# Patient Record
Sex: MALE | Race: BLACK OR AFRICAN AMERICAN | NOT HISPANIC OR LATINO | Employment: OTHER | URBAN - METROPOLITAN AREA
[De-identification: names, ages, dates, MRNs, and addresses within clinical notes are randomized per-mention and may not be internally consistent; named-entity substitution may affect disease eponyms.]

---

## 2018-05-14 ENCOUNTER — OFFICE VISIT (OUTPATIENT)
Dept: FAMILY MEDICINE | Facility: CLINIC | Age: 73
End: 2018-05-14
Payer: COMMERCIAL

## 2018-05-14 ENCOUNTER — LAB VISIT (OUTPATIENT)
Dept: LAB | Facility: HOSPITAL | Age: 73
End: 2018-05-14
Attending: FAMILY MEDICINE
Payer: COMMERCIAL

## 2018-05-14 VITALS
HEART RATE: 93 BPM | RESPIRATION RATE: 17 BRPM | WEIGHT: 143.31 LBS | HEIGHT: 67 IN | DIASTOLIC BLOOD PRESSURE: 90 MMHG | SYSTOLIC BLOOD PRESSURE: 142 MMHG | TEMPERATURE: 98 F | OXYGEN SATURATION: 97 % | BODY MASS INDEX: 22.49 KG/M2

## 2018-05-14 DIAGNOSIS — Z23 NEED FOR VACCINATION: ICD-10-CM

## 2018-05-14 DIAGNOSIS — Z12.9 SCREENING FOR CANCER: ICD-10-CM

## 2018-05-14 DIAGNOSIS — Z12.2 ENCOUNTER FOR SCREENING FOR LUNG CANCER: ICD-10-CM

## 2018-05-14 DIAGNOSIS — A60.01 HERPES SIMPLEX INFECTION OF PENIS: Primary | ICD-10-CM

## 2018-05-14 DIAGNOSIS — I10 ESSENTIAL HYPERTENSION: ICD-10-CM

## 2018-05-14 DIAGNOSIS — F17.200 SMOKING: ICD-10-CM

## 2018-05-14 DIAGNOSIS — H61.23 BILATERAL IMPACTED CERUMEN: ICD-10-CM

## 2018-05-14 DIAGNOSIS — Z13.6 SCREENING FOR AAA (ABDOMINAL AORTIC ANEURYSM): ICD-10-CM

## 2018-05-14 LAB
ALBUMIN SERPL BCP-MCNC: 4 G/DL
ALP SERPL-CCNC: 102 U/L
ALT SERPL W/O P-5'-P-CCNC: 25 U/L
ANION GAP SERPL CALC-SCNC: 11 MMOL/L
AST SERPL-CCNC: 28 U/L
BILIRUB SERPL-MCNC: 0.8 MG/DL
BUN SERPL-MCNC: 16 MG/DL
CALCIUM SERPL-MCNC: 10 MG/DL
CHLORIDE SERPL-SCNC: 105 MMOL/L
CHOLEST SERPL-MCNC: 231 MG/DL
CHOLEST/HDLC SERPL: 3.3 {RATIO}
CO2 SERPL-SCNC: 25 MMOL/L
CREAT SERPL-MCNC: 1.1 MG/DL
EST. GFR  (AFRICAN AMERICAN): >60 ML/MIN/1.73 M^2
EST. GFR  (NON AFRICAN AMERICAN): >60 ML/MIN/1.73 M^2
GLUCOSE SERPL-MCNC: 90 MG/DL
HDLC SERPL-MCNC: 70 MG/DL
HDLC SERPL: 30.3 %
LDLC SERPL CALC-MCNC: 146 MG/DL
NONHDLC SERPL-MCNC: 161 MG/DL
POTASSIUM SERPL-SCNC: 4.3 MMOL/L
PROT SERPL-MCNC: 8.9 G/DL
SODIUM SERPL-SCNC: 141 MMOL/L
TRIGL SERPL-MCNC: 75 MG/DL

## 2018-05-14 PROCEDURE — 99999 PR PBB SHADOW E&M-NEW PATIENT-LVL V: CPT | Mod: PBBFAC,,, | Performed by: FAMILY MEDICINE

## 2018-05-14 PROCEDURE — 36415 COLL VENOUS BLD VENIPUNCTURE: CPT | Mod: PN

## 2018-05-14 PROCEDURE — 3077F SYST BP >= 140 MM HG: CPT | Mod: CPTII,S$GLB,, | Performed by: FAMILY MEDICINE

## 2018-05-14 PROCEDURE — 80053 COMPREHEN METABOLIC PANEL: CPT

## 2018-05-14 PROCEDURE — 3080F DIAST BP >= 90 MM HG: CPT | Mod: CPTII,S$GLB,, | Performed by: FAMILY MEDICINE

## 2018-05-14 PROCEDURE — 80061 LIPID PANEL: CPT

## 2018-05-14 PROCEDURE — 99205 OFFICE O/P NEW HI 60 MIN: CPT | Mod: S$GLB,,, | Performed by: FAMILY MEDICINE

## 2018-05-14 RX ORDER — VALACYCLOVIR HYDROCHLORIDE 500 MG/1
500 TABLET, FILM COATED ORAL 2 TIMES DAILY
Qty: 6 TABLET | Refills: 0 | Status: SHIPPED | OUTPATIENT
Start: 2018-05-14 | End: 2018-05-17

## 2018-05-14 NOTE — PROGRESS NOTES
Routine Office Visit    Patient Name: Rashad Rodríguez    : 1945  MRN: [id number]    Subjective:  Rashad is a 73 y.o. male who presents today for:   Chief Complaint   Patient presents with    Penile lesion     personal    Nicotine Dependence     wants to discuss smoking program       73-year-old male comes in with concern of a lesion on his penis.  He reports that last week prior to having intercourse with his partner he attempted penetrative sex without having a full erection.  He states that after he finished having fact he noticed that sexual encounter, he had noticed that he had a small pimple on the penis.  He states that it had been there for a few days.  He reports no pain.  He reports no bloody discharge.  He does not recall having a previous episode similar to this.  Around the area where there was fluid skin is very dry.  The patient reports that he is sexually active only with his one female partner.    Several years.  He started smoking at age 19.  He states that he used to smoke more heavily in the past.  For the last several years he has been smoking 1 pack per day.  He reports no chronic cough.    The patient is on daily valsartan and amlodipine for his blood pressure.  He reports that his blood pressures normally well-controlled.  He states that it's been over 6 months since he hasn't had labs done.  He reports never having had an eye exam done.     Past Medical History  Past Medical History:   Diagnosis Date    Hyperlipidemia     Hypertension        Past Surgical History  Past Surgical History:   Procedure Laterality Date    NO PAST SURGERIES          Family History  Family History   Problem Relation Age of Onset    Cancer Mother     Stroke Father        Social History  Social History     Social History    Marital status:      Spouse name: N/A    Number of children: N/A    Years of education: N/A     Occupational History    Not on file.     Social History Main Topics    Smoking  status: Current Every Day Smoker     Packs/day: 1.00     Types: Cigarettes    Smokeless tobacco: Never Used    Alcohol use Yes      Comment: weekends    Drug use: Unknown    Sexual activity: Not on file     Other Topics Concern    Not on file     Social History Narrative    No narrative on file       Current Medications  Current Outpatient Prescriptions on File Prior to Visit   Medication Sig Dispense Refill    amlodipine (NORVASC) 10 MG tablet Take 10 mg by mouth once daily.      valsartan (DIOVAN) 40 MG tablet Take 40 mg by mouth once daily.      [DISCONTINUED] lisinopril (PRINIVIL,ZESTRIL) 40 MG tablet Take 1 tablet (40 mg total) by mouth once daily. 30 tablet 3    [DISCONTINUED] valacyclovir (VALTREX) 1000 MG tablet Take 1 tablet (1,000 mg total) by mouth 2 (two) times daily. 20 tablet 0     No current facility-administered medications on file prior to visit.        Allergies   Review of patient's allergies indicates:   Allergen Reactions    Cephalexin     Ciprofloxacin     Doxycycline Other (See Comments)     Stomach cramps    Bactrim [sulfamethoxazole-trimethoprim] Rash       Review of Systems   Constitutional: Negative for unexpected weight change.   HENT: Negative for ear pain and sore throat.    Eyes: Negative for visual disturbance.   Respiratory: Negative for shortness of breath.    Cardiovascular: Negative for chest pain.   Gastrointestinal: Negative for abdominal pain and blood in stool.   Endocrine: Negative for cold intolerance and heat intolerance.   Genitourinary: Negative for discharge, dysuria, frequency, penile pain, scrotal swelling and testicular pain.   Skin: Negative for rash.   Neurological: Negative for weakness, numbness and headaches.   Hematological: Negative for adenopathy.   Psychiatric/Behavioral: Negative for suicidal ideas.       BP (!) 142/90 (BP Location: Left arm, Patient Position: Sitting, BP Method: Medium (Manual))   Pulse 93   Temp 98.1 °F (36.7 °C) (Oral)    "Resp 17   Ht 5' 7" (1.702 m)   Wt 65 kg (143 lb 4.8 oz)   SpO2 97%   BMI 22.44 kg/m²     Physical Exam   Constitutional: He is oriented to person, place, and time. He appears well-developed and well-nourished. No distress.   HENT:   Head: Normocephalic and atraumatic.   Right Ear: External ear normal.   Left Ear: External ear normal.   Nose: Nose normal.   Mouth/Throat: Oropharynx is clear and moist.   Impacted cerumen bilaterally, which I was able to clear with irrigation (warm water, with H202 and alcohol) and a curette. After procedure, canals and TMs were well visualized and atraumatic   Eyes: Conjunctivae and EOM are normal. Pupils are equal, round, and reactive to light. Right eye exhibits no discharge. Left eye exhibits no discharge.   Neck: Normal range of motion. Neck supple. No tracheal deviation present. No thyromegaly present.   Cardiovascular: Normal rate, regular rhythm, normal heart sounds and intact distal pulses.    No murmur heard.  Pulmonary/Chest: Effort normal and breath sounds normal. No respiratory distress. He has no wheezes. He has no rales.   Abdominal: Soft. Bowel sounds are normal. He exhibits no distension and no mass. There is no tenderness.   Genitourinary: Testes normal. Circumcised.   Genitourinary Comments: See image   Lymphadenopathy:     He has no cervical adenopathy.   Neurological: He is alert and oriented to person, place, and time. No cranial nerve deficit.   Reflex Scores:       Patellar reflexes are 2+ on the right side and 2+ on the left side.  Skin: Skin is warm and dry. Capillary refill takes less than 2 seconds. No rash noted. He is not diaphoretic.   Psychiatric: He has a normal mood and affect. His behavior is normal.   Vitals reviewed.          Assessment/Plan:  Rashad was seen today for penile lesion and nicotine dependence.    Diagnoses and all orders for this visit:    Herpes simplex infection of penis  -     valACYclovir (VALTREX) 500 MG tablet; Take 1 tablet " (500 mg total) by mouth 2 (two) times daily.  Lesion consistent with HSV lesion. 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  Topical triple antibiotic given opens lesion to prevent superinfection.    Smoking  -     Ambulatory referral to Smoking Cessation Program  -     US Abdominal Aorta; Future  Patient referred to smoking cessation program  Advised AAA and lung cancer screening and patient agrees to these.    Essential hypertension  -     Comprehensive metabolic panel; Future  -     Lipid panel; Future  -     Microalbumin/creatinine urine ratio; Future  -     Ambulatory referral to Optometry  Continue current regimen.  Check fasting labs today.    Encounter for screening for lung cancer  -     CT Chest Lung Screening Low Dose; Future    Screening for AAA (abdominal aortic aneurysm)  -     US Abdominal Aorta; Future    Screening for cancer  -     CT Chest Lung Screening Low Dose; Future    Need for vaccination  -     varicella-zoster [id number], PF, (SHINGRIX, PF,) 50 mcg/0.5 mL injection; Inject 0.5 mLs into the muscle once. Now and 1 dose in 2-6 months    Bilateral impacted cerumen  Cleared with irrigation and curette

## 2018-05-17 ENCOUNTER — CLINICAL SUPPORT (OUTPATIENT)
Dept: SMOKING CESSATION | Facility: CLINIC | Age: 73
End: 2018-05-17
Payer: COMMERCIAL

## 2018-05-17 DIAGNOSIS — F17.200 NICOTINE DEPENDENCE: Primary | ICD-10-CM

## 2018-05-17 PROCEDURE — 99404 PREV MED CNSL INDIV APPRX 60: CPT | Mod: S$GLB,,,

## 2018-05-17 PROCEDURE — 99999 PR PBB SHADOW E&M-EST. PATIENT-LVL I: CPT | Mod: PBBFAC,,,

## 2018-05-17 RX ORDER — DM/P-EPHED/ACETAMINOPH/DOXYLAM 30-7.5/3
2 LIQUID (ML) ORAL
Qty: 108 LOZENGE | Refills: 0 | Status: SHIPPED | OUTPATIENT
Start: 2018-05-17 | End: 2018-06-13

## 2018-05-17 RX ORDER — IBUPROFEN 200 MG
1 TABLET ORAL DAILY
Qty: 14 PATCH | Refills: 0 | Status: SHIPPED | OUTPATIENT
Start: 2018-05-17 | End: 2018-06-13

## 2018-05-17 NOTE — Clinical Note
Patient presents for intake smoking 1pk of cigarettes per day, after assessment and discussion recommend the nicotine patch 21mg in conjunction with oral NRT 2mg for strong thoughts and urges to smoke, recommend an abrupt quit,  discussed strategies to help cut back and to help break the routine and habit associated with smoking, intake handout provided to the patient and discussed, all prescribed NRT discussed in depth as well as tobacco cessation medication s/e as well as usage discussed, contact card provided to the patient. Will continue to follow every two weeks while in the program.

## 2018-05-21 ENCOUNTER — OFFICE VISIT (OUTPATIENT)
Dept: FAMILY MEDICINE | Facility: CLINIC | Age: 73
End: 2018-05-21
Payer: COMMERCIAL

## 2018-05-21 ENCOUNTER — LAB VISIT (OUTPATIENT)
Dept: LAB | Facility: HOSPITAL | Age: 73
End: 2018-05-21
Attending: FAMILY MEDICINE
Payer: COMMERCIAL

## 2018-05-21 VITALS — TEMPERATURE: 98 F | DIASTOLIC BLOOD PRESSURE: 90 MMHG | SYSTOLIC BLOOD PRESSURE: 150 MMHG | HEART RATE: 88 BPM

## 2018-05-21 DIAGNOSIS — N48.1 BALANITIS: ICD-10-CM

## 2018-05-21 DIAGNOSIS — F17.200 SMOKING: ICD-10-CM

## 2018-05-21 DIAGNOSIS — I10 ESSENTIAL HYPERTENSION: Primary | ICD-10-CM

## 2018-05-21 DIAGNOSIS — E78.5 DYSLIPIDEMIA: ICD-10-CM

## 2018-05-21 PROCEDURE — 36415 COLL VENOUS BLD VENIPUNCTURE: CPT | Mod: PN

## 2018-05-21 PROCEDURE — 87491 CHLMYD TRACH DNA AMP PROBE: CPT

## 2018-05-21 PROCEDURE — 3080F DIAST BP >= 90 MM HG: CPT | Mod: CPTII,S$GLB,, | Performed by: FAMILY MEDICINE

## 2018-05-21 PROCEDURE — 87076 CULTURE ANAEROBE IDENT EACH: CPT

## 2018-05-21 PROCEDURE — 99214 OFFICE O/P EST MOD 30 MIN: CPT | Mod: S$GLB,,, | Performed by: FAMILY MEDICINE

## 2018-05-21 PROCEDURE — 3077F SYST BP >= 140 MM HG: CPT | Mod: CPTII,S$GLB,, | Performed by: FAMILY MEDICINE

## 2018-05-21 PROCEDURE — 99999 PR PBB SHADOW E&M-EST. PATIENT-LVL III: CPT | Mod: PBBFAC,,, | Performed by: FAMILY MEDICINE

## 2018-05-21 PROCEDURE — 87070 CULTURE OTHR SPECIMN AEROBIC: CPT

## 2018-05-21 PROCEDURE — 87075 CULTR BACTERIA EXCEPT BLOOD: CPT

## 2018-05-21 PROCEDURE — 86592 SYPHILIS TEST NON-TREP QUAL: CPT

## 2018-05-21 RX ORDER — VALSARTAN 320 MG/1
320 TABLET ORAL DAILY
COMMUNITY
End: 2018-05-21 | Stop reason: DRUGHIGH

## 2018-05-21 RX ORDER — CLOTRIMAZOLE AND BETAMETHASONE DIPROPIONATE 10; .64 MG/G; MG/G
CREAM TOPICAL 2 TIMES DAILY
Qty: 45 G | Refills: 0 | Status: SHIPPED | OUTPATIENT
Start: 2018-05-21 | End: 2018-06-13

## 2018-05-21 RX ORDER — VALSARTAN AND HYDROCHLOROTHIAZIDE 320; 12.5 MG/1; MG/1
1 TABLET, FILM COATED ORAL DAILY
Qty: 30 TABLET | Refills: 1 | Status: SHIPPED | OUTPATIENT
Start: 2018-05-21 | End: 2018-10-15 | Stop reason: SDUPTHER

## 2018-05-21 RX ORDER — ATORVASTATIN CALCIUM 10 MG/1
10 TABLET, FILM COATED ORAL DAILY
Qty: 30 TABLET | Refills: 1 | Status: SHIPPED | OUTPATIENT
Start: 2018-05-21 | End: 2018-06-13

## 2018-05-21 NOTE — PROGRESS NOTES
Routine Office Visit    Patient Name: Rashad oRdríguez    : 1945  MRN: [id number]    Subjective:  Rashad is a 73 y.o. male who presents today for:   Chief Complaint   Patient presents with    Follow-up     73-year-old male comes in for follow-up on hypertension.  He reports that he is taking his medications as prescribed.  He denies any side effects from the medications.  Patient did lab test last week.    Patient reports that he continues to have some clear discharge from his penis.  It is located in the shaft.  He is also now having some swelling in the area.  He denies any pain.  He denies any prolonged erections.  Patient reports the last time he had this he was seen by a dermatologist and was given a yeast infection cream.  He reports that this had helped a lot.      Past Medical History  Past Medical History:   Diagnosis Date    Hyperlipidemia     Hypertension        Past Surgical History  Past Surgical History:   Procedure Laterality Date    NO PAST SURGERIES          Family History  Family History   Problem Relation Age of Onset    Cancer Mother     Stroke Father        Social History  Social History     Social History    Marital status:      Spouse name: N/A    Number of children: N/A    Years of education: N/A     Occupational History    Not on file.     Social History Main Topics    Smoking status: Current Every Day Smoker     Packs/day: 1.00     Types: Cigarettes    Smokeless tobacco: Never Used    Alcohol use Yes      Comment: weekends    Drug use: Unknown    Sexual activity: Not on file     Other Topics Concern    Not on file     Social History Narrative    No narrative on file       Current Medications  Current Outpatient Prescriptions on File Prior to Visit   Medication Sig Dispense Refill    amlodipine (NORVASC) 10 MG tablet Take 10 mg by mouth once daily.      nicotine (NICODERM CQ) 21 mg/24 hr Place 1 patch onto the skin once daily. 14 patch 0    nicotine polacrilex 2  MG Lozg Take 1 lozenge (2 mg total) by mouth as needed (1 per hour as needed in place of a cigarette, max of 15 per day). 108 lozenge 0    [DISCONTINUED] valsartan (DIOVAN) 40 MG tablet Take 40 mg by mouth once daily.       No current facility-administered medications on file prior to visit.        Allergies   Review of patient's allergies indicates:   Allergen Reactions    Cephalexin     Ciprofloxacin     Doxycycline Other (See Comments)     Stomach cramps    Bactrim [sulfamethoxazole-trimethoprim] Rash       Review of Systems   Constitutional: Negative for unexpected weight change.   HENT: Negative for ear pain and sore throat.    Eyes: Negative for visual disturbance.   Respiratory: Negative for shortness of breath.    Cardiovascular: Negative for chest pain.   Gastrointestinal: Negative for abdominal pain and blood in stool.   Endocrine: Negative for cold intolerance and heat intolerance.   Genitourinary: Negative for dysuria, frequency, penile pain, scrotal swelling and testicular pain.        See HPI   Skin: Negative for rash.   Neurological: Negative for weakness, numbness and headaches.   Hematological: Negative for adenopathy.   Psychiatric/Behavioral: Negative for suicidal ideas.       BP (!) 150/90 (BP Location: Left arm, Patient Position: Sitting, BP Method: Medium (Manual)) Comment: took meds put on nicotine meds  Pulse 88   Temp 98 °F (36.7 °C) (Oral)     Physical Exam   Constitutional: He appears well-developed and well-nourished.   HENT:   Head: Normocephalic.   Right Ear: External ear normal.   Left Ear: External ear normal.   Nose: Nose normal.   Mouth/Throat: No oropharyngeal exudate.   Neck: Normal range of motion. Neck supple. No tracheal deviation present.   Cardiovascular: Normal rate, regular rhythm, normal heart sounds and intact distal pulses.    No murmur heard.  Pulmonary/Chest: Effort normal and breath sounds normal. He has no wheezes. He has no rales.   Abdominal: Soft. Bowel  sounds are normal. He exhibits no mass. There is no tenderness.   Genitourinary: Testes normal. Uncircumcised.   Genitourinary Comments: Edema of foreskin noted along with clear discharge, and white discharge consistent with smegma in folds of foreskin   Musculoskeletal: He exhibits no edema.   Lymphadenopathy:     He has no cervical adenopathy.   Skin: He is not diaphoretic.   Vitals reviewed.    Lab Visit on 05/14/2018   Component Date Value Ref Range Status    Microalbum.,U,Random 05/14/2018 49.0  ug/mL Final    Creatinine, Random Ur 05/14/2018 111.0  23.0 - 375.0 mg/dL Final    Comment: The random urine reference ranges provided were established   for 24 hour urine collections.  No reference ranges exist for  random urine specimens.  Correlate clinically.      Microalb Creat Ratio 05/14/2018 44.1* 0.0 - 30.0 ug/mg Final   Lab Visit on 05/14/2018   Component Date Value Ref Range Status    Sodium 05/14/2018 141  136 - 145 mmol/L Final    Potassium 05/14/2018 4.3  3.5 - 5.1 mmol/L Final    Chloride 05/14/2018 105  95 - 110 mmol/L Final    CO2 05/14/2018 25  23 - 29 mmol/L Final    Glucose 05/14/2018 90  70 - 110 mg/dL Final    BUN, Bld 05/14/2018 16  8 - 23 mg/dL Final    Creatinine 05/14/2018 1.1  0.5 - 1.4 mg/dL Final    Calcium 05/14/2018 10.0  8.7 - 10.5 mg/dL Final    Total Protein 05/14/2018 8.9* 6.0 - 8.4 g/dL Final    Albumin 05/14/2018 4.0  3.5 - 5.2 g/dL Final    Total Bilirubin 05/14/2018 0.8  0.1 - 1.0 mg/dL Final    Comment: For infants and newborns, interpretation of results should be based  on gestational age, weight and in agreement with clinical  observations.  Premature Infant recommended reference ranges:  Up to 24 hours.............<8.0 mg/dL  Up to 48 hours............<12.0 mg/dL  3-5 days..................<15.0 mg/dL  6-29 days.................<15.0 mg/dL      Alkaline Phosphatase 05/14/2018 102  55 - 135 U/L Final    AST 05/14/2018 28  10 - 40 U/L Final    ALT 05/14/2018 25   10 - 44 U/L Final    Anion Gap 05/14/2018 11  8 - 16 mmol/L Final    eGFR if  05/14/2018 >60  >60 mL/min/1.73 m^2 Final    eGFR if non African American 05/14/2018 >60  >60 mL/min/1.73 m^2 Final    Comment: Calculation used to obtain the estimated glomerular filtration  rate (eGFR) is the CKD-EPI equation.       Cholesterol 05/14/2018 231* 120 - 199 mg/dL Final    Comment: The National Cholesterol Education Program (NCEP) has set the  following guidelines (reference ranges) for Cholesterol:  Optimal.....................<200 mg/dL  Borderline High.............200-239 mg/dL  High........................> or = 240 mg/dL      Triglycerides 05/14/2018 75  30 - 150 mg/dL Final    Comment: The National Cholesterol Education Program (NCEP) has set the  following guidelines (reference values) for triglycerides:  Normal......................<150 mg/dL  Borderline High.............150-199 mg/dL  High........................200-499 mg/dL      HDL 05/14/2018 70  40 - 75 mg/dL Final    Comment: The National Cholesterol Education Program (NCEP) has set the  following guidelines (reference values) for HDL Cholesterol:  Low...............<40 mg/dL  Optimal...........>60 mg/dL      LDL Cholesterol 05/14/2018 146.0  63.0 - 159.0 mg/dL Final    Comment: The National Cholesterol Education Program (NCEP) has set the  following guidelines (reference values) for LDL Cholesterol:  Optimal.......................<130 mg/dL  Borderline High...............130-159 mg/dL  High..........................160-189 mg/dL  Very High.....................>190 mg/dL      HDL/Chol Ratio 05/14/2018 30.3  20.0 - 50.0 % Final    Total Cholesterol/HDL Ratio 05/14/2018 3.3  2.0 - 5.0 Final    Non-HDL Cholesterol 05/14/2018 161  mg/dL Final    Comment: Risk category and Non-HDL cholesterol goals:  Coronary heart disease (CHD)or equivalent (10-year risk of CHD >20%):  Non-HDL cholesterol goal     <130 mg/dL  Two or more CHD risk factors  and 10-year risk of CHD <= 20%:  Non-HDL cholesterol goal     <160 mg/dL  0 to 1 CHD risk factor:  Non-HDL cholesterol goal     <190 mg/dL         Assessment/Plan:  Rashad was seen today for follow-up.    Diagnoses and all orders for this visit:    Essential hypertension  -     valsartan-hydrochlorothiazide (DIOVAN-HCT) 320-12.5 mg per tablet; Take 1 tablet by mouth once daily.  Patient continue amlodipine 10 mg and will change valsartan 320 mg to valsartan/hydrochlorothiazide 320 mg-12.5 mg.  Side effects of change discussed with patient.  Will recheck blood pressure in 2-4 weeks.  Patient come into office sooner if any side effects.    Balanitis  -     clotrimazole-betamethasone 1-0.05% (LOTRISONE) cream; Apply topically 2 (two) times daily.  -     Ambulatory referral to Dermatology  -     CULTURE, AEROBIC  (SPECIFY SOURCE)  -     Culture, Anaerobic  -     C. trachomatis/N. gonorrhoeae by AMP DNA Urine; Future  -     RPR; Future  Culture collected.  Will empirically start patient on Lotrisone cream.  Patient referred to Dermatology for evaluation.    Smoking  Patient continue with smoking cessation counseling.  He was encouraged to keep up the good work.    Dyslipidemia  -     atorvastatin (LIPITOR) 10 MG tablet; Take 1 tablet (10 mg total) by mouth once daily.  Calculated ASCVD risk score of 36%.  Cardiovascular implications of this cord discussed with patient.  Side effects, risks, and benefits of statin medications were discussed with the patient.  Patient agrees to start statin medication.  Will recheck liver function test in 2-4 weeks.

## 2018-05-22 ENCOUNTER — OFFICE VISIT (OUTPATIENT)
Dept: OPTOMETRY | Facility: CLINIC | Age: 73
End: 2018-05-22
Payer: COMMERCIAL

## 2018-05-22 DIAGNOSIS — H25.13 NUCLEAR SCLEROSIS OF BOTH EYES: Primary | ICD-10-CM

## 2018-05-22 DIAGNOSIS — I10 HTN (HYPERTENSION), MALIGNANT: ICD-10-CM

## 2018-05-22 DIAGNOSIS — H52.7 REFRACTIVE ERROR: ICD-10-CM

## 2018-05-22 LAB
C TRACH DNA SPEC QL NAA+PROBE: NOT DETECTED
N GONORRHOEA DNA SPEC QL NAA+PROBE: NOT DETECTED
RPR SER QL: NORMAL

## 2018-05-22 PROCEDURE — 92004 COMPRE OPH EXAM NEW PT 1/>: CPT | Mod: S$GLB,,, | Performed by: OPTOMETRIST

## 2018-05-22 PROCEDURE — 92015 DETERMINE REFRACTIVE STATE: CPT | Mod: S$GLB,,, | Performed by: OPTOMETRIST

## 2018-05-22 PROCEDURE — 99999 PR PBB SHADOW E&M-EST. PATIENT-LVL II: CPT | Mod: PBBFAC,,, | Performed by: OPTOMETRIST

## 2018-05-22 NOTE — PROGRESS NOTES
Subjective:       Patient ID: Rashad Rodríguez is a 73 y.o. male      Chief Complaint   Patient presents with    Concerns About Ocular Health    Hypertensive Eye Exam     History of Present Illness  Dls: never    Pt here for hypertensive eye exam.   Pt states no changes in vision. Pt does not wear any glasses.   Pt states no tearing no itching no burning no pain no ha's no floaters.     Eye meds:    None     Assessment/Plan:     1. Nuclear sclerosis of both eyes  Educated pt on presence of cataracts and effects on vision. No surgery at this time. Recheck in one year.    2. HTN (hypertension), malignant  No hypertensive retinopathy. Continue BP control. RTC 1 year for DFE.    3. Refractive error  Educated patient on refractive error and discussed lens options. Dispensed updated spectacle Rx. Educated about adaptation period to new specs.    Eyeglass Final Rx     Eyeglass Final Rx       Sphere Cylinder Axis Add    Right -0.25 +2.50 180 +2.50    Left -0.50 +2.50 180 +2.50    Expiration Date:  5/23/2019                Follow-up in about 1 year (around 5/22/2019).

## 2018-05-22 NOTE — LETTER
May 22, 2018      Juanito Dejesus Jr., MD  [address]  [place] [address]           [facility]  [address]  [place] [address]  Phone: [phone]  Fax: [phone]          Patient: Rashad Rodríguez   MR Number: [id number]   YOB: 1945   Date of Visit: 5/22/2018       Dear Dr. Juanito Dejesus Jr.:    Thank you for referring Rashad Rodríguez to me for evaluation. Attached you will find relevant portions of my assessment and plan of care.    If you have questions, please do not hesitate to call me. I look forward to following Rashad Rodríguez along with you.    Sincerely,    Adriana Haro, OD    Enclosure  CC:  No Recipients    If you would like to receive this communication electronically, please contact [email or web address] or [phone] to request more information on [employer] access.    For providers and/or their staff who would like to refer a patient to [facility], please contact us through our one-stop-shop provider referral line, [facility] , at [phone].    If you feel you have received this communication in error or would no longer like to receive these types of communications, please e-mail [email or web address]

## 2018-05-24 LAB — BACTERIA SPEC AEROBE CULT: NORMAL

## 2018-05-27 LAB — BACTERIA SPEC ANAEROBE CULT: NORMAL

## 2018-05-28 ENCOUNTER — HOSPITAL ENCOUNTER (OUTPATIENT)
Dept: RADIOLOGY | Facility: HOSPITAL | Age: 73
Discharge: HOME | End: 2018-05-28
Attending: FAMILY MEDICINE
Payer: COMMERCIAL

## 2018-05-28 ENCOUNTER — HOSPITAL ENCOUNTER (OUTPATIENT)
Dept: RADIOLOGY | Facility: HOSPITAL | Age: 73
Discharge: HOME | End: 2018-05-28
Attending: FAMILY MEDICINE

## 2018-05-28 DIAGNOSIS — Z12.9 SCREENING FOR CANCER: ICD-10-CM

## 2018-05-28 DIAGNOSIS — Z12.2 ENCOUNTER FOR SCREENING FOR LUNG CANCER: ICD-10-CM

## 2018-05-28 DIAGNOSIS — Z13.6 SCREENING FOR AAA (ABDOMINAL AORTIC ANEURYSM): ICD-10-CM

## 2018-05-28 DIAGNOSIS — F17.200 SMOKING: ICD-10-CM

## 2018-05-28 PROCEDURE — 76775 US EXAM ABDO BACK WALL LIM: CPT | Mod: 26,,, | Performed by: RADIOLOGY

## 2018-05-28 PROCEDURE — 76775 US EXAM ABDO BACK WALL LIM: CPT | Mod: TC

## 2018-05-28 PROCEDURE — 76497 UNLISTED CT PROCEDURE: CPT | Mod: TC

## 2018-05-29 ENCOUNTER — TELEPHONE (OUTPATIENT)
Dept: FAMILY MEDICINE | Facility: CLINIC | Age: 73
End: 2018-05-29

## 2018-05-29 NOTE — TELEPHONE ENCOUNTER
Please let patient know I would like to see him either today or tomorrow, as his CT came back abnormal and we need to do some further tests.

## 2018-05-29 NOTE — TELEPHONE ENCOUNTER
Attempted to reach patient re: scheduling appt to discuss labs with Dr. Dejesus. Phone was busy several times.

## 2018-05-30 ENCOUNTER — TELEPHONE (OUTPATIENT)
Dept: FAMILY MEDICINE | Facility: CLINIC | Age: 73
End: 2018-05-30

## 2018-05-30 NOTE — TELEPHONE ENCOUNTER
No answer; LM to call office regarding previous message regarding scheduling OV to discuss CT results

## 2018-05-30 NOTE — TELEPHONE ENCOUNTER
Notified pt  Of needing to schedule appt to discuss Ct scan. Pt. Had appt for next week but rescheduled to 6/1/18.

## 2018-05-30 NOTE — TELEPHONE ENCOUNTER
----- Message from Mehnaz Morales sent at 5/30/2018  8:48 AM CDT -----  Contact: self  Patient returning call. Please contact him at [phone].    Thanks

## 2018-06-01 ENCOUNTER — TELEPHONE (OUTPATIENT)
Dept: SLEEP MEDICINE | Facility: CLINIC | Age: 73
End: 2018-06-01

## 2018-06-01 ENCOUNTER — OFFICE VISIT (OUTPATIENT)
Dept: FAMILY MEDICINE | Facility: CLINIC | Age: 73
End: 2018-06-01
Payer: COMMERCIAL

## 2018-06-01 VITALS
DIASTOLIC BLOOD PRESSURE: 90 MMHG | TEMPERATURE: 98 F | WEIGHT: 139 LBS | RESPIRATION RATE: 20 BRPM | SYSTOLIC BLOOD PRESSURE: 148 MMHG | BODY MASS INDEX: 21.82 KG/M2 | HEIGHT: 67 IN | OXYGEN SATURATION: 98 % | HEART RATE: 90 BPM

## 2018-06-01 DIAGNOSIS — E27.8 ADRENAL NODULE: ICD-10-CM

## 2018-06-01 DIAGNOSIS — R91.8 LUNG MASS: Primary | ICD-10-CM

## 2018-06-01 DIAGNOSIS — N28.89 RENAL MASS: ICD-10-CM

## 2018-06-01 PROCEDURE — 99214 OFFICE O/P EST MOD 30 MIN: CPT | Mod: S$GLB,,, | Performed by: FAMILY MEDICINE

## 2018-06-01 PROCEDURE — 3080F DIAST BP >= 90 MM HG: CPT | Mod: CPTII,S$GLB,, | Performed by: FAMILY MEDICINE

## 2018-06-01 PROCEDURE — 99999 PR PBB SHADOW E&M-EST. PATIENT-LVL IV: CPT | Mod: PBBFAC,,, | Performed by: FAMILY MEDICINE

## 2018-06-01 PROCEDURE — 3077F SYST BP >= 140 MM HG: CPT | Mod: CPTII,S$GLB,, | Performed by: FAMILY MEDICINE

## 2018-06-01 RX ORDER — HYDROXYZINE HYDROCHLORIDE 10 MG/1
TABLET, FILM COATED ORAL
COMMUNITY
Start: 2018-05-31 | End: 2018-06-13

## 2018-06-01 RX ORDER — TRIAMCINOLONE ACETONIDE 1 MG/G
CREAM TOPICAL
COMMUNITY
Start: 2018-05-31 | End: 2018-06-13

## 2018-06-01 RX ORDER — ZOSTER VACCINE RECOMBINANT, ADJUVANTED 50 MCG/0.5
KIT INTRAMUSCULAR
Refills: 0 | COMMUNITY
Start: 2018-05-21 | End: 2018-06-13

## 2018-06-01 NOTE — TELEPHONE ENCOUNTER
I left the pt a voicemail about consult appointment with Dr Skinner on 6/12/18 at 2pm at [facility] location. Also, I will mail appointment to pt and left the office number, if pt has any questions.

## 2018-06-09 NOTE — PROGRESS NOTES
Routine Office Visit    Patient Name: Rashad Rodríguez    : 1945  MRN: [id number]    Subjective:  Rashad is a 73 y.o. male who presents today for:   Chief Complaint   Patient presents with    Results     73-year-old male comes in after being recalled with abnormal low dose CT scan of the lungs.  The images shown opacities in the lungs that required further evaluation the largest opacity in the right lung appeared cavitated and measured 5.7 cm.  In addition the images shown a right adrenal nodule measuring 2.5 cm there was a smaller left adrenal nodule measuring 1.1 cm the right kidney also had a 1 cm cortical nodule.  At today's visit, the patient reports no acute concerns.  He reports no symptoms.    Past Medical History  Past Medical History:   Diagnosis Date    Hyperlipidemia     Hypertension        Past Surgical History  Past Surgical History:   Procedure Laterality Date    NO PAST SURGERIES          Family History  Family History   Problem Relation Age of Onset    Cancer Mother     Stroke Father     No Known Problems Sister     No Known Problems Brother     No Known Problems Maternal Aunt     No Known Problems Maternal Uncle     No Known Problems Paternal Aunt     No Known Problems Paternal Uncle     No Known Problems Maternal Grandmother     No Known Problems Maternal Grandfather     No Known Problems Paternal Grandmother     No Known Problems Paternal Grandfather     Amblyopia Neg Hx     Blindness Neg Hx     Cataracts Neg Hx     Diabetes Neg Hx     Glaucoma Neg Hx     Hypertension Neg Hx     Macular degeneration Neg Hx     Retinal detachment Neg Hx     Strabismus Neg Hx     Thyroid disease Neg Hx        Social History  Social History     Social History    Marital status:      Spouse name: N/A    Number of children: N/A    Years of education: N/A     Occupational History    Not on file.     Social History Main Topics    Smoking status: Current Every Day Smoker      Packs/day: 1.00     Types: Cigarettes    Smokeless tobacco: Never Used    Alcohol use Yes      Comment: weekends    Drug use: Unknown    Sexual activity: Not on file     Other Topics Concern    Not on file     Social History Narrative    No narrative on file       Current Medications  Current Outpatient Prescriptions on File Prior to Visit   Medication Sig Dispense Refill    amlodipine (NORVASC) 10 MG tablet Take 10 mg by mouth once daily.      atorvastatin (LIPITOR) 10 MG tablet Take 1 tablet (10 mg total) by mouth once daily. 30 tablet 1    clotrimazole-betamethasone 1-0.05% (LOTRISONE) cream Apply topically 2 (two) times daily. 45 g 0    nicotine (NICODERM CQ) 21 mg/24 hr Place 1 patch onto the skin once daily. 14 patch 0    nicotine polacrilex 2 MG Lozg Take 1 lozenge (2 mg total) by mouth as needed (1 per hour as needed in place of a cigarette, max of 15 per day). 108 lozenge 0    valsartan-hydrochlorothiazide (DIOVAN-HCT) 320-12.5 mg per tablet Take 1 tablet by mouth once daily. 30 tablet 1     No current facility-administered medications on file prior to visit.        Allergies   Review of patient's allergies indicates:   Allergen Reactions    Cephalexin     Ciprofloxacin     Doxycycline Other (See Comments)     Stomach cramps    Bactrim [sulfamethoxazole-trimethoprim] Rash       Review of Systems   Constitutional: Negative for unexpected weight change.   HENT: Negative for ear pain and sore throat.    Eyes: Negative for visual disturbance.   Respiratory: Negative for shortness of breath.    Cardiovascular: Negative for chest pain.   Gastrointestinal: Negative for abdominal pain and blood in stool.   Endocrine: Negative for cold intolerance and heat intolerance.   Genitourinary: Negative for dysuria and frequency.   Skin: Negative for rash.   Neurological: Negative for weakness, numbness and headaches.   Hematological: Negative for adenopathy.   Psychiatric/Behavioral: Negative for suicidal  "ideas.       BP (!) 148/90 (BP Location: Left arm, Patient Position: Sitting, BP Method: Medium (Manual))   Pulse 90   Temp 98 °F (36.7 °C) (Oral)   Resp 20   Ht 5' 7" (1.702 m)   Wt 63 kg (139 lb)   SpO2 98%   BMI 21.77 kg/m²     Physical Exam   Constitutional: He appears well-developed and well-nourished.   HENT:   Head: Normocephalic.   Right Ear: External ear normal.   Left Ear: External ear normal.   Nose: Nose normal.   Mouth/Throat: No oropharyngeal exudate.   Neck: Normal range of motion. Neck supple. No tracheal deviation present.   Cardiovascular: Normal rate, regular rhythm, normal heart sounds and intact distal pulses.    No murmur heard.  Pulmonary/Chest: Effort normal and breath sounds normal. He has no wheezes. He has no rales.   Abdominal: Soft. Bowel sounds are normal. He exhibits no mass. There is no tenderness.   Musculoskeletal: He exhibits no edema.   Lymphadenopathy:     He has no cervical adenopathy.   Skin: He is not diaphoretic.   Vitals reviewed.        Assessment/Plan:  Rashad was seen today for results.    Diagnoses and all orders for this visit:    Lung mass  -     Ambulatory referral to Pulmonology  -     Quantiferon Gold TB; Future  -     Quantiferon Gold TB; Future  -     CBC auto differential; Future    Renal mass  -     Ambulatory referral to Urology    Adrenal nodule  -     Ambulatory referral to Urology      Given the multiple abnormalities found on CT T of the lungs which was done for lung cancer screening given the patient's smoking history, the patient was strongly encouraged to follow up with a pulmonologist and urologist.  The he was advised that his the findings may be benign however he was also advised that his findings may be signs a of malignancy/cancer and as such require further evaluation.  The patient was at 1st hesitant to make follow-up appointments.  He was explained the risks to his life if he did not make follow-up appointments.  The an appointment with " Urology was scheduled for next week.  The office staff from Pulmonary will call him to schedule a pulmonary appointment.  He is to follow up in the office in 2 weeks

## 2018-06-13 ENCOUNTER — TELEPHONE (OUTPATIENT)
Dept: UROLOGY | Facility: CLINIC | Age: 73
End: 2018-06-13

## 2018-06-13 NOTE — TELEPHONE ENCOUNTER
Pt didn't want to wait until later appt do to  detained in surgery. Pt would like to know if  can go over results (ct an ultrasound) an give him a call with the results.please advise-scott

## 2018-07-17 ENCOUNTER — TELEPHONE (OUTPATIENT)
Dept: FAMILY MEDICINE | Facility: CLINIC | Age: 73
End: 2018-07-17

## 2018-07-17 NOTE — TELEPHONE ENCOUNTER
Spoke with pt, he had concerns about the recent recall of Atorvastatin, suggested he contact or go to his prescribing Pharmacy with medication to get more information.No further concerns noted.

## 2018-07-17 NOTE — TELEPHONE ENCOUNTER
----- Message from Moni Reid sent at 7/17/2018  7:44 AM CDT -----  Contact: self  Pt calling to discuss b/p. Please call [phone]

## 2018-08-07 ENCOUNTER — CLINICAL SUPPORT (OUTPATIENT)
Dept: SMOKING CESSATION | Facility: CLINIC | Age: 73
End: 2018-08-07
Payer: COMMERCIAL

## 2018-08-07 DIAGNOSIS — F17.200 NICOTINE DEPENDENCE: Primary | ICD-10-CM

## 2018-08-07 PROCEDURE — 99407 BEHAV CHNG SMOKING > 10 MIN: CPT | Mod: S$GLB,,, | Performed by: INTERNAL MEDICINE

## 2018-08-07 NOTE — PROGRESS NOTES
Spoke with patient today in regard to smoking cessation progress for 3 month follow up, he states not tobacco free. Patient has scheduled an appointment to return to the program for Quit attempt #2 on 8/16/18 @ 2:00 pm. Informed patient of benefit period, future follow ups, and contact information if any further help or support is needed. Will resolve episode and complete smart form for Quit attempt #1.

## 2018-08-12 DIAGNOSIS — I10 ESSENTIAL HYPERTENSION: ICD-10-CM

## 2018-08-14 RX ORDER — VALSARTAN AND HYDROCHLOROTHIAZIDE 320; 12.5 MG/1; MG/1
TABLET, FILM COATED ORAL
Qty: 30 TABLET | Refills: 0 | OUTPATIENT
Start: 2018-08-14

## 2018-08-15 NOTE — TELEPHONE ENCOUNTER
Pharmacy is requesting medication, refill.  Please recheck to patient and asking if he is going to be following up here as he never returned

## 2018-08-16 ENCOUNTER — CLINICAL SUPPORT (OUTPATIENT)
Dept: SMOKING CESSATION | Facility: CLINIC | Age: 73
End: 2018-08-16
Payer: COMMERCIAL

## 2018-08-16 DIAGNOSIS — F17.200 NICOTINE DEPENDENCE: Primary | ICD-10-CM

## 2018-08-16 PROCEDURE — 99999 PR PBB SHADOW E&M-EST. PATIENT-LVL I: CPT | Mod: PBBFAC,,,

## 2018-08-16 RX ORDER — BUPROPION HYDROCHLORIDE 150 MG/1
150 TABLET, EXTENDED RELEASE ORAL 2 TIMES DAILY
Qty: 60 TABLET | Refills: 0 | Status: SHIPPED | OUTPATIENT
Start: 2018-08-16 | End: 2020-03-21

## 2018-08-16 RX ORDER — ASPIRIN/CALCIUM CARB/MAGNESIUM 325 MG
4 TABLET ORAL
Qty: 108 LOZENGE | Refills: 0 | Status: SHIPPED | OUTPATIENT
Start: 2018-08-16 | End: 2018-09-16

## 2018-08-16 RX ORDER — DIPHENHYDRAMINE HCL 25 MG
4 CAPSULE ORAL
Qty: 100 EACH | Refills: 0 | Status: SHIPPED | OUTPATIENT
Start: 2018-08-16 | End: 2018-09-16

## 2018-10-15 ENCOUNTER — OFFICE VISIT (OUTPATIENT)
Dept: FAMILY MEDICINE | Facility: CLINIC | Age: 73
End: 2018-10-15
Payer: COMMERCIAL

## 2018-10-15 VITALS
DIASTOLIC BLOOD PRESSURE: 100 MMHG | BODY MASS INDEX: 22.13 KG/M2 | OXYGEN SATURATION: 97 % | SYSTOLIC BLOOD PRESSURE: 152 MMHG | HEART RATE: 93 BPM | WEIGHT: 141.31 LBS | TEMPERATURE: 98 F

## 2018-10-15 DIAGNOSIS — I10 ESSENTIAL HYPERTENSION: Primary | ICD-10-CM

## 2018-10-15 DIAGNOSIS — N28.89 RENAL MASS: ICD-10-CM

## 2018-10-15 DIAGNOSIS — E27.8 ADRENAL NODULE: ICD-10-CM

## 2018-10-15 DIAGNOSIS — Z23 NEED FOR VACCINATION: ICD-10-CM

## 2018-10-15 DIAGNOSIS — R91.8 LUNG MASS: ICD-10-CM

## 2018-10-15 PROCEDURE — 3080F DIAST BP >= 90 MM HG: CPT | Mod: CPTII,S$GLB,, | Performed by: FAMILY MEDICINE

## 2018-10-15 PROCEDURE — 99214 OFFICE O/P EST MOD 30 MIN: CPT | Mod: 25,S$GLB,, | Performed by: FAMILY MEDICINE

## 2018-10-15 PROCEDURE — 90471 IMMUNIZATION ADMIN: CPT | Mod: S$GLB,,, | Performed by: FAMILY MEDICINE

## 2018-10-15 PROCEDURE — 1101F PT FALLS ASSESS-DOCD LE1/YR: CPT | Mod: CPTII,S$GLB,, | Performed by: FAMILY MEDICINE

## 2018-10-15 PROCEDURE — 3077F SYST BP >= 140 MM HG: CPT | Mod: CPTII,S$GLB,, | Performed by: FAMILY MEDICINE

## 2018-10-15 PROCEDURE — 90662 IIV NO PRSV INCREASED AG IM: CPT | Mod: S$GLB,,, | Performed by: FAMILY MEDICINE

## 2018-10-15 PROCEDURE — 99999 PR PBB SHADOW E&M-EST. PATIENT-LVL IV: CPT | Mod: PBBFAC,,, | Performed by: FAMILY MEDICINE

## 2018-10-15 RX ORDER — ATORVASTATIN CALCIUM 10 MG/1
10 TABLET, FILM COATED ORAL DAILY
Qty: 90 TABLET | Refills: 0 | Status: SHIPPED | OUTPATIENT
Start: 2018-10-15 | End: 2019-01-20 | Stop reason: SDUPTHER

## 2018-10-15 RX ORDER — VALSARTAN AND HYDROCHLOROTHIAZIDE 320; 12.5 MG/1; MG/1
1 TABLET, FILM COATED ORAL DAILY
Qty: 90 TABLET | Refills: 0 | Status: SHIPPED | OUTPATIENT
Start: 2018-10-15 | End: 2019-01-20 | Stop reason: SDUPTHER

## 2018-10-15 RX ORDER — AMLODIPINE BESYLATE 10 MG/1
10 TABLET ORAL DAILY
Qty: 90 TABLET | Refills: 0 | Status: SHIPPED | OUTPATIENT
Start: 2018-10-15 | End: 2019-01-20 | Stop reason: SDUPTHER

## 2018-10-15 NOTE — PROGRESS NOTES
Routine Office Visit    Patient Name: Rashad Rodríguez    : 1945  MRN: [id number]    Subjective:  Rashad is a 73 y.o. male who presents today for:   Chief Complaint   Patient presents with    Medication Refill     2 wk refill out of meds       73-year-old male comes in for follow-up on hypertension.  He reports that he has been out of his blood pressure medication for a while.  He reports that his valsartan hydrochlorothiazide was not part of the recall.  He states that he referred this with the pharmacy.  He states that he has not been on the amlodipine because he thought he was not supposed to continue this 1 he was switched to the valsartan hydrochlorothiazide.  He denies chest pain, palpitations, headaches, dizziness, or passing out.  The patient has not followed up with Pulmonary or with Urology as previously advised because of a lung nodules and a adrenal/renal masses. He denies shortness of breath, coughing, blood in urine, or any urinary symptoms.    Past Medical History  Past Medical History:   Diagnosis Date    Hyperlipidemia     Hypertension        Past Surgical History  Past Surgical History:   Procedure Laterality Date    NO PAST SURGERIES          Family History  Family History   Problem Relation Age of Onset    Cancer Mother     Stroke Father     No Known Problems Sister     No Known Problems Brother     No Known Problems Maternal Aunt     No Known Problems Maternal Uncle     No Known Problems Paternal Aunt     No Known Problems Paternal Uncle     No Known Problems Maternal Grandmother     No Known Problems Maternal Grandfather     No Known Problems Paternal Grandmother     No Known Problems Paternal Grandfather     Amblyopia Neg Hx     Blindness Neg Hx     Cataracts Neg Hx     Diabetes Neg Hx     Glaucoma Neg Hx     Hypertension Neg Hx     Macular degeneration Neg Hx     Retinal detachment Neg Hx     Strabismus Neg Hx     Thyroid disease Neg Hx        Social  History  Social History     Socioeconomic History    Marital status:      Spouse name: Not on file    Number of children: Not on file    Years of education: Not on file    Highest education level: Not on file   Social Needs    Financial resource strain: Not on file    Food insecurity - worry: Not on file    Food insecurity - inability: Not on file    Transportation needs - medical: Not on file    Transportation needs - non-medical: Not on file   Occupational History    Not on file   Tobacco Use    Smoking status: Current Every Day Smoker     Packs/day: 1.00     Types: Cigarettes    Smokeless tobacco: Never Used   Substance and Sexual Activity    Alcohol use: Yes     Comment: weekends    Drug use: No    Sexual activity: Not on file   Other Topics Concern    Not on file   Social History Narrative    Not on file       Current Medications  Current Outpatient Medications on File Prior to Visit   Medication Sig Dispense Refill    [DISCONTINUED] atorvastatin (LIPITOR) 10 MG tablet Take 10 mg by mouth once daily.      [DISCONTINUED] valsartan-hydrochlorothiazide (DIOVAN-HCT) 320-12.5 mg per tablet Take 1 tablet by mouth once daily. 30 tablet 1    buPROPion (WELLBUTRIN SR) 150 MG TBSR 12 hr tablet Take 1 tablet (150 mg total) by mouth 2 (two) times daily. 60 tablet 0    [DISCONTINUED] amlodipine (NORVASC) 10 MG tablet Take 10 mg by mouth once daily.       No current facility-administered medications on file prior to visit.        Allergies   Review of patient's allergies indicates:   Allergen Reactions    Cephalexin     Ciprofloxacin     Doxycycline Other (See Comments)     Stomach cramps    Bactrim [sulfamethoxazole-trimethoprim] Rash       Review of Systems   Constitutional: Negative for chills, diaphoresis, fever and unexpected weight change.   Respiratory: Negative for cough, shortness of breath and wheezing.    Cardiovascular: Negative for chest pain and palpitations.   Gastrointestinal:  Negative for abdominal pain, blood in stool, constipation, diarrhea, nausea and vomiting.   Endocrine: Negative for polydipsia, polyphagia and polyuria.   Genitourinary: Negative for dysuria.   Neurological: Negative for dizziness, syncope, weakness, light-headedness and headaches.         BP (!) 152/100 (BP Location: Left arm, Patient Position: Sitting, BP Method: Medium (Manual))   Pulse 93   Temp 97.9 °F (36.6 °C)   Wt 64.1 kg (141 lb 5 oz)   SpO2 97%   BMI 22.13 kg/m²     Physical Exam   Constitutional: He appears well-developed and well-nourished.   HENT:   Head: Normocephalic.   Right Ear: External ear normal.   Left Ear: External ear normal.   Nose: Nose normal.   Mouth/Throat: No oropharyngeal exudate.   Eyes: EOM are normal. Pupils are equal, round, and reactive to light.   Neck: Normal range of motion. Neck supple. No tracheal deviation present.   Cardiovascular: Normal rate, regular rhythm, normal heart sounds and intact distal pulses.   No murmur heard.  Pulmonary/Chest: Effort normal and breath sounds normal. He has no wheezes. He has no rales.   Abdominal: Soft. Bowel sounds are normal. He exhibits no mass. There is no tenderness.   Musculoskeletal: He exhibits no edema.   Lymphadenopathy:     He has no cervical adenopathy.   Skin: He is not diaphoretic.   Vitals reviewed.      Assessment/Plan:  Rashad was seen today for medication refill.    Diagnoses and all orders for this visit:    Essential hypertension  -     amLODIPine (NORVASC) 10 MG tablet; Take 1 tablet (10 mg total) by mouth once daily.  -     atorvastatin (LIPITOR) 10 MG tablet; Take 1 tablet (10 mg total) by mouth once daily.  -     valsartan-hydrochlorothiazide (DIOVAN-HCT) 320-12.5 mg per tablet; Take 1 tablet by mouth once daily.  -     Renal function panel; Future  Blood pressure regimen reviewed with patient.  He is to continue amlodipine 10 mg and valsartan hydrochlorothiazide 320-12.5.  He is to return next week for a nurse  visit blood pressure check along with a renal function test.  We discussed risk of uncontrolled blood pressure including stroke, heart attack, renal disease, eye disease, coma, and death.  Importance of compliance and follow-up discussed.  Patient agrees to come in next week for the blood pressure check.    Renal mass  -     Ambulatory referral to Urology  Once again place referral for Urology for evaluation and patient agrees to keep appointment.    Lung mass  -     Ambulatory referral to Pulmonology  Patient states that he does not keep appointment with Pulmonary last time because he was told doctor was asleep doctor and he was not aware that he was a lung doctor.  He agrees to keep the appointment this time.    Adrenal nodule  -     Ambulatory referral to Urology    Need for vaccination  -     Influenza - High Dose (65+) (PF) (IM)          This office note has been dictated.  This dictation has been generated using M-Modal Fluency Direct dictation; some phonetic errors may occur.

## 2018-10-17 ENCOUNTER — TELEPHONE (OUTPATIENT)
Dept: ADMINISTRATIVE | Facility: HOSPITAL | Age: 73
End: 2018-10-17

## 2018-10-22 ENCOUNTER — LAB VISIT (OUTPATIENT)
Dept: LAB | Facility: HOSPITAL | Age: 73
End: 2018-10-22
Attending: FAMILY MEDICINE
Payer: COMMERCIAL

## 2018-10-22 ENCOUNTER — CLINICAL SUPPORT (OUTPATIENT)
Dept: FAMILY MEDICINE | Facility: CLINIC | Age: 73
End: 2018-10-22
Payer: COMMERCIAL

## 2018-10-22 VITALS — HEART RATE: 72 BPM | DIASTOLIC BLOOD PRESSURE: 80 MMHG | SYSTOLIC BLOOD PRESSURE: 130 MMHG

## 2018-10-22 DIAGNOSIS — Z01.30 BLOOD PRESSURE CHECK: Primary | ICD-10-CM

## 2018-10-22 DIAGNOSIS — R91.8 LUNG MASS: ICD-10-CM

## 2018-10-22 DIAGNOSIS — I10 ESSENTIAL HYPERTENSION: ICD-10-CM

## 2018-10-22 LAB
ALBUMIN SERPL BCP-MCNC: 3.7 G/DL
ANION GAP SERPL CALC-SCNC: 9 MMOL/L
BASOPHILS # BLD AUTO: 0.05 K/UL
BASOPHILS NFR BLD: 0.5 %
BUN SERPL-MCNC: 13 MG/DL
CALCIUM SERPL-MCNC: 10.1 MG/DL
CHLORIDE SERPL-SCNC: 103 MMOL/L
CO2 SERPL-SCNC: 29 MMOL/L
CREAT SERPL-MCNC: 1 MG/DL
DIFFERENTIAL METHOD: ABNORMAL
EOSINOPHIL # BLD AUTO: 0.2 K/UL
EOSINOPHIL NFR BLD: 2.3 %
ERYTHROCYTE [DISTWIDTH] IN BLOOD BY AUTOMATED COUNT: 14.2 %
EST. GFR  (AFRICAN AMERICAN): >60 ML/MIN/1.73 M^2
EST. GFR  (NON AFRICAN AMERICAN): >60 ML/MIN/1.73 M^2
GLUCOSE SERPL-MCNC: 90 MG/DL
HCT VFR BLD AUTO: 47.3 %
HGB BLD-MCNC: 16.2 G/DL
LYMPHOCYTES # BLD AUTO: 2.8 K/UL
LYMPHOCYTES NFR BLD: 28.3 %
MCH RBC QN AUTO: 31.1 PG
MCHC RBC AUTO-ENTMCNC: 34.2 G/DL
MCV RBC AUTO: 91 FL
MONOCYTES # BLD AUTO: 0.7 K/UL
MONOCYTES NFR BLD: 6.8 %
NEUTROPHILS # BLD AUTO: 6.2 K/UL
NEUTROPHILS NFR BLD: 62.1 %
PHOSPHATE SERPL-MCNC: 2.4 MG/DL
PLATELET # BLD AUTO: 284 K/UL
PMV BLD AUTO: 10.7 FL
POTASSIUM SERPL-SCNC: 4.2 MMOL/L
RBC # BLD AUTO: 5.21 M/UL
SODIUM SERPL-SCNC: 141 MMOL/L
WBC # BLD AUTO: 10 K/UL

## 2018-10-22 PROCEDURE — 99999 PR PBB SHADOW E&M-EST. PATIENT-LVL II: CPT | Mod: PBBFAC,,,

## 2018-10-22 PROCEDURE — 85025 COMPLETE CBC W/AUTO DIFF WBC: CPT

## 2018-10-22 PROCEDURE — 80069 RENAL FUNCTION PANEL: CPT

## 2018-10-22 NOTE — PROGRESS NOTES
Rashad Rodríguez 73 y.o. male is here today for Blood Pressure check.     History of HTN yes.    Review of patient's allergies indicates:   Allergen Reactions    Cephalexin     Ciprofloxacin     Doxycycline Other (See Comments)     Stomach cramps    Bactrim [sulfamethoxazole-trimethoprim] Rash     Creatinine   Date Value Ref Range Status   05/14/2018 1.1 0.5 - 1.4 mg/dL Final     Sodium   Date Value Ref Range Status   05/14/2018 141 136 - 145 mmol/L Final     Potassium   Date Value Ref Range Status   05/14/2018 4.3 3.5 - 5.1 mmol/L Final     Patient verifies taking blood pressure medications on a regular basis at the same time of the day.     Current Outpatient Medications:     amLODIPine (NORVASC) 10 MG tablet, Take 1 tablet (10 mg total) by mouth once daily., Disp: 90 tablet, Rfl: 0    atorvastatin (LIPITOR) 10 MG tablet, Take 1 tablet (10 mg total) by mouth once daily., Disp: 90 tablet, Rfl: 0    buPROPion (WELLBUTRIN SR) 150 MG TBSR 12 hr tablet, Take 1 tablet (150 mg total) by mouth 2 (two) times daily., Disp: 60 tablet, Rfl: 0    valsartan-hydrochlorothiazide (DIOVAN-HCT) 320-12.5 mg per tablet, Take 1 tablet by mouth once daily., Disp: 90 tablet, Rfl: 0     Does patient have record of home blood pressure readings no. Readings have been averaging - N/A.     Last dose of blood pressure medication was taken at 9:30 PM last night    Patient is asymptomatic.     Complains of - no complaints    BP: 130/80 , Pulse: 72 .    Blood pressure reading after 15 minutes was not taken    Dr. Dejesus notified.

## 2018-10-24 NOTE — PROGRESS NOTES
Please let patient know blood pressure was good and labs were good. Follow up in 3 months prior to medication running out

## 2018-10-25 ENCOUNTER — TELEPHONE (OUTPATIENT)
Dept: FAMILY MEDICINE | Facility: CLINIC | Age: 73
End: 2018-10-25

## 2018-11-08 ENCOUNTER — INITIAL CONSULT (OUTPATIENT)
Dept: PULMONOLOGY | Facility: CLINIC | Age: 73
End: 2018-11-08
Payer: COMMERCIAL

## 2018-11-08 ENCOUNTER — HOSPITAL ENCOUNTER (OUTPATIENT)
Dept: RADIOLOGY | Facility: HOSPITAL | Age: 73
Discharge: HOME | End: 2018-11-08
Attending: INTERNAL MEDICINE
Payer: COMMERCIAL

## 2018-11-08 VITALS
WEIGHT: 144 LBS | HEIGHT: 67 IN | OXYGEN SATURATION: 96 % | BODY MASS INDEX: 22.6 KG/M2 | SYSTOLIC BLOOD PRESSURE: 148 MMHG | HEART RATE: 96 BPM | DIASTOLIC BLOOD PRESSURE: 95 MMHG

## 2018-11-08 DIAGNOSIS — R91.1 LUNG NODULE: ICD-10-CM

## 2018-11-08 DIAGNOSIS — J98.4 CAVITARY LESION OF LUNG: ICD-10-CM

## 2018-11-08 PROCEDURE — 71250 CT THORAX DX C-: CPT | Mod: 26,,, | Performed by: RADIOLOGY

## 2018-11-08 PROCEDURE — 71250 CT THORAX DX C-: CPT | Mod: TC

## 2018-11-08 PROCEDURE — 3077F SYST BP >= 140 MM HG: CPT | Mod: CPTII,S$GLB,, | Performed by: INTERNAL MEDICINE

## 2018-11-08 PROCEDURE — 99204 OFFICE O/P NEW MOD 45 MIN: CPT | Mod: S$GLB,,, | Performed by: INTERNAL MEDICINE

## 2018-11-08 PROCEDURE — 3080F DIAST BP >= 90 MM HG: CPT | Mod: CPTII,S$GLB,, | Performed by: INTERNAL MEDICINE

## 2018-11-08 PROCEDURE — 1101F PT FALLS ASSESS-DOCD LE1/YR: CPT | Mod: CPTII,S$GLB,, | Performed by: INTERNAL MEDICINE

## 2018-11-08 NOTE — LETTER
November 8, 2018      Juanito Dejesus Jr., MD  [address] [facility] [place] [address]           [facility]  [address] [facility] [address]  [facility] [address]  Phone: [phone]  Fax: [phone]          Patient: Rashad Rodríguez   MR Number: [id number]   YOB: 1945   Date of Visit: 11/8/2018       Dear Dr. Juanito Dejesus Jr.:    Thank you for referring Rashad Rodríguez to me for evaluation. Attached you will find relevant portions of my assessment and plan of care.    If you have questions, please do not hesitate to call me. I look forward to following Rashad Rodríguez along with you.    Sincerely,    Earl Skinner MD    Enclosure  CC:  No Recipients    If you would like to receive this communication electronically, please contact [email or web address] or [phone] to request more information on [employer] access.    For providers and/or their staff who would like to refer a patient to [facility], please contact us through our one-stop-shop provider referral line, [facility], at [phone].    If you feel you have received this communication in error or would no longer like to receive these types of communications, please e-mail [email or web address]

## 2018-11-08 NOTE — PROGRESS NOTES
Rashad Rodríguez  was seen as a new patient at the request  Juanito Dejesus Jr., MD for the evaluation of  Lung mass.    CHIEF COMPLAINT:  Lung Mass      HISTORY OF PRESENT ILLNESS: Rashad Rodríguez is a 73 y.o. male  has a past medical history of Hyperlipidemia and Hypertension.  Patient smoked 1 ppd x 50 years.  Patient underwent ldct 5/18.  CT noted 5.7 cm rul.  Patient was referred to pulmonary at that time, but decided to cancel clinic appointment due to miscommunication.  Patient recently seen Dr. Dejesus and was referred back to pulmonary.  Patient denied cough.  No hemoptysis.  No weight loss.  No family h/o lung cancer.  Still active with yard work.  No chest pain.  No orthopnea.  No pnd.      PAST MEDICAL HISTORY:    Active Ambulatory Problems     Diagnosis Date Noted    Tobacco abuse 02/15/2013    HTN (hypertension), malignant 02/15/2013    Cavitary lesion of lung 11/08/2018     Resolved Ambulatory Problems     Diagnosis Date Noted    No Resolved Ambulatory Problems     Past Medical History:   Diagnosis Date    Hyperlipidemia     Hypertension                 PAST SURGICAL HISTORY:    Past Surgical History:   Procedure Laterality Date    NO PAST SURGERIES           FAMILY HISTORY:                Family History   Problem Relation Age of Onset    Cancer Mother     Stroke Father     No Known Problems Sister     No Known Problems Brother     No Known Problems Maternal Aunt     No Known Problems Maternal Uncle     No Known Problems Paternal Aunt     No Known Problems Paternal Uncle     No Known Problems Maternal Grandmother     No Known Problems Maternal Grandfather     No Known Problems Paternal Grandmother     No Known Problems Paternal Grandfather     Amblyopia Neg Hx     Blindness Neg Hx     Cataracts Neg Hx     Diabetes Neg Hx     Glaucoma Neg Hx     Hypertension Neg Hx     Macular degeneration Neg Hx     Retinal detachment Neg Hx     Strabismus Neg Hx     Thyroid disease Neg Hx         SOCIAL HISTORY:          Tobacco:   Social History     Tobacco Use   Smoking Status Current Every Day Smoker    Packs/day: 1.00    Years: 50.00    Pack years: 50.00    Types: Cigarettes   Smokeless Tobacco Never Used     alcohol use:    Social History     Substance and Sexual Activity   Alcohol Use Yes    Alcohol/week: 1.2 oz    Types: 2 Cans of beer per week    Comment: weekends               Occupation:  Retire; 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      ALLERGIES:    Review of patient's allergies indicates:   Allergen Reactions    Cephalexin     Ciprofloxacin     Doxycycline Other (See Comments)     Stomach cramps    Bactrim [sulfamethoxazole-trimethoprim] Rash       CURRENT MEDICATIONS:    Current Outpatient Medications   Medication Sig Dispense Refill    amLODIPine (NORVASC) 10 MG tablet Take 1 tablet (10 mg total) by mouth once daily. 90 tablet 0    atorvastatin (LIPITOR) 10 MG tablet Take 1 tablet (10 mg total) by mouth once daily. 90 tablet 0    buPROPion (WELLBUTRIN SR) 150 MG TBSR 12 hr tablet Take 1 tablet (150 mg total) by mouth 2 (two) times daily. 60 tablet 0    valsartan-hydrochlorothiazide (DIOVAN-HCT) 320-12.5 mg per tablet Take 1 tablet by mouth once daily. 90 tablet 0     No current facility-administered medications for this visit.                   REVIEW OF SYSTEMS:     Pulmonary related symptoms as per HPI.  Gen:  no weight loss, no fever, no night sweat  HEENT:  no visual changes, no sore throat, no hearing loss  CV:  Per hpi  GI:  no melena, no hematochezia, no diarhea, no constipation.  :  no dysuria, no hematuria, no hesistancy, no dribbling  Neuro:  no syncope, no vertigo, no tinitus  Psych:  No homocide or suicide ideation; no depression.  Endocrine:  No heat or cold intolerance.  Sleep:  No snoring; no witnessed apnea.  Feeling rested upon awake.    Otherwise, a balance of systems reviewed is negative.          PHYSICAL EXAM:  Vitals:    11/08/18 1302   BP:  "(!) 148/95   Pulse: 96   SpO2: 96%   Weight: 65.3 kg (144 lb)   Height: 5' 7" (1.702 m)   PainSc: 0-No pain     Body mass index is 22.55 kg/m².     GENERAL:  well develop; no apparent distress  HEENT:  no nasal congestion; no discharge noted; class 2 modified mallampatti.   NECK:  supple; no palpable masses.  CARDIO: regular rate and rhythm  PULM:  clear to auscultation bilaterally; no intercostals retractions; no accessory muscle usage   ABDOMEN:  soft nontender/nondistended.  +bowel sound  EXTREMITIES no cce  NEURO:  CN II-XII intact.  5/5 motor in all extremities.  sensation grossly intact   to light touch.  PSYCH:  normal affect.  Alert and oriented x 4    LABS  Pulmonary Functions Testing Results: none  ABG none  CXR:  none  CT CHEST:  5/28/18 rul cavitary lesion; julius nodule  2/27/13 negative  quantiferon 10/22/18 negative  ASSESSMENT/PLAN  Problem List Items Addressed This Visit     Cavitary lesion of lung    Overview     Ct 5/18 with rul cavitary lesion and julius nodule.          Current Assessment & Plan     Will repeat ct of chest.  May consider ct guided biopsy.  Sputum afb.           Relevant Orders    AFB Culture & Smear    Complete PFT with bronchodilator      Other Visit Diagnoses     Lung nodule        Relevant Orders    CT Chest Without Contrast        Patient will No Follow-up on file. with md.    CC: Send copy of this note to Juanito Dejesus Jr., MD   "

## 2018-11-09 ENCOUNTER — LAB VISIT (OUTPATIENT)
Dept: LAB | Facility: HOSPITAL | Age: 73
End: 2018-11-09
Attending: INTERNAL MEDICINE
Payer: COMMERCIAL

## 2018-11-09 DIAGNOSIS — J98.4 CAVITARY LESION OF LUNG: ICD-10-CM

## 2018-11-09 PROCEDURE — 87118 MYCOBACTERIC IDENTIFICATION: CPT | Mod: 59

## 2018-11-09 PROCEDURE — 87015 SPECIMEN INFECT AGNT CONCNTJ: CPT

## 2018-11-09 PROCEDURE — 87206 SMEAR FLUORESCENT/ACID STAI: CPT

## 2018-11-09 PROCEDURE — 87149 DNA/RNA DIRECT PROBE: CPT

## 2018-11-09 PROCEDURE — 87116 MYCOBACTERIA CULTURE: CPT

## 2018-11-09 PROCEDURE — 87186 SC STD MICRODIL/AGAR DIL: CPT

## 2018-11-12 ENCOUNTER — OFFICE VISIT (OUTPATIENT)
Dept: UROLOGY | Facility: CLINIC | Age: 73
End: 2018-11-12
Payer: COMMERCIAL

## 2018-11-12 VITALS — WEIGHT: 143 LBS | BODY MASS INDEX: 21.67 KG/M2 | HEIGHT: 68 IN | RESPIRATION RATE: 14 BRPM

## 2018-11-12 DIAGNOSIS — R35.1 NOCTURIA MORE THAN TWICE PER NIGHT: ICD-10-CM

## 2018-11-12 DIAGNOSIS — E27.8 ADRENAL NODULE: Primary | ICD-10-CM

## 2018-11-12 DIAGNOSIS — N40.1 BPH WITH OBSTRUCTION/LOWER URINARY TRACT SYMPTOMS: ICD-10-CM

## 2018-11-12 DIAGNOSIS — N13.8 BPH WITH OBSTRUCTION/LOWER URINARY TRACT SYMPTOMS: ICD-10-CM

## 2018-11-12 DIAGNOSIS — N52.9 ED (ERECTILE DYSFUNCTION) OF ORGANIC ORIGIN: ICD-10-CM

## 2018-11-12 LAB
BILIRUB SERPL-MCNC: NORMAL MG/DL
BLOOD URINE, POC: NORMAL
COLOR, POC UA: CLEAR
GLUCOSE UR QL STRIP: NORMAL
KETONES UR QL STRIP: NORMAL
LEUKOCYTE ESTERASE URINE, POC: 5
NITRITE, POC UA: NORMAL
PH, POC UA: 1000
PROTEIN, POC: NORMAL
SPECIFIC GRAVITY, POC UA: NORMAL
UROBILINOGEN, POC UA: NORMAL

## 2018-11-12 PROCEDURE — 81001 URINALYSIS AUTO W/SCOPE: CPT | Mod: S$GLB,,, | Performed by: UROLOGY

## 2018-11-12 PROCEDURE — 99244 OFF/OP CNSLTJ NEW/EST MOD 40: CPT | Mod: 25,S$GLB,, | Performed by: UROLOGY

## 2018-11-12 PROCEDURE — 99999 PR PBB SHADOW E&M-EST. PATIENT-LVL III: CPT | Mod: PBBFAC,,, | Performed by: UROLOGY

## 2018-11-12 RX ORDER — SILDENAFIL 100 MG/1
100 TABLET, FILM COATED ORAL DAILY PRN
Qty: 10 TABLET | Refills: 11 | Status: SHIPPED | OUTPATIENT
Start: 2018-11-12 | End: 2020-03-21

## 2018-11-12 NOTE — PROGRESS NOTES
Subjective:       Patient ID: Rashad Rodríguez is a 73 y.o. male who was referred by Juanito Dejesus Jr., MD    Chief Complaint:   Chief Complaint   Patient presents with    Establish Care     PCP referred over        Adrenal Nodule  He had a CT scan in May and again in November for a lesion in his lung.  This showed an adrenal nodule and he was asked to come in for evaluation.  He denies any weight changes, palpitations, or excessive sweating.  He was unaware of any issues with his adrenal glands.    Benign Prostatic Hyperplasia  He patient reports nocturia one time a night. He denies straining, urgency and weak stream. The patient states symptoms are of mild severity. Onset of symptoms was several years ago and was gradual in onset.  He has no personal history and no family history of prostate cancer. He reports a history of no complicating symptoms. He denies flank pain, gross hematuria, kidney stones and recurrent UTI.  He is currently taking no prostate medications.    Erectile Dysfunction  Patient complains of erectile dysfunction. Onset of dysfunction was several years ago and was gradual in onset.  Patient states the nature of difficulty is maintaining erection. Full erections occur never. Partial erections occur with intercourse. Libido is not affected. Risk factors for ED include cardiovascular disease. Patient denies history of diabetes mellitus. Previous treatment of ED includes none.      ACTIVE MEDICAL ISSUES:  Patient Active Problem List   Diagnosis    Tobacco abuse    HTN (hypertension), malignant    Cavitary lesion of lung       PAST MEDICAL HISTORY  Past Medical History:   Diagnosis Date    Hyperlipidemia     Hypertension        PAST SURGICAL HISTORY:  Past Surgical History:   Procedure Laterality Date    NO PAST SURGERIES         SOCIAL HISTORY:  Social History     Tobacco Use    Smoking status: Current Every Day Smoker     Packs/day: 1.00     Years: 50.00     Pack years: 50.00     Types:  Cigarettes    Smokeless tobacco: Never Used   Substance Use Topics    Alcohol use: Yes     Alcohol/week: 1.2 oz     Types: 2 Cans of beer per week     Comment: weekends    Drug use: No       FAMILY HISTORY:  Family History   Problem Relation Age of Onset    Cancer Mother     Stroke Father     No Known Problems Sister     No Known Problems Brother     No Known Problems Maternal Aunt     No Known Problems Maternal Uncle     No Known Problems Paternal Aunt     No Known Problems Paternal Uncle     No Known Problems Maternal Grandmother     No Known Problems Maternal Grandfather     No Known Problems Paternal Grandmother     No Known Problems Paternal Grandfather     Amblyopia Neg Hx     Blindness Neg Hx     Cataracts Neg Hx     Diabetes Neg Hx     Glaucoma Neg Hx     Hypertension Neg Hx     Macular degeneration Neg Hx     Retinal detachment Neg Hx     Strabismus Neg Hx     Thyroid disease Neg Hx        ALLERGIES AND MEDICATIONS: updated and reviewed.  Review of patient's allergies indicates:   Allergen Reactions    Cephalexin     Ciprofloxacin     Doxycycline Other (See Comments)     Stomach cramps    Bactrim [sulfamethoxazole-trimethoprim] Rash     Current Outpatient Medications   Medication Sig    amLODIPine (NORVASC) 10 MG tablet Take 1 tablet (10 mg total) by mouth once daily.    atorvastatin (LIPITOR) 10 MG tablet Take 1 tablet (10 mg total) by mouth once daily.    buPROPion (WELLBUTRIN SR) 150 MG TBSR 12 hr tablet Take 1 tablet (150 mg total) by mouth 2 (two) times daily.    valsartan-hydrochlorothiazide (DIOVAN-HCT) 320-12.5 mg per tablet Take 1 tablet by mouth once daily.    sildenafil (VIAGRA) 100 MG tablet Take 1 tablet (100 mg total) by mouth daily as needed for Erectile Dysfunction.     No current facility-administered medications for this visit.        Review of Systems   Constitutional: Negative for activity change, fatigue, fever and unexpected weight change.   HENT:  "Negative for congestion.    Eyes: Negative for redness.   Respiratory: Negative for chest tightness and shortness of breath.    Cardiovascular: Negative for chest pain and leg swelling.   Gastrointestinal: Negative for abdominal pain, constipation, diarrhea, nausea and vomiting.   Genitourinary: Negative for dysuria, flank pain, frequency, hematuria, penile pain, penile swelling, scrotal swelling, testicular pain and urgency.   Musculoskeletal: Negative for arthralgias and back pain.   Neurological: Negative for dizziness and light-headedness.   Psychiatric/Behavioral: Negative for behavioral problems and confusion. The patient is not nervous/anxious.    All other systems reviewed and are negative.      Objective:      Vitals:    11/12/18 1610   Resp: 14   Weight: 64.9 kg (143 lb)   Height: 5' 7.5" (1.715 m)     Physical Exam   Nursing note and vitals reviewed.  Constitutional: He is oriented to person, place, and time. He appears well-developed and well-nourished.   HENT:   Head: Normocephalic.   Eyes: Conjunctivae are normal.   Neck: Normal range of motion. Neck supple. No tracheal deviation present. No thyromegaly present.   Cardiovascular: Normal rate and normal heart sounds.    Pulmonary/Chest: Effort normal and breath sounds normal. No respiratory distress. He has no wheezes.   Abdominal: Soft. Bowel sounds are normal. He exhibits no distension and no mass. There is no hepatosplenomegaly. There is no tenderness. There is no rebound, no guarding and no CVA tenderness. No hernia. Hernia confirmed negative in the right inguinal area and confirmed negative in the left inguinal area.   Genitourinary: Rectum normal, testes normal and penis normal. Rectal exam shows no external hemorrhoid, no mass and no tenderness. Prostate is enlarged. Prostate is not tender. Right testis shows no mass and no tenderness. Left testis shows no mass and no tenderness.       Musculoskeletal: Normal range of motion. He exhibits no edema " or tenderness.   Lymphadenopathy:     He has no cervical adenopathy. No inguinal adenopathy noted on the right or left side.   Neurological: He is alert and oriented to person, place, and time.   Skin: Skin is warm and dry. No rash noted. No erythema.     Psychiatric: He has a normal mood and affect. His behavior is normal. Judgment and thought content normal.       Urine dipstick shows negative for all components.  Micro exam: negative for WBC's or RBC's.    Contains abnormal data CT Chest Without Contrast   Order: [id number]   Status:  Final result   Visible to patient:  No (Not Released)   Next appt:  01/25/2019 at 08:40 AM in Family Medicine (Juanito Dejesus Jr, MD)   Dx:  Lung nodule   Details     Reading Physician Reading Date Result Priority   Deonte Calderon MD 11/8/2018       Narrative     EXAMINATION:  CT CHEST WITHOUT CONTRAST    CLINICAL HISTORY:  Lung nodule, >=1cm; Solitary pulmonary nodule    TECHNIQUE:  Low dose axial images, sagittal and coronal reformations were obtained from the thoracic inlet to the lung bases. Contrast was not administered.    COMPARISON:  CT chest 05/28/2018    FINDINGS:  Visualized structures in the lower neck and both axillary areas are unremarkable.  Mediastinal and hilar areas show no abnormal mass or significant lymph node enlargement.  Thoracic aorta is normal size with left arch.  Mild atherosclerosis calcifications are seen in the aorta and coronary arteries.  Heart size is normal without pericardial fluid.    Visualized upper abdomen again shows 2 cysts in the liver up to 3 cm size.  Small cortical nodule at right kidney measures upper normal fluid attenuation, possible cyst.  The 1 cm left adrenal nodule is low attenuation consistent with adenoma.  The 2.3 cm right adrenal nodule is unchanged and measures 21 Hounsfield units, which is indeterminate; this could be lipid poor adenoma or other pathology.    Trachea and bronchi are patent with good expansion of both  lungs.  The high-resolution lung images demonstrate abnormal lucency consistent with moderate severity emphysema.  The left lung has a 0.7 cm solid nodule in the left upper lobe (series 2, image 34), unchanged from prior.  Minor atelectasis is present in the dependent portions of both lungs.  Dominant finding on the examination is an irregularly-shaped focal opacity in the apical segment of the right upper lobe.  It is unchanged in overall size at 5.7 cm.  The central cavitary regions are now more solid in appearance.  Dilated bronchi lead into it, and it has somewhat of a branching pattern suggesting bronchial structures.  Diagnostic possibilities include infection such as mycobacterial or fungal disease.  Mucoid impaction is also possible.  Since tumor is also possible, further evaluation is recommended.  Lungs are otherwise clear without acute disease.  No pleural fluid is present.    Skeletal structures are intact without acute finding.  Age-appropriate degenerative changes are seen in the spine.      Impression       1. No significant interval change in abnormal right upper lobe opacity 5.7 cm.  Infectious or inflammatory disease is favored.  Since neoplasm is possible, continued follow-up in 6 months with noncontrast chest CT or biopsy or PET scan is recommended.  2. Unchanged 0.7 cm left upper lobe nodule.  3. Emphysema.  4. Hepatic cysts.  5. 1 cm left adrenal nodule consistent with adenoma.  Unchanged 2.3 cm right adrenal nodule which is indeterminate.  6. Other findings as above.  7.  This report was flagged in Epic as abnormal.      Electronically signed by: Deonte Calderon MD  Date: 11/08/2018  Time: 16:04            Last Resulted: 11/08/18 16:04             Assessment:       1. Adrenal nodule    2. BPH with obstruction/lower urinary tract symptoms    3. Nocturia more than twice per night    4. ED (erectile dysfunction) of organic origin          Plan:       1. Adrenal nodule    - Comprehensive  metabolic panel; Future  - Cortisol, free, serum; Future  - METANEPHRINES, PLASMA FREE; Future  - CT Abdomen Pelvis W Wo Contrast; Future    2. BPH with obstruction/lower urinary tract symptoms    - POCT urinalysis, dipstick or tablet reag  - Prostate Specific Antigen, Diagnostic; Future    3. Nocturia more than twice per night  Limit evening fluids    4. ED (erectile dysfunction) of organic origin    - sildenafil (VIAGRA) 100 MG tablet; Take 1 tablet (100 mg total) by mouth daily as needed for Erectile Dysfunction.  Dispense: 10 tablet; Refill: 11            Follow-up in about 4 weeks (around 12/10/2018) for Follow up, Review X-ray, Review labs, Review PSA.

## 2018-11-23 ENCOUNTER — TELEPHONE (OUTPATIENT)
Dept: PULMONOLOGY | Facility: CLINIC | Age: 73
End: 2018-11-23

## 2018-11-23 DIAGNOSIS — R91.1 LUNG NODULE: ICD-10-CM

## 2018-11-23 DIAGNOSIS — A31.0 MAI (MYCOBACTERIUM AVIUM-INTRACELLULARE): Primary | ICD-10-CM

## 2018-11-26 ENCOUNTER — TELEPHONE (OUTPATIENT)
Dept: PULMONOLOGY | Facility: CLINIC | Age: 73
End: 2018-11-26

## 2018-11-26 NOTE — TELEPHONE ENCOUNTER
----- Message from Elina Ying sent at 11/26/2018  3:18 PM CST -----  Contact: Self   Pt calling to speak to a nurse regarding health. [phone]

## 2018-11-26 NOTE — TELEPHONE ENCOUNTER
Attempt to contact patient in regard to ct and sputum culture.  +AFB, most likely kareem in light of negative quantiferon.  Please schedule repeat sputum culture, id appointment and repeat ct in 6 months 5/19.

## 2018-11-26 NOTE — TELEPHONE ENCOUNTER
Called patient and Dr. Skinner complete conversation. I scheduled patient with Infectious Disease per Dr Skinner.

## 2018-11-29 ENCOUNTER — HOSPITAL ENCOUNTER (OUTPATIENT)
Dept: RADIOLOGY | Facility: HOSPITAL | Age: 73
Discharge: HOME | End: 2018-11-29
Attending: UROLOGY
Payer: COMMERCIAL

## 2018-11-29 DIAGNOSIS — E27.8 ADRENAL NODULE: ICD-10-CM

## 2018-11-29 PROCEDURE — 74178 CT ABD&PLV WO CNTR FLWD CNTR: CPT | Mod: TC

## 2018-11-29 PROCEDURE — 74178 CT ABD&PLV WO CNTR FLWD CNTR: CPT | Mod: 26,,, | Performed by: RADIOLOGY

## 2018-11-29 PROCEDURE — 25500020 PHARM REV CODE 255: Performed by: UROLOGY

## 2018-11-29 RX ADMIN — IOHEXOL 15 ML: 300 INJECTION, SOLUTION INTRAVENOUS at 02:11

## 2018-11-29 RX ADMIN — IOHEXOL 75 ML: 350 INJECTION, SOLUTION INTRAVENOUS at 02:11

## 2018-11-30 ENCOUNTER — TELEPHONE (OUTPATIENT)
Dept: PULMONOLOGY | Facility: CLINIC | Age: 73
End: 2018-11-30

## 2018-11-30 ENCOUNTER — LAB VISIT (OUTPATIENT)
Dept: LAB | Facility: HOSPITAL | Age: 73
End: 2018-11-30
Attending: INTERNAL MEDICINE
Payer: COMMERCIAL

## 2018-11-30 DIAGNOSIS — R91.1 LUNG NODULE: ICD-10-CM

## 2018-11-30 PROCEDURE — 87015 SPECIMEN INFECT AGNT CONCNTJ: CPT

## 2018-11-30 PROCEDURE — 87206 SMEAR FLUORESCENT/ACID STAI: CPT

## 2018-11-30 PROCEDURE — 87149 DNA/RNA DIRECT PROBE: CPT

## 2018-11-30 PROCEDURE — 87116 MYCOBACTERIA CULTURE: CPT

## 2018-11-30 PROCEDURE — 87118 MYCOBACTERIC IDENTIFICATION: CPT | Mod: 59

## 2018-12-11 ENCOUNTER — OFFICE VISIT (OUTPATIENT)
Dept: UROLOGY | Facility: CLINIC | Age: 73
End: 2018-12-11
Payer: COMMERCIAL

## 2018-12-11 VITALS
WEIGHT: 145.94 LBS | HEIGHT: 67 IN | HEART RATE: 62 BPM | DIASTOLIC BLOOD PRESSURE: 78 MMHG | SYSTOLIC BLOOD PRESSURE: 122 MMHG | BODY MASS INDEX: 22.91 KG/M2

## 2018-12-11 DIAGNOSIS — D35.00 ADRENAL ADENOMA, UNSPECIFIED LATERALITY: Primary | ICD-10-CM

## 2018-12-11 DIAGNOSIS — N52.9 ED (ERECTILE DYSFUNCTION) OF ORGANIC ORIGIN: ICD-10-CM

## 2018-12-11 DIAGNOSIS — N40.0 BPH WITHOUT OBSTRUCTION/LOWER URINARY TRACT SYMPTOMS: ICD-10-CM

## 2018-12-11 PROCEDURE — 3074F SYST BP LT 130 MM HG: CPT | Mod: CPTII,S$GLB,, | Performed by: UROLOGY

## 2018-12-11 PROCEDURE — 3078F DIAST BP <80 MM HG: CPT | Mod: CPTII,S$GLB,, | Performed by: UROLOGY

## 2018-12-11 PROCEDURE — 99214 OFFICE O/P EST MOD 30 MIN: CPT | Mod: S$GLB,,, | Performed by: UROLOGY

## 2018-12-11 PROCEDURE — 99999 PR PBB SHADOW E&M-EST. PATIENT-LVL III: CPT | Mod: PBBFAC,,, | Performed by: UROLOGY

## 2018-12-11 PROCEDURE — 1101F PT FALLS ASSESS-DOCD LE1/YR: CPT | Mod: CPTII,S$GLB,, | Performed by: UROLOGY

## 2018-12-11 NOTE — PROGRESS NOTES
Subjective:       Patient ID: Rashad Rodríguez is a 73 y.o. male who was last seen in this office 11/12/2018    Chief Complaint:   Chief Complaint   Patient presents with    Follow-up     follow up with ct scan an labwork          Adrenal Nodule  He had a CT scan in May and again in November for a lesion in his lung.  This showed an adrenal nodule and he was asked to come in for evaluation.  He denies any weight changes, palpitations, or excessive sweating.  He was unaware of any issues with his adrenal glands.    He is back with a adrenal protocol CT and a set of labs.    Benign Prostatic Hyperplasia  He patient reports nocturia one time a night. He denies straining, urgency and weak stream. The patient states symptoms are of mild severity. Onset of symptoms was several years ago and was gradual in onset.  He has no personal history and no family history of prostate cancer. He reports a history of no complicating symptoms. He denies flank pain, gross hematuria, kidney stones and recurrent UTI.  He is currently taking no prostate medications.    Erectile Dysfunction  Patient complains of erectile dysfunction. Onset of dysfunction was several years ago and was gradual in onset.  Patient states the nature of difficulty is maintaining erection. Full erections occur never. Partial erections occur with intercourse. Libido is not affected. Risk factors for ED include cardiovascular disease. Patient denies history of diabetes mellitus. Previous treatment of ED includes none.      ACTIVE MEDICAL ISSUES:  Patient Active Problem List   Diagnosis    Tobacco abuse    HTN (hypertension), malignant    Cavitary lesion of lung    Adrenal adenoma       ALLERGIES AND MEDICATIONS: updated and reviewed.  Review of patient's allergies indicates:   Allergen Reactions    Cephalexin     Ciprofloxacin     Doxycycline Other (See Comments)     Stomach cramps    Bactrim [sulfamethoxazole-trimethoprim] Rash     Current Outpatient  "Medications   Medication Sig    amLODIPine (NORVASC) 10 MG tablet Take 1 tablet (10 mg total) by mouth once daily.    atorvastatin (LIPITOR) 10 MG tablet Take 1 tablet (10 mg total) by mouth once daily.    buPROPion (WELLBUTRIN SR) 150 MG TBSR 12 hr tablet Take 1 tablet (150 mg total) by mouth 2 (two) times daily.    sildenafil (VIAGRA) 100 MG tablet Take 1 tablet (100 mg total) by mouth daily as needed for Erectile Dysfunction.    valsartan-hydrochlorothiazide (DIOVAN-HCT) 320-12.5 mg per tablet Take 1 tablet by mouth once daily.     No current facility-administered medications for this visit.        Review of Systems   Constitutional: Negative for activity change, fatigue, fever and unexpected weight change.   HENT: Negative for congestion.    Eyes: Negative for redness.   Respiratory: Negative for chest tightness and shortness of breath.    Cardiovascular: Negative for chest pain and leg swelling.   Gastrointestinal: Negative for abdominal pain, constipation, diarrhea, nausea and vomiting.   Genitourinary: Negative for dysuria, flank pain, frequency, hematuria, penile pain, penile swelling, scrotal swelling, testicular pain and urgency.   Musculoskeletal: Negative for arthralgias and back pain.   Neurological: Negative for dizziness and light-headedness.   Psychiatric/Behavioral: Negative for behavioral problems and confusion. The patient is not nervous/anxious.    All other systems reviewed and are negative.      Objective:      Vitals:    12/11/18 1541   BP: 122/78   Pulse: 62   Weight: 66.2 kg (145 lb 15.1 oz)   Height: 5' 7" (1.702 m)     Physical Exam   Nursing note and vitals reviewed.  Constitutional: He is oriented to person, place, and time. He appears well-developed and well-nourished.   HENT:   Head: Normocephalic.   Eyes: Conjunctivae are normal.   Neck: Normal range of motion. Neck supple. No tracheal deviation present. No thyromegaly present.   Cardiovascular: Normal rate and normal heart " sounds.    Pulmonary/Chest: Effort normal and breath sounds normal. No respiratory distress. He has no wheezes.   Abdominal: Soft. Bowel sounds are normal. There is no hepatosplenomegaly. There is no tenderness. There is no rebound and no CVA tenderness. No hernia.   Musculoskeletal: Normal range of motion. He exhibits no edema or tenderness.   Lymphadenopathy:     He has no cervical adenopathy.   Neurological: He is alert and oriented to person, place, and time.   Skin: Skin is warm and dry. No rash noted. No erythema.     Psychiatric: He has a normal mood and affect. His behavior is normal. Judgment and thought content normal.       Urine dipstick shows negative for all components.  Micro exam: negative for WBC's or RBC's.  1. Bilateral adrenal nodules with features consistent with adenoma.  2. Hepatic cysts.  3. Bladder wall changes consistent with chronic outlet obstruction.  Enlarged prostate.  4. No acute abnormality in the abdomen.  5. Other findings including hepatic cysts, atherosclerosis and colonic diverticulosis.      Electronically signed by: Deonte Calderon MD  Date: 11/29/2018  Time: 17:14        Labs reviewed in [facility].  Plasma Free Metanephrines slightly elevated  Other labs wnl    Assessment:       1. Adrenal adenoma, unspecified laterality    2. BPH without obstruction/lower urinary tract symptoms    3. ED (erectile dysfunction) of organic origin          Plan:       1. Adrenal adenoma, unspecified laterality  With a pheo, labs are usually very high    - US Retroperitoneal Complete (Kidney and; Future  - METANEPHRINES, PLASMA FREE; Future    2. BPH without obstruction/lower urinary tract symptoms  PSA and CHARLES in a year    3. ED (erectile dysfunction) of organic origin  Viagra            Follow-up in about 6 months (around 6/11/2019) for Follow up, Review labs, Review X-ray.

## 2019-01-02 ENCOUNTER — TELEPHONE (OUTPATIENT)
Dept: PULMONOLOGY | Facility: CLINIC | Age: 74
End: 2019-01-02

## 2019-01-02 NOTE — TELEPHONE ENCOUNTER
----- Message from Earl Skinner MD sent at 1/1/2019 12:20 AM CST -----  Please schedule ID appointment

## 2019-01-03 ENCOUNTER — CLINICAL SUPPORT (OUTPATIENT)
Dept: SMOKING CESSATION | Facility: CLINIC | Age: 74
End: 2019-01-03
Payer: COMMERCIAL

## 2019-01-03 DIAGNOSIS — F17.200 NICOTINE DEPENDENCE: Primary | ICD-10-CM

## 2019-01-03 PROCEDURE — 99407 PR TOBACCO USE CESSATION INTENSIVE >10 MINUTES: ICD-10-PCS | Mod: S$GLB,,, | Performed by: INTERNAL MEDICINE

## 2019-01-03 PROCEDURE — 99407 BEHAV CHNG SMOKING > 10 MIN: CPT | Mod: S$GLB,,, | Performed by: INTERNAL MEDICINE

## 2019-01-03 NOTE — PROGRESS NOTES
Spoke with patient today in regard to smoking cessation progress for 6 month follow up, he states not tobacco free. Patient states smoking about 1 ppd. Patient states being allergic to all the nicotine replacement therapies and nothing else works for him. He states not interested in returning to the program at this time. Informed patient of benefit period, future follow up, and contact information if any further help or support is needed. Will resolve episode and complete smart form for Quit attempt #1 and complete smart form for 3 and 6 month follow up on Quit attempt #2.

## 2019-01-08 ENCOUNTER — OFFICE VISIT (OUTPATIENT)
Dept: FAMILY MEDICINE | Facility: CLINIC | Age: 74
End: 2019-01-08
Payer: COMMERCIAL

## 2019-01-08 VITALS
RESPIRATION RATE: 16 BRPM | OXYGEN SATURATION: 96 % | HEART RATE: 98 BPM | SYSTOLIC BLOOD PRESSURE: 138 MMHG | WEIGHT: 140.44 LBS | TEMPERATURE: 98 F | HEIGHT: 67 IN | DIASTOLIC BLOOD PRESSURE: 88 MMHG | BODY MASS INDEX: 22.04 KG/M2

## 2019-01-08 DIAGNOSIS — I10 ESSENTIAL HYPERTENSION: ICD-10-CM

## 2019-01-08 DIAGNOSIS — J00 COMMON COLD: Primary | ICD-10-CM

## 2019-01-08 PROCEDURE — 99214 OFFICE O/P EST MOD 30 MIN: CPT | Mod: 25,S$GLB,, | Performed by: FAMILY MEDICINE

## 2019-01-08 PROCEDURE — 3079F DIAST BP 80-89 MM HG: CPT | Mod: CPTII,S$GLB,, | Performed by: FAMILY MEDICINE

## 2019-01-08 PROCEDURE — 94640 AIRWAY INHALATION TREATMENT: CPT | Mod: S$GLB,,, | Performed by: FAMILY MEDICINE

## 2019-01-08 PROCEDURE — 3079F PR MOST RECENT DIASTOLIC BLOOD PRESSURE 80-89 MM HG: ICD-10-PCS | Mod: CPTII,S$GLB,, | Performed by: FAMILY MEDICINE

## 2019-01-08 PROCEDURE — 99999 PR PBB SHADOW E&M-EST. PATIENT-LVL IV: ICD-10-PCS | Mod: PBBFAC,,, | Performed by: FAMILY MEDICINE

## 2019-01-08 PROCEDURE — 99214 PR OFFICE/OUTPT VISIT, EST, LEVL IV, 30-39 MIN: ICD-10-PCS | Mod: 25,S$GLB,, | Performed by: FAMILY MEDICINE

## 2019-01-08 PROCEDURE — 3075F PR MOST RECENT SYSTOLIC BLOOD PRESS GE 130-139MM HG: ICD-10-PCS | Mod: CPTII,S$GLB,, | Performed by: FAMILY MEDICINE

## 2019-01-08 PROCEDURE — 1101F PR PT FALLS ASSESS DOC 0-1 FALLS W/OUT INJ PAST YR: ICD-10-PCS | Mod: CPTII,S$GLB,, | Performed by: FAMILY MEDICINE

## 2019-01-08 PROCEDURE — 3075F SYST BP GE 130 - 139MM HG: CPT | Mod: CPTII,S$GLB,, | Performed by: FAMILY MEDICINE

## 2019-01-08 PROCEDURE — 99999 PR PBB SHADOW E&M-EST. PATIENT-LVL IV: CPT | Mod: PBBFAC,,, | Performed by: FAMILY MEDICINE

## 2019-01-08 PROCEDURE — 1101F PT FALLS ASSESS-DOCD LE1/YR: CPT | Mod: CPTII,S$GLB,, | Performed by: FAMILY MEDICINE

## 2019-01-08 PROCEDURE — 94640 PR INHAL RX, AIRWAY OBST/DX SPUTUM INDUCT: ICD-10-PCS | Mod: S$GLB,,, | Performed by: FAMILY MEDICINE

## 2019-01-08 RX ORDER — ALBUTEROL SULFATE 0.83 MG/ML
2.5 SOLUTION RESPIRATORY (INHALATION)
Status: COMPLETED | OUTPATIENT
Start: 2019-01-08 | End: 2019-01-08

## 2019-01-08 RX ORDER — PROMETHAZINE HYDROCHLORIDE AND DEXTROMETHORPHAN HYDROBROMIDE 6.25; 15 MG/5ML; MG/5ML
5 SYRUP ORAL
Qty: 240 ML | Refills: 0 | Status: SHIPPED | OUTPATIENT
Start: 2019-01-08 | End: 2020-03-21

## 2019-01-08 RX ORDER — ALBUTEROL SULFATE 90 UG/1
2 AEROSOL, METERED RESPIRATORY (INHALATION) EVERY 4 HOURS PRN
Qty: 1 INHALER | Refills: 0 | Status: SHIPPED | OUTPATIENT
Start: 2019-01-08 | End: 2020-03-21

## 2019-01-08 RX ADMIN — ALBUTEROL SULFATE 2.5 MG: 0.83 SOLUTION RESPIRATORY (INHALATION) at 02:01

## 2019-01-08 NOTE — PROGRESS NOTES
Routine Office Visit    Patient Name: Rashad Rodríguez    : 1945  MRN: [id number]    Subjective:  Rashad is a 73 y.o. male who presents today for:   Chief Complaint   Patient presents with    Hypertension    Follow-up    Cough       73-year-old male comes in for follow-up on hypertension.  He reports that he is taking his medication as prescribed.  He reports no side effects from it.  While here HEENT reports that for the last 3 days he has been having increased cough, chest congestion, feeling fatigued, runny nose, nasal congestion, and some wheezing.  He denies fevers or chills, chest pain, headaches, urinary symptoms, and abdominal pain.    Past Medical History  Past Medical History:   Diagnosis Date    Hyperlipidemia     Hypertension        Past Surgical History  Past Surgical History:   Procedure Laterality Date    NO PAST SURGERIES          Family History  Family History   Problem Relation Age of Onset    Cancer Mother     Stroke Father     No Known Problems Sister     No Known Problems Brother     No Known Problems Maternal Aunt     No Known Problems Maternal Uncle     No Known Problems Paternal Aunt     No Known Problems Paternal Uncle     No Known Problems Maternal Grandmother     No Known Problems Maternal Grandfather     No Known Problems Paternal Grandmother     No Known Problems Paternal Grandfather     Amblyopia Neg Hx     Blindness Neg Hx     Cataracts Neg Hx     Diabetes Neg Hx     Glaucoma Neg Hx     Hypertension Neg Hx     Macular degeneration Neg Hx     Retinal detachment Neg Hx     Strabismus Neg Hx     Thyroid disease Neg Hx        Social History  Social History     Socioeconomic History    Marital status:      Spouse name: Not on file    Number of children: Not on file    Years of education: Not on file    Highest education level: Not on file   Social Needs    Financial resource strain: Not on file    Food insecurity - worry: Not on file    Food  insecurity - inability: Not on file    Transportation needs - medical: Not on file    Transportation needs - non-medical: Not on file   Occupational History    Not on file   Tobacco Use    Smoking status: Current Every Day Smoker     Packs/day: 1.00     Years: 50.00     Pack years: 50.00     Types: Cigarettes    Smokeless tobacco: Never Used   Substance and Sexual Activity    Alcohol use: Yes     Alcohol/week: 1.2 oz     Types: 2 Cans of beer per week     Comment: weekends    Drug use: No    Sexual activity: Yes     Partners: Female   Other Topics Concern    Not on file   Social History Narrative    Not on file       Current Medications  Current Outpatient Medications on File Prior to Visit   Medication Sig Dispense Refill    amLODIPine (NORVASC) 10 MG tablet Take 1 tablet (10 mg total) by mouth once daily. 90 tablet 0    atorvastatin (LIPITOR) 10 MG tablet Take 1 tablet (10 mg total) by mouth once daily. 90 tablet 0    buPROPion (WELLBUTRIN SR) 150 MG TBSR 12 hr tablet Take 1 tablet (150 mg total) by mouth 2 (two) times daily. 60 tablet 0    sildenafil (VIAGRA) 100 MG tablet Take 1 tablet (100 mg total) by mouth daily as needed for Erectile Dysfunction. 10 tablet 11    valsartan-hydrochlorothiazide (DIOVAN-HCT) 320-12.5 mg per tablet Take 1 tablet by mouth once daily. 90 tablet 0     No current facility-administered medications on file prior to visit.        Allergies   Review of patient's allergies indicates:   Allergen Reactions    Cephalexin     Ciprofloxacin     Doxycycline Other (See Comments)     Stomach cramps    Bactrim [sulfamethoxazole-trimethoprim] Rash       Review of Systems   Constitutional: Positive for chills, fatigue and fever.   HENT: Positive for congestion, postnasal drip, rhinorrhea, sinus pressure and sore throat. Negative for ear discharge.    Eyes: Negative for discharge.   Respiratory: Positive for cough (productive). Negative for shortness of breath and wheezing.   "  Cardiovascular: Negative for palpitations.   Gastrointestinal: Negative for abdominal pain, blood in stool, constipation and diarrhea.   Genitourinary: Negative for dysuria.   Skin: Negative for rash.       /88 (BP Location: Left arm, Patient Position: Sitting, BP Method: Medium (Manual))   Pulse 98   Temp 98.2 °F (36.8 °C) (Oral)   Resp 16   Ht 5' 7" (1.702 m)   Wt 63.7 kg (140 lb 6.9 oz)   SpO2 96%   BMI 21.99 kg/m²     Physical Exam   Constitutional: He appears well-developed. He appears ill. No distress.   HENT:   Head: Normocephalic and atraumatic.   Right Ear: Tympanic membrane, external ear and ear canal normal.   Left Ear: Tympanic membrane, external ear and ear canal normal.   Nose: Mucosal edema and rhinorrhea present.  No foreign bodies. Right sinus exhibits no maxillary sinus tenderness. Left sinus exhibits no maxillary sinus tenderness.   Mouth/Throat: Posterior oropharyngeal erythema present.   Eyes: EOM and lids are normal. Pupils are equal, round, and reactive to light.   Neck: Trachea normal and normal range of motion. No tracheal tenderness present. No thyroid mass present.   Cardiovascular: Normal rate, regular rhythm, normal heart sounds and intact distal pulses.   Pulmonary/Chest: Effort normal. No respiratory distress. He has wheezes (scattered). He has no rales.   Lymphadenopathy:     He has no cervical adenopathy.   Psychiatric: He has a normal mood and affect. His behavior is normal.   Vitals reviewed.      Assessment/Plan:  Rashad was seen today for hypertension, follow-up and cough.    Diagnoses and all orders for this visit:    Common cold  -     promethazine-dextromethorphan (PROMETHAZINE-DM) 6.25-15 mg/5 mL Syrp; Take 5 mLs by mouth every 4 to 6 hours as needed.  -     albuterol (VENTOLIN HFA) 90 mcg/actuation inhaler; Inhale 2 puffs into the lungs every 4 (four) hours as needed for Wheezing or Shortness of Breath.  -     albuterol nebulizer solution 2.5 mg  Advised " symptomatic care with plenty of fluids, honey and lemon, rest, and above regimen.  OTC Ibuprofen or Tylenol for pain.  Discussed course of a cold.   Explained that after cold is better, may continue to have a dry cough for a week or two.  Appointment for follow-up scheduled for this Friday.      Essential hypertension  Continue current regimen.  Follow-up in 6 months.        This office note has been dictated.  This dictation has been generated using M-Modal Fluency Direct dictation; some phonetic errors may occur.

## 2019-01-10 ENCOUNTER — OFFICE VISIT (OUTPATIENT)
Dept: INFECTIOUS DISEASES | Facility: CLINIC | Age: 74
End: 2019-01-10
Payer: COMMERCIAL

## 2019-01-10 VITALS
HEIGHT: 67 IN | SYSTOLIC BLOOD PRESSURE: 183 MMHG | DIASTOLIC BLOOD PRESSURE: 107 MMHG | TEMPERATURE: 98 F | WEIGHT: 143.06 LBS | HEART RATE: 89 BPM | BODY MASS INDEX: 22.45 KG/M2

## 2019-01-10 DIAGNOSIS — Z72.0 TOBACCO ABUSE: ICD-10-CM

## 2019-01-10 DIAGNOSIS — J98.4 CAVITARY LESION OF LUNG: ICD-10-CM

## 2019-01-10 DIAGNOSIS — A31.0 MYCOBACTERIUM AVIUM COMPLEX: Primary | ICD-10-CM

## 2019-01-10 PROCEDURE — 99999 PR PBB SHADOW E&M-EST. PATIENT-LVL III: CPT | Mod: PBBFAC,,, | Performed by: INTERNAL MEDICINE

## 2019-01-10 PROCEDURE — 1101F PT FALLS ASSESS-DOCD LE1/YR: CPT | Mod: CPTII,S$GLB,, | Performed by: INTERNAL MEDICINE

## 2019-01-10 PROCEDURE — 3077F PR MOST RECENT SYSTOLIC BLOOD PRESSURE >= 140 MM HG: ICD-10-PCS | Mod: CPTII,S$GLB,, | Performed by: INTERNAL MEDICINE

## 2019-01-10 PROCEDURE — 3077F SYST BP >= 140 MM HG: CPT | Mod: CPTII,S$GLB,, | Performed by: INTERNAL MEDICINE

## 2019-01-10 PROCEDURE — 99204 OFFICE O/P NEW MOD 45 MIN: CPT | Mod: S$GLB,,, | Performed by: INTERNAL MEDICINE

## 2019-01-10 PROCEDURE — 99204 PR OFFICE/OUTPT VISIT, NEW, LEVL IV, 45-59 MIN: ICD-10-PCS | Mod: S$GLB,,, | Performed by: INTERNAL MEDICINE

## 2019-01-10 PROCEDURE — 3080F PR MOST RECENT DIASTOLIC BLOOD PRESSURE >= 90 MM HG: ICD-10-PCS | Mod: CPTII,S$GLB,, | Performed by: INTERNAL MEDICINE

## 2019-01-10 PROCEDURE — 1101F PR PT FALLS ASSESS DOC 0-1 FALLS W/OUT INJ PAST YR: ICD-10-PCS | Mod: CPTII,S$GLB,, | Performed by: INTERNAL MEDICINE

## 2019-01-10 PROCEDURE — 3080F DIAST BP >= 90 MM HG: CPT | Mod: CPTII,S$GLB,, | Performed by: INTERNAL MEDICINE

## 2019-01-10 PROCEDURE — 99999 PR PBB SHADOW E&M-EST. PATIENT-LVL III: ICD-10-PCS | Mod: PBBFAC,,, | Performed by: INTERNAL MEDICINE

## 2019-01-10 NOTE — LETTER
January 10, 2019      Earl Skinner MD  [address]  [facility] [address]           [facility] - Infectious Diseases  [address]  [facility] [address]  Phone: [phone]  Fax: [phone]          Patient: Rashad Rodríguez   MR Number: [id number]   YOB: 1945   Date of Visit: 1/10/2019       Dear Dr. Earl Skinner:    Thank you for referring Rashad Rodríguez to me for evaluation. Attached you will find relevant portions of my assessment and plan of care.    If you have questions, please do not hesitate to call me. I look forward to following Rashad Rodríguez along with you.    Sincerely,    Marisa Moreno MD    Enclosure  CC:  No Recipients    If you would like to receive this communication electronically, please contact [email or web address] or [phone] to request more information on [facility] access.    For providers and/or their staff who would like to refer a patient to [facility], please contact us through our one-stop-shop provider referral line, [facility], at [phone].    If you feel you have received this communication in error or would no longer like to receive these types of communications, please e-mail [email or web address]

## 2019-01-10 NOTE — PROGRESS NOTES
INFECTIOUS DISEASE CLINIC  01/10/2019 1:54 PM    Subjective:      Chief Complaint: + MAC    History of Present Illness:    Patient Rashad Rodríguez is a 73 y.o. male who presents today for evaluation of MAC growth on cultures. Patient reports >1ppd tobacco h/o for > 50 years. He had a CT chest 5/2018, which appears to have been done for routine lung cancer screening which showed a 5.7cm cavitary mass in the R lung. AFB cultures were collected that have grown out MAC twice. A repeat CT chest was done 11/18, which showed stable disease. Patient reports working with [facility] tobacco cessation program to quit smoking. Had reaction to the nicotine patches, but taking wellbutrin. Patient denies chronic cough. Reports some clear phlegm since last Saturday, but denies chronic sputum production. Denies hemoptysis. Denies sob. Denies weight loss (notes some weight gain). Worried about plan for long term antibotics for MAC because he has h/o Rashes/hives with so many antibiotics in the past including-- keflex, cipro, doxy, bactrim.      Ct chest 5/2018: (done for lung cancer screening)  Lungs: There are abnormal opacities that require further evaluation.  The largest opacity in the right lung appears cavitated and measures 5.7 cm on series 3, image 30 (right upper lobe).      CT 11/8/18:   1. No significant interval change in abnormal right upper lobe opacity 5.7 cm.  Infectious or inflammatory disease is favored.  Since neoplasm is possible, continued follow-up in 6 months with noncontrast chest CT or biopsy or PET scan is recommended.  2. Unchanged 0.7 cm left upper lobe nodule.  3. Emphysema.    AFB Culture & Smear Results called to and read back by:Nyla Olivarez MA for Dr. Susanne LONDON   AFB Culture & Smear 11/23/2018  14:47 P   AFB Culture & Smear MYCOBACTERIUM AVIUM INTRACELLULARE COMPLEX   From broth only  P      AFB CULTURE STAIN No acid fast bacilli seen.      Component 1mo ago   AFB Culture & Smear Results called to and  read back by:Gerri Rogers MA for Dr. Skinner  P   AFB Culture & Smear 12/12/2018  13:47 P   AFB Culture & Smear MYCOBACTERIUM AVIUM INTRACELLULARE COMPLEX   4 colonies  P      AFB CULTURE STAIN No acid fast bacilli seen.       Ref Range & Units 2mo ago   NIL See text IU/mL 0.060    TB1 - Nil See text IU/mL -0.010    TB2 - Nil See text IU/mL 0.030    Mitogen - Nil See text IU/mL 4.140    TB Gold Plus  Negative        Review of Symptoms:  Constitutional: Denies fevers, chills, or weakness.  ENT: Denies dysphagia, nasal discharge, ear pain or discharge.  Cardiovascular: Denies chest pain, palpitations, orthopnea, or claudication.  Respiratory: Denies shortness of breath, cough, hemoptysis, or wheezing.  GI: Denies nausea/vomitting, hematochezia, melena, abd pain, or changes in appetite.  : Denies dysuria, incontinence, or hematuria.  Musculoskeletal: Denies joint pain or myalgias.  Skin/breast: Denies rashes, lumps, lesions, or discharge.  Neurologic: Denies headache, dizziness, vertigo, or paresthesias.    Current Outpatient Medications on File Prior to Visit   Medication Sig Dispense Refill    albuterol (VENTOLIN HFA) 90 mcg/actuation inhaler Inhale 2 puffs into the lungs every 4 (four) hours as needed for Wheezing or Shortness of Breath. 1 Inhaler 0    amLODIPine (NORVASC) 10 MG tablet Take 1 tablet (10 mg total) by mouth once daily. 90 tablet 0    atorvastatin (LIPITOR) 10 MG tablet Take 1 tablet (10 mg total) by mouth once daily. 90 tablet 0    promethazine-dextromethorphan (PROMETHAZINE-DM) 6.25-15 mg/5 mL Syrp Take 5 mLs by mouth every 4 to 6 hours as needed. 240 mL 0    sildenafil (VIAGRA) 100 MG tablet Take 1 tablet (100 mg total) by mouth daily as needed for Erectile Dysfunction. 10 tablet 11    valsartan-hydrochlorothiazide (DIOVAN-HCT) 320-12.5 mg per tablet Take 1 tablet by mouth once daily. 90 tablet 0    buPROPion (WELLBUTRIN SR) 150 MG TBSR 12 hr tablet Take 1 tablet (150 mg total) by mouth 2  (two) times daily. 60 tablet 0     No current facility-administered medications on file prior to visit.          Past Medical History:   Diagnosis Date    Hyperlipidemia     Hypertension        Past Surgical History:   Procedure Laterality Date    NO PAST SURGERIES         Family History   Problem Relation Age of Onset    Cancer Mother     Stroke Father     No Known Problems Sister     No Known Problems Brother     No Known Problems Maternal Aunt     No Known Problems Maternal Uncle     No Known Problems Paternal Aunt     No Known Problems Paternal Uncle     No Known Problems Maternal Grandmother     No Known Problems Maternal Grandfather     No Known Problems Paternal Grandmother     No Known Problems Paternal Grandfather     Amblyopia Neg Hx     Blindness Neg Hx     Cataracts Neg Hx     Diabetes Neg Hx     Glaucoma Neg Hx     Hypertension Neg Hx     Macular degeneration Neg Hx     Retinal detachment Neg Hx     Strabismus Neg Hx     Thyroid disease Neg Hx    + tobacco  Lives on [address]  Worked around [employer]        Social History     Socioeconomic History    Marital status:      Spouse name: Not on file    Number of children: Not on file    Years of education: Not on file    Highest education level: Not on file   Social Needs    Financial resource strain: Not on file    Food insecurity - worry: Not on file    Food insecurity - inability: Not on file    Transportation needs - medical: Not on file    Transportation needs - non-medical: Not on file   Occupational History    Not on file   Tobacco Use    Smoking status: Current Every Day Smoker     Packs/day: 1.00     Years: 50.00     Pack years: 50.00     Types: Cigarettes    Smokeless tobacco: Never Used   Substance and Sexual Activity    Alcohol use: Yes     Alcohol/week: 1.2 oz     Types: 2 Cans of beer per week     Comment: weekends    Drug use: No    Sexual activity: Yes     Partners: Female   Other  Topics Concern    Not on file   Social History Narrative    Not on file       Review of patient's allergies indicates:   Allergen Reactions    Cephalexin     Ciprofloxacin     Doxycycline Other (See Comments)     Stomach cramps    Bactrim [sulfamethoxazole-trimethoprim] Rash         Objective:   VS (24h): There were no vitals filed for this visit.  [unfilled]  General: Afebrile, alert, comfortable, no acute distress.   HEENT: ALEX. EOMI, no scleral icterus. No sinus tenderness. MMM.  Pulmonary: Non labored,clear to auscultation A/P/L. No wheezing, crackles, or rhonchi.  Cardiac: normal S1 & S2 w/o rubs/murmurs/gallops. No JVD.   Abdominal: Non-tender, non-distended.Bowel sounds present x 4. No appreciable hepatosplenomegaly. No guarding or rebound tenderness  Extremities: Moves all extremities x 4. No peripheral edema. 2+ pulses.  Skin: No jaundice, rashes, or visible lesions.   Neurological:  Alert and oriented x 4.     Labs:  Glucose   Date Value Ref Range Status   11/29/2018 96 70 - 110 mg/dL Final   10/22/2018 90 70 - 110 mg/dL Final   05/14/2018 90 70 - 110 mg/dL Final     Calcium   Date Value Ref Range Status   11/29/2018 9.9 8.7 - 10.5 mg/dL Final   10/22/2018 10.1 8.7 - 10.5 mg/dL Final   05/14/2018 10.0 8.7 - 10.5 mg/dL Final     Albumin   Date Value Ref Range Status   11/29/2018 3.9 3.5 - 5.2 g/dL Final   10/22/2018 3.7 3.5 - 5.2 g/dL Final   05/14/2018 4.0 3.5 - 5.2 g/dL Final     Total Protein   Date Value Ref Range Status   11/29/2018 8.3 6.0 - 8.4 g/dL Final   05/14/2018 8.9 (H) 6.0 - 8.4 g/dL Final   02/15/2013 7.7 6.0 - 8.4 g/dL Final     Sodium   Date Value Ref Range Status   11/29/2018 139 136 - 145 mmol/L Final   10/22/2018 141 136 - 145 mmol/L Final   05/14/2018 141 136 - 145 mmol/L Final     Potassium   Date Value Ref Range Status   11/29/2018 3.9 3.5 - 5.1 mmol/L Final   10/22/2018 4.2 3.5 - 5.1 mmol/L Final   05/14/2018 4.3 3.5 - 5.1 mmol/L Final     CO2   Date Value Ref Range Status    11/29/2018 25 23 - 29 mmol/L Final   10/22/2018 29 23 - 29 mmol/L Final   05/14/2018 25 23 - 29 mmol/L Final     Chloride   Date Value Ref Range Status   11/29/2018 104 95 - 110 mmol/L Final   10/22/2018 103 95 - 110 mmol/L Final   05/14/2018 105 95 - 110 mmol/L Final     BUN, Bld   Date Value Ref Range Status   11/29/2018 17 8 - 23 mg/dL Final   10/22/2018 13 8 - 23 mg/dL Final   05/14/2018 16 8 - 23 mg/dL Final     Creatinine   Date Value Ref Range Status   11/29/2018 1.0 0.5 - 1.4 mg/dL Final   10/22/2018 1.0 0.5 - 1.4 mg/dL Final   05/14/2018 1.1 0.5 - 1.4 mg/dL Final     Alkaline Phosphatase   Date Value Ref Range Status   11/29/2018 105 55 - 135 U/L Final   05/14/2018 102 55 - 135 U/L Final   02/15/2013 100 55 - 135 U/L Final     ALT   Date Value Ref Range Status   11/29/2018 24 10 - 44 U/L Final   05/14/2018 25 10 - 44 U/L Final   02/15/2013 29 10 - 44 U/L Final     AST   Date Value Ref Range Status   11/29/2018 24 10 - 40 U/L Final   05/14/2018 28 10 - 40 U/L Final   02/15/2013 28 10 - 40 U/L Final     Total Bilirubin   Date Value Ref Range Status   11/29/2018 0.4 0.1 - 1.0 mg/dL Final     Comment:     For infants and newborns, interpretation of results should be based  on gestational age, weight and in agreement with clinical  observations.  Premature Infant recommended reference ranges:  Up to 24 hours.............<8.0 mg/dL  Up to 48 hours............<12.0 mg/dL  3-5 days..................<15.0 mg/dL  6-29 days.................<15.0 mg/dL     05/14/2018 0.8 0.1 - 1.0 mg/dL Final     Comment:     For infants and newborns, interpretation of results should be based  on gestational age, weight and in agreement with clinical  observations.  Premature Infant recommended reference ranges:  Up to 24 hours.............<8.0 mg/dL  Up to 48 hours............<12.0 mg/dL  3-5 days..................<15.0 mg/dL  6-29 days.................<15.0 mg/dL     02/15/2013 0.4 0.1 - 1.0 mg/dl Final     Comment:     For infants  and newborns, interpretation of results should be based  on gestational age, weight and in agreement with clinical  observations.  .  Premature Infant recommended reference ranges:  Up to 24 hours.............<8.0 mg/dl  Up to 48 hours............<12.0 mg/dl  3-5 days..................<15.0 mg/dl  6-29 days.................<15.0 mg/dl     WBC   Date Value Ref Range Status   10/22/2018 10.00 3.90 - 12.70 K/uL Final   02/15/2013 11.95 (H) 4.8 - 10.8 K/uL Final   07/19/2011 15.01 (H) 4.8 - 10.8 K/uL Final     Hemoglobin   Date Value Ref Range Status   10/22/2018 16.2 14.0 - 18.0 g/dL Final   02/15/2013 14.8 14.0 - 18.0 gm/dl Final   07/19/2011 15.2 14.0 - 18.0 gm/dl Final     Hematocrit   Date Value Ref Range Status   10/22/2018 47.3 40.0 - 54.0 % Final   02/15/2013 44.2 40.0 - 54.0 % Final   07/19/2011 45.1 40.0 - 54.0 % Final     MCV   Date Value Ref Range Status   10/22/2018 91 82 - 98 fL Final   02/15/2013 92.9 82 - 95 fL Final   07/19/2011 92.2 82 - 95 fL Final     Platelets   Date Value Ref Range Status   10/22/2018 284 150 - 350 K/uL Final   02/15/2013 390 (H) 150 - 350 K/uL Final   07/19/2011 321 150 - 350 K/uL Final     Lab Results   Component Value Date    CHOL 231 (H) 05/14/2018    CHOL 215 (H) 07/19/2011    CHOL 228 (H) 09/28/2010     Lab Results   Component Value Date    HDL 70 05/14/2018    HDL 68 07/19/2011    HDL 56 09/28/2010     Lab Results   Component Value Date    LDLCALC 146.0 05/14/2018    LDLCALC 128.6 07/19/2011    LDLCALC 152.2 (H) 09/28/2010     Lab Results   Component Value Date    TRIG 75 05/14/2018    TRIG 92 07/19/2011    TRIG 99 09/28/2010     Lab Results   Component Value Date    CHOLHDL 30.3 05/14/2018    CHOLHDL 31.6 07/19/2011    CHOLHDL 24.6 09/28/2010     RPR   Date Value Ref Range Status   05/21/2018 Non-reactive Non-reactive Final   02/27/2013 Non-Reactive Non-Reactive Final     No results found for: QUANTIFERON    Medications:  Current Outpatient Medications on File Prior to  Visit   Medication Sig Dispense Refill    albuterol (VENTOLIN HFA) 90 mcg/actuation inhaler Inhale 2 puffs into the lungs every 4 (four) hours as needed for Wheezing or Shortness of Breath. 1 Inhaler 0    amLODIPine (NORVASC) 10 MG tablet Take 1 tablet (10 mg total) by mouth once daily. 90 tablet 0    atorvastatin (LIPITOR) 10 MG tablet Take 1 tablet (10 mg total) by mouth once daily. 90 tablet 0    buPROPion (WELLBUTRIN SR) 150 MG TBSR 12 hr tablet Take 1 tablet (150 mg total) by mouth 2 (two) times daily. 60 tablet 0    promethazine-dextromethorphan (PROMETHAZINE-DM) 6.25-15 mg/5 mL Syrp Take 5 mLs by mouth every 4 to 6 hours as needed. 240 mL 0    sildenafil (VIAGRA) 100 MG tablet Take 1 tablet (100 mg total) by mouth daily as needed for Erectile Dysfunction. 10 tablet 11    valsartan-hydrochlorothiazide (DIOVAN-HCT) 320-12.5 mg per tablet Take 1 tablet by mouth once daily. 90 tablet 0     No current facility-administered medications on file prior to visit.        Antibiotics:   Antibiotics (From admission, onward)    None          HIV: No components found for: HIV 1/2 AG/AB  Hepatitis C IgG: No components found for: HEPATITIS C  Syphilis:   RPR   Date Value Ref Range Status   05/21/2018 Non-reactive Non-reactive Final   02/27/2013 Non-Reactive Non-Reactive Final       Hepatitis A IgG: No components found for: HEPATITIS A IGG  Hepatitis Bc IgG: No components found for: HEPATITIS B CORE IGG  Hepatitis Bs IgG:  Quantiferon: No results found for: QUANTIFERON  VZV IgG: No components found for: VARICELLA IGG    No components found for: SEDIMENTATION RATE  No components found for: C-REACTIVE PROTEIN      Microbiology x 7d:   Microbiology Results (last 7 days)     ** No results found for the last 168 hours. **          Immunization History   Administered Date(s) Administered    Influenza 10/19/2010, 09/10/2013    Influenza - High Dose 10/28/2016, 09/18/2017, 10/15/2018    Zoster 02/06/2017         Assessment:      MAC + sputum cultures  Cavitary lung disease  Tobacco abuse    Plan:     Meets ATS radiographic, and bacteriological criteria (not clinical criteria) for treatment of NTM. However, given extensive smoking history, I am concerned that the lung disease could still be from neoplasm, which hasn't been ruled out. I am also concerned that the patient will not do well with therapy for MAC. Once someone has cavitary disease, the liklihood of culture conversion after treatment is <50%. Furthermore with presence of cavities, he would need DAILY dosing of three antibiotics (likely azithromycin 600mg daily, ethambutol 1000mg daily, and rifampin 600mg daily), potentially for a year or more, and he has history of intolerance/rash to so many antibiotics. Spoke with micro lab and they are sending sample out for macrolide susecptibilites. Need ekg before starting azithro. I spoke with Dr Skinner with pumonology about my concerns. He will review the scans tomorrow. If it is possible to surgically debulk the cavity, this would likely be diagnostic and therapeutic and he would have a much better outcome from perspective of mac treatment.       Marisa Moreno MD, MPH  Infectious Disease

## 2019-01-11 ENCOUNTER — TELEPHONE (OUTPATIENT)
Dept: PULMONOLOGY | Facility: CLINIC | Age: 74
End: 2019-01-11

## 2019-01-11 NOTE — TELEPHONE ENCOUNTER
----- Message from Earl Skinner MD sent at 1/11/2019  4:50 PM CST -----  Please schedule routine clinic follow up with me.  Thanks  earl  ----- Message -----  From: Marisa Moreno MD  Sent: 1/10/2019   4:52 PM  To: Earl Skinner MD    Thanks for your help with this patient. Please keep me updated. thanks

## 2019-01-18 LAB
ACID FAST MOD KINY STN SPEC: NORMAL
MYCOBACTERIUM SPEC QL CULT: NORMAL

## 2019-01-20 DIAGNOSIS — I10 ESSENTIAL HYPERTENSION: ICD-10-CM

## 2019-01-20 RX ORDER — AMLODIPINE BESYLATE 10 MG/1
TABLET ORAL
Qty: 90 TABLET | Refills: 0 | Status: SHIPPED | OUTPATIENT
Start: 2019-01-20 | End: 2019-04-25 | Stop reason: SDUPTHER

## 2019-01-20 RX ORDER — ATORVASTATIN CALCIUM 10 MG/1
TABLET, FILM COATED ORAL
Qty: 90 TABLET | Refills: 0 | Status: SHIPPED | OUTPATIENT
Start: 2019-01-20 | End: 2019-04-26 | Stop reason: SDUPTHER

## 2019-01-20 RX ORDER — VALSARTAN AND HYDROCHLOROTHIAZIDE 320; 12.5 MG/1; MG/1
TABLET, FILM COATED ORAL
Qty: 90 TABLET | Refills: 0 | Status: SHIPPED | OUTPATIENT
Start: 2019-01-20 | End: 2019-04-25 | Stop reason: SDUPTHER

## 2019-02-01 LAB
ACID FAST SUSCEPTIBILITY, SLOW GROWER: ABNORMAL
SPECIMEN SOURCE AND ORGANISM NAME: ABNORMAL

## 2019-02-02 LAB
ACID FAST MOD KINY STN SPEC: NORMAL
MYCOBACTERIUM SPEC QL CULT: NORMAL

## 2019-02-19 ENCOUNTER — OFFICE VISIT (OUTPATIENT)
Dept: PULMONOLOGY | Facility: CLINIC | Age: 74
End: 2019-02-19
Payer: COMMERCIAL

## 2019-02-19 VITALS
OXYGEN SATURATION: 95 % | BODY MASS INDEX: 21.88 KG/M2 | HEART RATE: 85 BPM | SYSTOLIC BLOOD PRESSURE: 164 MMHG | HEIGHT: 67 IN | WEIGHT: 139.38 LBS | DIASTOLIC BLOOD PRESSURE: 105 MMHG

## 2019-02-19 DIAGNOSIS — J43.2 CENTRILOBULAR EMPHYSEMA: ICD-10-CM

## 2019-02-19 DIAGNOSIS — A31.0 MAI (MYCOBACTERIUM AVIUM-INTRACELLULARE): Primary | ICD-10-CM

## 2019-02-19 DIAGNOSIS — J98.4 CAVITARY LESION OF LUNG: ICD-10-CM

## 2019-02-19 PROBLEM — J44.9 COPD (CHRONIC OBSTRUCTIVE PULMONARY DISEASE): Status: ACTIVE | Noted: 2019-02-19

## 2019-02-19 PROCEDURE — 99999 PR PBB SHADOW E&M-EST. PATIENT-LVL III: CPT | Mod: PBBFAC,,, | Performed by: INTERNAL MEDICINE

## 2019-02-19 PROCEDURE — 99999 PR PBB SHADOW E&M-EST. PATIENT-LVL III: ICD-10-PCS | Mod: PBBFAC,,, | Performed by: INTERNAL MEDICINE

## 2019-02-19 PROCEDURE — 99214 PR OFFICE/OUTPT VISIT, EST, LEVL IV, 30-39 MIN: ICD-10-PCS | Mod: S$GLB,,, | Performed by: INTERNAL MEDICINE

## 2019-02-19 PROCEDURE — 3077F PR MOST RECENT SYSTOLIC BLOOD PRESSURE >= 140 MM HG: ICD-10-PCS | Mod: CPTII,S$GLB,, | Performed by: INTERNAL MEDICINE

## 2019-02-19 PROCEDURE — 3080F PR MOST RECENT DIASTOLIC BLOOD PRESSURE >= 90 MM HG: ICD-10-PCS | Mod: CPTII,S$GLB,, | Performed by: INTERNAL MEDICINE

## 2019-02-19 PROCEDURE — 1101F PR PT FALLS ASSESS DOC 0-1 FALLS W/OUT INJ PAST YR: ICD-10-PCS | Mod: CPTII,S$GLB,, | Performed by: INTERNAL MEDICINE

## 2019-02-19 PROCEDURE — 99214 OFFICE O/P EST MOD 30 MIN: CPT | Mod: S$GLB,,, | Performed by: INTERNAL MEDICINE

## 2019-02-19 PROCEDURE — 3077F SYST BP >= 140 MM HG: CPT | Mod: CPTII,S$GLB,, | Performed by: INTERNAL MEDICINE

## 2019-02-19 PROCEDURE — 3080F DIAST BP >= 90 MM HG: CPT | Mod: CPTII,S$GLB,, | Performed by: INTERNAL MEDICINE

## 2019-02-19 PROCEDURE — 1101F PT FALLS ASSESS-DOCD LE1/YR: CPT | Mod: CPTII,S$GLB,, | Performed by: INTERNAL MEDICINE

## 2019-02-19 NOTE — PROGRESS NOTES
Rashad Rodríguez  was seen as a follow up.    CHIEF COMPLAINT:  No chief complaint on file.      HISTORY OF PRESENT ILLNESS: Rashad Rodríguez is a 73 y.o. male  has a past medical history of Hyperlipidemia and Hypertension.  Patient smoked 1 ppd x 50 years.  Patient underwent ldct 5/18.  CT noted 5.7 cm rul.  Patient was referred to pulmonary at that time, but decided to cancel clinic appointment due to miscommunication.  Patient recently seen Dr. Dejesus and was referred back to pulmonary.  Our first encounter was 11/8/18.  At that time, patient denied cough.  No hemoptysis.  No weight loss.  No family h/o lung cancer.  Still active with yard work.  No chest pain.  No orthopnea.  No pnd.      Since our last appointment, patient was referred for infectious disease for blaine from sputum culture.      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    COPD (chronic obstructive pulmonary disease) 02/19/2019     Resolved Ambulatory Problems     Diagnosis Date Noted    No Resolved Ambulatory Problems     Past Medical History:   Diagnosis Date    Hyperlipidemia     Hypertension                 PAST SURGICAL HISTORY:    Past Surgical History:   Procedure Laterality Date    NO PAST SURGERIES           FAMILY HISTORY:                Family History   Problem Relation Age of Onset    Cancer Mother     Stroke Father     No Known Problems Sister     No Known Problems Brother     No Known Problems Maternal Aunt     No Known Problems Maternal Uncle     No Known Problems Paternal Aunt     No Known Problems Paternal Uncle     No Known Problems Maternal Grandmother     No Known Problems Maternal Grandfather     No Known Problems Paternal Grandmother     No Known Problems Paternal Grandfather      Amblyopia Neg Hx     Blindness Neg Hx     Cataracts Neg Hx     Diabetes Neg Hx     Glaucoma Neg Hx     Hypertension Neg Hx     Macular degeneration Neg Hx     Retinal detachment Neg Hx     Strabismus Neg Hx     Thyroid disease Neg Hx        SOCIAL HISTORY:          Tobacco:   Social History     Tobacco Use   Smoking Status Current Every Day Smoker    Packs/day: 1.00    Years: 50.00    Pack years: 50.00    Types: Cigarettes   Smokeless Tobacco Never Used     alcohol use:    Social History     Substance and Sexual Activity   Alcohol Use Yes    Alcohol/week: 1.2 oz    Types: 2 Cans of beer per week    Comment: weekends               Occupation:  Retire; former employee of [employer].      ALLERGIES:    Review of patient's allergies indicates:   Allergen Reactions    Cephalexin     Ciprofloxacin     Doxycycline Other (See Comments)     Stomach cramps    Bactrim [sulfamethoxazole-trimethoprim] Rash       CURRENT MEDICATIONS:    Current Outpatient Medications   Medication Sig Dispense Refill    albuterol (VENTOLIN HFA) 90 mcg/actuation inhaler Inhale 2 puffs into the lungs every 4 (four) hours as needed for Wheezing or Shortness of Breath. 1 Inhaler 0    amLODIPine (NORVASC) 10 MG tablet TAKE 1 TABLET(10 MG) BY MOUTH EVERY DAY 90 tablet 0    atorvastatin (LIPITOR) 10 MG tablet TAKE 1 TABLET(10 MG) BY MOUTH EVERY DAY 90 tablet 0    buPROPion (WELLBUTRIN SR) 150 MG TBSR 12 hr tablet Take 1 tablet (150 mg total) by mouth 2 (two) times daily. 60 tablet 0    promethazine-dextromethorphan (PROMETHAZINE-DM) 6.25-15 mg/5 mL Syrp Take 5 mLs by mouth every 4 to 6 hours as needed. 240 mL 0    sildenafil (VIAGRA) 100 MG tablet Take 1 tablet (100 mg total) by mouth daily as needed for Erectile Dysfunction. 10 tablet 11    valsartan-hydrochlorothiazide (DIOVAN-HCT) 320-12.5 mg per tablet TAKE 1 TABLET BY MOUTH EVERY DAY 90 tablet 0     No current facility-administered medications for this visit.   "                 REVIEW OF SYSTEMS:     Pulmonary related symptoms as per HPI.  Gen:  no weight loss, no fever, no night sweat  HEENT:  no visual changes, no sore throat, no hearing loss  CV:  Per hpi  GI:  no melena, no hematochezia, no diarhea, no constipation.  :  no dysuria, no hematuria, no hesistancy, no dribbling  Neuro:  no syncope, no vertigo, no tinitus  Psych:  No homocide or suicide ideation; no depression.  Endocrine:  No heat or cold intolerance.  Sleep:  No snoring; no witnessed apnea.  Feeling rested upon awake.    Otherwise, a balance of systems reviewed is negative.          PHYSICAL EXAM:  Vitals:    02/19/19 1156   BP: (!) 164/105   Pulse: 85   SpO2: 95%   Weight: 63.2 kg (139 lb 6.4 oz)   Height: 5' 7" (1.702 m)   PainSc: 0-No pain     Body mass index is 21.83 kg/m².     GENERAL:  well develop; no apparent distress  HEENT:  no nasal congestion; no discharge noted; class 2 modified mallampatti.   NECK:  supple; no palpable masses.  CARDIO: regular rate and rhythm  PULM:  clear to auscultation bilaterally; no intercostals retractions; no accessory muscle usage   ABDOMEN:  soft nontender/nondistended.  +bowel sound  EXTREMITIES no cce  NEURO:  CN II-XII intact.  5/5 motor in all extremities.  sensation grossly intact   to light touch.  PSYCH:  normal affect.  Alert and oriented x 4    LABS  Pulmonary Functions Testing Results: none  ABG none  CXR:  none  CT CHEST:  5/28/18 rul cavitary lesion; julius nodule  11/18 (personally review) no progression when compared to 5/18 ct.    2/27/13 negative  quantiferon 10/22/18 negative  ASSESSMENT/PLAN  Problem List Items Addressed This Visit     Cavitary lesion of lung    Overview     Ct 5/18 with rul cavitary lesion and julius nodule. Stable 11/18 ct.           COPD (chronic obstructive pulmonary disease)    Overview     Emphysematous changes on ct.  Will send for pft.           BLAINE (mycobacterium avium-intracellulare) - Primary    Overview     Multiple positive " culture with kareem.  S/p evaluation by Dr. Moreno.  Recommend vats + rul lobectomy.  Patient declined surgical resection for diagnosis and possibly debulking.  We talked about starting empiric treatment.  Regimen and possible side effects.  Per ID, less than 50% of sputum conversion.  In light of radiographic stability, will repeat ct in 6 months.  If there is radiographic progression, then will start with fob or ct guided biopsy.           Relevant Orders    AFB Culture & Smear        Patient will Follow-up in about 6 months (around 8/19/2019). with md.

## 2019-04-25 DIAGNOSIS — I10 ESSENTIAL HYPERTENSION: ICD-10-CM

## 2019-04-26 DIAGNOSIS — I10 ESSENTIAL HYPERTENSION: ICD-10-CM

## 2019-04-26 RX ORDER — AMLODIPINE BESYLATE 10 MG/1
TABLET ORAL
Qty: 90 TABLET | Refills: 0 | Status: SHIPPED | OUTPATIENT
Start: 2019-04-26 | End: 2020-03-21 | Stop reason: SDUPTHER

## 2019-04-26 RX ORDER — ATORVASTATIN CALCIUM 10 MG/1
TABLET, FILM COATED ORAL
Qty: 90 TABLET | Refills: 0 | Status: SHIPPED | OUTPATIENT
Start: 2019-04-26 | End: 2020-03-21 | Stop reason: SDUPTHER

## 2019-04-26 RX ORDER — VALSARTAN AND HYDROCHLOROTHIAZIDE 320; 12.5 MG/1; MG/1
TABLET, FILM COATED ORAL
Qty: 90 TABLET | Refills: 0 | Status: SHIPPED | OUTPATIENT
Start: 2019-04-26 | End: 2020-03-21 | Stop reason: SDUPTHER

## 2019-05-22 ENCOUNTER — TELEPHONE (OUTPATIENT)
Dept: SMOKING CESSATION | Facility: CLINIC | Age: 74
End: 2019-05-22

## 2019-06-11 ENCOUNTER — TELEPHONE (OUTPATIENT)
Dept: SMOKING CESSATION | Facility: CLINIC | Age: 74
End: 2019-06-11

## 2019-06-13 ENCOUNTER — LAB VISIT (OUTPATIENT)
Dept: LAB | Facility: HOSPITAL | Age: 74
End: 2019-06-13
Attending: INTERNAL MEDICINE
Payer: COMMERCIAL

## 2019-06-13 DIAGNOSIS — A31.0 MAI (MYCOBACTERIUM AVIUM-INTRACELLULARE): ICD-10-CM

## 2019-06-13 PROCEDURE — 87015 SPECIMEN INFECT AGNT CONCNTJ: CPT

## 2019-06-13 PROCEDURE — 87118 MYCOBACTERIC IDENTIFICATION: CPT

## 2019-06-13 PROCEDURE — 87149 DNA/RNA DIRECT PROBE: CPT

## 2019-06-13 PROCEDURE — 87206 SMEAR FLUORESCENT/ACID STAI: CPT

## 2019-06-13 PROCEDURE — 87116 MYCOBACTERIA CULTURE: CPT

## 2019-06-13 PROCEDURE — 87186 SC STD MICRODIL/AGAR DIL: CPT

## 2019-06-19 ENCOUNTER — OFFICE VISIT (OUTPATIENT)
Dept: FAMILY MEDICINE | Facility: CLINIC | Age: 74
End: 2019-06-19
Payer: COMMERCIAL

## 2019-06-19 VITALS
BODY MASS INDEX: 21 KG/M2 | HEIGHT: 67 IN | HEART RATE: 98 BPM | SYSTOLIC BLOOD PRESSURE: 132 MMHG | OXYGEN SATURATION: 97 % | RESPIRATION RATE: 17 BRPM | DIASTOLIC BLOOD PRESSURE: 76 MMHG | WEIGHT: 133.81 LBS | TEMPERATURE: 98 F

## 2019-06-19 DIAGNOSIS — B35.4 TINEA CORPORIS: Primary | ICD-10-CM

## 2019-06-19 PROCEDURE — 99214 OFFICE O/P EST MOD 30 MIN: CPT | Mod: S$GLB,,, | Performed by: FAMILY MEDICINE

## 2019-06-19 PROCEDURE — 99999 PR PBB SHADOW E&M-EST. PATIENT-LVL III: CPT | Mod: PBBFAC,,, | Performed by: FAMILY MEDICINE

## 2019-06-19 PROCEDURE — 99999 PR PBB SHADOW E&M-EST. PATIENT-LVL III: ICD-10-PCS | Mod: PBBFAC,,, | Performed by: FAMILY MEDICINE

## 2019-06-19 PROCEDURE — 3078F PR MOST RECENT DIASTOLIC BLOOD PRESSURE < 80 MM HG: ICD-10-PCS | Mod: CPTII,S$GLB,, | Performed by: FAMILY MEDICINE

## 2019-06-19 PROCEDURE — 1101F PT FALLS ASSESS-DOCD LE1/YR: CPT | Mod: CPTII,S$GLB,, | Performed by: FAMILY MEDICINE

## 2019-06-19 PROCEDURE — 3075F SYST BP GE 130 - 139MM HG: CPT | Mod: CPTII,S$GLB,, | Performed by: FAMILY MEDICINE

## 2019-06-19 PROCEDURE — 99214 PR OFFICE/OUTPT VISIT, EST, LEVL IV, 30-39 MIN: ICD-10-PCS | Mod: S$GLB,,, | Performed by: FAMILY MEDICINE

## 2019-06-19 PROCEDURE — 3078F DIAST BP <80 MM HG: CPT | Mod: CPTII,S$GLB,, | Performed by: FAMILY MEDICINE

## 2019-06-19 PROCEDURE — 1101F PR PT FALLS ASSESS DOC 0-1 FALLS W/OUT INJ PAST YR: ICD-10-PCS | Mod: CPTII,S$GLB,, | Performed by: FAMILY MEDICINE

## 2019-06-19 PROCEDURE — 3075F PR MOST RECENT SYSTOLIC BLOOD PRESS GE 130-139MM HG: ICD-10-PCS | Mod: CPTII,S$GLB,, | Performed by: FAMILY MEDICINE

## 2019-06-19 RX ORDER — TRIAMCINOLONE ACETONIDE 1 MG/G
CREAM TOPICAL
COMMUNITY
Start: 2019-06-18 | End: 2020-03-21

## 2019-06-19 RX ORDER — CLOTRIMAZOLE 1 %
CREAM (GRAM) TOPICAL 2 TIMES DAILY
Qty: 30 G | Refills: 1 | Status: SHIPPED | OUTPATIENT
Start: 2019-06-19 | End: 2020-03-21

## 2019-06-20 NOTE — PROGRESS NOTES
Routine Office Visit    Patient Name: Rashad Rodríguez    : 1945  MRN: [id number]    Subjective:  Rashad is a 74 y.o. male who presents today for:   Chief Complaint   Patient presents with    Rash     74-year-old male comes in with complaint of a very itchy rash under both axilla which has grown in size over the last week.  It is also red.  He states that the itchiness is worse at night.  He has applied cortisone cream for several days and this has made it worse.  He reports no fevers or chills.  It is not located anywhere else.  The only thing that he can associate with it is that several days prior some bug that he cannot identify bit him on the back of his right hand.    Past Medical History  Past Medical History:   Diagnosis Date    Hyperlipidemia     Hypertension        Past Surgical History  Past Surgical History:   Procedure Laterality Date    NO PAST SURGERIES          Family History  Family History   Problem Relation Age of Onset    Cancer Mother     Stroke Father     No Known Problems Sister     No Known Problems Brother     No Known Problems Maternal Aunt     No Known Problems Maternal Uncle     No Known Problems Paternal Aunt     No Known Problems Paternal Uncle     No Known Problems Maternal Grandmother     No Known Problems Maternal Grandfather     No Known Problems Paternal Grandmother     No Known Problems Paternal Grandfather     Amblyopia Neg Hx     Blindness Neg Hx     Cataracts Neg Hx     Diabetes Neg Hx     Glaucoma Neg Hx     Hypertension Neg Hx     Macular degeneration Neg Hx     Retinal detachment Neg Hx     Strabismus Neg Hx     Thyroid disease Neg Hx        Social History  Social History     Socioeconomic History    Marital status:      Spouse name: Not on file    Number of children: Not on file    Years of education: Not on file    Highest education level: Not on file   Occupational History    Not on file   Social Needs    Financial resource  strain: Not on file    Food insecurity:     Worry: Not on file     Inability: Not on file    Transportation needs:     Medical: Not on file     Non-medical: Not on file   Tobacco Use    Smoking status: Current Every Day Smoker     Packs/day: 1.00     Years: 50.00     Pack years: 50.00     Types: Cigarettes    Smokeless tobacco: Never Used   Substance and Sexual Activity    Alcohol use: Yes     Alcohol/week: 1.2 oz     Types: 2 Cans of beer per week     Comment: weekends    Drug use: No    Sexual activity: Yes     Partners: Female   Lifestyle    Physical activity:     Days per week: Not on file     Minutes per session: Not on file    Stress: Not on file   Relationships    Social connections:     Talks on phone: Not on file     Gets together: Not on file     Attends [employer] service: Not on file     Active member of club or organization: Not on file     Attends meetings of clubs or organizations: Not on file     Relationship status: Not on file   Other Topics Concern    Not on file   Social History Narrative    Not on file       Current Medications  Current Outpatient Medications on File Prior to Visit   Medication Sig Dispense Refill    albuterol (VENTOLIN HFA) 90 mcg/actuation inhaler Inhale 2 puffs into the lungs every 4 (four) hours as needed for Wheezing or Shortness of Breath. 1 Inhaler 0    amLODIPine (NORVASC) 10 MG tablet TAKE 1 TABLET(10 MG) BY MOUTH EVERY DAY 90 tablet 0    atorvastatin (LIPITOR) 10 MG tablet TAKE 1 TABLET(10 MG) BY MOUTH EVERY DAY 90 tablet 0    buPROPion (WELLBUTRIN SR) 150 MG TBSR 12 hr tablet Take 1 tablet (150 mg total) by mouth 2 (two) times daily. 60 tablet 0    promethazine-dextromethorphan (PROMETHAZINE-DM) 6.25-15 mg/5 mL Syrp Take 5 mLs by mouth every 4 to 6 hours as needed. 240 mL 0    sildenafil (VIAGRA) 100 MG tablet Take 1 tablet (100 mg total) by mouth daily as needed for Erectile Dysfunction. 10 tablet 11    triamcinolone acetonide 0.1% (KENALOG) 0.1 %  "cream       valsartan-hydrochlorothiazide (DIOVAN-HCT) 320-12.5 mg per tablet TAKE 1 TABLET BY MOUTH EVERY DAY 90 tablet 0     No current facility-administered medications on file prior to visit.        Allergies   Review of patient's allergies indicates:   Allergen Reactions    Cephalexin     Ciprofloxacin     Doxycycline Other (See Comments)     Stomach cramps    Bactrim [sulfamethoxazole-trimethoprim] Rash       Review of Systems   Constitutional: Negative for chills and fever.   Respiratory: Negative for shortness of breath.    Cardiovascular: Negative for chest pain and palpitations.   Skin: Positive for rash. Negative for wound.     /76 (BP Location: Left arm, Patient Position: Sitting, BP Method: Medium (Manual))   Pulse 98   Temp 98 °F (36.7 °C) (Oral)   Resp 17   Ht 5' 7" (1.702 m)   Wt 60.7 kg (133 lb 13.1 oz)   SpO2 97%   BMI 20.96 kg/m²     Physical Exam   Constitutional: He appears well-developed. No distress.   HENT:   Head: Normocephalic and atraumatic.   Eyes: Pupils are equal, round, and reactive to light. EOM are normal.   Cardiovascular: Normal rate, regular rhythm, normal heart sounds and intact distal pulses.   Pulmonary/Chest: Effort normal and breath sounds normal. No stridor. He has no wheezes.   Skin:   Around both axilla and coming anteriorly there is in erythematous circular rash with peripheral scaling and some central clearing         Assessment/Plan:  Rashad was seen today for rash.    Diagnoses and all orders for this visit:    Tinea corporis  -     clotrimazole (LOTRIMIN) 1 % cream; Apply topically 2 (two) times daily.     Discussed with patient that the rashes are consistent with tinea and explained what this meant.  Discussed with him that these are contagious and he can transmitted to other parts of his body by Real inoculation.  Advised topical antifungal as prescribed.  Advised to apply it using a gloved hand.  Prior to applying, he is to clean the area and dry " well.  If the rash is very itchy he can wait 10 to 15 minutes and then up apply the cortisone.  If there is no improvement within a week, he can send me a message and I will place a consult to Dermatology.            -Juanito Dejesus Jr., MD, AAHIVS          This office note has been dictated.  This dictation has been generated using M-Modal Fluency Direct dictation; some phonetic errors may occur.

## 2019-06-24 ENCOUNTER — TELEPHONE (OUTPATIENT)
Dept: FAMILY MEDICINE | Facility: CLINIC | Age: 74
End: 2019-06-24

## 2019-06-28 ENCOUNTER — TELEPHONE (OUTPATIENT)
Dept: PULMONOLOGY | Facility: CLINIC | Age: 74
End: 2019-06-28

## 2019-07-02 LAB
ACID FAST MOD KINY STN SPEC: ABNORMAL
MYCOBACTERIUM SPEC QL CULT: ABNORMAL

## 2019-08-21 ENCOUNTER — CLINICAL SUPPORT (OUTPATIENT)
Dept: SMOKING CESSATION | Facility: CLINIC | Age: 74
End: 2019-08-21
Payer: COMMERCIAL

## 2019-08-21 DIAGNOSIS — F17.200 NICOTINE DEPENDENCE: Primary | ICD-10-CM

## 2019-08-21 LAB
ACID FAST SUSCEPTIBILITY, SLOW GROWER: ABNORMAL
SPECIMEN SOURCE AND ORGANISM NAME: ABNORMAL

## 2019-08-21 PROCEDURE — 99407 BEHAV CHNG SMOKING > 10 MIN: CPT | Mod: S$GLB,,,

## 2019-08-21 PROCEDURE — 99407 PR TOBACCO USE CESSATION INTENSIVE >10 MINUTES: ICD-10-PCS | Mod: S$GLB,,,

## 2019-08-21 NOTE — PROGRESS NOTES
Spoke with patient today regarding smoking cessation progress for 12-month telephone follow up, states not tobacco free. Patient not interested in scheduling and appointment at this time. Will call when ready Informed patient of benefit period, future follow up, and contact information if any further help or support is needed. Will complete / resolve smart form for 12 month phone follow up on Quit attempt #2.

## 2019-08-26 ENCOUNTER — TELEPHONE (OUTPATIENT)
Dept: PULMONOLOGY | Facility: CLINIC | Age: 74
End: 2019-08-26

## 2019-08-26 NOTE — TELEPHONE ENCOUNTER
----- Message from Earl Skinner MD sent at 8/26/2019  4:05 PM CDT -----  Please schedule routine clinic follow up with me.

## 2019-09-07 ENCOUNTER — PATIENT OUTREACH (OUTPATIENT)
Dept: ADMINISTRATIVE | Facility: OTHER | Age: 74
End: 2019-09-07

## 2019-09-11 ENCOUNTER — OFFICE VISIT (OUTPATIENT)
Dept: PULMONOLOGY | Facility: CLINIC | Age: 74
End: 2019-09-11
Payer: COMMERCIAL

## 2019-09-11 VITALS
DIASTOLIC BLOOD PRESSURE: 114 MMHG | HEIGHT: 67 IN | BODY MASS INDEX: 22.23 KG/M2 | WEIGHT: 141.63 LBS | OXYGEN SATURATION: 97 % | HEART RATE: 89 BPM | SYSTOLIC BLOOD PRESSURE: 158 MMHG

## 2019-09-11 DIAGNOSIS — R91.1 LUNG NODULE: ICD-10-CM

## 2019-09-11 DIAGNOSIS — J43.2 CENTRILOBULAR EMPHYSEMA: ICD-10-CM

## 2019-09-11 DIAGNOSIS — J98.4 CAVITARY LESION OF LUNG: ICD-10-CM

## 2019-09-11 DIAGNOSIS — A31.0 MAI (MYCOBACTERIUM AVIUM-INTRACELLULARE): ICD-10-CM

## 2019-09-11 PROCEDURE — 99999 PR PBB SHADOW E&M-EST. PATIENT-LVL III: CPT | Mod: PBBFAC,,, | Performed by: INTERNAL MEDICINE

## 2019-09-11 PROCEDURE — 99999 PR PBB SHADOW E&M-EST. PATIENT-LVL III: ICD-10-PCS | Mod: PBBFAC,,, | Performed by: INTERNAL MEDICINE

## 2019-09-11 PROCEDURE — 99214 OFFICE O/P EST MOD 30 MIN: CPT | Mod: S$GLB,,, | Performed by: INTERNAL MEDICINE

## 2019-09-11 PROCEDURE — 1101F PR PT FALLS ASSESS DOC 0-1 FALLS W/OUT INJ PAST YR: ICD-10-PCS | Mod: CPTII,S$GLB,, | Performed by: INTERNAL MEDICINE

## 2019-09-11 PROCEDURE — 3080F PR MOST RECENT DIASTOLIC BLOOD PRESSURE >= 90 MM HG: ICD-10-PCS | Mod: CPTII,S$GLB,, | Performed by: INTERNAL MEDICINE

## 2019-09-11 PROCEDURE — 3077F SYST BP >= 140 MM HG: CPT | Mod: CPTII,S$GLB,, | Performed by: INTERNAL MEDICINE

## 2019-09-11 PROCEDURE — 1101F PT FALLS ASSESS-DOCD LE1/YR: CPT | Mod: CPTII,S$GLB,, | Performed by: INTERNAL MEDICINE

## 2019-09-11 PROCEDURE — 3080F DIAST BP >= 90 MM HG: CPT | Mod: CPTII,S$GLB,, | Performed by: INTERNAL MEDICINE

## 2019-09-11 PROCEDURE — 99214 PR OFFICE/OUTPT VISIT, EST, LEVL IV, 30-39 MIN: ICD-10-PCS | Mod: S$GLB,,, | Performed by: INTERNAL MEDICINE

## 2019-09-11 PROCEDURE — 3077F PR MOST RECENT SYSTOLIC BLOOD PRESSURE >= 140 MM HG: ICD-10-PCS | Mod: CPTII,S$GLB,, | Performed by: INTERNAL MEDICINE

## 2019-09-11 NOTE — PROGRESS NOTES
Rashad Rodríguez  was seen as a follow up.    CHIEF COMPLAINT:  Results      HISTORY OF PRESENT ILLNESS: Rashad Rodríguez is a 74 y.o. male  has a past medical history of Hyperlipidemia and Hypertension.  Patient smoked 1 ppd x 50 years.  Patient underwent ldct 5/18.  CT noted 5.7 cm rul.  Patient was referred to pulmonary at that time, but decided to cancel clinic appointment due to miscommunication.  Patient recently seen Dr. Dejesus and was referred back to pulmonary.  Our first encounter was 11/8/18.  At that time, patient denied cough.  No hemoptysis.  No weight loss.  No family h/o lung cancer.  Still active with yard work.  No chest pain.  No orthopnea.  No pnd.      Patient was referred for infectious disease for blaine from sputum culture.  Dr. Moreno requested tissue biopsy to rule out malignancy and possibly consideration for resection for debulking to improve likelihood of treatment conversion for blaine.  From our conversation of last visit, patient declined surgical intervention.  The plan was to repeat ct in 6 months to reassess cavitary lesion.    Today, patient denied hemoptysis or worsening cough.  Still active with yard work.  No weight loss.  Did not get repeat ct.      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    COPD (chronic obstructive pulmonary disease) 02/19/2019     Resolved Ambulatory Problems     Diagnosis Date Noted    No Resolved Ambulatory Problems     Past Medical History:   Diagnosis Date    Hyperlipidemia     Hypertension                 PAST SURGICAL HISTORY:    Past Surgical History:   Procedure Laterality Date    NO PAST SURGERIES           FAMILY HISTORY:                Family History   Problem Relation Age of Onset    Cancer Mother     Stroke Father      No Known Problems Sister     No Known Problems Brother     No Known Problems Maternal Aunt     No Known Problems Maternal Uncle     No Known Problems Paternal Aunt     No Known Problems Paternal Uncle     No Known Problems Maternal Grandmother     No Known Problems Maternal Grandfather     No Known Problems Paternal Grandmother     No Known Problems Paternal Grandfather     Amblyopia Neg Hx     Blindness Neg Hx     Cataracts Neg Hx     Diabetes Neg Hx     Glaucoma Neg Hx     Hypertension Neg Hx     Macular degeneration Neg Hx     Retinal detachment Neg Hx     Strabismus Neg Hx     Thyroid disease Neg Hx        SOCIAL HISTORY:          Tobacco:   Social History     Tobacco Use   Smoking Status Current Every Day Smoker    Packs/day: 1.00    Years: 50.00    Pack years: 50.00    Types: Cigarettes   Smokeless Tobacco Never Used     alcohol use:    Social History     Substance and Sexual Activity   Alcohol Use Yes    Alcohol/week: 1.2 oz    Types: 2 Cans of beer per week    Comment: weekends               Occupation:  Retire; former employee of [employer].      ALLERGIES:    Review of patient's allergies indicates:   Allergen Reactions    Cephalexin     Ciprofloxacin     Doxycycline Other (See Comments)     Stomach cramps    Bactrim [sulfamethoxazole-trimethoprim] Rash       CURRENT MEDICATIONS:    Current Outpatient Medications   Medication Sig Dispense Refill    albuterol (VENTOLIN HFA) 90 mcg/actuation inhaler Inhale 2 puffs into the lungs every 4 (four) hours as needed for Wheezing or Shortness of Breath. 1 Inhaler 0    amLODIPine (NORVASC) 10 MG tablet TAKE 1 TABLET(10 MG) BY MOUTH EVERY DAY 90 tablet 0    atorvastatin (LIPITOR) 10 MG tablet TAKE 1 TABLET(10 MG) BY MOUTH EVERY DAY 90 tablet 0    clotrimazole (LOTRIMIN) 1 % cream Apply topically 2 (two) times daily. 30 g 1    promethazine-dextromethorphan (PROMETHAZINE-DM) 6.25-15 mg/5 mL Syrp Take 5 mLs by mouth  "every 4 to 6 hours as needed. 240 mL 0    sildenafil (VIAGRA) 100 MG tablet Take 1 tablet (100 mg total) by mouth daily as needed for Erectile Dysfunction. 10 tablet 11    triamcinolone acetonide 0.1% (KENALOG) 0.1 % cream       valsartan-hydrochlorothiazide (DIOVAN-HCT) 320-12.5 mg per tablet TAKE 1 TABLET BY MOUTH EVERY DAY 90 tablet 0    buPROPion (WELLBUTRIN SR) 150 MG TBSR 12 hr tablet Take 1 tablet (150 mg total) by mouth 2 (two) times daily. 60 tablet 0     No current facility-administered medications for this visit.                   REVIEW OF SYSTEMS:     Pulmonary related symptoms as per HPI.  Gen:  no weight loss, no fever, no night sweat  HEENT:  no visual changes, no sore throat, no hearing loss  CV:  Per hpi  GI:  no melena, no hematochezia, no diarhea, no constipation.  :  no dysuria, no hematuria, no hesistancy, no dribbling  Neuro:  no syncope, no vertigo, no tinitus  Psych:  No homocide or suicide ideation; no depression.  Endocrine:  No heat or cold intolerance.  Sleep:  No snoring; no witnessed apnea.  Feeling rested upon awake.    Otherwise, a balance of systems reviewed is negative.          PHYSICAL EXAM:  Vitals:    09/11/19 0904   BP: (!) 158/114   Pulse: 89   SpO2: 97%   Weight: 64.3 kg (141 lb 10.3 oz)   Height: 5' 7" (1.702 m)   PainSc: 0-No pain     Body mass index is 22.18 kg/m².     GENERAL:  well develop; no apparent distress  HEENT:  no nasal congestion; no discharge noted; class 2 modified mallampatti.   NECK:  supple; no palpable masses.  CARDIO: regular rate and rhythm  PULM:  clear to auscultation bilaterally; no intercostals retractions; no accessory muscle usage   ABDOMEN:  soft nontender/nondistended.  +bowel sound  EXTREMITIES no cce  NEURO:  CN II-XII intact.  5/5 motor in all extremities.  sensation grossly intact   to light touch.  PSYCH:  normal affect.  Alert and oriented x 4    LABS  Pulmonary Functions Testing Results: none  ABG none  CXR:  none  CT CHEST:  " 5/28/18 rul cavitary lesion; julius nodule  11/18 (personally review) no progression when compared to 5/18 ct.    2/27/13 negative  quantiferon 10/22/18 negative  ASSESSMENT/PLAN  Problem List Items Addressed This Visit     Cavitary lesion of lung    Overview     Ct 5/18 with rul cavitary lesion and julius nodule. Stable 11/18 ct.           Current Assessment & Plan     Await repeat ct scan result         COPD (chronic obstructive pulmonary disease)    Overview     Emphysematous changes on ct.  Did not get pft.            BLAINE (mycobacterium avium-intracellulare)    Overview     Multiple positive culture with blaine.  S/p evaluation by Dr. Moreno.  Recommend vats + rul lobectomy.  Patient declined surgical resection for diagnosis and possibly debulking.  We talked about starting empiric treatment.    Per ID, less than 50% of sputum conversion in addition to multiple potential side effects.  In light of radiographic stability, will repeat ct in 6 months.  If there is radiographic progression, then may consider fob or ct guided biopsy.             Other Visit Diagnoses     Lung nodule        Relevant Orders    CT Chest Without Contrast        Patient will Follow up in about 6 months (around 3/11/2020). with md/np

## 2019-09-17 ENCOUNTER — HOSPITAL ENCOUNTER (OUTPATIENT)
Dept: RADIOLOGY | Facility: HOSPITAL | Age: 74
Discharge: HOME | End: 2019-09-17
Attending: INTERNAL MEDICINE
Payer: COMMERCIAL

## 2019-09-17 DIAGNOSIS — R91.1 LUNG NODULE: ICD-10-CM

## 2019-09-17 PROCEDURE — 71250 CT CHEST WITHOUT CONTRAST: ICD-10-PCS | Mod: 26,,, | Performed by: RADIOLOGY

## 2019-09-17 PROCEDURE — 71250 CT THORAX DX C-: CPT | Mod: TC

## 2019-09-17 PROCEDURE — 71250 CT THORAX DX C-: CPT | Mod: 26,,, | Performed by: RADIOLOGY

## 2020-01-06 DIAGNOSIS — I10 HTN (HYPERTENSION), MALIGNANT: ICD-10-CM

## 2020-03-21 ENCOUNTER — OFFICE VISIT (OUTPATIENT)
Dept: FAMILY MEDICINE | Facility: CLINIC | Age: 75
End: 2020-03-21
Payer: COMMERCIAL

## 2020-03-21 VITALS
OXYGEN SATURATION: 96 % | HEIGHT: 67 IN | TEMPERATURE: 98 F | DIASTOLIC BLOOD PRESSURE: 82 MMHG | WEIGHT: 138.38 LBS | HEART RATE: 86 BPM | BODY MASS INDEX: 21.72 KG/M2 | SYSTOLIC BLOOD PRESSURE: 130 MMHG

## 2020-03-21 DIAGNOSIS — R91.1 LUNG NODULE: ICD-10-CM

## 2020-03-21 DIAGNOSIS — N52.9 ERECTILE DYSFUNCTION, UNSPECIFIED ERECTILE DYSFUNCTION TYPE: ICD-10-CM

## 2020-03-21 DIAGNOSIS — Z12.11 SCREENING FOR COLORECTAL CANCER: ICD-10-CM

## 2020-03-21 DIAGNOSIS — Z13.6 ENCOUNTER FOR LIPID SCREENING FOR CARDIOVASCULAR DISEASE: ICD-10-CM

## 2020-03-21 DIAGNOSIS — Z28.20 VACCINE REFUSED BY PATIENT: ICD-10-CM

## 2020-03-21 DIAGNOSIS — I10 ESSENTIAL HYPERTENSION: ICD-10-CM

## 2020-03-21 DIAGNOSIS — Z12.12 SCREENING FOR COLORECTAL CANCER: ICD-10-CM

## 2020-03-21 DIAGNOSIS — Z00.00 GENERAL MEDICAL EXAM: Primary | ICD-10-CM

## 2020-03-21 DIAGNOSIS — Z13.220 ENCOUNTER FOR LIPID SCREENING FOR CARDIOVASCULAR DISEASE: ICD-10-CM

## 2020-03-21 PROCEDURE — 3075F SYST BP GE 130 - 139MM HG: CPT | Mod: CPTII,S$GLB,, | Performed by: FAMILY MEDICINE

## 2020-03-21 PROCEDURE — 99999 PR PBB SHADOW E&M-EST. PATIENT-LVL IV: ICD-10-PCS | Mod: PBBFAC,,, | Performed by: FAMILY MEDICINE

## 2020-03-21 PROCEDURE — 3079F PR MOST RECENT DIASTOLIC BLOOD PRESSURE 80-89 MM HG: ICD-10-PCS | Mod: CPTII,S$GLB,, | Performed by: FAMILY MEDICINE

## 2020-03-21 PROCEDURE — 99999 PR PBB SHADOW E&M-EST. PATIENT-LVL IV: CPT | Mod: PBBFAC,,, | Performed by: FAMILY MEDICINE

## 2020-03-21 PROCEDURE — 99397 PER PM REEVAL EST PAT 65+ YR: CPT | Mod: S$GLB,,, | Performed by: FAMILY MEDICINE

## 2020-03-21 PROCEDURE — 3075F PR MOST RECENT SYSTOLIC BLOOD PRESS GE 130-139MM HG: ICD-10-PCS | Mod: CPTII,S$GLB,, | Performed by: FAMILY MEDICINE

## 2020-03-21 PROCEDURE — 99397 PR PREVENTIVE VISIT,EST,65 & OVER: ICD-10-PCS | Mod: S$GLB,,, | Performed by: FAMILY MEDICINE

## 2020-03-21 PROCEDURE — 3079F DIAST BP 80-89 MM HG: CPT | Mod: CPTII,S$GLB,, | Performed by: FAMILY MEDICINE

## 2020-03-21 RX ORDER — VALSARTAN AND HYDROCHLOROTHIAZIDE 320; 12.5 MG/1; MG/1
1 TABLET, FILM COATED ORAL DAILY
Qty: 90 TABLET | Refills: 3 | Status: ON HOLD | OUTPATIENT
Start: 2020-03-21 | End: 2020-10-05 | Stop reason: HOSPADM

## 2020-03-21 RX ORDER — AMLODIPINE BESYLATE 10 MG/1
TABLET ORAL
Qty: 90 TABLET | Refills: 3 | Status: ON HOLD | OUTPATIENT
Start: 2020-03-21 | End: 2020-10-05 | Stop reason: HOSPADM

## 2020-03-21 RX ORDER — METHYLPREDNISOLONE 4 MG/1
TABLET ORAL
COMMUNITY
Start: 2020-03-10 | End: 2020-03-21

## 2020-03-21 RX ORDER — HYDROCORTISONE 25 MG/G
OINTMENT TOPICAL
COMMUNITY
Start: 2020-03-10 | End: 2020-03-21

## 2020-03-21 RX ORDER — SILDENAFIL CITRATE 100 MG/1
TABLET, FILM COATED ORAL
Qty: 10 TABLET | Refills: 11 | Status: SHIPPED | OUTPATIENT
Start: 2020-03-21 | End: 2022-02-10 | Stop reason: SDUPTHER

## 2020-03-21 RX ORDER — HYDROXYZINE HYDROCHLORIDE 10 MG/1
TABLET, FILM COATED ORAL
COMMUNITY
Start: 2020-03-10 | End: 2020-03-21

## 2020-03-21 RX ORDER — ATORVASTATIN CALCIUM 10 MG/1
TABLET, FILM COATED ORAL
Qty: 90 TABLET | Refills: 3 | Status: ON HOLD | OUTPATIENT
Start: 2020-03-21 | End: 2020-10-05 | Stop reason: HOSPADM

## 2020-03-24 NOTE — PROGRESS NOTES
Subjective:       Patient ID: Rashad Rodríguez is a 74 y.o. male.    Chief Complaint: Results    HPI   74 year old male with hypertension, and history of a lung nodule comes in for wellness exam. He reports taking medications as prescribed. Only acute concern is continued erection problems. States that generic Viagra does not work for him. States that has used brand name Viagra and this does work and would like a prescription for this.   Patient refuses tetanus and pneumococcal vaccines.    Review of Systems   Constitutional: Negative for unexpected weight change.   HENT: Negative for ear pain and sore throat.    Eyes: Negative for visual disturbance.   Respiratory: Negative for shortness of breath.    Cardiovascular: Negative for chest pain.   Gastrointestinal: Negative for abdominal pain and blood in stool.   Endocrine: Negative for cold intolerance and heat intolerance.   Genitourinary: Negative for dysuria and frequency.   Skin: Negative for rash.   Neurological: Negative for weakness, numbness and headaches.   Hematological: Negative for adenopathy.   Psychiatric/Behavioral: Negative for suicidal ideas.       Objective:      Physical Exam   Constitutional: He is oriented to person, place, and time. He appears well-developed and well-nourished. No distress.   HENT:   Head: Normocephalic and atraumatic.   Right Ear: Tympanic membrane, external ear and ear canal normal.   Left Ear: Tympanic membrane, external ear and ear canal normal.   Nose: Nose normal.   Mouth/Throat: Oropharynx is clear and moist.   Eyes: Pupils are equal, round, and reactive to light. Conjunctivae and EOM are normal. Right eye exhibits no discharge. Left eye exhibits no discharge.   Neck: Normal range of motion. Neck supple. No tracheal deviation present. No thyromegaly present.   Cardiovascular: Normal rate, regular rhythm, S1 normal, S2 normal, normal heart sounds and intact distal pulses.   No murmur heard.  Pulses:       Radial pulses are  2+ on the right side, and 2+ on the left side.   Pulmonary/Chest: Effort normal and breath sounds normal. No respiratory distress. He has no wheezes. He has no rhonchi. He has no rales.   Abdominal: Soft. Bowel sounds are normal. He exhibits no distension and no mass. There is no tenderness. There is no rigidity, no guarding and no CVA tenderness.   Lymphadenopathy:     He has no cervical adenopathy.   Neurological: He is alert and oriented to person, place, and time. He has normal strength. He displays no atrophy. No cranial nerve deficit or sensory deficit. He exhibits normal muscle tone.   Reflex Scores:       Patellar reflexes are 2+ on the right side and 2+ on the left side.  Skin: Skin is warm and dry. Capillary refill takes less than 2 seconds. No rash noted. He is not diaphoretic.   Psychiatric: He has a normal mood and affect. His behavior is normal.   Vitals reviewed.      Assessment:       1. General medical exam    2. Essential hypertension    3. Lung nodule    4. Erectile dysfunction, unspecified erectile dysfunction type    5. Screening for colorectal cancer    6. Encounter for lipid screening for cardiovascular disease    7. Vaccine refused by patient        Plan:       Rashad was seen today for results.    Diagnoses and all orders for this visit:    General medical exam  -     Comprehensive metabolic panel; Future  -     Lipid panel; Future  -     CBC auto differential; Future  -     Microalbumin/creatinine urine ratio; Future  Age appropriate recommendations reviewed.  Screening labs ordered.    Essential hypertension  -     amLODIPine (NORVASC) 10 MG tablet; TAKE 1 TABLET(10 MG) BY MOUTH EVERY DAY  -     atorvastatin (LIPITOR) 10 MG tablet; TAKE 1 TABLET(10 MG) BY MOUTH EVERY DAY  -     valsartan-hydrochlorothiazide (DIOVAN-HCT) 320-12.5 mg per tablet; Take 1 tablet by mouth once daily.  -     Comprehensive metabolic panel; Future  -     Lipid panel; Future  -     CBC auto differential; Future  -      Microalbumin/creatinine urine ratio; Future  Continue current HTN regimen.    Lung nodule  -     CT Chest Without Contrast; Future  Will recheck CT scan in June    Erectile dysfunction, unspecified erectile dysfunction type  -     VIAGRA 100 mg tablet; 1 tablet PO one hour prior to sex, maximum of 1 tablet in 24 hours  -     Comprehensive metabolic panel; Future  -     Lipid panel; Future  -     CBC auto differential; Future  -     Microalbumin/creatinine urine ratio; Future  Patient requesting brand bname Viagra. Informed him this likely won't be covered and he understands and would still like brand name.    Screening for colorectal cancer  -     Cologuard Screening (Multitarget Stool DNA); Future  -     Cologuard Screening (Multitarget Stool DNA)  Patient agrees to Cologuard for colon cancer screening.  Video on how to collect shown (https://www.[employer].com/watch?v=V9oOq54KU5m)    Encounter for lipid screening for cardiovascular disease  -     Lipid panel; Future    Vaccine refused by patient  Patient declines vaccinations.

## 2020-03-26 LAB — GASTROCULT GAST QL: POSITIVE

## 2020-04-01 ENCOUNTER — TELEPHONE (OUTPATIENT)
Dept: FAMILY MEDICINE | Facility: CLINIC | Age: 75
End: 2020-04-01

## 2020-04-01 DIAGNOSIS — R19.5 POSITIVE COLORECTAL CANCER SCREENING USING COLOGUARD TEST: Primary | ICD-10-CM

## 2020-04-01 NOTE — TELEPHONE ENCOUNTER
Please let patient know his stool test had shown some blood, and we need to rule out malignancy as such will need a colonoscopy. Order for this was placed and he should get a call to schedule it for after this COVID high risk time. If he does not get a call within a week to get it scheduled he should let us know.   Adequate: hears normal conversation without difficulty

## 2020-04-01 NOTE — TELEPHONE ENCOUNTER
Left message for patient in regards to Positive [place]-guard test results and the need for a colonoscopy to rule out malignancy and he should receive the call within the next month or so

## 2020-04-28 ENCOUNTER — PATIENT OUTREACH (OUTPATIENT)
Dept: ADMINISTRATIVE | Facility: HOSPITAL | Age: 75
End: 2020-04-28

## 2020-06-01 ENCOUNTER — TELEPHONE (OUTPATIENT)
Dept: FAMILY MEDICINE | Facility: CLINIC | Age: 75
End: 2020-06-01

## 2020-06-01 NOTE — TELEPHONE ENCOUNTER
Now that colonoscopies are being scheduled, please call patient and let him know very important to schedule colonoscopy because of the positive cologuard earlier this year and please have Ms. Padmini Mishra schedule.

## 2020-06-02 ENCOUNTER — TELEPHONE (OUTPATIENT)
Dept: FAMILY MEDICINE | Facility: CLINIC | Age: 75
End: 2020-06-02

## 2020-06-02 DIAGNOSIS — Z12.11 COLON CANCER SCREENING: Primary | ICD-10-CM

## 2020-06-02 NOTE — TELEPHONE ENCOUNTER
----- Message from Lauren Contreras sent at 6/2/2020  2:33 PM CDT -----  Contact: Self  [phone]  Type:  Patient Returning Call    Who Called: Self     Who Left Message for Patient: Lani Gloriadows    Does the patient know what this is regarding?: yes    Would the patient rather a call back or a response via [facility]? Call back     Best Call Back Number: [phone]

## 2020-06-02 NOTE — TELEPHONE ENCOUNTER
I spoke to the and advised very important for him to have his Colonoscopy due to positive Cologard. Pt agrees and referral coordinator contacted to schedule.

## 2020-08-17 DIAGNOSIS — Z12.11 COLON CANCER SCREENING: Primary | ICD-10-CM

## 2020-09-21 ENCOUNTER — OFFICE VISIT (OUTPATIENT)
Dept: FAMILY MEDICINE | Facility: CLINIC | Age: 75
End: 2020-09-21
Payer: COMMERCIAL

## 2020-09-21 VITALS
OXYGEN SATURATION: 97 % | RESPIRATION RATE: 18 BRPM | DIASTOLIC BLOOD PRESSURE: 74 MMHG | WEIGHT: 140.88 LBS | BODY MASS INDEX: 22.11 KG/M2 | SYSTOLIC BLOOD PRESSURE: 135 MMHG | HEIGHT: 67 IN | TEMPERATURE: 98 F | HEART RATE: 92 BPM

## 2020-09-21 DIAGNOSIS — R91.1 LUNG NODULE: ICD-10-CM

## 2020-09-21 DIAGNOSIS — E78.5 DYSLIPIDEMIA: ICD-10-CM

## 2020-09-21 DIAGNOSIS — F17.200 SMOKING: ICD-10-CM

## 2020-09-21 DIAGNOSIS — Z23 NEED FOR VACCINATION: ICD-10-CM

## 2020-09-21 DIAGNOSIS — I10 ESSENTIAL HYPERTENSION: Primary | ICD-10-CM

## 2020-09-21 PROCEDURE — 3075F PR MOST RECENT SYSTOLIC BLOOD PRESS GE 130-139MM HG: ICD-10-PCS | Mod: CPTII,S$GLB,, | Performed by: FAMILY MEDICINE

## 2020-09-21 PROCEDURE — 90732 PPSV23 VACC 2 YRS+ SUBQ/IM: CPT | Mod: S$GLB,,, | Performed by: FAMILY MEDICINE

## 2020-09-21 PROCEDURE — 3078F DIAST BP <80 MM HG: CPT | Mod: CPTII,S$GLB,, | Performed by: FAMILY MEDICINE

## 2020-09-21 PROCEDURE — 1126F PR PAIN SEVERITY QUANTIFIED, NO PAIN PRESENT: ICD-10-PCS | Mod: S$GLB,,, | Performed by: FAMILY MEDICINE

## 2020-09-21 PROCEDURE — 90471 PNEUMOCOCCAL POLYSACCHARIDE VACCINE 23-VALENT =>2YO SQ IM: ICD-10-PCS | Mod: S$GLB,,, | Performed by: FAMILY MEDICINE

## 2020-09-21 PROCEDURE — 3078F PR MOST RECENT DIASTOLIC BLOOD PRESSURE < 80 MM HG: ICD-10-PCS | Mod: CPTII,S$GLB,, | Performed by: FAMILY MEDICINE

## 2020-09-21 PROCEDURE — 99999 PR PBB SHADOW E&M-EST. PATIENT-LVL IV: CPT | Mod: PBBFAC,,, | Performed by: FAMILY MEDICINE

## 2020-09-21 PROCEDURE — 1159F PR MEDICATION LIST DOCUMENTED IN MEDICAL RECORD: ICD-10-PCS | Mod: S$GLB,,, | Performed by: FAMILY MEDICINE

## 2020-09-21 PROCEDURE — 1101F PR PT FALLS ASSESS DOC 0-1 FALLS W/OUT INJ PAST YR: ICD-10-PCS | Mod: CPTII,S$GLB,, | Performed by: FAMILY MEDICINE

## 2020-09-21 PROCEDURE — 90732 PNEUMOCOCCAL POLYSACCHARIDE VACCINE 23-VALENT =>2YO SQ IM: ICD-10-PCS | Mod: S$GLB,,, | Performed by: FAMILY MEDICINE

## 2020-09-21 PROCEDURE — 1126F AMNT PAIN NOTED NONE PRSNT: CPT | Mod: S$GLB,,, | Performed by: FAMILY MEDICINE

## 2020-09-21 PROCEDURE — 90471 IMMUNIZATION ADMIN: CPT | Mod: S$GLB,,, | Performed by: FAMILY MEDICINE

## 2020-09-21 PROCEDURE — 1159F MED LIST DOCD IN RCRD: CPT | Mod: S$GLB,,, | Performed by: FAMILY MEDICINE

## 2020-09-21 PROCEDURE — 99214 PR OFFICE/OUTPT VISIT, EST, LEVL IV, 30-39 MIN: ICD-10-PCS | Mod: 25,S$GLB,, | Performed by: FAMILY MEDICINE

## 2020-09-21 PROCEDURE — 99999 PR PBB SHADOW E&M-EST. PATIENT-LVL IV: ICD-10-PCS | Mod: PBBFAC,,, | Performed by: FAMILY MEDICINE

## 2020-09-21 PROCEDURE — 99214 OFFICE O/P EST MOD 30 MIN: CPT | Mod: 25,S$GLB,, | Performed by: FAMILY MEDICINE

## 2020-09-21 PROCEDURE — 3008F BODY MASS INDEX DOCD: CPT | Mod: CPTII,S$GLB,, | Performed by: FAMILY MEDICINE

## 2020-09-21 PROCEDURE — 3008F PR BODY MASS INDEX (BMI) DOCUMENTED: ICD-10-PCS | Mod: CPTII,S$GLB,, | Performed by: FAMILY MEDICINE

## 2020-09-21 PROCEDURE — 3075F SYST BP GE 130 - 139MM HG: CPT | Mod: CPTII,S$GLB,, | Performed by: FAMILY MEDICINE

## 2020-09-21 PROCEDURE — 1101F PT FALLS ASSESS-DOCD LE1/YR: CPT | Mod: CPTII,S$GLB,, | Performed by: FAMILY MEDICINE

## 2020-09-21 NOTE — PROGRESS NOTES
"Subjective:       Patient ID: Rashad Rodríguez is a 75 y.o. male.    Chief Complaint: Follow-up (Pt has no complaints on today )    HPI   75 year old male, with hypertension, dyslipidemia, and who is a smoker, comes in for routine follow up on these. He reports no side effects from his medications. He states he just received refills, so does not need any at present. He received his flu shot at the pharmacy.   He continues to smoke a pack a day. Reports he attended smoking cessation but he had a reaction to the patch and had to see a dermatologist. Not interested in smoking cessation at present.   He has a lung nodule that is past due for re-imaging.     Review of Systems   Constitutional: Negative for unexpected weight change.   Respiratory: Negative for shortness of breath and wheezing.    Cardiovascular: Negative for chest pain, palpitations and claudication.   Gastrointestinal: Negative for abdominal pain and blood in stool.   Genitourinary: Negative for hematuria.         Objective:     /74 (BP Location: Right arm, Patient Position: Sitting, BP Method: Medium (Manual))   Pulse 92   Temp 98 °F (36.7 °C) (Oral)   Resp 18   Ht 5' 7" (1.702 m)   Wt 63.9 kg (140 lb 14 oz)   SpO2 97%   BMI 22.06 kg/m²     Physical Exam  Vitals signs reviewed.   Constitutional:       Appearance: He is well-developed. He is not diaphoretic.   HENT:      Head: Normocephalic.      Right Ear: External ear normal.      Left Ear: External ear normal.      Nose: Nose normal.      Mouth/Throat:      Pharynx: No oropharyngeal exudate.   Neck:      Musculoskeletal: Normal range of motion and neck supple.      Trachea: No tracheal deviation.   Cardiovascular:      Rate and Rhythm: Normal rate and regular rhythm.      Heart sounds: Normal heart sounds. No murmur.   Pulmonary:      Effort: Pulmonary effort is normal.      Breath sounds: Normal breath sounds. No wheezing or rales.   Abdominal:      General: Bowel sounds are normal.      " Palpations: Abdomen is soft. Abdomen is not rigid. There is no mass.      Tenderness: There is no abdominal tenderness. There is no guarding or rebound.   Lymphadenopathy:      Cervical: No cervical adenopathy.   Neurological:      Mental Status: He is oriented to person, place, and time.      Gait: Gait normal.       The 10-year ASCVD risk score (Daron DIETZ Jr., et al., 2013) is: 34.4%    Values used to calculate the score:      Age: 75 years      Sex: Male      Is Non- : Yes      Diabetic: No      Tobacco smoker: Yes      Systolic Blood Pressure: 135 mmHg      Is BP treated: Yes      HDL Cholesterol: 70 mg/dL      Total Cholesterol: 231 mg/dL    Assessment:       1. Essential hypertension    2. Dyslipidemia    3. Smoking    4. Need for vaccination        Plan:       Rashad was seen today for follow-up.    Diagnoses and all orders for this visit:    Essential hypertension  -     Comprehensive metabolic panel; Future  -     Lipid Panel; Future  Fair control as per JNC 8.  Continue current regimen.  Labs previously ordered to be done today.  Follow upin 6 months with labs prior.    Dyslipidemia  -     Comprehensive metabolic panel; Future  -     Lipid Panel; Future  ASCVD score elevated.  Will recheck lipids to recalculate ASCVD risk score    Smoking  Importance of smoking cessation discussed with patient. 5 minutes spent counseling patient on risks of smoking, benefits of stopping and ways of stopping. Patient not ready to quit.    Lung Nodule  Patient encouraged to call and schedule previously ordered CT scan. Scheduling number provided to patient on After Visit Summary.     Need for vaccination  -     (In Office Administered) Pneumococcal Polysaccharide Vaccine (23 Valent) (SQ/IM)  Pneumovax-23 due and was administered today

## 2020-09-30 ENCOUNTER — HOSPITAL ENCOUNTER (EMERGENCY)
Facility: HOSPITAL | Age: 75
Discharge: HOME | End: 2020-09-30
Attending: EMERGENCY MEDICINE
Payer: COMMERCIAL

## 2020-09-30 VITALS
SYSTOLIC BLOOD PRESSURE: 153 MMHG | HEART RATE: 76 BPM | DIASTOLIC BLOOD PRESSURE: 92 MMHG | BODY MASS INDEX: 21.97 KG/M2 | HEIGHT: 67 IN | TEMPERATURE: 98 F | OXYGEN SATURATION: 97 % | WEIGHT: 140 LBS | RESPIRATION RATE: 17 BRPM

## 2020-09-30 DIAGNOSIS — R20.0 NUMBNESS: Primary | ICD-10-CM

## 2020-09-30 DIAGNOSIS — I63.9 STROKE: ICD-10-CM

## 2020-09-30 LAB
ALBUMIN SERPL BCP-MCNC: 3.9 G/DL (ref 3.5–5.2)
ALP SERPL-CCNC: 102 U/L (ref 55–135)
ALT SERPL W/O P-5'-P-CCNC: 28 U/L (ref 10–44)
ANION GAP SERPL CALC-SCNC: 11 MMOL/L (ref 8–16)
AST SERPL-CCNC: 28 U/L (ref 10–40)
BASOPHILS # BLD AUTO: 0.08 K/UL (ref 0–0.2)
BASOPHILS NFR BLD: 0.7 % (ref 0–1.9)
BILIRUB SERPL-MCNC: 0.2 MG/DL (ref 0.1–1)
BILIRUB UR QL STRIP: NEGATIVE
BNP SERPL-MCNC: 34 PG/ML (ref 0–99)
BUN SERPL-MCNC: 20 MG/DL (ref 8–23)
CALCIUM SERPL-MCNC: 9.5 MG/DL (ref 8.7–10.5)
CHLORIDE SERPL-SCNC: 105 MMOL/L (ref 95–110)
CLARITY UR: CLEAR
CO2 SERPL-SCNC: 25 MMOL/L (ref 23–29)
COLOR UR: COLORLESS
CREAT SERPL-MCNC: 1.1 MG/DL (ref 0.5–1.4)
DIFFERENTIAL METHOD: ABNORMAL
EOSINOPHIL # BLD AUTO: 0.3 K/UL (ref 0–0.5)
EOSINOPHIL NFR BLD: 2.5 % (ref 0–8)
ERYTHROCYTE [DISTWIDTH] IN BLOOD BY AUTOMATED COUNT: 13.7 % (ref 11.5–14.5)
EST. GFR  (AFRICAN AMERICAN): >60 ML/MIN/1.73 M^2
EST. GFR  (NON AFRICAN AMERICAN): >60 ML/MIN/1.73 M^2
GLUCOSE SERPL-MCNC: 102 MG/DL (ref 70–110)
GLUCOSE UR QL STRIP: NEGATIVE
HCT VFR BLD AUTO: 44.1 % (ref 40–54)
HGB BLD-MCNC: 14.8 G/DL (ref 14–18)
HGB UR QL STRIP: NEGATIVE
IMM GRANULOCYTES # BLD AUTO: 0.03 K/UL (ref 0–0.04)
IMM GRANULOCYTES NFR BLD AUTO: 0.3 % (ref 0–0.5)
INR PPP: 0.9 (ref 0.8–1.2)
KETONES UR QL STRIP: NEGATIVE
LEUKOCYTE ESTERASE UR QL STRIP: NEGATIVE
LYMPHOCYTES # BLD AUTO: 4.1 K/UL (ref 1–4.8)
LYMPHOCYTES NFR BLD: 37.8 % (ref 18–48)
MCH RBC QN AUTO: 30.1 PG (ref 27–31)
MCHC RBC AUTO-ENTMCNC: 33.6 G/DL (ref 32–36)
MCV RBC AUTO: 90 FL (ref 82–98)
MONOCYTES # BLD AUTO: 1.1 K/UL (ref 0.3–1)
MONOCYTES NFR BLD: 10 % (ref 4–15)
NEUTROPHILS # BLD AUTO: 5.3 K/UL (ref 1.8–7.7)
NEUTROPHILS NFR BLD: 48.7 % (ref 38–73)
NITRITE UR QL STRIP: NEGATIVE
NRBC BLD-RTO: 0 /100 WBC
PH UR STRIP: 7 [PH] (ref 5–8)
PLATELET # BLD AUTO: 280 K/UL (ref 150–350)
PMV BLD AUTO: 10.5 FL (ref 9.2–12.9)
POCT GLUCOSE: 109 MG/DL (ref 70–110)
POTASSIUM SERPL-SCNC: 4.1 MMOL/L (ref 3.5–5.1)
PROT SERPL-MCNC: 8 G/DL (ref 6–8.4)
PROT UR QL STRIP: NEGATIVE
PROTHROMBIN TIME: 10.4 SEC (ref 9–12.5)
RBC # BLD AUTO: 4.92 M/UL (ref 4.6–6.2)
SODIUM SERPL-SCNC: 141 MMOL/L (ref 136–145)
SP GR UR STRIP: 1 (ref 1–1.03)
TROPONIN I SERPL DL<=0.01 NG/ML-MCNC: <0.006 NG/ML (ref 0–0.03)
TSH SERPL DL<=0.005 MIU/L-ACNC: 2.35 UIU/ML (ref 0.4–4)
URN SPEC COLLECT METH UR: ABNORMAL
UROBILINOGEN UR STRIP-ACNC: NEGATIVE EU/DL
WBC # BLD AUTO: 10.93 K/UL (ref 3.9–12.7)

## 2020-09-30 PROCEDURE — 83880 ASSAY OF NATRIURETIC PEPTIDE: CPT

## 2020-09-30 PROCEDURE — 93010 ELECTROCARDIOGRAM REPORT: CPT | Mod: ,,, | Performed by: INTERNAL MEDICINE

## 2020-09-30 PROCEDURE — 85610 PROTHROMBIN TIME: CPT

## 2020-09-30 PROCEDURE — 81003 URINALYSIS AUTO W/O SCOPE: CPT

## 2020-09-30 PROCEDURE — 85025 COMPLETE CBC W/AUTO DIFF WBC: CPT

## 2020-09-30 PROCEDURE — 93010 EKG 12-LEAD: ICD-10-PCS | Mod: ,,, | Performed by: INTERNAL MEDICINE

## 2020-09-30 PROCEDURE — G0426 PR INPT TELEHEALTH CONSULT 50M: ICD-10-PCS | Mod: GT,,, | Performed by: PSYCHIATRY & NEUROLOGY

## 2020-09-30 PROCEDURE — 84443 ASSAY THYROID STIM HORMONE: CPT

## 2020-09-30 PROCEDURE — 99285 EMERGENCY DEPT VISIT HI MDM: CPT | Mod: 25

## 2020-09-30 PROCEDURE — 84484 ASSAY OF TROPONIN QUANT: CPT

## 2020-09-30 PROCEDURE — 80053 COMPREHEN METABOLIC PANEL: CPT

## 2020-09-30 PROCEDURE — G0426 INPT/ED TELECONSULT50: HCPCS | Mod: GT,,, | Performed by: PSYCHIATRY & NEUROLOGY

## 2020-09-30 PROCEDURE — 93005 ELECTROCARDIOGRAM TRACING: CPT

## 2020-09-30 PROCEDURE — 82962 GLUCOSE BLOOD TEST: CPT

## 2020-09-30 NOTE — ED TRIAGE NOTES
Woke up approx 7 am today with weakness/numbness to right side.  Went to bed approx 7pm last night, was up around 10 pm - was normal.  Denies chest pains, sob, abd pain, headache or trauma.  Denies n/v/d or fever.   No drift to any ext.  + unsteady gait.

## 2020-10-01 ENCOUNTER — HOSPITAL ENCOUNTER (INPATIENT)
Facility: HOSPITAL | Age: 75
LOS: 4 days | Discharge: NURSING, REHAB OR LONG-TERM CARE | DRG: 065 | End: 2020-10-05
Attending: EMERGENCY MEDICINE | Admitting: INTERNAL MEDICINE
Payer: MEDICARE

## 2020-10-01 DIAGNOSIS — I63.9 STROKE: ICD-10-CM

## 2020-10-01 DIAGNOSIS — R00.0 TACHYCARDIA: ICD-10-CM

## 2020-10-01 DIAGNOSIS — I63.9 CEREBROVASCULAR ACCIDENT (CVA), UNSPECIFIED MECHANISM: Primary | ICD-10-CM

## 2020-10-01 DIAGNOSIS — I63.9 CEREBROVASCULAR ACCIDENT (CVA): ICD-10-CM

## 2020-10-01 DIAGNOSIS — I49.9 CARDIAC RHYTHM DISORDER OR DISTURBANCE OR CHANGE: ICD-10-CM

## 2020-10-01 PROBLEM — I63.81 LACUNAR STROKE: Status: ACTIVE | Noted: 2020-10-01

## 2020-10-01 LAB
ALBUMIN SERPL BCP-MCNC: 3.7 G/DL (ref 3.5–5.2)
ALP SERPL-CCNC: 100 U/L (ref 55–135)
ALT SERPL W/O P-5'-P-CCNC: 26 U/L (ref 10–44)
ANION GAP SERPL CALC-SCNC: 9 MMOL/L (ref 8–16)
AORTIC ROOT ANNULUS: 2.7 CM
AORTIC VALVE CUSP SEPERATION: 2.15 CM
ASCENDING AORTA: 3.4 CM
AST SERPL-CCNC: 29 U/L (ref 10–40)
AV INDEX (PROSTH): 1.05
AV MEAN GRADIENT: 3 MMHG
AV PEAK GRADIENT: 4 MMHG
AV VALVE AREA: 4.19 CM2
AV VELOCITY RATIO: 1.05
BASOPHILS # BLD AUTO: 0.07 K/UL (ref 0–0.2)
BASOPHILS NFR BLD: 0.7 % (ref 0–1.9)
BILIRUB SERPL-MCNC: 0.6 MG/DL (ref 0.1–1)
BSA FOR ECHO PROCEDURE: 1.73 M2
BUN SERPL-MCNC: 19 MG/DL (ref 8–23)
CALCIUM SERPL-MCNC: 10.2 MG/DL (ref 8.7–10.5)
CHLORIDE SERPL-SCNC: 104 MMOL/L (ref 95–110)
CHOLEST SERPL-MCNC: 178 MG/DL (ref 120–199)
CHOLEST/HDLC SERPL: 3.2 {RATIO} (ref 2–5)
CO2 SERPL-SCNC: 27 MMOL/L (ref 23–29)
CREAT SERPL-MCNC: 1.1 MG/DL (ref 0.5–1.4)
CTP QC/QA: YES
CV ECHO LV RWT: 0.69 CM
DIFFERENTIAL METHOD: NORMAL
DOP CALC AO PEAK VEL: 1.04 M/S
DOP CALC AO VTI: 20.72 CM
DOP CALC LVOT AREA: 4 CM2
DOP CALC LVOT DIAMETER: 2.25 CM
DOP CALC LVOT PEAK VEL: 1.09 M/S
DOP CALC LVOT STROKE VOLUME: 86.83 CM3
DOP CALCLVOT PEAK VEL VTI: 21.85 CM
E WAVE DECELERATION TIME: 244.34 MSEC
E/A RATIO: 1.15
ECHO LV POSTERIOR WALL: 1.17 CM (ref 0.6–1.1)
EOSINOPHIL # BLD AUTO: 0.2 K/UL (ref 0–0.5)
EOSINOPHIL NFR BLD: 1.5 % (ref 0–8)
ERYTHROCYTE [DISTWIDTH] IN BLOOD BY AUTOMATED COUNT: 13.6 % (ref 11.5–14.5)
EST. GFR  (AFRICAN AMERICAN): >60 ML/MIN/1.73 M^2
EST. GFR  (NON AFRICAN AMERICAN): >60 ML/MIN/1.73 M^2
FRACTIONAL SHORTENING: 39 % (ref 28–44)
GLUCOSE SERPL-MCNC: 129 MG/DL (ref 70–110)
GLUCOSE SERPL-MCNC: 146 MG/DL (ref 70–110)
HCT VFR BLD AUTO: 44.7 % (ref 40–54)
HDLC SERPL-MCNC: 55 MG/DL (ref 40–75)
HDLC SERPL: 30.9 % (ref 20–50)
HGB BLD-MCNC: 15.1 G/DL (ref 14–18)
IMM GRANULOCYTES # BLD AUTO: 0.03 K/UL (ref 0–0.04)
IMM GRANULOCYTES NFR BLD AUTO: 0.3 % (ref 0–0.5)
INR PPP: 1 (ref 0.8–1.2)
INTERVENTRICULAR SEPTUM: 1.17 CM (ref 0.6–1.1)
IVRT: 114.19 MSEC
LA MAJOR: 4.34 CM
LA MINOR: 4.68 CM
LA WIDTH: 3.16 CM
LDLC SERPL CALC-MCNC: 98.8 MG/DL (ref 63–159)
LEFT ATRIUM SIZE: 3.03 CM
LEFT ATRIUM VOLUME INDEX: 21.1 ML/M2
LEFT ATRIUM VOLUME: 36.65 CM3
LEFT INTERNAL DIMENSION IN SYSTOLE: 2.09 CM (ref 2.1–4)
LEFT VENTRICLE DIASTOLIC VOLUME INDEX: 27.3 ML/M2
LEFT VENTRICLE DIASTOLIC VOLUME: 47.44 ML
LEFT VENTRICLE MASS INDEX: 72 G/M2
LEFT VENTRICLE SYSTOLIC VOLUME INDEX: 8.2 ML/M2
LEFT VENTRICLE SYSTOLIC VOLUME: 14.26 ML
LEFT VENTRICULAR INTERNAL DIMENSION IN DIASTOLE: 3.4 CM (ref 3.5–6)
LEFT VENTRICULAR MASS: 125.25 G
LYMPHOCYTES # BLD AUTO: 2.3 K/UL (ref 1–4.8)
LYMPHOCYTES NFR BLD: 23 % (ref 18–48)
MCH RBC QN AUTO: 30.3 PG (ref 27–31)
MCHC RBC AUTO-ENTMCNC: 33.8 G/DL (ref 32–36)
MCV RBC AUTO: 90 FL (ref 82–98)
MONOCYTES # BLD AUTO: 0.9 K/UL (ref 0.3–1)
MONOCYTES NFR BLD: 9.2 % (ref 4–15)
MV PEAK A VEL: 0.61 M/S
MV PEAK E VEL: 0.7 M/S
MV STENOSIS PRESSURE HALF TIME: 70.86 MS
MV VALVE AREA P 1/2 METHOD: 3.1 CM2
NEUTROPHILS # BLD AUTO: 6.6 K/UL (ref 1.8–7.7)
NEUTROPHILS NFR BLD: 65.3 % (ref 38–73)
NONHDLC SERPL-MCNC: 123 MG/DL
NRBC BLD-RTO: 0 /100 WBC
PISA TR MAX VEL: 2.34 M/S
PLATELET # BLD AUTO: 292 K/UL (ref 150–350)
PMV BLD AUTO: 10.7 FL (ref 9.2–12.9)
POCT GLUCOSE: 146 MG/DL (ref 70–110)
POTASSIUM SERPL-SCNC: 4.1 MMOL/L (ref 3.5–5.1)
PROT SERPL-MCNC: 8.2 G/DL (ref 6–8.4)
PROTHROMBIN TIME: 10.5 SEC (ref 9–12.5)
PULM VEIN S/D RATIO: 1.26
PV PEAK D VEL: 0.43 M/S
PV PEAK S VEL: 0.54 M/S
PV PEAK VELOCITY: 0.78 CM/S
RA MAJOR: 4.43 CM
RA PRESSURE: 3 MMHG
RA WIDTH: 2.5 CM
RBC # BLD AUTO: 4.98 M/UL (ref 4.6–6.2)
RIGHT VENTRICULAR END-DIASTOLIC DIMENSION: 3.08 CM
RV TISSUE DOPPLER FREE WALL SYSTOLIC VELOCITY 1 (APICAL 4 CHAMBER VIEW): 11.91 CM/S
SARS-COV-2 RDRP RESP QL NAA+PROBE: NEGATIVE
SINUS: 3.42 CM
SODIUM SERPL-SCNC: 140 MMOL/L (ref 136–145)
STJ: 2.71 CM
TR MAX PG: 22 MMHG
TRICUSPID ANNULAR PLANE SYSTOLIC EXCURSION: 2.18 CM
TRIGL SERPL-MCNC: 121 MG/DL (ref 30–150)
TSH SERPL DL<=0.005 MIU/L-ACNC: 1.37 UIU/ML (ref 0.4–4)
TV REST PULMONARY ARTERY PRESSURE: 25 MMHG
WBC # BLD AUTO: 10.09 K/UL (ref 3.9–12.7)

## 2020-10-01 PROCEDURE — 93010 EKG 12-LEAD: ICD-10-PCS | Mod: ,,, | Performed by: INTERNAL MEDICINE

## 2020-10-01 PROCEDURE — 99285 EMERGENCY DEPT VISIT HI MDM: CPT | Mod: 25

## 2020-10-01 PROCEDURE — 85025 COMPLETE CBC W/AUTO DIFF WBC: CPT

## 2020-10-01 PROCEDURE — 94761 N-INVAS EAR/PLS OXIMETRY MLT: CPT

## 2020-10-01 PROCEDURE — 63600175 PHARM REV CODE 636 W HCPCS: Performed by: INTERNAL MEDICINE

## 2020-10-01 PROCEDURE — G0425 PR INPT TELEHEALTH CONSULT 30M: ICD-10-PCS | Mod: GT,,, | Performed by: PSYCHIATRY & NEUROLOGY

## 2020-10-01 PROCEDURE — 93010 ELECTROCARDIOGRAM REPORT: CPT | Mod: ,,, | Performed by: INTERNAL MEDICINE

## 2020-10-01 PROCEDURE — 99222 PR INITIAL HOSPITAL CARE,LEVL II: ICD-10-PCS | Mod: ,,, | Performed by: PSYCHIATRY & NEUROLOGY

## 2020-10-01 PROCEDURE — G0425 INPT/ED TELECONSULT30: HCPCS | Mod: GT,,, | Performed by: PSYCHIATRY & NEUROLOGY

## 2020-10-01 PROCEDURE — 82962 GLUCOSE BLOOD TEST: CPT

## 2020-10-01 PROCEDURE — 93005 ELECTROCARDIOGRAM TRACING: CPT

## 2020-10-01 PROCEDURE — 99222 1ST HOSP IP/OBS MODERATE 55: CPT | Mod: ,,, | Performed by: PSYCHIATRY & NEUROLOGY

## 2020-10-01 PROCEDURE — 80061 LIPID PANEL: CPT

## 2020-10-01 PROCEDURE — 84443 ASSAY THYROID STIM HORMONE: CPT

## 2020-10-01 PROCEDURE — 85610 PROTHROMBIN TIME: CPT

## 2020-10-01 PROCEDURE — 80053 COMPREHEN METABOLIC PANEL: CPT

## 2020-10-01 PROCEDURE — 21400001 HC TELEMETRY ROOM

## 2020-10-01 PROCEDURE — 25000003 PHARM REV CODE 250: Performed by: EMERGENCY MEDICINE

## 2020-10-01 PROCEDURE — 25000003 PHARM REV CODE 250: Performed by: INTERNAL MEDICINE

## 2020-10-01 PROCEDURE — U0002 COVID-19 LAB TEST NON-CDC: HCPCS | Performed by: EMERGENCY MEDICINE

## 2020-10-01 RX ORDER — ATORVASTATIN CALCIUM 40 MG/1
40 TABLET, FILM COATED ORAL DAILY
Status: DISCONTINUED | OUTPATIENT
Start: 2020-10-02 | End: 2020-10-05 | Stop reason: HOSPADM

## 2020-10-01 RX ORDER — ASPIRIN 81 MG/1
81 TABLET ORAL DAILY
Status: DISCONTINUED | OUTPATIENT
Start: 2020-10-02 | End: 2020-10-05 | Stop reason: HOSPADM

## 2020-10-01 RX ORDER — SODIUM CHLORIDE 0.9 % (FLUSH) 0.9 %
10 SYRINGE (ML) INJECTION
Status: DISCONTINUED | OUTPATIENT
Start: 2020-10-01 | End: 2020-10-05 | Stop reason: HOSPADM

## 2020-10-01 RX ORDER — SODIUM CHLORIDE 9 MG/ML
INJECTION, SOLUTION INTRAVENOUS CONTINUOUS
Status: DISCONTINUED | OUTPATIENT
Start: 2020-10-01 | End: 2020-10-04

## 2020-10-01 RX ORDER — ENOXAPARIN SODIUM 100 MG/ML
40 INJECTION SUBCUTANEOUS EVERY 24 HOURS
Status: DISCONTINUED | OUTPATIENT
Start: 2020-10-01 | End: 2020-10-05 | Stop reason: HOSPADM

## 2020-10-01 RX ORDER — IPRATROPIUM BROMIDE AND ALBUTEROL SULFATE 2.5; .5 MG/3ML; MG/3ML
3 SOLUTION RESPIRATORY (INHALATION) EVERY 6 HOURS PRN
Status: DISCONTINUED | OUTPATIENT
Start: 2020-10-01 | End: 2020-10-05 | Stop reason: HOSPADM

## 2020-10-01 RX ORDER — LABETALOL HYDROCHLORIDE 5 MG/ML
10 INJECTION, SOLUTION INTRAVENOUS EVERY 6 HOURS PRN
Status: DISCONTINUED | OUTPATIENT
Start: 2020-10-01 | End: 2020-10-05 | Stop reason: HOSPADM

## 2020-10-01 RX ORDER — ASPIRIN 325 MG
325 TABLET ORAL ONCE
Status: COMPLETED | OUTPATIENT
Start: 2020-10-01 | End: 2020-10-01

## 2020-10-01 RX ORDER — CLOPIDOGREL BISULFATE 75 MG/1
75 TABLET ORAL DAILY
Status: DISCONTINUED | OUTPATIENT
Start: 2020-10-02 | End: 2020-10-05 | Stop reason: HOSPADM

## 2020-10-01 RX ORDER — CLOPIDOGREL 300 MG/1
300 TABLET, FILM COATED ORAL
Status: COMPLETED | OUTPATIENT
Start: 2020-10-01 | End: 2020-10-01

## 2020-10-01 RX ADMIN — SODIUM CHLORIDE: 0.9 INJECTION, SOLUTION INTRAVENOUS at 11:10

## 2020-10-01 RX ADMIN — SODIUM CHLORIDE: 0.9 INJECTION, SOLUTION INTRAVENOUS at 01:10

## 2020-10-01 RX ADMIN — ENOXAPARIN SODIUM 40 MG: 40 INJECTION SUBCUTANEOUS at 05:10

## 2020-10-01 RX ADMIN — ASPIRIN 325 MG ORAL TABLET 325 MG: 325 PILL ORAL at 09:10

## 2020-10-01 RX ADMIN — SODIUM CHLORIDE 1000 ML: 0.9 INJECTION, SOLUTION INTRAVENOUS at 11:10

## 2020-10-01 RX ADMIN — CLOPIDOGREL BISULFATE 300 MG: 300 TABLET, FILM COATED ORAL at 09:10

## 2020-10-01 NOTE — CONSULTS
[facility]  Neurology  Consult Note    Patient Name: Rashad Rodríguez  MRN: [id number]  Admission Date: 10/1/2020  Hospital Length of Stay: 0 days  Code Status: Full Code   Attending Provider: Moni Patel MD   Consulting Provider: Arnold Roca MD  Primary Care Physician: Juanito Dejesus Jr, MD  Principal Problem:Cerebrovascular accident (CVA)    Inpatient consult to Neurology  Consult performed by: Arnold Roca MD  Consult ordered by: Moni Patel MD        Subjective:     Chief Complaint: Right sided weakness     HPI: 76 y/o male with medical Hx as listed below comes to ED for weakness of right limbs as slurred speech. Pt had gone to bed around 11 pm, asymptomatic. He woke up at 6:30 am with symptoms. Denies visual or speech disturbances, vertigo, involvement of left side. No previous episodes. No aggravating or alleviating factors.    Past Medical History:   Diagnosis Date    Cigarette smoker     Hyperlipidemia     Hypertension     Stroke 10/01/2020       Past Surgical History:   Procedure Laterality Date    NO PAST SURGERIES         Review of patient's allergies indicates:   Allergen Reactions    Cephalexin     Ciprofloxacin     Doxycycline Other (See Comments)     Stomach cramps    Bactrim [sulfamethoxazole-trimethoprim] Rash       Current Neurological Medications:     No current facility-administered medications on file prior to encounter.      Current Outpatient Medications on File Prior to Encounter   Medication Sig    amLODIPine (NORVASC) 10 MG tablet TAKE 1 TABLET(10 MG) BY MOUTH EVERY DAY    atorvastatin (LIPITOR) 10 MG tablet TAKE 1 TABLET(10 MG) BY MOUTH EVERY DAY    valsartan-hydrochlorothiazide (DIOVAN-HCT) 320-12.5 mg per tablet Take 1 tablet by mouth once daily.    VIAGRA 100 mg tablet 1 tablet PO one hour prior to sex, maximum of 1 tablet in 24 hours      Family History     Problem Relation (Age of Onset)    Cancer Mother    No Known Problems Sister, Brother,  Maternal Aunt, Maternal Uncle, Paternal Aunt, Paternal Uncle, Maternal Grandmother, Maternal Grandfather, Paternal Grandmother, Paternal Grandfather    Stroke Father        Tobacco Use    Smoking status: Current Every Day Smoker     Packs/day: 1.00     Years: 50.00     Pack years: 50.00     Types: Cigarettes    Smokeless tobacco: Never Used   Substance and Sexual Activity    Alcohol use: Yes     Alcohol/week: 2.0 standard drinks     Types: 2 Cans of beer per week     Comment: socially    Drug use: No    Sexual activity: Yes     Partners: Female     Review of Systems   Constitutional: Negative for fever.   HENT: Negative for hearing loss.    Eyes: Negative for photophobia.   Respiratory: Negative for shortness of breath.    Cardiovascular: Negative for chest pain.   Gastrointestinal: Negative for abdominal pain.   Genitourinary: Negative for dysuria.   Musculoskeletal: Negative for back pain.   Neurological: Negative for headaches.   Psychiatric/Behavioral: Negative for confusion.     Objective:     Vital Signs (Most Recent):  Temp: 98.3 °F (36.8 °C) (10/01/20 1346)  Pulse: 69 (10/01/20 1346)  Resp: 16 (10/01/20 1346)  BP: (!) 140/81 (10/01/20 1346)  SpO2: 99 % (10/01/20 1346) Vital Signs (24h Range):  Temp:  [98 °F (36.7 °C)-98.5 °F (36.9 °C)] 98.3 °F (36.8 °C)  Pulse:  [] 69  Resp:  [16-18] 16  SpO2:  [96 %-100 %] 99 %  BP: (117-185)/(73-99) 140/81     Weight: 63.5 kg (140 lb)  Body mass index is 21.93 kg/m².    Physical Exam  Constitutional:       General: He is not in acute distress.     Appearance: He is not ill-appearing.   HENT:      Right Ear: External ear normal.      Left Ear: External ear normal.   Eyes:      General:         Right eye: No discharge.         Left eye: No discharge.   Neck:      Musculoskeletal: No muscular tenderness.   Cardiovascular:      Rate and Rhythm: Normal rate.   Pulmonary:      Breath sounds: Normal breath sounds.   Abdominal:      Palpations: Abdomen is soft.    Musculoskeletal:         General: No tenderness.   Neurological:      Mental Status: He is oriented to person, place, and time.   Psychiatric:         Speech: Speech is slurred.         NEUROLOGICAL EXAMINATION:     MENTAL STATUS   Oriented to person, place, and time.   Speech: slurred   Level of consciousness: alert    CRANIAL NERVES     CN II   Right visual field deficit: none  Left visual field deficit: none     CN III, IV, VI   Right pupil: Size: 3 mm. Shape: regular.   Left pupil: Size: 3 mm. Shape: regular.   Nystagmus: none   Conjugate gaze: present    CN V   Right facial sensation deficit: none  Left facial sensation deficit: none    CN VII   Right facial weakness: none  Left facial weakness: none    CN XII   Tongue deviation: none    MOTOR EXAM        RUE: drift  RLE: drift  LUE: 5/5  LLE: 5/5       Significant Labs:   CBC:   Recent Labs   Lab 09/30/20  1855 10/01/20  0918 10/02/20  0610   WBC 10.93 10.09 8.52   HGB 14.8 15.1 14.0   HCT 44.1 44.7 43.1    292 302     CMP:   Recent Labs   Lab 09/30/20  1855 10/01/20  0918 10/02/20  0610    129* 89    140 141   K 4.1 4.1 4.5    104 106   CO2 25 27 27   BUN 20 19 11   CREATININE 1.1 1.1 0.9   CALCIUM 9.5 10.2 9.0   MG  --   --  1.9   PROT 8.0 8.2 7.6   ALBUMIN 3.9 3.7 3.4*   BILITOT 0.2 0.6 0.9   ALKPHOS 102 100 91   AST 28 29 38   ALT 28 26 25   ANIONGAP 11 9 8   EGFRNONAA >60 >60 >60     LIPIDS/LFTS:  Recent Labs   Lab 05/14/18  1103 09/21/20  0905 10/01/20  0918   Cholesterol 231 H 200 H 178   Triglycerides 75 66 121   HDL 70 60 55   LDL Cholesterol 146.0 126.8 98.8   Non-HDL Cholesterol 161 140 123         Significant Imaging: I have reviewed all pertinent imaging results/findings within the past 24 hours.  MRI brain  Impression:     1. Questionable diffusion-weighted abnormality along the left parietopontine tract possibly representing subacute lacunar type infarction.  There is no associated hemorrhage or abnormal mass  effects.  2. Few scattered T2 FLAIR hyperintensities in the periventricular white matter likely from chronic microvascular ischemic disease.        Electronically signed by: Marcell Cassidy MD  Date:                                            10/01/2020  Time:                                           11:52    Assessment and Plan:     76 y/o male with acute stroke    1. 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   -Permissive HTN. Treat if > 220/100.   -ASA 81 mg daily. Clopidogrel 75 mg daily. Already had clopidogrel 300 mg x 1 and  mg x 1.   -Statin.   -Echo.   -PT/OT/ST.       Active Diagnoses:    Diagnosis Date Noted POA    PRINCIPAL PROBLEM:  Cerebrovascular accident (CVA) [I63.9] 10/01/2020 Yes    Lacunar stroke [I63.81] 10/01/2020 Unknown    COPD (chronic obstructive pulmonary disease) [J44.9] 02/19/2019 Yes    Essential hypertension [I10] 02/15/2013 Yes    Tobacco abuse [Z72.0] 02/15/2013 Yes      Problems Resolved During this Admission:       VTE Risk Mitigation (From admission, onward)         Ordered     enoxaparin injection 40 mg  Every 24 hours      10/01/20 1342     IP VTE HIGH RISK PATIENT  Once      10/01/20 1342     Place sequential compression device  Until discontinued      10/01/20 1342                Thank you for your consult. I will follow-up with patient. Please contact us if you have any additional questions.    Arnold Roca MD  Neurology  [facility]

## 2020-10-01 NOTE — NURSING
Transfer from ED to Telemetry:   Note that patient awake, alert and fully oriented.  Accompanied by his brother, Robert.   Denies pain at thio time.  V/S stable.   Right sided weakness.     Acclimated patient to room  Reviewed and released orders new orders.  Applied telemetry monitor [id number].   All safety measures activated.     Will continue to f/u.

## 2020-10-01 NOTE — SUBJECTIVE & OBJECTIVE
Past Medical History:   Diagnosis Date    Cigarette smoker     Hyperlipidemia     Hypertension     Stroke 10/01/2020       Past Surgical History:   Procedure Laterality Date    NO PAST SURGERIES         Review of patient's allergies indicates:   Allergen Reactions    Cephalexin     Ciprofloxacin     Doxycycline Other (See Comments)     Stomach cramps    Bactrim [sulfamethoxazole-trimethoprim] Rash       No current facility-administered medications on file prior to encounter.      Current Outpatient Medications on File Prior to Encounter   Medication Sig    amLODIPine (NORVASC) 10 MG tablet TAKE 1 TABLET(10 MG) BY MOUTH EVERY DAY    atorvastatin (LIPITOR) 10 MG tablet TAKE 1 TABLET(10 MG) BY MOUTH EVERY DAY    valsartan-hydrochlorothiazide (DIOVAN-HCT) 320-12.5 mg per tablet Take 1 tablet by mouth once daily.    VIAGRA 100 mg tablet 1 tablet PO one hour prior to sex, maximum of 1 tablet in 24 hours     Family History     Problem Relation (Age of Onset)    Cancer Mother    No Known Problems Sister, Brother, Maternal Aunt, Maternal Uncle, Paternal Aunt, Paternal Uncle, Maternal Grandmother, Maternal Grandfather, Paternal Grandmother, Paternal Grandfather    Stroke Father        Tobacco Use    Smoking status: Current Every Day Smoker     Packs/day: 1.00     Years: 50.00     Pack years: 50.00     Types: Cigarettes    Smokeless tobacco: Never Used   Substance and Sexual Activity    Alcohol use: Yes     Alcohol/week: 2.0 standard drinks     Types: 2 Cans of beer per week     Comment: socially    Drug use: No    Sexual activity: Yes     Partners: Female     Review of Systems   Constitutional: Negative for chills and fever.   HENT: Negative for nosebleeds and tinnitus.    Eyes: Negative for photophobia and visual disturbance.   Respiratory: Negative for shortness of breath and wheezing.    Cardiovascular: Negative for chest pain, palpitations and leg swelling.   Gastrointestinal: Negative for abdominal  distention, nausea and vomiting.   Genitourinary: Negative for dysuria, flank pain and hematuria.   Musculoskeletal: Negative for gait problem and joint swelling.   Skin: Negative for rash and wound.   Neurological: Positive for speech difficulty, weakness and numbness. Negative for seizures and syncope.     Objective:     Vital Signs (Most Recent):  Temp: 98.3 °F (36.8 °C) (10/01/20 1346)  Pulse: 69 (10/01/20 1346)  Resp: 16 (10/01/20 1346)  BP: (!) 140/81 (10/01/20 1346)  SpO2: 99 % (10/01/20 1346) Vital Signs (24h Range):  Temp:  [98 °F (36.7 °C)-98.5 °F (36.9 °C)] 98.3 °F (36.8 °C)  Pulse:  [] 69  Resp:  [16-18] 16  SpO2:  [96 %-100 %] 99 %  BP: (117-185)/(73-99) 140/81     Weight: 63.5 kg (140 lb)  Body mass index is 21.93 kg/m².    Physical Exam  Vitals signs and nursing note reviewed.   Constitutional:       General: He is not in acute distress.     Appearance: He is well-developed.   HENT:      Head: Normocephalic and atraumatic.   Eyes:      General:         Right eye: No discharge.         Left eye: No discharge.      Conjunctiva/sclera: Conjunctivae normal.   Neck:      Musculoskeletal: Normal range of motion.      Thyroid: No thyromegaly.   Cardiovascular:      Rate and Rhythm: Normal rate and regular rhythm.      Heart sounds: No murmur.   Pulmonary:      Effort: Pulmonary effort is normal. No respiratory distress.      Breath sounds: Normal breath sounds.   Abdominal:      General: Bowel sounds are normal. There is no distension.      Palpations: Abdomen is soft. There is no mass.      Tenderness: There is no abdominal tenderness.   Musculoskeletal:         General: No deformity.   Skin:     General: Skin is warm and dry.   Neurological:      Mental Status: He is alert and oriented to person, place, and time.      Coordination: Coordination abnormal.      Comments: 4/5 strength right side. Right sided facial droop and slurred speech.    Psychiatric:         Behavior: Behavior normal.              Significant Labs:   CBC:   Recent Labs   Lab 09/30/20  1855 10/01/20  0918   WBC 10.93 10.09   HGB 14.8 15.1   HCT 44.1 44.7    292     CMP:   Recent Labs   Lab 09/30/20  1855 10/01/20  0918    140   K 4.1 4.1    104   CO2 25 27    129*   BUN 20 19   CREATININE 1.1 1.1   CALCIUM 9.5 10.2   PROT 8.0 8.2   ALBUMIN 3.9 3.7   BILITOT 0.2 0.6   ALKPHOS 102 100   AST 28 29   ALT 28 26   ANIONGAP 11 9   EGFRNONAA >60 >60     Coagulation:   Recent Labs   Lab 10/01/20  0918   INR 1.0     Lipid Panel:   Recent Labs   Lab 10/01/20  0918   CHOL 178   HDL 55   LDLCALC 98.8   TRIG 121   CHOLHDL 30.9     TSH:   Recent Labs   Lab 10/01/20  0918   TSH 1.366       Significant Imaging:   Imaging Results           MRA Brain without contrast (Final result)  Result time 10/01/20 11:51:03    Final result by Be Braswell MD (10/01/20 11:51:03)                 Impression:      1. Nonvisualization of the V3 and V4 segments of the right vertebral artery which is concerning for occlusion however, slow flow through the segments can have a similar MRI appearance.  Otherwise, no additional evidence of high-grade focal stenosis, occlusion, sizable (3 mm or greater) aneurysm or vascular malformation.  This report was flagged in Epic as abnormal.      Electronically signed by: Be Braswell  Date:    10/01/2020  Time:    11:51             Narrative:    EXAMINATION:  MRA BRAIN WITHOUT CONTRAST    CLINICAL HISTORY:  Focal neurological deficit.  Acute stroke suspected.    TECHNIQUE:  Non-contrast 3-D time-of-flight intracranial MR angiography was performed through the [place] [facility] [place] with MIP reformatting.    COMPARISON:  CT brain performed earlier the same date.    FINDINGS:  Anterior intracranial circulation: No appreciable high-grade focal stenosis, occlusion, sizable (3 mm or greater) aneurysm or vascular malformation.    Posterior intracranial circulation: Otherwise, no appreciable additional areas suspicious for  high-grade focal stenosis, occlusion, sizable (3 mm or greater) aneurysm or vascular malformation.  V3 and V4 segments of the right vertebral artery are not visualized.                                MRA Neck without contrast (Final result)  Result time 10/01/20 11:59:01   Procedure changed from MRA Neck with and without contrast     Final result by Be Braswell MD (10/01/20 11:59:01)                 Impression:      1. Right vertebral artery is not visualized throughout the entirety of its course suggesting chronic total occlusion.  2. Otherwise, no additional evidence of hemodynamically significant stenosis or occlusion of the bilateral extracranial internal carotid or vertebral arteries on this motion limited exam.  This report was flagged in Epic as abnormal.      Electronically signed by: Be Braswell  Date:    10/01/2020  Time:    11:59             Narrative:    EXAMINATION:  MRA NECK WITHOUT CONTRAST    CLINICAL HISTORY:  slurred speech, AMS;    TECHNIQUE:  2D time-of-flight MR angiogram of the neck was obtained without the use of IV Gadavist, 3D time-of-flight MR angiography of the neck was performed with MIP reformatting.    Of note, extensive motion artifact significantly limits evaluation.    COMPARISON:  None    FINDINGS:  AORTIC ARCH AND BRANCH VESSELS: There appears to be 2-branch vessel configuration of a left-sided aortic arch with the left vertebral artery appearing to arise directly from the brachiocephalic trunk on this motion limited exam.  Origin of the great vessels appear to be patent.    RIGHT CAROTID    COMMON CAROTID: No occlusion, hemodynamically significant stenosis or aneurysmal degeneration.    INTERNAL CAROTID: No occlusion, hemodynamically significant stenosis or aneurysmal degeneration. Distal segment of the right extracranial internal carotid artery measures -cm.    LEFT CAROTID    COMMON CAROTID: No occlusion, hemodynamically significant stenosis or aneurysmal  degeneration.    INTERNAL CAROTID: No occlusion, hemodynamically significant stenosis or aneurysmal degeneration. Distal segment of the left extracranial internal carotid artery measures -cm.    VERTEBRAL ARTERIES    RIGHT VERTEBRAL: Right vertebral artery is not visualized throughout the entirety of its imaged course.    LEFT VERTEBRAL: No occlusion, hemodynamically significant stenosis or aneurysmal degeneration.    Multilevel degenerative changes of the imaged spine.                               MRI Brain Without Contrast (Final result)  Result time 10/01/20 11:52:24   Procedure changed from MRI Brain Ischemic Inter Pro Incl MRA W/O Con     Final result by Marcell Cassidy MD (10/01/20 11:52:24)                 Impression:      1. Questionable diffusion-weighted abnormality along the left parietopontine tract possibly representing subacute lacunar type infarction.  There is no associated hemorrhage or abnormal mass effects.  2. Few scattered T2 FLAIR hyperintensities in the periventricular white matter likely from chronic microvascular ischemic disease.      Electronically signed by: Marcell Cassidy MD  Date:    10/01/2020  Time:    11:52             Narrative:    EXAMINATION:  MRI BRAIN WITHOUT CONTRAST    CLINICAL HISTORY:  Stroke, follow up;    TECHNIQUE:  Multiplanar multisequence MR imaging of the brain was performed without contrast.    COMPARISON:  CT scan of the head 10/01/2020, 09/30/2020    FINDINGS:  There are symmetric lateral ventricles without hydrocephalus.  No abnormal extra-axial fluid collections.    On the DWI images there is a focal hyperintensity in the expected location of the parietal pontine tract (image 13 series 3) with a questionable restricted diffusion on the ADC map.  It is possible that this could represent a subacute lacunar type infarction.  Clinical correlation suggested.    No definite signs for acute major vessel territory infarctions are noted.    In the deep periventricular white  matter of the centrum there are a few small scattered T2 FLAIR hyperintensities, likely from chronic microvascular ischemic disease.  No parenchymal hemorrhage or midline shift or herniations.    In the posterior fossa there is mild cerebellar atrophy.  No definite signs for acute infarctions in the posterior fossa.    The flow void of the major vessels is within normal limits.                               CT Head Without Contrast (Final result)  Result time 10/01/20 09:09:15    Final result by Marcell Cassidy MD (10/01/20 09:09:15)                 Impression:      1. Periventricular hypodensities in the parietal lobes bilaterally, likely chronic microvascular ischemic changes.  If an acute lacunar-type infarction is suspected then a MRI of the brain with diffusion-weighted sequences is suggested.  2. No acute intracranial hemorrhages.      Electronically signed by: Marcell Cassidy MD  Date:    10/01/2020  Time:    09:09             Narrative:    EXAMINATION:  CT HEAD WITHOUT CONTRAST    CLINICAL HISTORY:  Neuro deficit, acute, stroke suspected;    TECHNIQUE:  Low dose axial images were obtained through the head.  Coronal and sagittal reformations were also performed. Contrast was not administered.    COMPARISON:  CT scan of the head 09/30/2020    FINDINGS:  There is age-appropriate generalized cerebral volume loss.  No signs for obstructive hydrocephalus.  In the deep white matter of the posterior/parietal convexities periventricular hypodensities noted bilaterally.  This findings were seen on the previous study.  It is possible that these represent chronic microvascular ischemic changes.  If an acute lacunar-type infarction is suspected a MRI of the brain with diffusion-weighted sequences is suggested.    No acute intracranial hemorrhages or abnormal extra-axial fluid collections.  No gross abnormalities of the basal ganglia.    In the posterior fossa the 4th ventricle is in the midline.  No acute cerebellar  hemorrhages or masses or Chiari type malformations.  No definite signs for acute posterior fossa infarctions.    Normal appearance of the cranial vault.  Paranasal air sinuses are clear.  There is normal pneumatization of the mastoids.

## 2020-10-01 NOTE — SUBJECTIVE & OBJECTIVE
"  Woke up with symptoms?: yes    Recent bleeding noted: no  Does the patient take any Blood Thinners? no  Medications: No relevant medications      Past Medical History: hypertension    Past Surgical History: no relevant surgical history    Family History: no relevant history    Social History: smoker (active)    Allergies: Cephalexin  Ciprofloxacin  Doxycycline  Bactrim [Sulfamethoxazole-Trimethoprim] ,    Review of Systems   Constitutional: Negative for fever.   HENT: Negative for facial swelling and nosebleeds.    Eyes: Negative for visual disturbance.   Respiratory: Negative for chest tightness and shortness of breath.    Cardiovascular: Negative for chest pain.   Genitourinary: Negative for dysuria.   Neurological: Positive for speech difficulty, weakness and numbness.     Objective:   Vitals: Blood pressure 117/89, pulse 97, resp. rate 18, height 5' 7" (1.702 m), weight 63.5 kg (140 lb), SpO2 99 %. Heart Rate: .    CT READ: Yes  No hemmorhage. No mass effect. No early infarct signs.     Physical Exam  Constitutional:       General: He is not in acute distress.  HENT:      Head: Normocephalic and atraumatic.   Eyes:      Pupils: Pupils are equal, round, and reactive to light.   Pulmonary:      Effort: Pulmonary effort is normal.   Neurological:      Comments: MS: A&XO3, speech fluent, follows commands, no neglect  CN: PERRL, EOMI, sensation intact, R facial droop, mild dysarthria  Motor: R arm and leg drift  Sens: intact to LT  Coord: ataxia on R finger to nose out of proportion to wkns               "

## 2020-10-01 NOTE — H&P
"[facility] Hospital Medicine  History & Physical    Patient Name: Rashad Rodríguez  MRN: [id number]  Admission Date: 10/1/2020  Attending Physician: Moni Patel MD   Primary Care Provider: Juanito Dejesus Jr, MD         Patient information was obtained from patient, past medical records and ER records.     Subjective:     Principal Problem:Cerebrovascular accident (CVA)    Chief Complaint:   Chief Complaint   Patient presents with    Speech Problem     Pt reports he woke up at 0630 this AM with slurred speech, went to bed at 2300 last night. States he was seen in this ED yesterday for stroke-like symptom but was told "everything is fine". This morning his RIGHT arm and leg feels heavier and more weak, pt had R side facial droop    Facial Droop        HPI: Rashad Rodríguez 75 y.o. male with HTN, tobacco abuse, and COPD presents to the hospital with a chief complaint of right sided weakness. He reports yesterday he noticed right sided heaviness and difficulty with speech. These symptoms worsened this morning and he presented to the ED. He has lost coordination of his right hand and is unable to grasp objects. He also finds he has been slurring his speech and his right leg feels heavy. He takes medication for HTN at home. He smokes 1ppd. He denies fever, chest pain, SOB, N/V, abdominal pain, dysuria, visual disturbance, syncope.     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     Past Medical History:   Diagnosis Date    Cigarette smoker     Hyperlipidemia     Hypertension     Stroke 10/01/2020       Past Surgical History:   Procedure Laterality Date    NO PAST SURGERIES         Review of patient's allergies indicates:   Allergen Reactions    Cephalexin     Ciprofloxacin     Doxycycline Other (See Comments)     Stomach cramps    Bactrim " [sulfamethoxazole-trimethoprim] Rash       No current facility-administered medications on file prior to encounter.      Current Outpatient Medications on File Prior to Encounter   Medication Sig    amLODIPine (NORVASC) 10 MG tablet TAKE 1 TABLET(10 MG) BY MOUTH EVERY DAY    atorvastatin (LIPITOR) 10 MG tablet TAKE 1 TABLET(10 MG) BY MOUTH EVERY DAY    valsartan-hydrochlorothiazide (DIOVAN-HCT) 320-12.5 mg per tablet Take 1 tablet by mouth once daily.    VIAGRA 100 mg tablet 1 tablet PO one hour prior to sex, maximum of 1 tablet in 24 hours     Family History     Problem Relation (Age of Onset)    Cancer Mother    No Known Problems Sister, Brother, Maternal Aunt, Maternal Uncle, Paternal Aunt, Paternal Uncle, Maternal Grandmother, Maternal Grandfather, Paternal Grandmother, Paternal Grandfather    Stroke Father        Tobacco Use    Smoking status: Current Every Day Smoker     Packs/day: 1.00     Years: 50.00     Pack years: 50.00     Types: Cigarettes    Smokeless tobacco: Never Used   Substance and Sexual Activity    Alcohol use: Yes     Alcohol/week: 2.0 standard drinks     Types: 2 Cans of beer per week     Comment: socially    Drug use: No    Sexual activity: Yes     Partners: Female     Review of Systems   Constitutional: Negative for chills and fever.   HENT: Negative for nosebleeds and tinnitus.    Eyes: Negative for photophobia and visual disturbance.   Respiratory: Negative for shortness of breath and wheezing.    Cardiovascular: Negative for chest pain, palpitations and leg swelling.   Gastrointestinal: Negative for abdominal distention, nausea and vomiting.   Genitourinary: Negative for dysuria, flank pain and hematuria.   Musculoskeletal: Negative for gait problem and joint swelling.   Skin: Negative for rash and wound.   Neurological: Positive for speech difficulty, weakness and numbness. Negative for seizures and syncope.     Objective:     Vital Signs (Most Recent):  Temp: 98.3 °F (36.8 °C)  (10/01/20 1346)  Pulse: 69 (10/01/20 1346)  Resp: 16 (10/01/20 1346)  BP: (!) 140/81 (10/01/20 1346)  SpO2: 99 % (10/01/20 1346) Vital Signs (24h Range):  Temp:  [98 °F (36.7 °C)-98.5 °F (36.9 °C)] 98.3 °F (36.8 °C)  Pulse:  [] 69  Resp:  [16-18] 16  SpO2:  [96 %-100 %] 99 %  BP: (117-185)/(73-99) 140/81     Weight: 63.5 kg (140 lb)  Body mass index is 21.93 kg/m².    Physical Exam  Vitals signs and nursing note reviewed.   Constitutional:       General: He is not in acute distress.     Appearance: He is well-developed.   HENT:      Head: Normocephalic and atraumatic.   Eyes:      General:         Right eye: No discharge.         Left eye: No discharge.      Conjunctiva/sclera: Conjunctivae normal.   Neck:      Musculoskeletal: Normal range of motion.      Thyroid: No thyromegaly.   Cardiovascular:      Rate and Rhythm: Normal rate and regular rhythm.      Heart sounds: No murmur.   Pulmonary:      Effort: Pulmonary effort is normal. No respiratory distress.      Breath sounds: Normal breath sounds.   Abdominal:      General: Bowel sounds are normal. There is no distension.      Palpations: Abdomen is soft. There is no mass.      Tenderness: There is no abdominal tenderness.   Musculoskeletal:         General: No deformity.   Skin:     General: Skin is warm and dry.   Neurological:      Mental Status: He is alert and oriented to person, place, and time.      Coordination: Coordination abnormal.      Comments: 4/5 strength right side. Right sided facial droop and slurred speech.    Psychiatric:         Behavior: Behavior normal.             Significant Labs:   CBC:   Recent Labs   Lab 09/30/20  1855 10/01/20  0918   WBC 10.93 10.09   HGB 14.8 15.1   HCT 44.1 44.7    292     CMP:   Recent Labs   Lab 09/30/20  1855 10/01/20  0918    140   K 4.1 4.1    104   CO2 25 27    129*   BUN 20 19   CREATININE 1.1 1.1   CALCIUM 9.5 10.2   PROT 8.0 8.2   ALBUMIN 3.9 3.7   BILITOT 0.2 0.6   ALKPHOS  102 100   AST 28 29   ALT 28 26   ANIONGAP 11 9   EGFRNONAA >60 >60     Coagulation:   Recent Labs   Lab 10/01/20  0918   INR 1.0     Lipid Panel:   Recent Labs   Lab 10/01/20  0918   CHOL 178   HDL 55   LDLCALC 98.8   TRIG 121   CHOLHDL 30.9     TSH:   Recent Labs   Lab 10/01/20  0918   TSH 1.366       Significant Imaging:   Imaging Results           MRA Brain without contrast (Final result)  Result time 10/01/20 11:51:03    Final result by Be Braswell MD (10/01/20 11:51:03)                 Impression:      1. Nonvisualization of the V3 and V4 segments of the right vertebral artery which is concerning for occlusion however, slow flow through the segments can have a similar MRI appearance.  Otherwise, no additional evidence of high-grade focal stenosis, occlusion, sizable (3 mm or greater) aneurysm or vascular malformation.  This report was flagged in Epic as abnormal.      Electronically signed by: Be Braswell  Date:    10/01/2020  Time:    11:51             Narrative:    EXAMINATION:  MRA BRAIN WITHOUT CONTRAST    CLINICAL HISTORY:  Focal neurological deficit.  Acute stroke suspected.    TECHNIQUE:  Non-contrast 3-D time-of-flight intracranial MR angiography was performed through the [place] [facility] [place] with MIP reformatting.    COMPARISON:  CT brain performed earlier the same date.    FINDINGS:  Anterior intracranial circulation: No appreciable high-grade focal stenosis, occlusion, sizable (3 mm or greater) aneurysm or vascular malformation.    Posterior intracranial circulation: Otherwise, no appreciable additional areas suspicious for high-grade focal stenosis, occlusion, sizable (3 mm or greater) aneurysm or vascular malformation.  V3 and V4 segments of the right vertebral artery are not visualized.                                MRA Neck without contrast (Final result)  Result time 10/01/20 11:59:01   Procedure changed from MRA Neck with and without contrast     Final result by Be Braswell MD (10/01/20  11:59:01)                 Impression:      1. Right vertebral artery is not visualized throughout the entirety of its course suggesting chronic total occlusion.  2. Otherwise, no additional evidence of hemodynamically significant stenosis or occlusion of the bilateral extracranial internal carotid or vertebral arteries on this motion limited exam.  This report was flagged in Epic as abnormal.      Electronically signed by: Be Braswell  Date:    10/01/2020  Time:    11:59             Narrative:    EXAMINATION:  MRA NECK WITHOUT CONTRAST    CLINICAL HISTORY:  slurred speech, AMS;    TECHNIQUE:  2D time-of-flight MR angiogram of the neck was obtained without the use of IV Gadavist, 3D time-of-flight MR angiography of the neck was performed with MIP reformatting.    Of note, extensive motion artifact significantly limits evaluation.    COMPARISON:  None    FINDINGS:  AORTIC ARCH AND BRANCH VESSELS: There appears to be 2-branch vessel configuration of a left-sided aortic arch with the left vertebral artery appearing to arise directly from the brachiocephalic trunk on this motion limited exam.  Origin of the great vessels appear to be patent.    RIGHT CAROTID    COMMON CAROTID: No occlusion, hemodynamically significant stenosis or aneurysmal degeneration.    INTERNAL CAROTID: No occlusion, hemodynamically significant stenosis or aneurysmal degeneration. Distal segment of the right extracranial internal carotid artery measures -cm.    LEFT CAROTID    COMMON CAROTID: No occlusion, hemodynamically significant stenosis or aneurysmal degeneration.    INTERNAL CAROTID: No occlusion, hemodynamically significant stenosis or aneurysmal degeneration. Distal segment of the left extracranial internal carotid artery measures -cm.    VERTEBRAL ARTERIES    RIGHT VERTEBRAL: Right vertebral artery is not visualized throughout the entirety of its imaged course.    LEFT VERTEBRAL: No occlusion, hemodynamically significant stenosis or  aneurysmal degeneration.    Multilevel degenerative changes of the imaged spine.                               MRI Brain Without Contrast (Final result)  Result time 10/01/20 11:52:24   Procedure changed from MRI Brain Ischemic Inter Pro Incl MRA W/O Con     Final result by Marcell Cassidy MD (10/01/20 11:52:24)                 Impression:      1. Questionable diffusion-weighted abnormality along the left parietopontine tract possibly representing subacute lacunar type infarction.  There is no associated hemorrhage or abnormal mass effects.  2. Few scattered T2 FLAIR hyperintensities in the periventricular white matter likely from chronic microvascular ischemic disease.      Electronically signed by: Marcell Cassidy MD  Date:    10/01/2020  Time:    11:52             Narrative:    EXAMINATION:  MRI BRAIN WITHOUT CONTRAST    CLINICAL HISTORY:  Stroke, follow up;    TECHNIQUE:  Multiplanar multisequence MR imaging of the brain was performed without contrast.    COMPARISON:  CT scan of the head 10/01/2020, 09/30/2020    FINDINGS:  There are symmetric lateral ventricles without hydrocephalus.  No abnormal extra-axial fluid collections.    On the DWI images there is a focal hyperintensity in the expected location of the parietal pontine tract (image 13 series 3) with a questionable restricted diffusion on the ADC map.  It is possible that this could represent a subacute lacunar type infarction.  Clinical correlation suggested.    No definite signs for acute major vessel territory infarctions are noted.    In the deep periventricular white matter of the centrum there are a few small scattered T2 FLAIR hyperintensities, likely from chronic microvascular ischemic disease.  No parenchymal hemorrhage or midline shift or herniations.    In the posterior fossa there is mild cerebellar atrophy.  No definite signs for acute infarctions in the posterior fossa.    The flow void of the major vessels is within normal limits.                                CT Head Without Contrast (Final result)  Result time 10/01/20 09:09:15    Final result by Marcell Cassidy MD (10/01/20 09:09:15)                 Impression:      1. Periventricular hypodensities in the parietal lobes bilaterally, likely chronic microvascular ischemic changes.  If an acute lacunar-type infarction is suspected then a MRI of the brain with diffusion-weighted sequences is suggested.  2. No acute intracranial hemorrhages.      Electronically signed by: Marcell Cassidy MD  Date:    10/01/2020  Time:    09:09             Narrative:    EXAMINATION:  CT HEAD WITHOUT CONTRAST    CLINICAL HISTORY:  Neuro deficit, acute, stroke suspected;    TECHNIQUE:  Low dose axial images were obtained through the head.  Coronal and sagittal reformations were also performed. Contrast was not administered.    COMPARISON:  CT scan of the head 09/30/2020    FINDINGS:  There is age-appropriate generalized cerebral volume loss.  No signs for obstructive hydrocephalus.  In the deep white matter of the posterior/parietal convexities periventricular hypodensities noted bilaterally.  This findings were seen on the previous study.  It is possible that these represent chronic microvascular ischemic changes.  If an acute lacunar-type infarction is suspected a MRI of the brain with diffusion-weighted sequences is suggested.    No acute intracranial hemorrhages or abnormal extra-axial fluid collections.  No gross abnormalities of the basal ganglia.    In the posterior fossa the 4th ventricle is in the midline.  No acute cerebellar hemorrhages or masses or Chiari type malformations.  No definite signs for acute posterior fossa infarctions.    Normal appearance of the cranial vault.  Paranasal air sinuses are clear.  There is normal pneumatization of the mastoids.                                  Assessment/Plan:     * Cerebrovascular accident (CVA)  With slurred speech and right sided weakness that began yesterday.   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 PRN duo-neb    Essential hypertension  Holding home amlodipine/valsartan/hctz for permissive HTN. PRN labetalol    Tobacco abuse  Greater than 3 minutes spent counseling patient on dangers of continued tobacco abuse will provide tobacco cessation education prior to discharge.       VTE Risk Mitigation (From admission, onward)         Ordered     enoxaparin injection 40 mg  Every 24 hours      10/01/20 1342     IP VTE HIGH RISK PATIENT  Once      10/01/20 1342     Place sequential compression device  Until discontinued      10/01/20 1342              VTE: lovenox  Code: full  Diet: NPO until seen by speech  Dispo: pending neurology and therapy recommendation  Patient will be admitted to inpatient with hospital medicine.     Albino Jennings PA-C  Department of Hospital Medicine   [facility]

## 2020-10-01 NOTE — PLAN OF CARE
Disharge planning assessment completed with patient's assistance with sisterShauna, at the bedside.  Patient from home and independent.  Patient has no DME or OP services.  Patient prefers morning appts.  His sister (Shauna) or brothers will help if needed.  When medically stable patient will discharge to home.  Case management will assist with discharge planning.       10/01/20 1150   Discharge Assessment   Assessment Type Discharge Planning Assessment   Confirmed/corrected address and phone number on facesheet? Yes   Assessment information obtained from? Patient;Caregiver;Medical Record   Expected Length of Stay (days) 2   Communicated expected length of stay with patient/caregiver no   Prior to hospitilization cognitive status: Alert/Oriented   Prior to hospitalization functional status: Independent   Current cognitive status: Alert/Oriented   Current Functional Status: Independent   Facility Arrived From: home   Lives With alone   Able to Return to Prior Arrangements yes   Is patient able to care for self after discharge? Yes   Who are your caregiver(s) and their phone number(s)? Shauna Rodríguez; sister [phone]   Patient's perception of discharge disposition home or selfcare   Readmission Within the Last 30 Days no previous admission in last 30 days   Patient currently being followed by outpatient case management? No   Patient currently receives any other outside agency services? No   Equipment Currently Used at Home none   Part D Coverage n/a   Do you have any problems affording any of your prescribed medications? No   Is the patient taking medications as prescribed? yes   Does the patient have transportation home? Yes   Transportation Anticipated family or friend will provide   Dialysis Name and Scheduled days n/a   Does the patient receive services at the Coumadin Clinic? No   Discharge Plan A Home   Discharge Plan B Home   DME Needed Upon Discharge  none   Patient/Family in Agreement with Plan yes    Sammi Campoverde [facility]  10/1/20

## 2020-10-01 NOTE — ASSESSMENT & PLAN NOTE
With slurred speech and right sided weakness that began yesterday.   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

## 2020-10-01 NOTE — ASSESSMENT & PLAN NOTE
74 y/o man with HTN, tobacco abuse, presenting with ataxic hemiparesis, with fluctuating symptoms since yesterday.  Likely lacunar stroke (cristy?).  Recommend loading with ASA/Plavix and admit for MRI, stroke workup.    Antithrombotics for secondary stroke prevention: Antiplatelets: Aspirin: 325 mg daily  Clopidogrel: 300 mg loading dose x 1, now    Statins for secondary stroke prevention and hyperlipidemia, if present:   Statins: Atorvastatin- 80 mg daily    Aggressive risk factor modification: HTN, Smoking     Rehab efforts: The patient has been evaluated by a stroke team provider and the therapy needs have been fully considered based off the presenting complaints and exam findings. The following therapy evaluations are needed: PT evaluate and treat, OT evaluate and treat, SLP evaluate and treat, PM&R evaluate for appropriate placement    Diagnostics ordered/pending: HgbA1C to assess blood glucose levels, Lipid Profile to assess cholesterol levels, MRA head to assess vasculature, MRA neck/arch to assess vasculature, MRI head without contrast to assess brain parenchyma, TTE to assess cardiac function/status , TSH to assess thyroid function    VTE prophylaxis: Heparin 5000 units SQ every 8 hours    BP parameters: Infarct: No intervention, SBP <220

## 2020-10-01 NOTE — HPI
74 y/o man with HTN, tobacco abuse, who presents with slurred speech and R sided weakness.  Patient actually started having neurological symptoms yesterday.  He presented to the ED yesterday with R sided numbness and tingling but was discharged.  When he woke up this morning he had slurred speech and R sided weakness.

## 2020-10-01 NOTE — ED TRIAGE NOTES
Pt presents to the ED reporting that his right side feels heavy. Pt was here yesterday for the same reason but it was only numb yesterday. Today it feels heavy and numb and pt has a facial droop with slurred speech. Pt already taken to CT. Pt currently in no distress.

## 2020-10-01 NOTE — ED PROVIDER NOTES
Encounter Date: 9/30/2020    SCRIBE #1 NOTE: I, Mayi Toth, am scribing for, and in the presence of,  Jesse Pena MD. I have scribed the following portions of the note - Other sections scribed: HPI, ROS, PE.       History     Chief Complaint   Patient presents with    Fatigue     Pt reports he woke up at 0700 this AM with RIGHT-sided weakness to arm and leg, feeling unbalanced. Also reports some chest tightness    Chest Pain     CC: Weakness    75-year-old male with a PMHx of HTN who presents to the ED complaining of RUE and RLE weakness that began at 7 am this morning. He also reports decreased coordination to his right hand for the past 2-3 hours, noting difficulty reaching for items. He reports associated chest pain. No left side extremity weakness. Patient is able to ambulate as normal, but reports feeling unbalanced. No other associated symptoms. No further medical complaints.     The history is provided by the patient.     Review of patient's allergies indicates:   Allergen Reactions    Cephalexin     Ciprofloxacin     Doxycycline Other (See Comments)     Stomach cramps    Bactrim [sulfamethoxazole-trimethoprim] Rash     Past Medical History:   Diagnosis Date    Cigarette smoker     Hyperlipidemia     Hypertension     Stroke 10/01/2020     Past Surgical History:   Procedure Laterality Date    NO PAST SURGERIES       Family History   Problem Relation Age of Onset    Cancer Mother     Stroke Father     No Known Problems Sister     No Known Problems Brother     No Known Problems Maternal Aunt     No Known Problems Maternal Uncle     No Known Problems Paternal Aunt     No Known Problems Paternal Uncle     No Known Problems Maternal Grandmother     No Known Problems Maternal Grandfather     No Known Problems Paternal Grandmother     No Known Problems Paternal Grandfather     Amblyopia Neg Hx     Blindness Neg Hx     Cataracts Neg Hx     Diabetes Neg Hx     Glaucoma Neg Hx      Hypertension Neg Hx     Macular degeneration Neg Hx     Retinal detachment Neg Hx     Strabismus Neg Hx     Thyroid disease Neg Hx      Social History     Tobacco Use    Smoking status: Current Every Day Smoker     Packs/day: 1.00     Years: 50.00     Pack years: 50.00     Types: Cigarettes    Smokeless tobacco: Never Used   Substance Use Topics    Alcohol use: Yes     Alcohol/week: 2.0 standard drinks     Types: 2 Cans of beer per week     Comment: socially    Drug use: No     Review of Systems   Constitutional: Negative.    HENT: Negative.    Eyes: Negative.    Respiratory: Negative.    Cardiovascular: Positive for chest pain.   Gastrointestinal: Negative.    Genitourinary: Negative.    Musculoskeletal: Negative.    Skin: Negative.    Neurological: Positive for weakness.       Physical Exam     Initial Vitals [09/30/20 1838]   BP Pulse Resp Temp SpO2   (!) 185/99 93 18 98 °F (36.7 °C) 98 %      MAP       --         Physical Exam    Nursing note and vitals reviewed.  Constitutional: He appears well-developed and well-nourished. He is not diaphoretic. No distress.   HENT:   Head: Normocephalic and atraumatic.   Nose: Nose normal.   Mouth/Throat: No oropharyngeal exudate.   Eyes: EOM are normal. Pupils are equal, round, and reactive to light.   Neck: Normal range of motion. Neck supple. No tracheal deviation present. No JVD present.   Cardiovascular: Normal rate, regular rhythm and normal heart sounds.   No murmur heard.  Pulmonary/Chest: Breath sounds normal. No respiratory distress. He has no wheezes. He has no rhonchi. He has no rales.   Abdominal: Soft. Bowel sounds are normal. He exhibits no distension. There is no abdominal tenderness. There is no rebound and no guarding.   Musculoskeletal: Normal range of motion. No tenderness or edema.   Neurological: He is alert and oriented to person, place, and time. No cranial nerve deficit. GCS score is 15. GCS eye subscore is 4. GCS verbal subscore is 5. GCS  motor subscore is 6.   Skin: Skin is warm and dry. Capillary refill takes less than 2 seconds. No rash noted. No erythema.         ED Course   Procedures  Labs Reviewed   CBC W/ AUTO DIFFERENTIAL - Abnormal; Notable for the following components:       Result Value    Mono # 1.1 (*)     All other components within normal limits   URINALYSIS, REFLEX TO URINE CULTURE - Abnormal; Notable for the following components:    Color, UA Colorless (*)     All other components within normal limits    Narrative:     Specimen Source->Urine   COMPREHENSIVE METABOLIC PANEL   PROTIME-INR   TSH   TROPONIN I   B-TYPE NATRIURETIC PEPTIDE   POCT GLUCOSE        ECG Results          ECG 12 lead (In process)  Result time 10/01/20 06:19:27    In process by Interface, Lab In [facility] (10/01/20 06:19:27)                 Narrative:    Test Reason : I63.9,    Vent. Rate : 091 BPM     Atrial Rate : 091 BPM     P-R Int : 212 ms          QRS Dur : 082 ms      QT Int : 364 ms       P-R-T Axes : 066 049 055 degrees     QTc Int : 447 ms    Sinus rhythm with 1st degree A-V block  Moderate voltage criteria for LVH, may be normal variant  Borderline Abnormal ECG  When compared with ECG of 28-JUN-2005 10:04,  Significant changes have occurred    Referred By: [email or web address]   SELF           Confirmed By:                   In process by Interface, Lab In [facility] (10/01/20 06:14:22)                 Narrative:    Test Reason : I63.9,    Vent. Rate : 091 BPM     Atrial Rate : 091 BPM     P-R Int : 212 ms          QRS Dur : 082 ms      QT Int : 364 ms       P-R-T Axes : 066 049 055 degrees     QTc Int : 447 ms    Sinus rhythm with 1st degree A-V block  Moderate voltage criteria for LVH, may be normal variant  Borderline Abnormal ECG  When compared with ECG of 28-JUN-2005 10:04,  Significant changes have occurred    Referred By: [email or web address]   SELF           Confirmed By:                             Imaging Results          X-Ray Chest AP Portable (Final result)  Result  time 09/30/20 19:37:10    Final result by Haider Reyes MD (09/30/20 19:37:10)                 Impression:      No acute process.      Electronically signed by: Haider Reyes MD  Date:    09/30/2020  Time:    19:37             Narrative:    EXAMINATION:  XR CHEST AP PORTABLE    CLINICAL HISTORY:  Stroke;    TECHNIQUE:  Single frontal view of the chest was performed.    COMPARISON:  None    FINDINGS:  Monitoring EKG leads are present.  The trachea is unremarkable.  The cardiomediastinal silhouette is within normal limits.  The hilar structures are unremarkable.  The hemidiaphragms are within normal limits.  There is no evidence of free air beneath the hemidiaphragms.  There are no pleural effusions.  There is no evidence of a pneumothorax.  There is no evidence of pneumomediastinum.  No airspace opacity is present.  There are degenerative changes in the osseous structures.                               CT Head Without Contrast (Final result)  Result time 09/30/20 19:15:06    Final result by Sergo Monahan MD (09/30/20 19:15:06)                 Impression:      1. No acute intracranial abnormalities noting sequela of chronic microvascular ischemic change and senescent change.      Electronically signed by: Sergo Monahan MD  Date:    09/30/2020  Time:    19:15             Narrative:    EXAMINATION:  CT HEAD WITHOUT CONTRAST    CLINICAL HISTORY:  Neuro deficit, acute, stroke suspected;    TECHNIQUE:  Low dose axial images were obtained through the head.  Coronal and sagittal reformations were also performed. Contrast was not administered.    COMPARISON:  None.    FINDINGS:  There is generalized cerebral volume loss.  There is hypoattenuation in a periventricular fashion, likely sequela of chronic microvascular ischemic change.  There is no evidence of acute major vascular territory infarct, hemorrhage, or mass.  There is no hydrocephalus.  There are no abnormal extra-axial fluid collections.  There is fluid layering  within the right maxillary sinus, otherwise the visualized paranasal sinuses and mastoid air cells are clear, and there is no evidence of calvarial fracture.  The visualized soft tissues are unremarkable.                                 Medical Decision Making:   History:   Old Medical Records: I decided to obtain old medical records.  Independently Interpreted Test(s):   I have ordered and independently interpreted EKG Reading(s) - see prior notes  Clinical Tests:   Lab Tests: Ordered and Reviewed  Radiological Study: Ordered and Reviewed  Medical Tests: Ordered and Reviewed  ED Management:  19:05: Consult to TeleStroke. Patient reported his symptoms initially began at 10 pm last night.     Patient presentation initially concerning for stroke given patient had symptoms this morning that went away and then reoccurred when he woke from a nap this afternoon.  Vascular neurology consult to tele stroke activated and patient was evaluated bedside.  Discussion with vascular Neurology as patient is not a tPA candidate currently without symptoms and suspect this is not related to a TIA/CVA.  Patient also reporting some chest pain, EKG without any ischemic changes noted, troponin was negative.  CT head negative for any acute process, CBC and CMP otherwise unremarkable.  Patient observed in the ED for greater than 3 hours, reassessed without any current complaints, no chest pain, no numbness/tingling, stable gait, no neuro deficits noted.  At this point time based on physical exam evaluation do not suspect acute CVA/TIA, acute MI/ACS, arrhythmia, acute kidney injury, acute renal failure, hypertensive emergency, or any further malignant etiology.  Discussed with patient follow-up with neurology as well as regular diet next week.  Discussed diagnosis and plan further management, all questions answered, patient discharged home improved and stable.            Scribe Attestation:   Scribe #1: I performed the above scribed service  and the documentation accurately describes the services I performed. I attest to the accuracy of the note.                      Clinical Impression:       ICD-10-CM ICD-9-CM   1. Numbness  R20.0 782.0   2. Stroke  I63.9 434.91                          ED Disposition Condition    Discharge Stable        ED Prescriptions     None        Follow-up Information     Follow up With Specialties Details Why Contact Info    [facility] Emergency Medicine Go to  If symptoms worsen [address] [facility] [id number] [phone]    Juanito Dejesus Jr., MD Family Medicine Go in 1 week As needed [address] [facility] [address]  [phone]                                I, Jesse Pena M.D., personally performed the services described in this documentation. All medical record entries made by the scribe were at my direction and in my presence.  I have reviewed the chart and agree that the record reflects my personal performance and is accurate and complete.           Jesse Pena MD  10/01/20 7710

## 2020-10-01 NOTE — ED NOTES
[facility] CALLED FOR STROKE ALERT (DR. FERRARA WAS NOTIFIED AT 0027)

## 2020-10-01 NOTE — PROGRESS NOTES
SW attempted discharge planning assessment.  Patient out of room for a procedure.  Sammi Campoverde LMSW, [facility]  10/1/20

## 2020-10-01 NOTE — ED PROVIDER NOTES
"Encounter Date: 10/1/2020    SCRIBE #1 NOTE: I, Evan Stover, am scribing for, and in the presence of,  Davian Gill MD. I have scribed the following portions of the note - Other sections scribed: HPI, ROS, PE.       History     Chief Complaint   Patient presents with    Speech Problem     Pt reports he woke up at 0630 this AM with slurred speech, went to bed at 2300 last night. States he was seen in this ED yesterday for stroke-like symptom but was told "everything is fine". This morning his RIGHT arm and leg feels heavier and more weak, pt had R side facial droop    Facial Droop     CC: Speech Difficulty    HPI: This is a 75 y.o. M who has HTN, and HLD who presents to the ED complaining of acute slurred speech that began upon waking approximately 2 hours and 10 minutes PTA. Pt reports drooling when attempting to drink fluids this morning. Pt states that he was evaluated and treated at this ED last night for weakness in the right upper extremity and right lower extremity, and decrease in ability to  with the right hand. Per relative, the symptoms improved and patient was last seen normal at 11:00pm last night. Weakness reoccurred this morning, and are worse than yesterday. He does not have a Hx of Stroke. Pt has HTN, and states that he is compliant with blood pressure medication. He took his blood pressure medication this morning. Pt denies DM, or anticoagulant use.    The history is provided by the patient and a relative. No  was used.     Review of patient's allergies indicates:   Allergen Reactions    Cephalexin     Ciprofloxacin     Doxycycline Other (See Comments)     Stomach cramps    Bactrim [sulfamethoxazole-trimethoprim] Rash     Past Medical History:   Diagnosis Date    Hyperlipidemia     Hypertension      Past Surgical History:   Procedure Laterality Date    NO PAST SURGERIES       Family History   Problem Relation Age of Onset    Cancer Mother     Stroke Father  "    No Known Problems Sister     No Known Problems Brother     No Known Problems Maternal Aunt     No Known Problems Maternal Uncle     No Known Problems Paternal Aunt     No Known Problems Paternal Uncle     No Known Problems Maternal Grandmother     No Known Problems Maternal Grandfather     No Known Problems Paternal Grandmother     No Known Problems Paternal Grandfather     Amblyopia Neg Hx     Blindness Neg Hx     Cataracts Neg Hx     Diabetes Neg Hx     Glaucoma Neg Hx     Hypertension Neg Hx     Macular degeneration Neg Hx     Retinal detachment Neg Hx     Strabismus Neg Hx     Thyroid disease Neg Hx      Social History     Tobacco Use    Smoking status: Current Every Day Smoker     Packs/day: 1.00     Years: 50.00     Pack years: 50.00     Types: Cigarettes    Smokeless tobacco: Never Used   Substance Use Topics    Alcohol use: Yes     Alcohol/week: 2.0 standard drinks     Types: 2 Cans of beer per week     Comment: socially    Drug use: No     Review of Systems   Constitutional: Negative for chills and fever.   HENT: Negative for sore throat.    Respiratory: Negative for cough and shortness of breath.    Cardiovascular: Negative for chest pain.   Gastrointestinal: Negative for abdominal pain, diarrhea, nausea and vomiting.   Genitourinary: Negative for dysuria.   Musculoskeletal: Negative for back pain.   Skin: Negative for rash.   Neurological: Positive for speech difficulty and weakness.   Hematological: Does not bruise/bleed easily.       Physical Exam     Initial Vitals [10/01/20 0847]   BP Pulse Resp Temp SpO2   117/89 97 18 -- 99 %      MAP       --         Physical Exam    Nursing note and vitals reviewed.  Constitutional: He appears well-developed and well-nourished. He is not diaphoretic. He appears distressed (mild).   HENT:   Head: Normocephalic and atraumatic.   Mouth/Throat: Mucous membranes are normal.   Eyes: Conjunctivae and EOM are normal. Pupils are equal, round, and  reactive to light. Right eye exhibits no nystagmus. Left eye exhibits no nystagmus.   Neck: Normal range of motion. Neck supple.   Cardiovascular: Normal rate, regular rhythm and normal heart sounds.   Pulmonary/Chest: Breath sounds normal. No respiratory distress.   Abdominal: Soft. Bowel sounds are normal. There is no abdominal tenderness.   Musculoskeletal:      Comments: No peripheral edema.   Neurological: He is alert.   Weakness in the RLE. Right sided facial droop. Pronator drift in the RUE.   Skin: Skin is warm and dry. No pallor.         ED Course   Procedures  Labs Reviewed   POCT GLUCOSE - Abnormal; Notable for the following components:       Result Value    POCT Glucose 146 (*)     All other components within normal limits   CBC W/ AUTO DIFFERENTIAL   PROTIME-INR   COMPREHENSIVE METABOLIC PANEL   TSH   LIPID PANEL   POCT GLUCOSE, HAND-HELD DEVICE          Imaging Results          CT Head Without Contrast (Final result)  Result time 10/01/20 09:09:15    Final result by Marcell Cassidy MD (10/01/20 09:09:15)                 Impression:      1. Periventricular hypodensities in the parietal lobes bilaterally, likely chronic microvascular ischemic changes.  If an acute lacunar-type infarction is suspected then a MRI of the brain with diffusion-weighted sequences is suggested.  2. No acute intracranial hemorrhages.      Electronically signed by: Marcell Cassidy MD  Date:    10/01/2020  Time:    09:09             Narrative:    EXAMINATION:  CT HEAD WITHOUT CONTRAST    CLINICAL HISTORY:  Neuro deficit, acute, stroke suspected;    TECHNIQUE:  Low dose axial images were obtained through the head.  Coronal and sagittal reformations were also performed. Contrast was not administered.    COMPARISON:  CT scan of the head 09/30/2020    FINDINGS:  There is age-appropriate generalized cerebral volume loss.  No signs for obstructive hydrocephalus.  In the deep white matter of the posterior/parietal convexities periventricular  hypodensities noted bilaterally.  This findings were seen on the previous study.  It is possible that these represent chronic microvascular ischemic changes.  If an acute lacunar-type infarction is suspected a MRI of the brain with diffusion-weighted sequences is suggested.    No acute intracranial hemorrhages or abnormal extra-axial fluid collections.  No gross abnormalities of the basal ganglia.    In the posterior fossa the 4th ventricle is in the midline.  No acute cerebellar hemorrhages or masses or Chiari type malformations.  No definite signs for acute posterior fossa infarctions.    Normal appearance of the cranial vault.  Paranasal air sinuses are clear.  There is normal pneumatization of the mastoids.                                                   ED Course as of Oct 01 1005   Thu Oct 01, 2020   0849 Stroke activation from triage    []   0853 I evaluated the patient out at triage and ordered a stroke activation.  On my exam there is no evidence of large vessel occlusion.  The patient has right facial droop right pronator drift and right leg weakness.  There is dysarthria.  There is no aphasia.  There is no visual field deficits.  There is no neglect.    [MH]   0856 Patient had been evaluated for stroke-like symptoms yesterday.  He went home and went to bed at 11:00 p.m. almost at baseline.  He reports he had had a little residual numbness in his right but no weakness.  He woke up this morning and was having trouble speaking and had noticed weakness in his right arm and leg.    [MH]   0858 Patient is back from CT.  The pronator drift has resolved.  The right leg weakness has resolved.  The dysarthria has improved but not resolved.  The facial droop is still present.    [MH]   0901 Awaiting tele stroke consult    [MH]   0917 I spoke with the tele stroke consultant.  She recommends 300 of Plavix, aspirin and gentle hydration.  Admit for MRI.    []      ED Course User Index  [] Davian Gill MD             Clinical Impression:       ICD-10-CM ICD-9-CM   1. Cerebrovascular accident (CVA), unspecified mechanism  I63.9 434.91   2. Stroke  I63.9 434.91                          ED Disposition Condition    Admit                Scribe Attestation:      I attest that I personally performed the services documented by the scribe and acknowledged and confirm the content of the note.   Nurses notes were reviewed.  Davian Gill      CRITICAL CARE TIME  Based on this patient's presenting history and physical exam, this patient had high probability of sudden, clinically significant deterioration and the services I provided to this patient were to treat and/or prevent clinically significant deterioration.      These services included the following: chart data review, reviewing nursing notes and researching old charts from internal and external sources, documentation time, consultant collaboration regarding findings and treatment options, medication orders and management, direct patient care, vital sign assessments, physical exam reassessments, and ordering, interpreting and reviewing diagnostic studies/lab tests.    Aggregate critical care time was approximately 40 minutes, which includes only time during which I was engaged in work directly related to the patient's care, as described above, whether at the bedside or elsewhere in the Emergency Department.  It did not include time spent performing other reported procedures or the services of residents, students, nurses or physician assistants.      10:06 AM  The medical management of Rashad Rodríguez has been transferred to the admitting team.  At this time, the patient's condition is unchanged, and this was relayed to the accepting team.  Please see notes from the admitting team for continued care.  Davian Gill 10:06 AM                     Davian Gill MD  10/01/20 1006

## 2020-10-01 NOTE — CONSULTS
[facility]  Vascular Neurology  Comprehensive Stroke Center  Tele-Consultation Note      Consults    Consulting Provider: MARINA STEARNS  Current Providers  No providers found    Patient Location:  [facility] EMERGENCY DEPARTMENT Emergency Department  Spoke hospital nurse at bedside with patient assisting consultant.     Patient information was obtained from patient.         Assessment/Plan:     STROKE DOCUMENTATION     Acute Stroke Times:   Acute Stroke Times   Last Known Normal Date: 09/29/20  Last Known Normal Time: 2200  Stroke Team Called Date: 10/01/20  Stroke Team Called Time: 0854  Stroke Team Arrival Date: 10/01/20  Stroke Team Arrival Time: 0900  CT Interpretation Time: 0900    NIH Scale:  1a. Level of Consciousness: 0-->Alert, keenly responsive  1b. LOC Questions: 0-->Answers both questions correctly  1c. LOC Commands: 0-->Performs both tasks correctly  2. Best Gaze: 0-->Normal  3. Visual: 0-->No visual loss  4. Facial Palsy: 1-->Minor paralysis (flattened nasolabial fold, asymmetry on smiling)  5a. Motor Arm, Left: 0-->No drift, limb holds 90 (or 45) degrees for full 10 secs  5b. Motor Arm, Right: 1-->Drift, limb holds 90 (or 45) degrees, but drifts down before full 10 secs, does not hit bed or other support  6a. Motor Leg, Left: 0-->No drift, leg holds 30 degree position for full 5 secs  6b. Motor Leg, Right: 1-->Drift, leg falls by the end of the 5-sec period but does not hit bed  7. Limb Ataxia: 1-->Present in one limb  8. Sensory: 0-->Normal, no sensory loss  9. Best Language: 0-->No aphasia, normal  10. Dysarthria: 1-->Mild-to-moderate dysarthria, patient slurs at least some words and, at worst, can be understood with some difficulty  11. Extinction and Inattention (formerly Neglect): 0-->No abnormality  Total (NIH Stroke Scale): 5     Modified [place]    [place] Coma Scale:    ABCD2 Score:    [id number] Score:   HAS -BLED Score:   ICH Score:   Hunt [employer] Anthony  "Classification:       Diagnoses:   Lacunar stroke  76 y/o man with HTN, tobacco abuse, presenting with ataxic hemiparesis, with fluctuating symptoms since yesterday.  Likely lacunar stroke (cristy?).  Recommend loading with ASA/Plavix and admit for MRI, stroke workup.    Antithrombotics for secondary stroke prevention: Antiplatelets: Aspirin: 325 mg daily  Clopidogrel: 300 mg loading dose x 1, now    Statins for secondary stroke prevention and hyperlipidemia, if present:   Statins: Atorvastatin- 80 mg daily    Aggressive risk factor modification: HTN, Smoking     Rehab efforts: The patient has been evaluated by a stroke team provider and the therapy needs have been fully considered based off the presenting complaints and exam findings. The following therapy evaluations are needed: PT evaluate and treat, OT evaluate and treat, SLP evaluate and treat, PM&R evaluate for appropriate placement    Diagnostics ordered/pending: HgbA1C to assess blood glucose levels, Lipid Profile to assess cholesterol levels, MRA head to assess vasculature, MRA neck/arch to assess vasculature, MRI head without contrast to assess brain parenchyma, TTE to assess cardiac function/status , TSH to assess thyroid function    VTE prophylaxis: Heparin 5000 units SQ every 8 hours    BP parameters: Infarct: No intervention, SBP <220            Blood pressure 117/89, pulse 97, resp. rate 18, height 5' 7" (1.702 m), weight 63.5 kg (140 lb), SpO2 99 %.  Alteplase Eligible?: No  Alteplase Recommendation: Alteplase not recommended due to Outside of treatment window   Possible Interventional Revascularization Candidate? No; No large vessel occlusion    Disposition Recommendation: admit to inpatient    Subjective:     History of Present Illness:  76 y/o man with HTN, tobacco abuse, who presents with slurred speech and R sided weakness.  Patient actually started having neurological symptoms yesterday.  He presented to the ED yesterday with R sided numbness and " "tingling but was discharged.  When he woke up this morning he had slurred speech and R sided weakness.        Woke up with symptoms?: yes    Recent bleeding noted: no  Does the patient take any Blood Thinners? no  Medications: No relevant medications      Past Medical History: hypertension    Past Surgical History: no relevant surgical history    Family History: no relevant history    Social History: smoker (active)    Allergies: Cephalexin  Ciprofloxacin  Doxycycline  Bactrim [Sulfamethoxazole-Trimethoprim] ,    Review of Systems   Constitutional: Negative for fever.   HENT: Negative for facial swelling and nosebleeds.    Eyes: Negative for visual disturbance.   Respiratory: Negative for chest tightness and shortness of breath.    Cardiovascular: Negative for chest pain.   Genitourinary: Negative for dysuria.   Neurological: Positive for speech difficulty, weakness and numbness.     Objective:   Vitals: Blood pressure 117/89, pulse 97, resp. rate 18, height 5' 7" (1.702 m), weight 63.5 kg (140 lb), SpO2 99 %. Heart Rate: .    CT READ: Yes  No hemmorhage. No mass effect. No early infarct signs.     Physical Exam  Constitutional:       General: He is not in acute distress.  HENT:      Head: Normocephalic and atraumatic.   Eyes:      Pupils: Pupils are equal, round, and reactive to light.   Pulmonary:      Effort: Pulmonary effort is normal.   Neurological:      Comments: MS: A&XO3, speech fluent, follows commands, no neglect  CN: PERRL, EOMI, sensation intact, R facial droop, mild dysarthria  Motor: R arm and leg drift  Sens: intact to LT  Coord: ataxia on R finger to nose out of proportion to wkns                   Recommended the emergency room physician to have a brief discussion with the patient and/or family if available regarding the risks and benefits of treatment, and to briefly document the occurrence of that discussion in his clinical encounter note.     The attending portion of this evaluation, treatment, " and documentation was performed per Chasity Rashid MD via audiovisual.    Billing code:  (moderate to severe stroke, large areas of edema, some mimics)    · This patient has a critical neurological condition/illness, with high morbidity and mortality.  · There is a high probability for acute neurological change leading to clinical and possibly life-threatening deterioration requiring highest level of physician preparedness for urgent intervention.  · Care was coordinated with other physicians involved in the patient's care.  · Radiologic studies and laboratory data were reviewed and interpreted, and plan of care was re-assessed based on the results.  · Diagnosis, treatment options and prognosis may have been discussed with the patient and/or family members or caregiver.  · Further advanced medical management and further evaluation is warranted for his care.      In your opinion, this was a: Tier 2 Van Negative    Consult End Time: 9:24 AM     Chasity Rashid MD  Comprehensive Stroke Center  Vascular Neurology   [facility]

## 2020-10-01 NOTE — ASSESSMENT & PLAN NOTE
Greater than 3 minutes spent counseling patient on dangers of continued tobacco abuse will provide tobacco cessation education prior to discharge.

## 2020-10-01 NOTE — HPI
Rashad Rodríguez 75 y.o. male with HTN, tobacco abuse, and COPD presents to the hospital with a chief complaint of right sided weakness. He reports yesterday he noticed right sided heaviness and difficulty with speech. These symptoms worsened this morning and he presented to the ED. He has lost coordination of his right hand and is unable to grasp objects. He also finds he has been slurring his speech and his right leg feels heavy. He takes medication for HTN at home. He smokes 1ppd. He denies fever, chest pain, SOB, N/V, abdominal pain, dysuria, visual disturbance, syncope.     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

## 2020-10-02 LAB
ALBUMIN SERPL BCP-MCNC: 3.4 G/DL (ref 3.5–5.2)
ALP SERPL-CCNC: 91 U/L (ref 55–135)
ALT SERPL W/O P-5'-P-CCNC: 25 U/L (ref 10–44)
ANION GAP SERPL CALC-SCNC: 8 MMOL/L (ref 8–16)
AST SERPL-CCNC: 38 U/L (ref 10–40)
BASOPHILS # BLD AUTO: 0.06 K/UL (ref 0–0.2)
BASOPHILS NFR BLD: 0.7 % (ref 0–1.9)
BILIRUB SERPL-MCNC: 0.9 MG/DL (ref 0.1–1)
BUN SERPL-MCNC: 11 MG/DL (ref 8–23)
CALCIUM SERPL-MCNC: 9 MG/DL (ref 8.7–10.5)
CHLORIDE SERPL-SCNC: 106 MMOL/L (ref 95–110)
CO2 SERPL-SCNC: 27 MMOL/L (ref 23–29)
CREAT SERPL-MCNC: 0.9 MG/DL (ref 0.5–1.4)
DIFFERENTIAL METHOD: NORMAL
EOSINOPHIL # BLD AUTO: 0.1 K/UL (ref 0–0.5)
EOSINOPHIL NFR BLD: 1.6 % (ref 0–8)
ERYTHROCYTE [DISTWIDTH] IN BLOOD BY AUTOMATED COUNT: 13.9 % (ref 11.5–14.5)
EST. GFR  (AFRICAN AMERICAN): >60 ML/MIN/1.73 M^2
EST. GFR  (NON AFRICAN AMERICAN): >60 ML/MIN/1.73 M^2
ESTIMATED AVG GLUCOSE: 117 MG/DL (ref 68–131)
GLUCOSE SERPL-MCNC: 89 MG/DL (ref 70–110)
HBA1C MFR BLD HPLC: 5.7 % (ref 4–5.6)
HCT VFR BLD AUTO: 43.1 % (ref 40–54)
HGB BLD-MCNC: 14 G/DL (ref 14–18)
IMM GRANULOCYTES # BLD AUTO: 0.04 K/UL (ref 0–0.04)
IMM GRANULOCYTES NFR BLD AUTO: 0.5 % (ref 0–0.5)
LYMPHOCYTES # BLD AUTO: 2.3 K/UL (ref 1–4.8)
LYMPHOCYTES NFR BLD: 26.6 % (ref 18–48)
MAGNESIUM SERPL-MCNC: 1.9 MG/DL (ref 1.6–2.6)
MCH RBC QN AUTO: 30 PG (ref 27–31)
MCHC RBC AUTO-ENTMCNC: 32.5 G/DL (ref 32–36)
MCV RBC AUTO: 93 FL (ref 82–98)
MONOCYTES # BLD AUTO: 0.8 K/UL (ref 0.3–1)
MONOCYTES NFR BLD: 9.7 % (ref 4–15)
NEUTROPHILS # BLD AUTO: 5.2 K/UL (ref 1.8–7.7)
NEUTROPHILS NFR BLD: 60.9 % (ref 38–73)
NRBC BLD-RTO: 0 /100 WBC
PHOSPHATE SERPL-MCNC: 2.5 MG/DL (ref 2.7–4.5)
PLATELET # BLD AUTO: 302 K/UL (ref 150–350)
PMV BLD AUTO: 10.6 FL (ref 9.2–12.9)
POTASSIUM SERPL-SCNC: 4.5 MMOL/L (ref 3.5–5.1)
PROT SERPL-MCNC: 7.6 G/DL (ref 6–8.4)
RBC # BLD AUTO: 4.66 M/UL (ref 4.6–6.2)
SODIUM SERPL-SCNC: 141 MMOL/L (ref 136–145)
WBC # BLD AUTO: 8.52 K/UL (ref 3.9–12.7)

## 2020-10-02 PROCEDURE — 99222 PR INITIAL HOSPITAL CARE,LEVL II: ICD-10-PCS | Mod: ,,, | Performed by: PSYCHIATRY & NEUROLOGY

## 2020-10-02 PROCEDURE — 93010 ELECTROCARDIOGRAM REPORT: CPT | Mod: ,,, | Performed by: INTERNAL MEDICINE

## 2020-10-02 PROCEDURE — 97802 MEDICAL NUTRITION INDIV IN: CPT

## 2020-10-02 PROCEDURE — 36415 COLL VENOUS BLD VENIPUNCTURE: CPT

## 2020-10-02 PROCEDURE — 97165 OT EVAL LOW COMPLEX 30 MIN: CPT

## 2020-10-02 PROCEDURE — 63600175 PHARM REV CODE 636 W HCPCS: Performed by: INTERNAL MEDICINE

## 2020-10-02 PROCEDURE — 25000003 PHARM REV CODE 250: Performed by: INTERNAL MEDICINE

## 2020-10-02 PROCEDURE — 97535 SELF CARE MNGMENT TRAINING: CPT

## 2020-10-02 PROCEDURE — 97110 THERAPEUTIC EXERCISES: CPT

## 2020-10-02 PROCEDURE — 97116 GAIT TRAINING THERAPY: CPT

## 2020-10-02 PROCEDURE — 85025 COMPLETE CBC W/AUTO DIFF WBC: CPT

## 2020-10-02 PROCEDURE — 83735 ASSAY OF MAGNESIUM: CPT

## 2020-10-02 PROCEDURE — 93005 ELECTROCARDIOGRAM TRACING: CPT

## 2020-10-02 PROCEDURE — 25000003 PHARM REV CODE 250: Performed by: NURSE PRACTITIONER

## 2020-10-02 PROCEDURE — 93010 EKG 12-LEAD: ICD-10-PCS | Mod: ,,, | Performed by: INTERNAL MEDICINE

## 2020-10-02 PROCEDURE — 92610 EVALUATE SWALLOWING FUNCTION: CPT

## 2020-10-02 PROCEDURE — 83036 HEMOGLOBIN GLYCOSYLATED A1C: CPT

## 2020-10-02 PROCEDURE — 21400001 HC TELEMETRY ROOM

## 2020-10-02 PROCEDURE — 80053 COMPREHEN METABOLIC PANEL: CPT

## 2020-10-02 PROCEDURE — 94761 N-INVAS EAR/PLS OXIMETRY MLT: CPT

## 2020-10-02 PROCEDURE — 97161 PT EVAL LOW COMPLEX 20 MIN: CPT

## 2020-10-02 PROCEDURE — 99222 1ST HOSP IP/OBS MODERATE 55: CPT | Mod: ,,, | Performed by: PSYCHIATRY & NEUROLOGY

## 2020-10-02 PROCEDURE — 84100 ASSAY OF PHOSPHORUS: CPT

## 2020-10-02 RX ORDER — TALC
6 POWDER (GRAM) TOPICAL NIGHTLY PRN
Status: DISCONTINUED | OUTPATIENT
Start: 2020-10-02 | End: 2020-10-05 | Stop reason: HOSPADM

## 2020-10-02 RX ADMIN — SODIUM CHLORIDE: 0.9 INJECTION, SOLUTION INTRAVENOUS at 07:10

## 2020-10-02 RX ADMIN — ASPIRIN 81 MG: 81 TABLET, COATED ORAL at 09:10

## 2020-10-02 RX ADMIN — SODIUM CHLORIDE: 0.9 INJECTION, SOLUTION INTRAVENOUS at 11:10

## 2020-10-02 RX ADMIN — Medication 6 MG: at 11:10

## 2020-10-02 RX ADMIN — SODIUM CHLORIDE: 0.9 INJECTION, SOLUTION INTRAVENOUS at 05:10

## 2020-10-02 RX ADMIN — CLOPIDOGREL 75 MG: 75 TABLET, FILM COATED ORAL at 09:10

## 2020-10-02 RX ADMIN — ENOXAPARIN SODIUM 40 MG: 40 INJECTION SUBCUTANEOUS at 05:10

## 2020-10-02 RX ADMIN — ATORVASTATIN CALCIUM 40 MG: 40 TABLET, FILM COATED ORAL at 09:10

## 2020-10-02 NOTE — PLAN OF CARE
B/s swallow evaluation completed. Decreased labial ROM/coordination on right side. ST will follow 3x/week.     Problem: SLP Goal  Goal: SLP Goal  Description: 1. Pt will tolerate mech soft diet and thin liquids w/o overt s/s of aspiration.   Outcome: Ongoing, Progressing

## 2020-10-02 NOTE — HOSPITAL COURSE
Mr Rodríguez presented with acute right hemiparesis and facial droop secondary to acute ischemic stroke. MRI brain with subactue left sided lacunar infarct to internal capsule. 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 Echo with concentric remodeling but no failure or other pathology. Per Cardiology, no arrhythmia to cause stroke nor consider anticoagulation. TSH WNL. HgbA1c 5.7%. Treated with aspirin, clopidogrel, statin and permissive hypertension x 48 hours. Symptoms improved some. PT/OT/ST recommended inpatient rehab. Accepted at rehab facility located at .Authorized by insurance. Patient very motivated to quit smoking. Low sodium diet. Activity as tolerated.

## 2020-10-02 NOTE — PLAN OF CARE
Problem: Occupational Therapy Goal  Goal: Occupational Therapy Goal  Description: Goals to be met by: 10/16/20    Patient will increase functional independence with ADLs by performing:    Feeding with Modified [place].  UE Dressing with Modified [place].  LE Dressing with Modified [place].  Grooming while standing at sink with Modified [place].  Toileting from toilet with Modified [place] for hygiene and clothing management.   Rolling to Bilateral with Modified [place].   Supine to sit with Modified [place].  Step transfer with Modified [place]  Toilet transfer to toilet with Modified [place].  Upper extremity exercise program x15 reps per handout, with independence.  Educate the patient re: neuromuscular re-education for RUE    Outcome: Ongoing, Progressing   The patient presents with RUE deficits with decreased ability to perform self care tasks. Patient will benefit from OT to address functional deficits.

## 2020-10-02 NOTE — SUBJECTIVE & OBJECTIVE
Interval History: states weakness is same as yesterday.     Review of Systems   Constitutional: Negative.    Respiratory: Negative.    Cardiovascular: Negative.    Gastrointestinal: Negative.    Neurological: Positive for facial asymmetry and weakness.     Objective:     Vital Signs (Most Recent):  Temp: 97.9 °F (36.6 °C) (10/02/20 1144)  Pulse: 80 (10/02/20 1144)  Resp: 16 (10/02/20 1144)  BP: (!) 171/89 (10/02/20 1144)  SpO2: 97 % (10/02/20 1144) Vital Signs (24h Range):  Temp:  [97.4 °F (36.3 °C)-98.2 °F (36.8 °C)] 97.9 °F (36.6 °C)  Pulse:  [64-80] 80  Resp:  [16-18] 16  SpO2:  [96 %-99 %] 97 %  BP: (147-171)/(85-99) 171/89     Weight: 63.5 kg (140 lb)  Body mass index is 21.93 kg/m².    Intake/Output Summary (Last 24 hours) at 10/2/2020 1417  Last data filed at 10/2/2020 0600  Gross per 24 hour   Intake 0 ml   Output 1250 ml   Net -1250 ml      Physical Exam  Vitals signs and nursing note reviewed.   Cardiovascular:      Rate and Rhythm: Normal rate and regular rhythm.   Pulmonary:      Effort: Pulmonary effort is normal.      Breath sounds: Normal breath sounds.   Abdominal:      General: Bowel sounds are normal.      Palpations: Abdomen is soft.   Skin:     Capillary Refill: Capillary refill takes less than 2 seconds.   Neurological:      Mental Status: He is alert and oriented to person, place, and time.      Comments: Right facial droop  Right hemiparesis    Psychiatric:         Mood and Affect: Mood normal.         Behavior: Behavior normal.         Thought Content: Thought content normal.         Judgment: Judgment normal.         Significant Labs: All pertinent labs within the past 24 hours have been reviewed.    Significant Imaging: I have reviewed all pertinent imaging results/findings within the past 24 hours.  I have reviewed and interpreted all pertinent imaging results/findings within the past 24 hours.

## 2020-10-02 NOTE — ASSESSMENT & PLAN NOTE
With slurred speech and right sided weakness that began 1 day prior to presentation.   MRI brain with subactue left sided lacunar infarct to internal capsule  MRA with chronic right total occlusion of vertebral artery but with no stroke to that area  Continue aspirin, clopidogrel and statin  Increase intravenous fluids   Echo with concentric remodeling but no failure or other pathology  TSH WNL  HgbA1c pending  Cardiac monitor reviewed. Has episodes of tachycardia but difficult to know if sinus or not. Will have an order for EKG available for when tachycardic to better evaluate  PT/OT/ST

## 2020-10-02 NOTE — PROGRESS NOTES
WRITTEN HEALTHCARE DISCHARGE INFORMATION     Things that YOU are RESPONSIBLE for to Manage Your Care At Home:    1. Getting your prescriptions filled.  2. Taking you medications as directed. DO NOT MISS ANY DOSES!  3. Going to your follow-up doctor appointments. This is important because it allows the doctor to monitor your progress and to determine if any changes need to be made to your treatment plan.    If you are unable to make your follow up appointments, please call the number listed and reschedule this appointment.     ____________HELP AT HOME____________________    Experiencing any SIGNS or SYMPTOMS: YOU CAN    Schedule a same day appopintment with your Primary Care Doctor or  you can call [facility] On Call Nurse Care Line for 24/7 assistance at [phone]    If you are experience any signs or symptoms that have become severe, Call 911 and come to your nearest Emergency Room.    Thank you for choosing [facility] and allowing us to care for you.   From your care management team: Amber GLEZ [facility] [phone]    You should receive a call from [facility] Discharge Department within 48-72 hours to help manage your care after discharge. Please try to make sure that you answer your phone for this important phone call.  Follow-up Information     Juanito Dejesus Jr, MD On 10/6/2020.    Specialty: Family Medicine  Why: Outpatient Services, PCP, follow-up appointment at 8:00am.   Contact information:  [address] [place] [address]  [phone]

## 2020-10-02 NOTE — PLAN OF CARE
Problem: Fall Injury Risk  Goal: Absence of Fall and Fall-Related Injury  Outcome: Ongoing, Progressing     Problem: Adult Inpatient Plan of Care  Goal: Plan of Care Review  Outcome: Ongoing, Progressing  Goal: Patient-Specific Goal (Individualization)  Outcome: Ongoing, Progressing  Goal: Absence of Hospital-Acquired Illness or Injury  Outcome: Ongoing, Progressing  Goal: Optimal Comfort and Wellbeing  Outcome: Ongoing, Progressing  Goal: Readiness for Transition of Care  Outcome: Ongoing, Progressing  Goal: Rounds/Family Conference  Outcome: Ongoing, Progressing     Problem: Adjustment to Illness (Stroke, Ischemic/Transient Ischemic Attack)  Goal: Optimal Coping  Outcome: Ongoing, Progressing     Problem: Bowel Elimination Impaired (Stroke, Ischemic/Transient Ischemic Attack)  Goal: Effective Bowel Elimination  Outcome: Ongoing, Progressing     Problem: Cerebral Tissue Perfusion Risk (Stroke, Ischemic/Transient Ischemic Attack)  Goal: Optimal Cerebral Tissue Perfusion  Outcome: Ongoing, Progressing     Problem: Communication Impairment (Stroke, Ischemic/Transient Ischemic Attack)  Goal: Improved Communication Skills  Outcome: Ongoing, Progressing     Problem: Eating/Swallowing Impairment (Stroke, Ischemic/Transient Ischemic Attack)  Goal: Oral Intake without Aspiration  Outcome: Ongoing, Progressing     Problem: Functional Ability Impaired (Stroke, Ischemic/Transient Ischemic Attack)  Goal: Optimal Functional Ability  Outcome: Ongoing, Progressing     Problem: Hemodynamic Instability (Stroke, Ischemic/Transient Ischemic Attack)  Goal: Vital Signs Remain in Desired Range  Outcome: Ongoing, Progressing     Problem: Pain (Stroke, Ischemic/Transient Ischemic Attack)  Goal: Acceptable Pain Control  Outcome: Ongoing, Progressing     Problem: Sensorimotor Impairment (Stroke, Ischemic/Transient Ischemic Attack)  Goal: Improved Sensorimotor Function  Outcome: Ongoing, Progressing     Problem: Urinary Elimination Impaired  (Stroke, Ischemic/Transient Ischemic Attack)  Goal: Effective Urinary Elimination  Outcome: Ongoing, Progressing     Problem: Wound  Goal: Optimal Wound Healing  Outcome: Ongoing, Progressing     Problem: Infection  Goal: Infection Symptom Resolution  Outcome: Ongoing, Progressing

## 2020-10-02 NOTE — PLAN OF CARE
Problem: Physical Therapy Goal  Goal: Physical Therapy Goal  Description: Goals to be met by: 10/16     Patient will increase functional independence with mobility by performin. Sit to stand transfer with Modified [place]  2. Bed to chair transfer with Modified [place]   3. Gait  x 150 feet with Modified [place] using Rolling Walker.   4. Lower extremity exercise program x20 reps per handout, with supervision    Outcome: Ongoing, Progressing    Inpt rehab. 6x/week.

## 2020-10-02 NOTE — PT/OT/SLP EVAL
Occupational Therapy   Evaluation/Treatment    Name: Rashad Rodríguez  MRN: [id number]  Admitting Diagnosis:  Cerebrovascular accident (CVA)      Recommendations:     Discharge Recommendations: rehabilitation facility  Discharge Equipment Recommendations:  (TBD)  Barriers to discharge:  (The patient is not at his functional baseline and will benefit from IPR)    Assessment:     Rashad Rodríguez is a 75 y.o. male with a medical diagnosis of Cerebrovascular accident (CVA).   Performance deficits affecting function: weakness, impaired endurance, impaired sensation, impaired self care skills, impaired functional mobilty, gait instability, impaired balance, decreased upper extremity function, decreased lower extremity function, decreased safety awareness, impaired fine motor, impaired cardiopulmonary response to activity.    The patient presents with decreased coordination, sensation and weakness in the RUE. The patient requires assist for self care and functional mobility with LOB with attempts to amb. The patient is highly motivated to improve function. The patient was (I) with all self care and mobility at home, without AE. The patient will benefit from In Patient Rehab to address functional deficits and optimize the patient ability to return to PLOF.     Rehab Prognosis: Good; patient would benefit from acute skilled OT services to address these deficits and reach maximum level of function.       Plan:     Patient to be seen 5 x/week, 6 x/week to address the above listed problems via self-care/home management, therapeutic activities, therapeutic exercises, neuromuscular re-education  · Plan of Care Expires: 10/16/20  · Plan of Care Reviewed with: patient    Subjective     Chief Complaint: feels hungry and wants to eat  Patient/Family Comments/goals: regain use of the RUE    Occupational Profile:  Living Environment: The patient lives alone in a [facility] with no ELKIN.  Previous level of function: (I) self care and amb without AD.  The patient bathes in the tub.  Roles and Routines: The patient drives, grocery shops and cooks.  Equipment Used at Home:  none  Assistance upon Discharge: nephew?    Pain/Comfort:  · Pain Rating 1: 0/10    Patients cultural, spiritual, Faith conflicts given the current situation: no    Objective:     Communicated with: Curt arteaga prior to session.  Patient found seated on the EOB with peripheral IV, telemetry upon OT entry to room.    General Precautions: Standard, fall   Orthopedic Precautions:Full weight bearing   Braces: N/A     Occupational Performance:    Bed Mobility:    · Patient completed Scooting/Bridging with stand by assistance  · Patient completed Supine to Sit with contact guard assistance, with side rail and HOB elevated  · Patient completed Sit to Supine with  stand by  assistance and increased time to lift BLE into the bed 2* c/o weakness    Functional Mobility/Transfers:  · Patient completed Sit <> Stand Transfer with contact guard assistance  with  no assistive device and hand-held assist   · Patient completed Bed <> Chair Transfer using Step Transfer technique with contact guard assistance with no assistive device  · Functional Mobility: The patient amb with HHA and verbal cues ~5' from the bed>chair with CGA and VC for hand placement to sit in the chair. The patient amb with HHA/CGA from the chair<>sink with 1 LOB and CGA to correct while turning away from the sink.     Activities of Daily Living:  · Grooming: The patient was dependent to open toothpaste box and remove safety seal from toothpaste. The patient was able to apply toothpaste to the brush using his left and and the right hand as a stabilizer. The patient brushed his teeth by alternating the toothbrush between his left and right hand. The patient reports not feeling the toothbrush in his right hand.  · Upper Body Dressing: maximal assistance to don back gown  · Lower Body Dressing: The patient was able to doff the left sock  but was dependent to doff right sock and don B socks while seated EOB.     Cognitive/Visual Perceptual:  Cognitive/Psychosocial Skills:     -       Oriented to: Person, Place, Time and Situation   -       Follows Commands/attention:Follows two-step commands  -       Communication: clear/fluent  -       Memory: No Deficits noted  -       Safety awareness/insight to disability: impaired   -       Mood/Affect/Coping skills/emotional control: Appropriate to situation  Visual/Perceptual:      -reports no deficits      Physical Exam:  Balance: -       fair+ sit/stand  Postural examination/scapula alignment:    -       No postural abnormalities identified  Skin integrity: Visible skin intact  Edema:  None noted  Sensation:    -       Impaired  Patient reports numbness, haviness and generalized decreased sensation in the RUE. The patient has stereognosis sensation in the right palm but reports numbness toward his finger tips.  Dominant hand: -       right  Upper Extremity Range of Motion:     -       Right Upper Extremity: WFL  -       Left Upper Extremity: WFL  Upper Extremity Strength:    -       Right Upper Extremity: WFL except 4/5 elbow ext, wrist flex/ext and hand  -       Left Upper Extremity: WFL   Strength:    -       Right Upper Extremity: weak griip with only partial grasp  -       Left Upper Extremity: WFL  Fine Motor Coordination:    -       Impaired  Right hand thumb/finger opposition skills  (unable to oppose) and Right hand, manipulation of objects (unable)    AMPAC 6 Click ADL:  AMPAC Total Score: 20    Treatment & Education:  The patient participated in OT eval and was educated re: OT role and POC. The patient participated in grooming tsaks at the sink with CGA. The patient was educated re: need to request nursing assist to amb to the bathroom and sit in the chair 2* balance deficits and fall risk.  The patient was educated re: use of light theraputty exercise to the Right hand. The patient was able to  perform gross grasp only and was unable to manipulate the putty in his right hand. The patient was given putty to use with handout with instructions to perform gross grasp/finger ext as able.   Education:    Patient left HOB elevated with all lines intact, call button in reach and bed alarm on    GOALS:   Multidisciplinary Problems     Occupational Therapy Goals        Problem: Occupational Therapy Goal    Goal Priority Disciplines Outcome Interventions   Occupational Therapy Goal     OT, PT/OT Ongoing, Progressing    Description: Goals to be met by: 10/16/20    Patient will increase functional independence with ADLs by performing:    Feeding with Modified [place].  UE Dressing with Modified [place].  LE Dressing with Modified [place].  Grooming while standing at sink with Modified [place].  Toileting from toilet with Modified [place] for hygiene and clothing management.   Rolling to Bilateral with Modified [place].   Supine to sit with Modified [place].  Step transfer with Modified [place]  Toilet transfer to toilet with Modified [place].  Upper extremity exercise program x15 reps per handout, with independence.  Educate the patient re: neuromuscular re-education for RUE                     History:     Past Medical History:   Diagnosis Date    Cigarette smoker     Hyperlipidemia     Hypertension     Stroke 10/01/2020       Past Surgical History:   Procedure Laterality Date    NO PAST SURGERIES         Time Tracking:     OT Date of Treatment: 10/02/20  OT Start Time: 1154  OT Stop Time: 1215  OT Total Time (min): 21 min   Start time: 4646-9587:19 min  Total Time:40 min (split treatment)    Billable Minutes:Evaluation 15  Self Care/Home Management 15  Therapeutic Exercise 10  Total Time 40    Carol Tirado OT  10/2/2020

## 2020-10-02 NOTE — NURSING
Gave patient morning medications and he was able to swallow pills without difficulty. However, when drinking water patient had drooling of fluids from the side of the mouth. Dr. Mehta notified of the results.

## 2020-10-02 NOTE — CONSULTS
"  [facility]  Adult Nutrition  Consult Note    SUMMARY     Recommendations    1. Please consult SLP for swallow study.   2. Add cardiac restriction to diet once ordered.   3. Weigh pt weekly.    Goals: 1. Initiate diet order by next RD visit.  Nutrition Goal Status: new  Communication of RD Recs: (plan of care)    Reason for Assessment    Reason For Assessment: consult(stroke pathway)  Diagnosis: stroke/CVA  Relevant Medical History: HLT, HLD, current smoker (1 PPD ~50 yrs)  Interdisciplinary Rounds: did not attend    General Information Comments: 75 y.o. male presented to the ED with weakness/numbness to right side with slurred speech and a facial droop. Pt was awake lying in bed at visit. Pt was very upset that the SLP has not seen him yet because he is " very hungry and hasn't eaten since wednesday". Pt denies any trouble chewing/swallowing. Pt weight is stable at 140 lbs. Last BM 9/30, no reports of GI distress. NFPE not warrented, pt appears well-nourished.    Nutrition Discharge Planning: Adequate intake on cardiac diet, texture per SLP    Nutrition Risk Screen    Nutrition Risk Screen: no indicators present    Nutrition/Diet History    Patient Reported Diet/Restrictions/Preferences: general  Spiritual, Cultural Beliefs, Buddhism Practices, Values that Affect Care: no  Food Allergies: NKFA  Factors Affecting Nutritional Intake: NPO    Anthropometrics    Temp: 98.2 °F (36.8 °C)  Height Method: Stated  Height: 5' 7" (170.2 cm)  Height (inches): 67 in  Weight Method: Stated  Weight: 63.5 kg (140 lb)  Weight (lb): 140 lb  Ideal Body Weight (IBW), Male: 148 lb  % Ideal Body Weight, Male (lb): 94.59 %  BMI (Calculated): 21.9  BMI Grade: 18.5-24.9 - normal  Weight Loss: (None)    Lab/Procedures/Meds    Pertinent Labs Reviewed: reviewed  Pertinent Labs Comments: Phos 2.5, Alb 3.4  Pertinent Medications Reviewed: reviewed  Pertinent Medications Comments: 0.9% Na Cl    Estimated/Assessed " Needs    Weight Used For Calorie Calculations: 63.5 kg (139 lb 15.9 oz)  Energy Calorie Requirements (kcal): 4243-0596 kcal (25-30 kcal/kg)  Energy Need Method: Kcal/kg  Protein Requirements: 63-76g (1.2-1.2 g/kg)  Weight Used For Protein Calculations: 63.5 kg (139 lb 15.9 oz)  Fluid Requirements (mL): 1 mL/kcal or per MD  Estimated Fluid Requirement Method: RDA Method  RDA Method (mL): 1587  CHO Requirement: 200-235 g daily    Nutrition Prescription Ordered    Current Diet Order: NPO until passes LEXI    Evaluation of Received Nutrient/Fluid Intake    I/O: 1000/1250  Energy Calories Required: not meeting needs  Protein Required: not meeting needs  Fluid Required: meeting needs  Comments: Last BM 9/30  Tolerance: tolerating  % Intake of Estimated Energy Needs: 0 %  % Meal Intake: NPO    Nutrition Risk    Level of Risk/Frequency of Follow-up: moderate(2x/week)     Assessment and Plan  Nutrition Problem  Inadequate oral intake    Related to (etiology):   NPO status    Signs and Symptoms (as evidenced by):   NPO with no other means of nutrition     Interventions (treatment strategy):  Low sodium/fat diet  Collaboration with other providers    Nutrition Diagnosis Status:   New    Monitor and Evaluation    Food and Nutrient Intake: energy intake, food and beverage intake  Food and Nutrient Adminstration: diet order  Knowledge/Beliefs/Attitudes: food and nutrition knowledge/skill  Anthropometric Measurements: weight, weight change  Nutrition-Focused Physical Findings: overall appearance, skin     Malnutrition Assessment     Skin (Micronutrient): (Felipe score-19)  Teeth (Micronutrient): (WDL)       Weight Loss (Malnutrition): (10/1/20 63.5 kg)  Hand  Strength, Right (Malnutrition): 4/5   Orbital Region (Subcutaneous Fat Loss): well nourished  Upper Arm Region (Subcutaneous Fat Loss): well nourished  Thoracic and Lumbar Region: well nourished   Scientology Region (Muscle Loss): well nourished  Clavicle Bone Region (Muscle  Loss): well nourished  Clavicle and Acromion Bone Region (Muscle Loss): well nourished  Scapular Bone Region (Muscle Loss): well nourished  Dorsal Hand (Muscle Loss): well nourished  Patellar Region (Muscle Loss): well nourished  Anterior Thigh Region (Muscle Loss): well nourished  Posterior Calf Region (Muscle Loss): well nourished   Edema (Fluid Accumulation): 0-->no edema present   Subcutaneous Fat Loss (Final Summary): well nourished  Muscle Loss Evaluation (Final Summary): well nourished       Nutrition Follow-Up    RD Follow-up?: Yes

## 2020-10-02 NOTE — PT/OT/SLP EVAL
Speech Language Pathology Evaluation  Bedside Swallow/Evaluation of Oral Musculature function     Patient Name:  Rashad Rodríguez   MRN:  [id number]  Admitting Diagnosis: Cerebrovascular accident (CVA)    Recommendations:                 General Recommendations:   ·  Speech therapy to address decreased labial ROM/coordination on right side     Diet recommendations:  Mechanical soft, Thin   Aspiration Precautions: Alternating bites/sips, Frequent oral care, HOB to 90 degrees and Meds whole 1 at a time   General Precautions: Standard    Communication strategies:  none    History:     Past Medical History:   Diagnosis Date    Cigarette smoker     Hyperlipidemia     Hypertension     Stroke 10/01/2020       Past Surgical History:   Procedure Laterality Date    NO PAST SURGERIES         Social History: Patient lives at home alone, spouse recently .     Chest X-Rays: On 20 w/o comparison  Findings: The trachea is unremarkable.  The cardiomediastinal silhouette is within normal limits.  The hilar structures are unremarkable.  The hemidiaphragms are within normal limits.  There is no evidence of free air beneath the hemidiaphragms.  There are no pleural effusions.  There is no evidence of a pneumothorax.  There is no evidence of pneumomediastinum.  No airspace opacity is present.  There are degenerative changes in the osseous structures.     Prior diet: Regular diet consistency and thin liquids.     Subjective     Pt awake/alert with family present at b/s. He was oriented and able to follow commands demonstrated speech intelligibility >80%. No concern for persisting cognitive-linguistic deficits at this time. Pt observed with decreased labial ROM/coordinaiton on right side.      Patient goals: To continue PO intake     Pain/Comfort:  · Pain Rating 1: 0/10    Objective:     Oral Musculature Evaluation  · Oral Musculature: WFL  · Dentition: present and adequate  · Secretion Management: adequate  · Mucosal Quality:  adequate  · Oral Labial Strength and Mobility: functional coordination, functional seal  · Lingual Strength and Mobility: WFL  · Velar Elevation: WFL  · Buccal Strength and Mobility: WFL  · Volitional Cough: Adequate; strong  · Volitional Swallow: Adequate  · Voice Prior to PO Intake: Clear  · Oral Musculature Comments: Impaired ROM upon labial mvt to right side of mouth    Bedside Swallow Eval:   Consistencies Assessed:  · Thin liquids via straw 4-6oz  · Puree x4 tsp  · Soft solids x3     Oral Phase:   · WFL  · Lingual residue-post puree trial x1; pt able to clear residue with additional swallow     Pharyngeal Phase:   · no overt clinical signs/symptoms of aspiration    Compensatory Strategies  · None    Treatment: Pt educated to perform safe swallow strategies during intake, i.e., alternating bites/sips, elevated HOB and performing small bites/sips. Pt instructed for execution of exercises to address decreased labial ROM/coordination; pt demonstrated good return given min vv cues.     Assessment:     Rashad Rodríguez is a 75 y.o. male with a medical diagnosis of Cerebrovascular accident (CVA). He presents with adequate swallowing function for continuation of PO intake w/o concern for airway compromise. Pt has decreased labial ROM/coordination on right side which ST will address with continuity of OMEs 3x/week.       Goals:   Multidisciplinary Problems     SLP Goals        Problem: SLP Goal    Goal Priority Disciplines Outcome   SLP Goal     SLP Ongoing, Progressing   Description: 1. Pt will tolerate mech soft diet and thin liquids w/o overt s/s of aspiration.                    Plan:     · Patient to be seen:  3 x/week(3x/week)   · Plan of Care expires:  10/16/20  · Plan of Care reviewed with:  patient, family   · SLP Follow-Up:  Yes       Discharge recommendations:  other (see comments)(TBD)   Barriers to Discharge:  None    Time Tracking:     SLP Treatment Date:   10/02/20  Speech Start Time:  1215  Speech Stop  Time:  1238     Speech Total Time (min):  23 min    Billable Minutes: Eval Swallow and Oral Function 23min    Alina Hurd CCC-SLP  10/02/2020

## 2020-10-02 NOTE — PROGRESS NOTES
[facility] Hospital Medicine  Progress Note    Patient Name: Rashad Rodríguez  MRN: [id number]  Patient Class: IP- Inpatient   Admission Date: 10/1/2020  Length of Stay: 1 days  Attending Physician: Moni Patel MD  Primary Care Provider: Juanito Dejesus Jr, MD        Subjective:     Principal Problem:Cerebrovascular accident (CVA)        HPI:  Rashad Rodríguez 75 y.o. male with HTN, tobacco abuse, and COPD presents to the hospital with a chief complaint of right sided weakness. He reports yesterday he noticed right sided heaviness and difficulty with speech. These symptoms worsened this morning and he presented to the ED. He has lost coordination of his right hand and is unable to grasp objects. He also finds he has been slurring his speech and his right leg feels heavy. He takes medication for HTN at home. He smokes 1ppd. He denies fever, chest pain, SOB, N/V, abdominal pain, dysuria, visual disturbance, syncope.     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     Overview/Hospital Course:  Mr Rodríguez presented with acute right hemiparesis and facial droop secondary to acute ischemic stroke.     Interval History: states weakness is same as yesterday.     Review of Systems   Constitutional: Negative.    Respiratory: Negative.    Cardiovascular: Negative.    Gastrointestinal: Negative.    Neurological: Positive for facial asymmetry and weakness.     Objective:     Vital Signs (Most Recent):  Temp: 97.9 °F (36.6 °C) (10/02/20 1144)  Pulse: 80 (10/02/20 1144)  Resp: 16 (10/02/20 1144)  BP: (!) 171/89 (10/02/20 1144)  SpO2: 97 % (10/02/20 1144) Vital Signs (24h Range):  Temp:  [97.4 °F (36.3 °C)-98.2 °F (36.8 °C)] 97.9 °F (36.6 °C)  Pulse:  [64-80] 80  Resp:  [16-18] 16  SpO2:  [96 %-99 %] 97 %  BP: (147-171)/(85-99) 171/89     Weight: 63.5 kg (140  lb)  Body mass index is 21.93 kg/m².    Intake/Output Summary (Last 24 hours) at 10/2/2020 1417  Last data filed at 10/2/2020 0600  Gross per 24 hour   Intake 0 ml   Output 1250 ml   Net -1250 ml      Physical Exam  Vitals signs and nursing note reviewed.   Cardiovascular:      Rate and Rhythm: Normal rate and regular rhythm.   Pulmonary:      Effort: Pulmonary effort is normal.      Breath sounds: Normal breath sounds.   Abdominal:      General: Bowel sounds are normal.      Palpations: Abdomen is soft.   Skin:     Capillary Refill: Capillary refill takes less than 2 seconds.   Neurological:      Mental Status: He is alert and oriented to person, place, and time.      Comments: Right facial droop  Right hemiparesis    Psychiatric:         Mood and Affect: Mood normal.         Behavior: Behavior normal.         Thought Content: Thought content normal.         Judgment: Judgment normal.         Significant Labs: All pertinent labs within the past 24 hours have been reviewed.    Significant Imaging: I have reviewed all pertinent imaging results/findings within the past 24 hours.  I have reviewed and interpreted all pertinent imaging results/findings within the past 24 hours.      Assessment/Plan:      * Cerebrovascular accident (CVA)  With slurred speech and right sided weakness that began 1 day prior to presentation.   MRI brain with subactue left sided lacunar infarct to internal capsule  MRA with chronic right total occlusion of vertebral artery but with no stroke to that area  Continue aspirin, clopidogrel and statin  Increase intravenous fluids   Echo with concentric remodeling but no failure or other pathology  TSH WNL  HgbA1c pending  Cardiac monitor reviewed. Has episodes of tachycardia but difficult to know if sinus or not. Will have an order for EKG available for when tachycardic to better evaluate  PT/OT/ST      Lacunar stroke  See above    COPD (chronic obstructive pulmonary disease)  Emphysematous changes  on CT did not get PFT. PRN duo-neb    Essential hypertension  Holding home amlodipine/valsartan/hctz for permissive HTN.    Tobacco use disorder, continuous  Greater than 3 minutes spent counseling patient on dangers of continued tobacco abuse will provide tobacco cessation education prior to discharge.       VTE Risk Mitigation (From admission, onward)         Ordered     enoxaparin injection 40 mg  Every 24 hours      10/01/20 1342     IP VTE HIGH RISK PATIENT  Once      10/01/20 1342     Place sequential compression device  Until discontinued      10/01/20 1342                Discharge Planning   LEX:      Code Status: Full Code   Is the patient medically ready for discharge?:     Reason for patient still in hospital (select all that apply): Treatment  Discharge Plan A: Home      Moni Mehta MD  Department of Hospital Medicine   [facility]

## 2020-10-02 NOTE — PROGRESS NOTES
[facility] [place]  [place] Note    Patient Name: Rashad Rodríguez  MRN: [id number]  Admission Date: 10/1/2020  Hospital Length of Stay: 1 days  Code Status: Full Code   Attending Provider: Moni Patel MD  Primary Care Physician: Juanito Dejesus Jr, MD   Principal Problem:Cerebrovascular accident (CVA)    Subjective:     Interval History: 74 y/o male with medical Hx as listed below comes to ED for weakness of right limbs as slurred speech. Pt had gone to bed around 11 pm, asymptomatic. He woke up at 6:30 am with symptoms. Denies visual or speech disturbances, vertigo, involvement of left side. No previous episodes. No aggravating or alleviating factors.    -10/2/20: No new issues. Right sided weakness continues.    Current Neurological Medications:     Current Facility-Administered Medications   Medication Dose Route Frequency Provider Last Rate Last Dose    0.9%  NaCl infusion   Intravenous Continuous Moni Patel  mL/hr at 10/02/20 0739      albuterol-ipratropium 2.5 mg-0.5 mg/3 mL nebulizer solution 3 mL  3 mL Nebulization Q6H PRN Albino Jennings PA-C        aspirin EC tablet 81 mg  81 mg Oral Daily Moni Patel MD   81 mg at 10/02/20 0938    atorvastatin tablet 40 mg  40 mg Per NG tube Daily Moni Patel MD   40 mg at 10/02/20 0937    clopidogreL tablet 75 mg  75 mg Oral Daily Moni Patel MD   75 mg at 10/02/20 0938    enoxaparin injection 40 mg  40 mg Subcutaneous Q24H Moni Patel MD   40 mg at 10/01/20 1715    labetaloL injection 10 mg  10 mg Intravenous Q6H PRN Albino Jennings PA-C        sodium chloride 0.9% bolus 500 mL  500 mL Intravenous Continuous PRN Moni Patel MD        sodium chloride 0.9% flush 10 mL  10 mL Intravenous PRN Moni Patel MD           Review of Systems   Constitutional: Negative for fever.   HENT: Negative for hearing loss.    Eyes: Negative for photophobia.   Respiratory: Negative for shortness of breath.     Cardiovascular: Negative for chest pain.   Gastrointestinal: Negative for abdominal pain.   Genitourinary: Negative for dysuria.   Musculoskeletal: Negative for back pain.   Neurological: Negative for headaches.   Psychiatric/Behavioral: Negative for confusion.     Objective:     Vital Signs (Most Recent):  Temp: 97.9 °F (36.6 °C) (10/02/20 1144)  Pulse: 80 (10/02/20 1144)  Resp: 16 (10/02/20 1144)  BP: (!) 171/89 (10/02/20 1144)  SpO2: 97 % (10/02/20 1144) Vital Signs (24h Range):  Temp:  [97.4 °F (36.3 °C)-98.2 °F (36.8 °C)] 97.9 °F (36.6 °C)  Pulse:  [64-80] 80  Resp:  [16-18] 16  SpO2:  [96 %-99 %] 97 %  BP: (147-171)/(85-99) 171/89     Weight: 63.5 kg (140 lb)  Body mass index is 21.93 kg/m².    Physical Exam  Constitutional:       General: He is not in acute distress.     Appearance: He is not ill-appearing.   HENT:      Right Ear: External ear normal.      Left Ear: External ear normal.   Eyes:      General:         Right eye: No discharge.         Left eye: No discharge.   Neck:      Musculoskeletal: No muscular tenderness.   Cardiovascular:      Rate and Rhythm: Normal rate.   Pulmonary:      Breath sounds: Normal breath sounds.   Abdominal:      Palpations: Abdomen is soft.   Musculoskeletal:         General: No tenderness.   Neurological:      Mental Status: He is oriented to person, place, and time.   Psychiatric:         Speech: Speech is slurred.            NEUROLOGICAL EXAMINATION:      MENTAL STATUS   Oriented to person, place, and time.   Speech: slurred   Level of consciousness: alert     CRANIAL NERVES      CN II   Right visual field deficit: none  Left visual field deficit: none      CN III, IV, VI   Right pupil: Size: 3 mm. Shape: regular.   Left pupil: Size: 3 mm. Shape: regular.   Nystagmus: none   Conjugate gaze: present     CN V   Right facial sensation deficit: none  Left facial sensation deficit: none     CN VII   Right facial weakness: none  Left facial weakness: none     CN XII    Tongue deviation: none     MOTOR EXAM        RUE: drift  RLE: drift  LUE: 5/5  LLE: 5/5           Significant Labs:   CBC:   Recent Labs   Lab 09/30/20  1855 10/01/20  0918 10/02/20  0610   WBC 10.93 10.09 8.52   HGB 14.8 15.1 14.0   HCT 44.1 44.7 43.1    292 302     CMP:   Recent Labs   Lab 09/30/20  1855 10/01/20  0918 10/02/20  0610    129* 89    140 141   K 4.1 4.1 4.5    104 106   CO2 25 27 27   BUN 20 19 11   CREATININE 1.1 1.1 0.9   CALCIUM 9.5 10.2 9.0   MG  --   --  1.9   PROT 8.0 8.2 7.6   ALBUMIN 3.9 3.7 3.4*   BILITOT 0.2 0.6 0.9   ALKPHOS 102 100 91   AST 28 29 38   ALT 28 26 25   ANIONGAP 11 9 8   EGFRNONAA >60 >60 >60       Significant Imaging: I have reviewed all pertinent imaging results/findings within the past 24 hours.    Assessment and Plan:     76 y/o male with acute stroke     1. 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 Good EF on echo; no LAE.           -Permissive HTN. Treat if > 220/100.           -ASA 81 mg daily. Clopidogrel 75 mg daily. Already had clopidogrel 300 mg x 1 and  mg x 1.           -Statin.           -PT/OT/ST. Inpatient rehab.    Active Diagnoses:    Diagnosis Date Noted POA    PRINCIPAL PROBLEM:  Cerebrovascular accident (CVA) [I63.9] 10/01/2020 Yes    Lacunar stroke [I63.81] 10/01/2020 Yes    COPD (chronic obstructive pulmonary disease) [J44.9] 02/19/2019 Yes    Essential hypertension [I10] 02/15/2013 Yes    Tobacco use disorder, continuous [F17.209] 02/15/2013 Yes      Problems Resolved During this Admission:       VTE Risk Mitigation (From admission, onward)         Ordered     enoxaparin injection 40 mg  Every 24 hours      10/01/20 1342     IP VTE HIGH RISK PATIENT  Once      10/01/20 1342     Place sequential compression device  Until discontinued      10/01/20 1342                Arnold Roca MD  Neurology  [facility]

## 2020-10-02 NOTE — PLAN OF CARE
Recommendations    1. Please consult SLP for swallow study.   2. Add cardiac restriction to diet once ordered.   3. Weigh pt weekly.    Goals: 1. Initiate diet order by next RD visit.  Nutrition Goal Status: new  Communication of RD Recs: (plan of care)

## 2020-10-02 NOTE — PLAN OF CARE
Problem: Fall Injury Risk  Goal: Absence of Fall and Fall-Related Injury  Outcome: Ongoing, Progressing  Intervention: Identify and Manage Contributors to Fall Injury Risk  Flowsheets (Taken 10/1/2020 1957)  Self-Care Promotion: BADL personal objects within reach  Medication Review/Management:   medications reviewed   high risk medications identified  Intervention: Promote Injury-Free Environment  Flowsheets (Taken 10/1/2020 1957)  Safety Promotion/Fall Prevention:   bed alarm set   nonskid shoes/socks when out of bed   medications reviewed   room near unit station   Fall Risk reviewed with patient/family  Environmental Safety Modification:   assistive device/personal items within reach   clutter free environment maintained

## 2020-10-03 LAB
ALBUMIN SERPL BCP-MCNC: 3.4 G/DL (ref 3.5–5.2)
ALP SERPL-CCNC: 82 U/L (ref 55–135)
ALT SERPL W/O P-5'-P-CCNC: 30 U/L (ref 10–44)
ANION GAP SERPL CALC-SCNC: 9 MMOL/L (ref 8–16)
AST SERPL-CCNC: 55 U/L (ref 10–40)
BASOPHILS # BLD AUTO: 0.06 K/UL (ref 0–0.2)
BASOPHILS NFR BLD: 0.7 % (ref 0–1.9)
BILIRUB SERPL-MCNC: 0.9 MG/DL (ref 0.1–1)
BUN SERPL-MCNC: 9 MG/DL (ref 8–23)
CALCIUM SERPL-MCNC: 8.8 MG/DL (ref 8.7–10.5)
CHLORIDE SERPL-SCNC: 110 MMOL/L (ref 95–110)
CO2 SERPL-SCNC: 22 MMOL/L (ref 23–29)
CREAT SERPL-MCNC: 0.9 MG/DL (ref 0.5–1.4)
DIFFERENTIAL METHOD: ABNORMAL
EOSINOPHIL # BLD AUTO: 0.1 K/UL (ref 0–0.5)
EOSINOPHIL NFR BLD: 1.4 % (ref 0–8)
ERYTHROCYTE [DISTWIDTH] IN BLOOD BY AUTOMATED COUNT: 13.6 % (ref 11.5–14.5)
EST. GFR  (AFRICAN AMERICAN): >60 ML/MIN/1.73 M^2
EST. GFR  (NON AFRICAN AMERICAN): >60 ML/MIN/1.73 M^2
GLUCOSE SERPL-MCNC: 88 MG/DL (ref 70–110)
HCT VFR BLD AUTO: 41.8 % (ref 40–54)
HGB BLD-MCNC: 13.9 G/DL (ref 14–18)
IMM GRANULOCYTES # BLD AUTO: 0.02 K/UL (ref 0–0.04)
IMM GRANULOCYTES NFR BLD AUTO: 0.2 % (ref 0–0.5)
LYMPHOCYTES # BLD AUTO: 2.2 K/UL (ref 1–4.8)
LYMPHOCYTES NFR BLD: 25.5 % (ref 18–48)
MCH RBC QN AUTO: 30.2 PG (ref 27–31)
MCHC RBC AUTO-ENTMCNC: 33.3 G/DL (ref 32–36)
MCV RBC AUTO: 91 FL (ref 82–98)
MONOCYTES # BLD AUTO: 0.8 K/UL (ref 0.3–1)
MONOCYTES NFR BLD: 8.8 % (ref 4–15)
NEUTROPHILS # BLD AUTO: 5.5 K/UL (ref 1.8–7.7)
NEUTROPHILS NFR BLD: 63.4 % (ref 38–73)
NRBC BLD-RTO: 0 /100 WBC
PLATELET # BLD AUTO: 285 K/UL (ref 150–350)
PMV BLD AUTO: 10.7 FL (ref 9.2–12.9)
POTASSIUM SERPL-SCNC: 3.9 MMOL/L (ref 3.5–5.1)
PROT SERPL-MCNC: 7.3 G/DL (ref 6–8.4)
RBC # BLD AUTO: 4.61 M/UL (ref 4.6–6.2)
SODIUM SERPL-SCNC: 141 MMOL/L (ref 136–145)
WBC # BLD AUTO: 8.67 K/UL (ref 3.9–12.7)

## 2020-10-03 PROCEDURE — 85025 COMPLETE CBC W/AUTO DIFF WBC: CPT

## 2020-10-03 PROCEDURE — 94761 N-INVAS EAR/PLS OXIMETRY MLT: CPT

## 2020-10-03 PROCEDURE — 97116 GAIT TRAINING THERAPY: CPT

## 2020-10-03 PROCEDURE — 97110 THERAPEUTIC EXERCISES: CPT | Mod: CQ

## 2020-10-03 PROCEDURE — 99232 SBSQ HOSP IP/OBS MODERATE 35: CPT | Mod: ,,, | Performed by: PSYCHIATRY & NEUROLOGY

## 2020-10-03 PROCEDURE — 99232 PR SUBSEQUENT HOSPITAL CARE,LEVL II: ICD-10-PCS | Mod: ,,, | Performed by: PSYCHIATRY & NEUROLOGY

## 2020-10-03 PROCEDURE — 63600175 PHARM REV CODE 636 W HCPCS: Performed by: INTERNAL MEDICINE

## 2020-10-03 PROCEDURE — 80053 COMPREHEN METABOLIC PANEL: CPT

## 2020-10-03 PROCEDURE — 25000003 PHARM REV CODE 250: Performed by: INTERNAL MEDICINE

## 2020-10-03 PROCEDURE — 36415 COLL VENOUS BLD VENIPUNCTURE: CPT

## 2020-10-03 PROCEDURE — 99900035 HC TECH TIME PER 15 MIN (STAT)

## 2020-10-03 PROCEDURE — 25000003 PHARM REV CODE 250: Performed by: NURSE PRACTITIONER

## 2020-10-03 PROCEDURE — 21400001 HC TELEMETRY ROOM

## 2020-10-03 PROCEDURE — 97161 PT EVAL LOW COMPLEX 20 MIN: CPT

## 2020-10-03 RX ADMIN — ASPIRIN 81 MG: 81 TABLET, COATED ORAL at 09:10

## 2020-10-03 RX ADMIN — SODIUM CHLORIDE: 0.9 INJECTION, SOLUTION INTRAVENOUS at 06:10

## 2020-10-03 RX ADMIN — CLOPIDOGREL 75 MG: 75 TABLET, FILM COATED ORAL at 09:10

## 2020-10-03 RX ADMIN — SODIUM CHLORIDE: 0.9 INJECTION, SOLUTION INTRAVENOUS at 03:10

## 2020-10-03 RX ADMIN — SODIUM CHLORIDE: 0.9 INJECTION, SOLUTION INTRAVENOUS at 10:10

## 2020-10-03 RX ADMIN — ATORVASTATIN CALCIUM 40 MG: 40 TABLET, FILM COATED ORAL at 09:10

## 2020-10-03 RX ADMIN — Medication 6 MG: at 09:10

## 2020-10-03 RX ADMIN — ENOXAPARIN SODIUM 40 MG: 40 INJECTION SUBCUTANEOUS at 05:10

## 2020-10-03 NOTE — PLAN OF CARE
Problem: Physical Therapy Goal  Goal: Physical Therapy Goal  Description: Goals to be met by: 10/16     Patient will increase functional independence with mobility by performin. Sit to stand transfer with Modified [place]  2. Bed to chair transfer with Modified [place]   3. Gait  x 150 feet with Modified [place] using Rolling Walker.   4. Lower extremity exercise program x20 reps per handout, with supervision    Outcome: Ongoing, Progressing    Patient supine in bed up PTA arrival. Supervision for bed mobility, sit to stand with CGA using rolling walker. Patient gait trained ~ 75 feet and an additional 50 feet following a standing rest break due to fatigue. Patient with noted narrow base of support, decreased jefe, inconsistent foot placement and noted hyperextension of knee and decreased quad control, x 1 slight  loss of balance with direction change with Angelica to recover

## 2020-10-03 NOTE — PROGRESS NOTES
Monitor tech made me aware or Rhythm change from SR with a 1st degree to SR with a 2nd degree block. EKG done and showed SR with 1st degree with PACs. Made NP Obdulia aware. Placed nursing communication to beny COLEMAN before administering PRN Labetalol.

## 2020-10-03 NOTE — PROGRESS NOTES
[facility] Hospital Medicine  Progress Note    Patient Name: Rashad Rodríguez  MRN: [id number]  Patient Class: IP- Inpatient   Admission Date: 10/1/2020  Length of Stay: 2 days  Attending Physician: Moni Patel MD  Primary Care Provider: Juanito Dejesus Jr, MD        Subjective:     Principal Problem:Cerebrovascular accident (CVA)        HPI:  Rashad Rodríguez 75 y.o. male with HTN, tobacco abuse, and COPD presents to the hospital with a chief complaint of right sided weakness. He reports yesterday he noticed right sided heaviness and difficulty with speech. These symptoms worsened this morning and he presented to the ED. He has lost coordination of his right hand and is unable to grasp objects. He also finds he has been slurring his speech and his right leg feels heavy. He takes medication for HTN at home. He smokes 1ppd. He denies fever, chest pain, SOB, N/V, abdominal pain, dysuria, visual disturbance, syncope.     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     Overview/Hospital Course:  Mr Rodríguez presented with acute right hemiparesis and facial droop secondary to acute ischemic stroke.     Interval History: states weakness and dysarthria better.     Review of Systems   Constitutional: Negative.    Respiratory: Negative.    Cardiovascular: Negative.    Gastrointestinal: Negative.    Neurological: Positive for facial asymmetry (better) and weakness (better).     Objective:     Vital Signs (Most Recent):  Temp: 98 °F (36.7 °C) (10/03/20 0720)  Pulse: 76 (10/03/20 0720)  Resp: 17 (10/03/20 0720)  BP: (!) 157/92 (10/03/20 0720)  SpO2: 96 % (10/03/20 0858) Vital Signs (24h Range):  Temp:  [97.5 °F (36.4 °C)-98.3 °F (36.8 °C)] 98 °F (36.7 °C)  Pulse:  [68-82] 76  Resp:  [16-18] 17  SpO2:  [96 %-99 %] 96 %  BP: (138-171)/(70-96) 157/92     Weight: 60 kg  (132 lb 4.4 oz)  Body mass index is 20.72 kg/m².    Intake/Output Summary (Last 24 hours) at 10/3/2020 1053  Last data filed at 10/3/2020 0930  Gross per 24 hour   Intake 4290 ml   Output 2450 ml   Net 1840 ml      Physical Exam  Vitals signs and nursing note reviewed.   Cardiovascular:      Rate and Rhythm: Normal rate and regular rhythm.   Pulmonary:      Effort: Pulmonary effort is normal.      Breath sounds: Normal breath sounds.   Abdominal:      General: Bowel sounds are normal.      Palpations: Abdomen is soft.   Skin:     Capillary Refill: Capillary refill takes less than 2 seconds.   Neurological:      Mental Status: He is alert and oriented to person, place, and time.      Comments: Right facial droop  Right hemiparesis   dysarthria   Psychiatric:         Mood and Affect: Mood normal.         Behavior: Behavior normal.         Thought Content: Thought content normal.         Judgment: Judgment normal.         Significant Labs: All pertinent labs within the past 24 hours have been reviewed.    Significant Imaging: I have reviewed all pertinent imaging results/findings within the past 24 hours.  I have reviewed and interpreted all pertinent imaging results/findings within the past 24 hours.      Assessment/Plan:      * Cerebrovascular accident (CVA)  With slurred speech and right sided weakness that began 1 day prior to presentation.   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 Hemiparesis, droop and dysarthria improved some today  Will treat HTN once out of 48 hour window  Echo with concentric remodeling but no failure or other pathology  TSH WNL  HgbA1c 5.7%  Cardiac monitor reviewed. Has episodes of tachyarrhythmia of unknown significance  Will consult Cardiology to see if this might have contributed to his  stroke  PT/OT/ST-inpatient rehab      Lacunar stroke  See above    COPD (chronic obstructive pulmonary disease)  Emphysematous changes on CT did not get PFT. PRN duo-neb    Essential hypertension  Holding home BP meds    Tobacco use disorder, continuous  Greater than 3 minutes spent counseling patient on dangers of continued tobacco abuse will provide tobacco cessation education prior to discharge.     VTE Risk Mitigation (From admission, onward)         Ordered     enoxaparin injection 40 mg  Every 24 hours      10/01/20 1342     IP VTE HIGH RISK PATIENT  Once      10/01/20 1342     Place sequential compression device  Until discontinued      10/01/20 1342                Discharge Planning   LEX:      Code Status: Full Code   Is the patient medically ready for discharge?:     Reason for patient still in hospital (select all that apply): Treatment  Discharge Plan A: Home                  Moni Mehta MD  Department of Hospital Medicine   [facility]

## 2020-10-03 NOTE — PLAN OF CARE
Problem: Fall Injury Risk  Goal: Absence of Fall and Fall-Related Injury  Outcome: Ongoing, Progressing  Intervention: Identify and Manage Contributors to Fall Injury Risk  Flowsheets (Taken 10/3/2020 1521)  Medication Review/Management:   medications reviewed   high risk medications identified  Intervention: Promote Injury-Free Environment  Flowsheets (Taken 10/3/2020 1521)  Safety Promotion/Fall Prevention:   assistive device/personal item within reach   bed alarm set   commode/urinal/bedpan at bedside   Fall Risk reviewed with patient/family   lighting adjusted   medications reviewed   nonskid shoes/socks when out of bed   room near unit station   side rails raised x 3   instructed to call staff for mobility  Environmental Safety Modification:   assistive device/personal items within reach   clutter free environment maintained   lighting adjusted   mobility aid in reach   room near unit station   room organization consistent     Problem: Adult Inpatient Plan of Care  Goal: Plan of Care Review  Outcome: Ongoing, Progressing  Goal: Patient-Specific Goal (Individualization)  Outcome: Ongoing, Progressing  Goal: Absence of Hospital-Acquired Illness or Injury  Outcome: Ongoing, Progressing  Goal: Optimal Comfort and Wellbeing  Outcome: Ongoing, Progressing  Goal: Readiness for Transition of Care  Outcome: Ongoing, Progressing  Goal: Rounds/Family Conference  Outcome: Ongoing, Progressing     Problem: Adjustment to Illness (Stroke, Ischemic/Transient Ischemic Attack)  Goal: Optimal Coping  Outcome: Ongoing, Progressing     Problem: Bowel Elimination Impaired (Stroke, Ischemic/Transient Ischemic Attack)  Goal: Effective Bowel Elimination  Outcome: Ongoing, Progressing  Intervention: Promote Effective Bowel Elimination  Flowsheets (Taken 10/3/2020 1521)  Bowel Dysfunction Management: toileting offered     Problem: Cerebral Tissue Perfusion Risk (Stroke, Ischemic/Transient Ischemic Attack)  Goal: Optimal Cerebral Tissue  Perfusion  Outcome: Ongoing, Progressing  Intervention: Protect and Optimize Cerebral Perfusion  Flowsheets (Taken 10/3/2020 1521)  Sensory Stimulation Regulation:   care clustered   visitors limited  Cerebral Perfusion Promotion: blood pressure monitored  Intervention: Optimize Oxygenation and Ventilation  Flowsheets (Taken 10/3/2020 1521)  Head of Bed (HOB): HOB elevated     Problem: Communication Impairment (Stroke, Ischemic/Transient Ischemic Attack)  Goal: Improved Communication Skills  Outcome: Ongoing, Progressing  Intervention: Optimize Cognitive and Communication Skills  Flowsheets (Taken 10/3/2020 1521)  Reorientation Measures: clock in view  Communication Enhancement Strategies:   call light answered in person   communication board used     Problem: Eating/Swallowing Impairment (Stroke, Ischemic/Transient Ischemic Attack)  Goal: Oral Intake without Aspiration  Outcome: Ongoing, Progressing  Intervention: Optimize Eating and Swallowing  Flowsheets (Taken 10/3/2020 1521)  Aspiration Precautions: awake/alert before oral intake  Swallowing Techniques: Dysphagia: appropriate positioning encouraged     Problem: Functional Ability Impaired (Stroke, Ischemic/Transient Ischemic Attack)  Goal: Optimal Functional Ability  Outcome: Ongoing, Progressing  Intervention: Optimize Functional Ability  Flowsheets (Taken 10/3/2020 1521)  Activity Management: walk with assistive device and/or staff member - L3     Problem: Hemodynamic Instability (Stroke, Ischemic/Transient Ischemic Attack)  Goal: Vital Signs Remain in Desired Range  Outcome: Ongoing, Progressing  Intervention: Optimize Blood Flow  Flowsheets (Taken 10/3/2020 1521)  Fluid/Electrolyte Management:   fluids provided   intravenous fluids adjusted     Problem: Pain (Stroke, Ischemic/Transient Ischemic Attack)  Goal: Acceptable Pain Control  Outcome: Ongoing, Progressing  Intervention: Monitor and Manage Pain  Flowsheets (Taken 10/3/2020 1521)  Pain Management  Interventions: care clustered

## 2020-10-03 NOTE — PROGRESS NOTES
[facility]  Neurology  Progress Note    Patient Name: Rashad Rodríguez  MRN: [id number]  Admission Date: 10/1/2020  Hospital Length of Stay: 2 days  Code Status: Full Code   Attending Provider: Moni Patel MD  Primary Care Physician: Juanito Dejesus Jr, MD   Principal Problem:Cerebrovascular accident (CVA)    Subjective:     Interval History: 74 y/o male with medical Hx as listed below comes to ED for weakness of right limbs as slurred speech. Pt had gone to bed around 11 pm, asymptomatic. He woke up at 6:30 am with symptoms. Denies visual or speech disturbances, vertigo, involvement of left side. No previous episodes. No aggravating or alleviating factors.     -10/2/20: No new issues. Right sided weakness continues.    -10/3/20: Pt denies headaches, visual disturbances. No new weaknaess or numbness.    Current Neurological Medications:     Current Facility-Administered Medications   Medication Dose Route Frequency Provider Last Rate Last Dose    0.9%  NaCl infusion   Intravenous Continuous Moni Patel  mL/hr at 10/03/20 0600      albuterol-ipratropium 2.5 mg-0.5 mg/3 mL nebulizer solution 3 mL  3 mL Nebulization Q6H PRN Albino Jennings PA-C        aspirin EC tablet 81 mg  81 mg Oral Daily Moni Patel MD   81 mg at 10/03/20 0926    atorvastatin tablet 40 mg  40 mg Per NG tube Daily Moni Patel MD   40 mg at 10/03/20 0926    clopidogreL tablet 75 mg  75 mg Oral Daily Moni Patel MD   75 mg at 10/03/20 0926    enoxaparin injection 40 mg  40 mg Subcutaneous Q24H Moni Patel MD   40 mg at 10/02/20 1727    labetaloL injection 10 mg  10 mg Intravenous Q6H PRN Albino Jennings PA-C        melatonin tablet 6 mg  6 mg Oral Nightly PRN Mike Steiner, YOVANI   6 mg at 10/02/20 2331    sodium chloride 0.9% bolus 500 mL  500 mL Intravenous Continuous PRN Moni Patel MD        sodium chloride 0.9% flush 10 mL  10 mL Intravenous PRN Moni Patel MD            Review of Systems   Constitutional: Negative for fever.   HENT: Negative for hearing loss.    Eyes: Negative for photophobia.   Respiratory: Negative for shortness of breath.    Cardiovascular: Negative for chest pain.   Gastrointestinal: Negative for abdominal pain.   Genitourinary: Negative for dysuria.   Musculoskeletal: Negative for back pain.   Neurological: Negative for headaches.   Psychiatric/Behavioral: Negative for confusion.     Objective:     Vital Signs (Most Recent):  Temp: 98.2 °F (36.8 °C) (10/03/20 1132)  Pulse: 68 (10/03/20 1132)  Resp: 17 (10/03/20 1132)  BP: (!) 168/96 (10/03/20 1132)  SpO2: 97 % (10/03/20 1132) Vital Signs (24h Range):  Temp:  [97.5 °F (36.4 °C)-98.3 °F (36.8 °C)] 98.2 °F (36.8 °C)  Pulse:  [68-82] 68  Resp:  [16-18] 17  SpO2:  [96 %-99 %] 97 %  BP: (138-168)/(70-96) 168/96     Weight: 60 kg (132 lb 4.4 oz)  Body mass index is 20.72 kg/m².    Physical Exam  Constitutional:       General: He is not in acute distress.     Appearance: He is not ill-appearing.   HENT:      Right Ear: External ear normal.      Left Ear: External ear normal.   Eyes:      General:         Right eye: No discharge.         Left eye: No discharge.   Neck:      Musculoskeletal: No muscular tenderness.   Cardiovascular:      Rate and Rhythm: Normal rate.   Pulmonary:      Breath sounds: Normal breath sounds.   Abdominal:      Palpations: Abdomen is soft.   Musculoskeletal:         General: No tenderness.   Neurological:      Mental Status: He is oriented to person, place, and time.   Psychiatric:         Speech: Speech is slurred.            NEUROLOGICAL EXAMINATION:      MENTAL STATUS   Oriented to person, place, and time.   Speech: slurred   Level of consciousness: alert     CRANIAL NERVES      CN II   Right visual field deficit: none  Left visual field deficit: none      CN III, IV, VI   Right pupil: Size: 3 mm. Shape: regular.   Left pupil: Size: 3 mm. Shape: regular.   Nystagmus: none   Conjugate  gaze: present     CN V   Right facial sensation deficit: none  Left facial sensation deficit: none     CN VII   Right facial weakness: none  Left facial weakness: none     CN XII   Tongue deviation: none     MOTOR EXAM        RUE: 3/5  RLE: 3/5  LUE: 5/5  LLE: 5/5           Significant Labs:   CBC:   Recent Labs   Lab 10/03/20  0636   WBC 8.67   HGB 13.9*   HCT 41.8        CMP:   Recent Labs   Lab 10/03/20  0636   GLU 88      K 3.9      CO2 22*   BUN 9   CREATININE 0.9   CALCIUM 8.8   PROT 7.3   ALBUMIN 3.4*   BILITOT 0.9   ALKPHOS 82   AST 55*   ALT 30   ANIONGAP 9   EGFRNONAA >60       Significant Imaging: I have reviewed all pertinent imaging results/findings within the past 24 hours.    Assessment and Plan:     76 y/o male with acute stroke     1. 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 Good EF on echo; no LAE.           -OK for -180's           -ASA 81 mg daily. Clopidogrel 75 mg daily. Already had clopidogrel 300 mg x 1 and  mg x 1.           -Statin.           -PT/OT/ST. Inpatient rehab.    Active Diagnoses:    Diagnosis Date Noted POA    PRINCIPAL PROBLEM:  Cerebrovascular accident (CVA) [I63.9] 10/01/2020 Yes    Lacunar stroke [I63.81] 10/01/2020 Yes    COPD (chronic obstructive pulmonary disease) [J44.9] 02/19/2019 Yes    Essential hypertension [I10] 02/15/2013 Yes    Tobacco use disorder, continuous [F17.209] 02/15/2013 Yes      Problems Resolved During this Admission:       VTE Risk Mitigation (From admission, onward)         Ordered     enoxaparin injection 40 mg  Every 24 hours      10/01/20 1342     IP VTE HIGH RISK PATIENT  Once      10/01/20 1342     Place sequential compression device  Until discontinued      10/01/20 1342                Arnold Roca MD  Neurology  [facility]

## 2020-10-03 NOTE — NURSING
Handoff received from SAE Mckenna     Pt resting in bed quietly. NAD noted. No c/o pain.     Fall and safety precautions maintained. Bed alarm activated and audible.. Bed locked in lowest position, with side rails up x2. Call bell and personal items within reach

## 2020-10-03 NOTE — PLAN OF CARE
Problem: Fall Injury Risk  Goal: Absence of Fall and Fall-Related Injury  Outcome: Ongoing, Progressing     Problem: Cerebral Tissue Perfusion Risk (Stroke, Ischemic/Transient Ischemic Attack)  Goal: Optimal Cerebral Tissue Perfusion  Outcome: Ongoing, Progressing   PRN medication for sleep effective. Remains SR with a first degree on telemetry monitor. No skin issues and no injury during shift. Utilized the urinal at bedside. Educated on new medication and verbalized understanding. Bed alarm activated and audible. Call light at side.

## 2020-10-03 NOTE — PT/OT/SLP EVAL
Physical Therapy Evaluation    Patient Name:  Rashad Rodríguez   MRN:  [id number]    Recommendations:     Discharge Recommendations:  rehabilitation facility   Discharge Equipment Recommendations: walker, rolling   Barriers to discharge: None    Assessment:     Rashad Rodríguez is a 75 y.o. male admitted with a medical diagnosis of Cerebrovascular accident (CVA).  He presents with the following impairments/functional limitations:  weakness, gait instability, impaired balance, decreased lower extremity function, impaired coordination, impaired functional mobilty, decreased coordination .    Pt presents with acute coordination deficits and truncal ataxia rendering him a high fall risk. At this time he is ambulatory with a walker and assistance to prevent fall. Needs intensive inpatient rehab program for optimizing strength, balance, coordination, and safety in order to return to independent living and full community reintegration. He is receptive to education.  Progressive Mobility Level 3: Recommend patient be assisted out of bed to chair, toilet, and/or bedside commode with </= minimum assistance.       Rehab Prognosis: excellent; patient would benefit from acute skilled PT services to address these deficits and reach maximum level of function.    Recent Surgery: * No surgery found *      Plan:     During this hospitalization, patient to be seen 6 x/week to address the identified rehab impairments via gait training, therapeutic activities, therapeutic exercises, neuromuscular re-education and progress toward the following goals:    · Plan of Care Expires:       Subjective     Chief Complaint: none  Patient/Family Comments/goals: believes he needs to walk with a walker  Pain/Comfort:  ·      Patients cultural, spiritual, Syn[employer] conflicts given the current situation: no    Living Environment:  Home alone with no ELK[facility].   Prior to admission, patients level of function was independent with all. Ambulated without AD.   Equipment used at home: none.  DME owned (not currently used): none.  Upon discharge, patient will have assistance from n/a.    Objective:     Communicated with RN prior to session.  Patient found HOB elevated with bed alarm, telemetry, peripheral IV  upon PT entry to room.    General Precautions: Standard, fall   Orthopedic Precautions:N/A   Braces:       Exams:  · pleasant.   · Cognitive Exam:  Patient is oriented to Person, Place, Time and Situation  · Gross Motor Coordination:  impaired, see below  · Impaired RLE heel to shin, +dysmetria and loss of balance, seated  · Postural Exam:  Patient presented with the following abnormalities:    · -       No postural abnormalities identified  · Sensation:    · -       Intact  · Skin Integrity/Edema:      · -       Skin integrity: Visible skin intact  · RLE ROM: WFL  · RLE Strength: 5/5 hip flex, knee flex/ext, and ankle DF/PF (seated)  · LLE ROM: WFL  · LLE Strength: WFL  · Decreased functional use of RUE with functional mobility  · R  fair    Functional Mobility:  · Bed Mobility:     · Supine to Sit: supervision  · Transfers:     · Sit to Stand:  contact guard assistance with rolling walker  · Wide stance  · March in place with RW and SBA  · Loss of balance, prompting hasty transition to sit on EOB  · Gait: training with RW ~ 100 ft with Angelica  · Very narrow base of support, decreased speed, x 2 losses of balance with Angelica to recover  · Mild/moderate LE ataxia, primarily RLE  · Balance: fair + seated, loss of balance posteriorly when actively moving legs    Therapeutic Activities and Exercises:   n/a    AM-PAC 6 CLICK MOBILITY  Total Score:      Patient left HOB elevated with call button in reach and bed alarm on.    GOALS:   Multidisciplinary Problems     Physical Therapy Goals        Problem: Physical Therapy Goal    Goal Priority Disciplines Outcome Goal Variances Interventions   Physical Therapy Goal     PT, PT/OT Ongoing, Progressing     Description: Goals to  be met by: 10/16     Patient will increase functional independence with mobility by performin. Sit to stand transfer with Modified [place]  2. Bed to chair transfer with Modified [place]   3. Gait  x 150 feet with Modified [place] using Rolling Walker.   4. Lower extremity exercise program x20 reps per handout, with supervision                     History:     Past Medical History:   Diagnosis Date    Cigarette smoker     Hyperlipidemia     Hypertension     Stroke 10/01/2020       Past Surgical History:   Procedure Laterality Date    NO PAST SURGERIES         Time Tracking:     PT Received On: 10/02/20  PT Start Time: 1445     PT Stop Time: 1510  PT Total Time (min): 25 min     Billable Minutes: Evaluation 15 and Gait Training 10      Susan Andujar, PT  10/03/2020

## 2020-10-03 NOTE — PT/OT/SLP PROGRESS
Physical Therapy Treatment    Patient Name:  Rashad Rodríguez   MRN:  [id number]    Recommendations:     Discharge Recommendations:  rehabilitation facility   Discharge Equipment Recommendations: walker, rolling   Barriers to discharge: None    Assessment:     Rashad Rodríguez is a 75 y.o. male admitted with a medical diagnosis of Cerebrovascular accident (CVA).  He presents with the following impairments/functional limitations:  weakness, gait instability, impaired balance, decreased lower extremity function, impaired coordination, impaired functional mobilty, decreased coordination Patient supine in bed up PTA arrival. Supervision for bed mobility, sit to stand with CGA using rolling walker. Patient gait trained ~ 75 feet and an additional 50 feet following a standing rest break due to fatigue. Patient with noted narrow base of support, decreased jefe, inconsistent foot placement and noted hyperextension of knee and decreased quad control, x 1 slight  loss of balance with direction change with Angelica to recover. Remains with Mild/moderate LE ataxia of right lower extremity.     Rehab Prognosis: Excellent ; patient would benefit from acute skilled PT services to address these deficits and reach maximum level of function.    Recent Surgery: * No surgery found *      Plan:     During this hospitalization, patient to be seen 6 x/week to address the identified rehab impairments via neuromuscular re-education, therapeutic exercises, therapeutic activities, gait training and progress toward the following goals:    · Plan of Care Expires:       Subjective     Chief Complaint: No complaints reported   Patient/Family Comments/goals: want to get stronger and eventually be able to use a cane   Pain/Comfort:  · Pain Rating 1: 0/10      Objective:     Communicated with patient and nurse Lucy  prior to session.  Patient found supine with bed alarm, telemetry, peripheral IV upon PT entry to room.     General Precautions: Standard, fall    Orthopedic Precautions:N/A   Braces: N/A     Functional Mobility:  · Bed Mobility:     · Rolling Left:  supervision  · Supine to Sit: supervision  · Transfers:     · Sit to Stand:  contact guard assistance with rolling walker  · Gait: Patient gait trained ~ 75 feet and an additional 50 feet following a standing rest break due to fatigue. Patient with noted narrow base of support, decreased jefe, inconsistent foot placement and noted hyperextension of knee and decreased quad control, x 1 slight  loss of balance with direction change with Angelica to recover. Remains with Mild/moderate LE ataxia of right lower extremity.       AM-PAC 6 CLICK MOBILITY  Turning over in bed (including adjusting bedclothes, sheets and blankets)?: 4  Sitting down on and standing up from a chair with arms (e.g., wheelchair, bedside commode, etc.): 3  Moving from lying on back to sitting on the side of the bed?: 4  Moving to and from a bed to a chair (including a wheelchair)?: 3  Need to walk in hospital room?: 3  Climbing 3-5 steps with a railing?: 2  Basic Mobility Total Score: 19       Therapeutic Activities and Exercises:   Patient was instructed in and performed seated bilateral lower extremity therapeutic exercises at edge of bed including seated heel /toe raises, long arc quads, hip flexion 2 x 10 reps each with cueing to complete full available range of motion.      Patient left seated at edge of bed  with all lines intact, call button in reach and nursing notified..    GOALS:   Multidisciplinary Problems     Physical Therapy Goals        Problem: Physical Therapy Goal    Goal Priority Disciplines Outcome Goal Variances Interventions   Physical Therapy Goal     PT, PT/OT Ongoing, Progressing     Description: Goals to be met by: 10/16     Patient will increase functional independence with mobility by performin. Sit to stand transfer with Modified [place]  2. Bed to chair transfer with Modified [place]   3. Gait  x  150 feet with Modified [place] using Rolling Walker.   4. Lower extremity exercise program x20 reps per handout, with supervision                     Time Tracking:     PT Received On: 10/03/20  PT Start Time: 1130     PT Stop Time: 1157  PT Total Time (min): 27 min     Billable Minutes: Gait Training 12 and Therapeutic Exercise 15    Treatment Type: Treatment  PT/PTA: PTA     PTA Visit Number: 0     Shirley Fletcher, PTA  10/03/2020

## 2020-10-03 NOTE — ASSESSMENT & PLAN NOTE
With slurred speech and right sided weakness that began 1 day prior to presentation.   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 Hemiparesis, droop and dysarthria improved some today  Will treat HTN once out of 48 hour window  Echo with concentric remodeling but no failure or other pathology  TSH WNL  HgbA1c 5.7%  Cardiac monitor reviewed. Has episodes of tachyarrhythmia of unknown significance  Will consult Cardiology to see if this might have contributed to his stroke  PT/OT/ST-inpatient rehab

## 2020-10-03 NOTE — SUBJECTIVE & OBJECTIVE
Interval History: states weakness and dysarthria better.     Review of Systems   Constitutional: Negative.    Respiratory: Negative.    Cardiovascular: Negative.    Gastrointestinal: Negative.    Neurological: Positive for facial asymmetry (better) and weakness (better).     Objective:     Vital Signs (Most Recent):  Temp: 98 °F (36.7 °C) (10/03/20 0720)  Pulse: 76 (10/03/20 0720)  Resp: 17 (10/03/20 0720)  BP: (!) 157/92 (10/03/20 0720)  SpO2: 96 % (10/03/20 0858) Vital Signs (24h Range):  Temp:  [97.5 °F (36.4 °C)-98.3 °F (36.8 °C)] 98 °F (36.7 °C)  Pulse:  [68-82] 76  Resp:  [16-18] 17  SpO2:  [96 %-99 %] 96 %  BP: (138-171)/(70-96) 157/92     Weight: 60 kg (132 lb 4.4 oz)  Body mass index is 20.72 kg/m².    Intake/Output Summary (Last 24 hours) at 10/3/2020 1053  Last data filed at 10/3/2020 0930  Gross per 24 hour   Intake 4290 ml   Output 2450 ml   Net 1840 ml      Physical Exam  Vitals signs and nursing note reviewed.   Cardiovascular:      Rate and Rhythm: Normal rate and regular rhythm.   Pulmonary:      Effort: Pulmonary effort is normal.      Breath sounds: Normal breath sounds.   Abdominal:      General: Bowel sounds are normal.      Palpations: Abdomen is soft.   Skin:     Capillary Refill: Capillary refill takes less than 2 seconds.   Neurological:      Mental Status: He is alert and oriented to person, place, and time.      Comments: Right facial droop  Right hemiparesis   dysarthria   Psychiatric:         Mood and Affect: Mood normal.         Behavior: Behavior normal.         Thought Content: Thought content normal.         Judgment: Judgment normal.         Significant Labs: All pertinent labs within the past 24 hours have been reviewed.    Significant Imaging: I have reviewed all pertinent imaging results/findings within the past 24 hours.  I have reviewed and interpreted all pertinent imaging results/findings within the past 24 hours.

## 2020-10-04 PROCEDURE — 63600175 PHARM REV CODE 636 W HCPCS: Performed by: INTERNAL MEDICINE

## 2020-10-04 PROCEDURE — 99223 PR INITIAL HOSPITAL CARE,LEVL III: ICD-10-PCS | Mod: ,,, | Performed by: INTERNAL MEDICINE

## 2020-10-04 PROCEDURE — 99900035 HC TECH TIME PER 15 MIN (STAT)

## 2020-10-04 PROCEDURE — 94761 N-INVAS EAR/PLS OXIMETRY MLT: CPT

## 2020-10-04 PROCEDURE — 99223 1ST HOSP IP/OBS HIGH 75: CPT | Mod: ,,, | Performed by: INTERNAL MEDICINE

## 2020-10-04 PROCEDURE — 99232 PR SUBSEQUENT HOSPITAL CARE,LEVL II: ICD-10-PCS | Mod: ,,, | Performed by: PSYCHIATRY & NEUROLOGY

## 2020-10-04 PROCEDURE — 25000003 PHARM REV CODE 250: Performed by: INTERNAL MEDICINE

## 2020-10-04 PROCEDURE — 97535 SELF CARE MNGMENT TRAINING: CPT

## 2020-10-04 PROCEDURE — 99232 SBSQ HOSP IP/OBS MODERATE 35: CPT | Mod: ,,, | Performed by: PSYCHIATRY & NEUROLOGY

## 2020-10-04 PROCEDURE — 97110 THERAPEUTIC EXERCISES: CPT

## 2020-10-04 PROCEDURE — 21400001 HC TELEMETRY ROOM

## 2020-10-04 RX ADMIN — ENOXAPARIN SODIUM 40 MG: 40 INJECTION SUBCUTANEOUS at 05:10

## 2020-10-04 RX ADMIN — ATORVASTATIN CALCIUM 40 MG: 40 TABLET, FILM COATED ORAL at 08:10

## 2020-10-04 RX ADMIN — SODIUM CHLORIDE: 0.9 INJECTION, SOLUTION INTRAVENOUS at 05:10

## 2020-10-04 RX ADMIN — CLOPIDOGREL 75 MG: 75 TABLET, FILM COATED ORAL at 08:10

## 2020-10-04 RX ADMIN — ASPIRIN 81 MG: 81 TABLET, COATED ORAL at 08:10

## 2020-10-04 NOTE — PROGRESS NOTES
[facility]  Neurology  Progress Note    Patient Name: Rashad Rodríguez  MRN: [id number]  Admission Date: 10/1/2020  Hospital Length of Stay: 3 days  Code Status: Full Code   Attending Provider: Moni Patel MD  Primary Care Physician: Juanito Dejesus Jr, MD   Principal Problem:Cerebrovascular accident (CVA)    Subjective:     Interval History: 74 y/o male with medical Hx as listed below comes to ED for weakness of right limbs as slurred speech. Pt had gone to bed around 11 pm, asymptomatic. He woke up at 6:30 am with symptoms. Denies visual or speech disturbances, vertigo, involvement of left side. No previous episodes. No aggravating or alleviating factors.     -10/2/20: No new issues. Right sided weakness continues.     -10/3/20: Pt denies headaches, visual disturbances. No new weaknaess or numbness.    -10/4/20: Mr. Rodríguez with no complaints today.    Current Neurological Medications:     Current Facility-Administered Medications   Medication Dose Route Frequency Provider Last Rate Last Dose    albuterol-ipratropium 2.5 mg-0.5 mg/3 mL nebulizer solution 3 mL  3 mL Nebulization Q6H PRN Albino Jennings PA-C        aspirin EC tablet 81 mg  81 mg Oral Daily Moni Patel MD   81 mg at 10/04/20 0844    atorvastatin tablet 40 mg  40 mg Per NG tube Daily Moni Patel MD   40 mg at 10/04/20 0844    clopidogreL tablet 75 mg  75 mg Oral Daily Moni Patel MD   75 mg at 10/04/20 0844    enoxaparin injection 40 mg  40 mg Subcutaneous Q24H Moni Patel MD   40 mg at 10/03/20 1710    labetaloL injection 10 mg  10 mg Intravenous Q6H PRN Albino Jennings PA-C        melatonin tablet 6 mg  6 mg Oral Nightly PRN Mike Steiner NP   6 mg at 10/03/20 2112    sodium chloride 0.9% bolus 500 mL  500 mL Intravenous Continuous PRN Moni Patel MD        sodium chloride 0.9% flush 10 mL  10 mL Intravenous PRN Moni Patel MD           Review of Systems   Constitutional: Negative  for fever.   HENT: Negative for hearing loss.    Eyes: Negative for photophobia.   Respiratory: Negative for shortness of breath.    Cardiovascular: Negative for chest pain.   Gastrointestinal: Negative for abdominal pain.   Genitourinary: Negative for dysuria.   Musculoskeletal: Negative for back pain.   Neurological: Negative for headaches.   Psychiatric/Behavioral: Negative for confusion.     Objective:     Vital Signs (Most Recent):  Temp: 99.2 °F (37.3 °C) (10/04/20 1143)  Pulse: 83 (10/04/20 1143)  Resp: 17 (10/04/20 1143)  BP: (!) 146/95 (10/04/20 1143)  SpO2: 95 % (10/04/20 1145) Vital Signs (24h Range):  Temp:  [97.8 °F (36.6 °C)-99.2 °F (37.3 °C)] 99.2 °F (37.3 °C)  Pulse:  [66-98] 83  Resp:  [17-18] 17  SpO2:  [95 %-99 %] 95 %  BP: (138-169)/() 146/95     Weight: 61 kg (134 lb 7.7 oz)  Body mass index is 21.06 kg/m².    Physical Exam  Constitutional:       General: He is not in acute distress.     Appearance: He is not ill-appearing.   HENT:      Right Ear: External ear normal.      Left Ear: External ear normal.   Eyes:      General:         Right eye: No discharge.         Left eye: No discharge.   Neck:      Musculoskeletal: No muscular tenderness.   Cardiovascular:      Rate and Rhythm: Normal rate.   Pulmonary:      Breath sounds: Normal breath sounds.   Abdominal:      Palpations: Abdomen is soft.   Musculoskeletal:         General: No tenderness.   Neurological:      Mental Status: He is oriented to person, place, and time.   Psychiatric:         Speech: Speech is slurred.            NEUROLOGICAL EXAMINATION:      MENTAL STATUS   Oriented to person, place, and time.   Speech: slurred   Level of consciousness: alert     CRANIAL NERVES      CN II   Right visual field deficit: none  Left visual field deficit: none      CN III, IV, VI   Right pupil: Size: 3 mm. Shape: regular.   Left pupil: Size: 3 mm. Shape: regular.   Nystagmus: none   Conjugate gaze: present     CN V   Right facial sensation  deficit: none  Left facial sensation deficit: none     CN VII   Right facial weakness: none  Left facial weakness: none     CN XII   Tongue deviation: none     MOTOR EXAM        RUE: 3/5  RLE: 3/5  LUE: 5/5  LLE: 5/5           Significant Labs:   CBC:   Recent Labs   Lab 10/03/20  0636   WBC 8.67   HGB 13.9*   HCT 41.8        CMP:   Recent Labs   Lab 10/03/20  0636   GLU 88      K 3.9      CO2 22*   BUN 9   CREATININE 0.9   CALCIUM 8.8   PROT 7.3   ALBUMIN 3.4*   BILITOT 0.9   ALKPHOS 82   AST 55*   ALT 30   ANIONGAP 9   EGFRNONAA >60       Significant Imaging: I have reviewed all pertinent imaging results/findings within the past 24 hours.    Assessment and Plan:     76 y/o male with acute stroke     1. 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 Good EF on echo; no LAE. Episodes ot tachycardia reported but no atrial fibrillation.           -OK for -160's           -ASA 81 mg daily. Clopidogrel 75 mg daily.            -Statin.           -PT/OT/ST. Inpatient rehab.    Active Diagnoses:    Diagnosis Date Noted POA    PRINCIPAL PROBLEM:  Cerebrovascular accident (CVA) [I63.9] 10/01/2020 Yes    Lacunar stroke [I63.81] 10/01/2020 Yes    COPD (chronic obstructive pulmonary disease) [J44.9] 02/19/2019 Yes    Essential hypertension [I10] 02/15/2013 Yes    Tobacco use disorder, continuous [F17.209] 02/15/2013 Yes      Problems Resolved During this Admission:       VTE Risk Mitigation (From admission, onward)         Ordered     enoxaparin injection 40 mg  Every 24 hours      10/01/20 1342     IP VTE HIGH RISK PATIENT  Once      10/01/20 1342     Place sequential compression device  Until discontinued      10/01/20 1342                Arnold Roca MD  Neurology  [facility] [place]

## 2020-10-04 NOTE — HPI
Rashad Rodríguez 75 y.o. male with HTN, tobacco abuse, and COPD presents to the hospital with a chief complaint of right sided weakness. He reports yesterday he noticed right sided heaviness and difficulty with speech. These symptoms worsened this morning and he presented to the ED. He has lost coordination of his right hand and is unable to grasp objects. He also finds he has been slurring his speech and his right leg feels heavy. He takes medication for HTN at home. He smokes 1ppd. He denies fever, chest pain, SOB, N/V, abdominal pain, dysuria, visual disturbance, syncope.      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      Overview/Hospital Course:  Mr Rodríguez presented with acute right hemiparesis and facial droop secondary to acute ischemic stroke.     Denies CP or SOB  EKG NSR 61 with wenckebach - otherwise ok  Telemetry - mostly NSR and episodes of Mobitz I 2nd degree AVB. No A-fib or A-flutter seen    Echo 10/1/20  · The left ventricle is normal in size with normal systolic function. The estimated ejection fraction is 55%.  · There is left ventricular concentric remodeling.  · Normal left ventricular diastolic function.  · Normal right ventricular systolic function.  · No pulmonary hypertension  · Normal central venous pressure (3 mmHg).  · The estimated PA systolic pressure is 25 mmHg.

## 2020-10-04 NOTE — SUBJECTIVE & OBJECTIVE
Interval History: states weakness and dysarthria still present. BP not high enough to start BP meds. Off IV fluids.     Review of Systems   Constitutional: Negative.    Respiratory: Negative.    Cardiovascular: Negative.    Gastrointestinal: Negative.    Neurological: Positive for facial asymmetry (better) and weakness (better).     Objective:     Vital Signs (Most Recent):  Temp: 99.2 °F (37.3 °C) (10/04/20 1143)  Pulse: 83 (10/04/20 1143)  Resp: 17 (10/04/20 1143)  BP: (!) 146/95 (10/04/20 1143)  SpO2: 95 % (10/04/20 1145) Vital Signs (24h Range):  Temp:  [97.8 °F (36.6 °C)-99.2 °F (37.3 °C)] 99.2 °F (37.3 °C)  Pulse:  [66-98] 83  Resp:  [17-18] 17  SpO2:  [95 %-99 %] 95 %  BP: (138-169)/() 146/95     Weight: 61 kg (134 lb 7.7 oz)  Body mass index is 21.06 kg/m².    Intake/Output Summary (Last 24 hours) at 10/4/2020 1301  Last data filed at 10/4/2020 0600  Gross per 24 hour   Intake --   Output 700 ml   Net -700 ml      Physical Exam  Vitals signs and nursing note reviewed.   Cardiovascular:      Rate and Rhythm: Normal rate and regular rhythm.   Pulmonary:      Effort: Pulmonary effort is normal.      Breath sounds: Normal breath sounds.   Abdominal:      General: Bowel sounds are normal.      Palpations: Abdomen is soft.   Skin:     Capillary Refill: Capillary refill takes less than 2 seconds.   Neurological:      Mental Status: He is alert and oriented to person, place, and time.      Comments: Right facial droop  Right hemiparesis   dysarthria   Psychiatric:         Mood and Affect: Mood normal.         Behavior: Behavior normal.         Thought Content: Thought content normal.         Judgment: Judgment normal.         Significant Labs: All pertinent labs within the past 24 hours have been reviewed.    Significant Imaging: I have reviewed all pertinent imaging results/findings within the past 24 hours.  I have reviewed and interpreted all pertinent imaging results/findings within the past 24 hours.

## 2020-10-04 NOTE — ASSESSMENT & PLAN NOTE
With slurred speech and right sided weakness that began 1 day prior to presentation.   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 HgbA1c 5.7%  Continue cardiac monitoring. Stop IVFs and start BP meds if needed. So far not needed  PT/OT/ST: inpatient rehab. Is clear for discharge to inpatient rehab once arranged and authorized by insurance.

## 2020-10-04 NOTE — PLAN OF CARE
TN called to patient's room per his request to discuss discharge plan; stated that he would danuta to go to Rehab; per chart, IPR has been rec by therapy; TN discussed different levels of therapy with patient and sister at bedside and both are in agreement to IPR;    List of facilities provided from [facility]; agree to be referred to [facility], [facility] and [facility] (1st choice);     Referrals sent via        10/04/20 1319   Post-Acute Status   Post-Acute Authorization Placement  (IPR)   Post-Acute Placement Status Referrals Sent  ([facility], [facility], [facility] via )   Discharge Delays None known at this time   Discharge Plan   Discharge Plan A Rehab

## 2020-10-04 NOTE — PLAN OF CARE
Problem: Occupational Therapy Goal  Goal: Occupational Therapy Goal  Description: Goals to be met by: 10/16/20    Patient will increase functional independence with ADLs by performing:    Feeding with Modified [place].  UE Dressing with Modified [place].  LE Dressing with Modified [place].  Grooming while standing at sink with Modified [place].  Toileting from toilet with Modified [place] for hygiene and clothing management.   Rolling to Bilateral with Modified [place].   Supine to sit with Modified [place].  Step transfer with Modified [place]  Toilet transfer to toilet with Modified [place].  Upper extremity exercise program x15 reps per handout, with independence.  Educate the patient re: neuromuscular re-education for RUE    Outcome: Ongoing, Progressing   The patient is progressing in OT. The patient will benefit from In Patient Rehab.

## 2020-10-04 NOTE — PT/OT/SLP PROGRESS
Occupational Therapy   Treatment    Name: Rashad Rodríguez  MRN: [id number]  Admitting Diagnosis:  Cerebrovascular accident (CVA)       Recommendations:     Discharge Recommendations: rehabilitation facility  Discharge Equipment Recommendations:  bedside commode, walker, rolling, bath bench  Barriers to discharge:  (will benefit from IPR)    Assessment:     Rashad Rodríguez is a 75 y.o. male with a medical diagnosis of Cerebrovascular accident (CVA).  Performance deficits affecting function are weakness, impaired endurance, impaired sensation, impaired self care skills, impaired functional mobilty, gait instability, impaired balance, decreased coordination, decreased upper extremity function, decreased lower extremity function, decreased safety awareness, impaired fine motor.    The patient is highly motivated to participate in OT to improve RUE and ability to perform self care. The patient was able to perform A/AAROM to RUE and theraputty exercise to RUE ~25 min. The patient with increased right finger flexion and strength in the right hand.     Rehab Prognosis:  Good; patient would benefit from acute skilled OT services to address these deficits and reach maximum level of function.       Plan:     Patient to be seen 5 x/week, 6 x/week to address the above listed problems via self-care/home management, therapeutic activities, therapeutic exercises, neuromuscular re-education  · Plan of Care Expires: 10/16/20  · Plan of Care Reviewed with: patient    Subjective     Pain/Comfort:  · Pain Rating 1: 0/10    Objective:     Communicated with: nurse prior to session.  Patient found HOB elevated with peripheral IV, telemetry upon OT entry to room.  The patient's spouse was present.    General Precautions: Standard, fall   Orthopedic Precautions:N/A   Braces: N/A     Occupational Performance:     Bed Mobility:    · Patient completed Scooting/Bridging with stand by assistance  · Patient completed Supine to Sit with stand by  "assistance with HOB elevated     Functional Mobility/Transfers:  · Patient completed Sit <> Stand Transfer with contact guard assistance  with  no assistive device and hand-held assist   · Functional Mobility: The patient stepped from the bed to the chair with CGA ~6' with unsteady gait with right leg ataxia. The patient requested to stp without use of RW stating "I'm slow, but I can do it". The patient was educated re: need to return to bed with the assist of nursing staff 2* fall risk. The patient and spouse verbalized understanding.    Activities of Daily Living:  · Upper Body Dressing: moderate assistance to don back gown while seated on the EOB   · The patient and spouse were educated re: en dressing technique s/p CVA      [facility] 6 Click ADL: 20    Treatment & Education:  · The patient participated in self care and functional mobility as noted above.   · The patient was able to perform putty exercise to RUE while seated on the EOB. The patient demo ability to grasp putty and release, roll putty on table top into a ball and roll "into a snake" with right hand fingers extended and use right shldr and elbow.   · The patient was able to perform B elbow flexion x5 with the patient encouraged to match movement R<>LUE, AAROM to RUE x5 with B hands clasped together, right forearm pro/supination x10 and right wrist flexion/ext x10  Importance of OOB activity with staff assistance.  Importance of sitting up in the chair throughout the day as tolerated, especially for meals     Patient left up in chair with all lines intact, call button in reach, nurseLucy notified and spouse presentEducation:      GOALS:   Multidisciplinary Problems     Occupational Therapy Goals        Problem: Occupational Therapy Goal    Goal Priority Disciplines Outcome Interventions   Occupational Therapy Goal     OT, PT/OT Ongoing, Progressing    Description: Goals to be met by: 10/16/20    Patient will increase functional independence with ADLs " by performing:    Feeding with Modified [place].  UE Dressing with Modified [place].  LE Dressing with Modified [place].  Grooming while standing at sink with Modified [place].  Toileting from toilet with Modified [place] for hygiene and clothing management.   Rolling to Bilateral with Modified [place].   Supine to sit with Modified [place].  Step transfer with Modified [place]  Toilet transfer to toilet with Modified [place].  Upper extremity exercise program x15 reps per handout, with independence.  Educate the patient re: neuromuscular re-education for RUE                     Time Tracking:     OT Date of Treatment: 10/04/20  OT Start Time: 1544  OT Stop Time: 1616  OT Total Time (min): 32 min    Billable Minutes:Self Care/Home Management 10  Therapeutic Exercise 22  Total Time 32    Carol Tirado OT  10/4/2020

## 2020-10-04 NOTE — PROGRESS NOTES
[facility] Hospital Medicine  Progress Note    Patient Name: Rashad Rodríguez  MRN: [id number]  Patient Class: IP- Inpatient   Admission Date: 10/1/2020  Length of Stay: 3 days  Attending Physician: Moni Patel MD  Primary Care Provider: Juanito Dejesus Jr, MD        Subjective:     Principal Problem:Cerebrovascular accident (CVA)        HPI:  Rashad Rodríguez 75 y.o. male with HTN, tobacco abuse, and COPD presents to the hospital with a chief complaint of right sided weakness. He reports yesterday he noticed right sided heaviness and difficulty with speech. These symptoms worsened this morning and he presented to the ED. He has lost coordination of his right hand and is unable to grasp objects. He also finds he has been slurring his speech and his right leg feels heavy. He takes medication for HTN at home. He smokes 1ppd. He denies fever, chest pain, SOB, N/V, abdominal pain, dysuria, visual disturbance, syncope.     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     Overview/Hospital Course:  Mr Rodríguez presented with acute right hemiparesis and facial droop secondary to acute ischemic stroke.     Interval History: states weakness and dysarthria still present. BP not high enough to start BP meds. Off IV fluids.     Review of Systems   Constitutional: Negative.    Respiratory: Negative.    Cardiovascular: Negative.    Gastrointestinal: Negative.    Neurological: Positive for facial asymmetry (better) and weakness (better).     Objective:     Vital Signs (Most Recent):  Temp: 99.2 °F (37.3 °C) (10/04/20 1143)  Pulse: 83 (10/04/20 1143)  Resp: 17 (10/04/20 1143)  BP: (!) 146/95 (10/04/20 1143)  SpO2: 95 % (10/04/20 1145) Vital Signs (24h Range):  Temp:  [97.8 °F (36.6 °C)-99.2 °F (37.3 °C)] 99.2 °F (37.3 °C)  Pulse:  [66-98] 83  Resp:  [17-18] 17  SpO2:   [95 %-99 %] 95 %  BP: (138-169)/() 146/95     Weight: 61 kg (134 lb 7.7 oz)  Body mass index is 21.06 kg/m².    Intake/Output Summary (Last 24 hours) at 10/4/2020 1301  Last data filed at 10/4/2020 0600  Gross per 24 hour   Intake --   Output 700 ml   Net -700 ml      Physical Exam  Vitals signs and nursing note reviewed.   Cardiovascular:      Rate and Rhythm: Normal rate and regular rhythm.   Pulmonary:      Effort: Pulmonary effort is normal.      Breath sounds: Normal breath sounds.   Abdominal:      General: Bowel sounds are normal.      Palpations: Abdomen is soft.   Skin:     Capillary Refill: Capillary refill takes less than 2 seconds.   Neurological:      Mental Status: He is alert and oriented to person, place, and time.      Comments: Right facial droop  Right hemiparesis   dysarthria   Psychiatric:         Mood and Affect: Mood normal.         Behavior: Behavior normal.         Thought Content: Thought content normal.         Judgment: Judgment normal.         Significant Labs: All pertinent labs within the past 24 hours have been reviewed.    Significant Imaging: I have reviewed all pertinent imaging results/findings within the past 24 hours.  I have reviewed and interpreted all pertinent imaging results/findings within the past 24 hours.      Assessment/Plan:      * Cerebrovascular accident (CVA)  With slurred speech and right sided weakness that began 1 day prior to presentation.   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 HgbA1c 5.7%  Continue cardiac monitoring. Stop IVFs and start BP meds if needed. So far not needed  PT/OT/ST: inpatient rehab. Is clear for discharge to inpatient  rehab once arranged and authorized by insurance.     Lacunar stroke  See above    COPD (chronic obstructive pulmonary disease)  Emphysematous changes on CT did not get PFT. PRN duo-neb    Essential hypertension  Holding home BP meds    Tobacco use disorder, continuous  Greater than 3 minutes spent counseling patient on dangers of continued tobacco abuse will provide tobacco cessation education prior to discharge.       VTE Risk Mitigation (From admission, onward)         Ordered     enoxaparin injection 40 mg  Every 24 hours      10/01/20 1342     IP VTE HIGH RISK PATIENT  Once      10/01/20 1342     Place sequential compression device  Until discontinued      10/01/20 1342                Discharge Planning   LEX:      Code Status: Full Code   Is the patient medically ready for discharge?:     Reason for patient still in hospital (select all that apply): Treatment  Discharge Plan A: Home                  Moni Mehta MD  Department of Hospital Medicine   [facility]

## 2020-10-04 NOTE — PLAN OF CARE
Problem: Fall Injury Risk  Goal: Absence of Fall and Fall-Related Injury  Outcome: Ongoing, Progressing  Intervention: Identify and Manage Contributors to Fall Injury Risk  Flowsheets (Taken 10/4/2020 1516)  Self-Care Promotion: independence encouraged  Medication Review/Management:   medications reviewed   high risk medications identified  Intervention: Promote Injury-Free Environment  Flowsheets (Taken 10/4/2020 1516)  Safety Promotion/Fall Prevention:   assistive device/personal item within reach   bed alarm set   commode/urinal/bedpan at bedside   Fall Risk signage in place   high risk medications identified   medications reviewed   room near unit station   side rails raised x 2   instructed to call staff for mobility  Environmental Safety Modification:   assistive device/personal items within reach   clutter free environment maintained   lighting adjusted   room near unit station   room organization consistent     Problem: Adult Inpatient Plan of Care  Goal: Plan of Care Review  Flowsheets (Taken 10/4/2020 1516)  Plan of Care Reviewed With: patient  Goal: Patient-Specific Goal (Individualization)  Outcome: Ongoing, Progressing  Flowsheets (Taken 10/4/2020 1516)  Individualized Care Needs: explanations and courtesy  Anxieties, Fears or Concerns: denied concerns  Patient-Specific Goals (Include Timeframe): understanding and self demonstrations     Problem: Adjustment to Illness (Stroke, Ischemic/Transient Ischemic Attack)  Goal: Optimal Coping  Outcome: Ongoing, Progressing  Intervention: Support Patient/Family Psychosocial Response to Stroke  Flowsheets (Taken 10/4/2020 1516)  Supportive Measures: active listening utilized     Problem: Cerebral Tissue Perfusion Risk (Stroke, Ischemic/Transient Ischemic Attack)  Goal: Optimal Cerebral Tissue Perfusion  Outcome: Ongoing, Progressing  Intervention: Protect and Optimize Cerebral Perfusion  Flowsheets (Taken 10/4/2020 1516)  Sensory Stimulation Regulation:   care  clustered   music/television provided for relaxation   quiet environment promoted   visitors limited  Cerebral Perfusion Promotion: blood pressure monitored  Intervention: Optimize Oxygenation and Ventilation  Flowsheets (Taken 10/4/2020 1516)  Head of Bed (HOB): HOB elevated     Problem: Communication Impairment (Stroke, Ischemic/Transient Ischemic Attack)  Goal: Improved Communication Skills  Outcome: Ongoing, Progressing  Intervention: Optimize Cognitive and Communication Skills  Flowsheets (Taken 10/4/2020 1516)  Environment Familiarity/Consistency: daily routine followed  Reorientation Measures: clock in view  Communication Enhancement Strategies:   call light answered in person   communication board used   extra time allowed for response     Problem: Eating/Swallowing Impairment (Stroke, Ischemic/Transient Ischemic Attack)  Goal: Oral Intake without Aspiration  Outcome: Ongoing, Progressing  Intervention: Optimize Eating and Swallowing  Flowsheets (Taken 10/4/2020 1516)  Aspiration Precautions: awake/alert before oral intake     Problem: Functional Ability Impaired (Stroke, Ischemic/Transient Ischemic Attack)  Goal: Optimal Functional Ability  Outcome: Ongoing, Progressing  Intervention: Optimize Functional Ability  Flowsheets (Taken 10/4/2020 1516)  Self-Care Promotion: independence encouraged  Activity Management: walk with assistive device and/or staff member - L3     Problem: Sensorimotor Impairment (Stroke, Ischemic/Transient Ischemic Attack)  Goal: Improved Sensorimotor Function  Outcome: Ongoing, Progressing  Intervention: Optimize Range of Motion, Motor Control and Function  Flowsheets (Taken 10/4/2020 1516)  Range of Motion: active ROM (range of motion) encouraged

## 2020-10-04 NOTE — ASSESSMENT & PLAN NOTE
Per neurology - permissive HTN. No clear A-fib or flutter seen on telemetry. Frequent episode of Mobitz I 2nd degree AVB seen with adequate HR. Echo with good EF - no cardio-embolic source seen. Will f/u prn. Consider out patient event monitor or ILR if cardio-embolic source suspected

## 2020-10-04 NOTE — SUBJECTIVE & OBJECTIVE
Past Medical History:   Diagnosis Date    Cigarette smoker     Hyperlipidemia     Hypertension     Stroke 10/01/2020       Past Surgical History:   Procedure Laterality Date    NO PAST SURGERIES         Review of patient's allergies indicates:   Allergen Reactions    Cephalexin     Ciprofloxacin     Doxycycline Other (See Comments)     Stomach cramps    Bactrim [sulfamethoxazole-trimethoprim] Rash       No current facility-administered medications on file prior to encounter.      Current Outpatient Medications on File Prior to Encounter   Medication Sig    amLODIPine (NORVASC) 10 MG tablet TAKE 1 TABLET(10 MG) BY MOUTH EVERY DAY    atorvastatin (LIPITOR) 10 MG tablet TAKE 1 TABLET(10 MG) BY MOUTH EVERY DAY    valsartan-hydrochlorothiazide (DIOVAN-HCT) 320-12.5 mg per tablet Take 1 tablet by mouth once daily.    VIAGRA 100 mg tablet 1 tablet PO one hour prior to sex, maximum of 1 tablet in 24 hours     Family History     Problem Relation (Age of Onset)    Cancer Mother    No Known Problems Sister, Brother, Maternal Aunt, Maternal Uncle, Paternal Aunt, Paternal Uncle, Maternal Grandmother, Maternal Grandfather, Paternal Grandmother, Paternal Grandfather    Stroke Father        Tobacco Use    Smoking status: Current Every Day Smoker     Packs/day: 1.00     Years: 50.00     Pack years: 50.00     Types: Cigarettes    Smokeless tobacco: Never Used   Substance and Sexual Activity    Alcohol use: Yes     Alcohol/week: 2.0 standard drinks     Types: 2 Cans of beer per week     Comment: socially    Drug use: No    Sexual activity: Yes     Partners: Female     Review of Systems   Constitution: Negative for decreased appetite.   HENT: Negative for ear discharge.    Eyes: Negative for blurred vision.   Endocrine: Negative for polyphagia.   Skin: Negative for nail changes.   Genitourinary: Negative for bladder incontinence.   Neurological: Negative for aphonia.   Psychiatric/Behavioral: Negative for  hallucinations.   Allergic/Immunologic: Negative for hives.     Objective:     Vital Signs (Most Recent):  Temp: 99.2 °F (37.3 °C) (10/04/20 1143)  Pulse: 83 (10/04/20 1143)  Resp: 17 (10/04/20 1143)  BP: (!) 146/95 (10/04/20 1143)  SpO2: 95 % (10/04/20 1145) Vital Signs (24h Range):  Temp:  [97.8 °F (36.6 °C)-99.2 °F (37.3 °C)] 99.2 °F (37.3 °C)  Pulse:  [66-98] 83  Resp:  [17-18] 17  SpO2:  [95 %-99 %] 95 %  BP: (138-169)/() 146/95     Weight: 61 kg (134 lb 7.7 oz)  Body mass index is 21.06 kg/m².    SpO2: 95 %  O2 Device (Oxygen Therapy): room air      Intake/Output Summary (Last 24 hours) at 10/4/2020 1200  Last data filed at 10/4/2020 0600  Gross per 24 hour   Intake --   Output 700 ml   Net -700 ml       Lines/Drains/Airways     Peripheral Intravenous Line                 Peripheral IV - Single Lumen 10/04/20 0616 22 G Anterior;Right Forearm less than 1 day                Physical Exam   Constitutional: He is oriented to person, place, and time. He appears well-developed and well-nourished.   HENT:   Head: Normocephalic and atraumatic.   Eyes: Pupils are equal, round, and reactive to light. Conjunctivae are normal.   Neck: Normal range of motion. Neck supple.   Cardiovascular: Normal rate, normal heart sounds and intact distal pulses.   Pulmonary/Chest: Effort normal and breath sounds normal.   Abdominal: Soft. Bowel sounds are normal.   Musculoskeletal: Normal range of motion.   Neurological: He is alert and oriented to person, place, and time.   Skin: Skin is warm and dry.       Significant Labs: All pertinent lab results from the last 24 hours have been reviewed.    Significant Imaging: Echocardiogram: 2D echo with color flow doppler: No results found for this or any previous visit.

## 2020-10-04 NOTE — NURSING
Handoff received from MUNA Collazo      Pt resting in bed quietly. NAD noted. No c/o pain.     Fall and safety precautions maintained. Bed alarm activated and audible.. Bed locked in lowest position, with side rails up x2. Call bell and personal items within reach

## 2020-10-04 NOTE — NURSING
PER handoff given to SAE Collazo     Pt resting in bed quietly. NAD noted. No c/o pain.     Fall and safety precautions maintained. Bed alarm activated and audible.. Bed locked in lowest position, with side rails up x2. Call bell and personal items within reach

## 2020-10-04 NOTE — CONSULTS
[facility]  Cardiology  Consult Note    Patient Name: Rashad Rodríguez  MRN: [id number]  Admission Date: 10/1/2020  Hospital Length of Stay: 3 days  Code Status: Full Code   Attending Provider: Moni Patel MD   Consulting Provider: Amadou Pedro MD  Primary Care Physician: Juanito Dejesus Jr, MD  Principal Problem:Cerebrovascular accident (CVA)    Patient information was obtained from patient and ER records.     Consults  Subjective:     Chief Complaint:  CVA, abnormal heart rhythm     HPI:   Rashad Rodríguez 75 y.o. male with HTN, tobacco abuse, and COPD presents to the hospital with a chief complaint of right sided weakness. He reports yesterday he noticed right sided heaviness and difficulty with speech. These symptoms worsened this morning and he presented to the ED. He has lost coordination of his right hand and is unable to grasp objects. He also finds he has been slurring his speech and his right leg feels heavy. He takes medication for HTN at home. He smokes 1ppd. He denies fever, chest pain, SOB, N/V, abdominal pain, dysuria, visual disturbance, syncope.      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      Overview/Hospital Course:  Mr Rodríguez presented with acute right hemiparesis and facial droop secondary to acute ischemic stroke.     Denies CP or SOB  EKG NSR 61 with wenckebach - otherwise ok  Telemetry - mostly NSR and episodes of Mobitz I 2nd degree AVB. No A-fib or A-flutter seen    Echo 10/1/20  · The left ventricle is normal in size with normal systolic function. The estimated ejection fraction is 55%.  · There is left ventricular concentric remodeling.  · Normal left ventricular diastolic function.  · Normal right ventricular systolic function.  · No pulmonary hypertension  · Normal central venous pressure (3 mmHg).  The estimated  PA systolic pressure is 25 mmHg.    Past Medical History:   Diagnosis Date    Cigarette smoker     Hyperlipidemia     Hypertension     Stroke 10/01/2020       Past Surgical History:   Procedure Laterality Date    NO PAST SURGERIES         Review of patient's allergies indicates:   Allergen Reactions    Cephalexin     Ciprofloxacin     Doxycycline Other (See Comments)     Stomach cramps    Bactrim [sulfamethoxazole-trimethoprim] Rash       No current facility-administered medications on file prior to encounter.      Current Outpatient Medications on File Prior to Encounter   Medication Sig    amLODIPine (NORVASC) 10 MG tablet TAKE 1 TABLET(10 MG) BY MOUTH EVERY DAY    atorvastatin (LIPITOR) 10 MG tablet TAKE 1 TABLET(10 MG) BY MOUTH EVERY DAY    valsartan-hydrochlorothiazide (DIOVAN-HCT) 320-12.5 mg per tablet Take 1 tablet by mouth once daily.    VIAGRA 100 mg tablet 1 tablet PO one hour prior to sex, maximum of 1 tablet in 24 hours     Family History     Problem Relation (Age of Onset)    Cancer Mother    No Known Problems Sister, Brother, Maternal Aunt, Maternal Uncle, Paternal Aunt, Paternal Uncle, Maternal Grandmother, Maternal Grandfather, Paternal Grandmother, Paternal Grandfather    Stroke Father        Tobacco Use    Smoking status: Current Every Day Smoker     Packs/day: 1.00     Years: 50.00     Pack years: 50.00     Types: Cigarettes    Smokeless tobacco: Never Used   Substance and Sexual Activity    Alcohol use: Yes     Alcohol/week: 2.0 standard drinks     Types: 2 Cans of beer per week     Comment: socially    Drug use: No    Sexual activity: Yes     Partners: Female     Review of Systems   Constitution: Negative for decreased appetite.   HENT: Negative for ear discharge.    Eyes: Negative for blurred vision.   Endocrine: Negative for polyphagia.   Skin: Negative for nail changes.   Genitourinary: Negative for bladder incontinence.   Neurological: Negative for aphonia.    Psychiatric/Behavioral: Negative for hallucinations.   Allergic/Immunologic: Negative for hives.     Objective:     Vital Signs (Most Recent):  Temp: 99.2 °F (37.3 °C) (10/04/20 1143)  Pulse: 83 (10/04/20 1143)  Resp: 17 (10/04/20 1143)  BP: (!) 146/95 (10/04/20 1143)  SpO2: 95 % (10/04/20 1145) Vital Signs (24h Range):  Temp:  [97.8 °F (36.6 °C)-99.2 °F (37.3 °C)] 99.2 °F (37.3 °C)  Pulse:  [66-98] 83  Resp:  [17-18] 17  SpO2:  [95 %-99 %] 95 %  BP: (138-169)/() 146/95     Weight: 61 kg (134 lb 7.7 oz)  Body mass index is 21.06 kg/m².    SpO2: 95 %  O2 Device (Oxygen Therapy): room air      Intake/Output Summary (Last 24 hours) at 10/4/2020 1200  Last data filed at 10/4/2020 0600  Gross per 24 hour   Intake --   Output 700 ml   Net -700 ml       Lines/Drains/Airways     Peripheral Intravenous Line                 Peripheral IV - Single Lumen 10/04/20 0616 22 G Anterior;Right Forearm less than 1 day                Physical Exam   Constitutional: He is oriented to person, place, and time. He appears well-developed and well-nourished.   HENT:   Head: Normocephalic and atraumatic.   Eyes: Pupils are equal, round, and reactive to light. Conjunctivae are normal.   Neck: Normal range of motion. Neck supple.   Cardiovascular: Normal rate, normal heart sounds and intact distal pulses.   Pulmonary/Chest: Effort normal and breath sounds normal.   Abdominal: Soft. Bowel sounds are normal.   Musculoskeletal: Normal range of motion.   Neurological: He is alert and oriented to person, place, and time.   Skin: Skin is warm and dry.       Significant Labs: All pertinent lab results from the last 24 hours have been reviewed.    Significant Imaging: Echocardiogram: 2D echo with color flow doppler: No results found for this or any previous visit.    Assessment and Plan:     Lacunar stroke  Per neurology - permissive HTN. No clear A-fib or flutter seen on telemetry. Frequent episode of Mobitz I 2nd degree AVB seen with adequate  HR. Echo with good EF - no cardio-embolic source seen. Will f/u prn. Consider out patient event monitor or ILR if cardio-embolic source suspected    COPD (chronic obstructive pulmonary disease)  Per primary    Essential hypertension  Permissive HTN with CVA        VTE Risk Mitigation (From admission, onward)         Ordered     enoxaparin injection 40 mg  Every 24 hours      10/01/20 1342     IP VTE HIGH RISK PATIENT  Once      10/01/20 1342     Place sequential compression device  Until discontinued      10/01/20 1342                Thank you for your consult. I will sign off. Please contact us if you have any additional questions.    Amadou Pedro MD  Cardiology   [facility]

## 2020-10-05 VITALS
TEMPERATURE: 98 F | OXYGEN SATURATION: 99 % | HEART RATE: 84 BPM | WEIGHT: 133.38 LBS | SYSTOLIC BLOOD PRESSURE: 184 MMHG | RESPIRATION RATE: 16 BRPM | HEIGHT: 67 IN | DIASTOLIC BLOOD PRESSURE: 89 MMHG | BODY MASS INDEX: 20.93 KG/M2

## 2020-10-05 PROCEDURE — 94761 N-INVAS EAR/PLS OXIMETRY MLT: CPT

## 2020-10-05 PROCEDURE — 97116 GAIT TRAINING THERAPY: CPT

## 2020-10-05 PROCEDURE — 25000003 PHARM REV CODE 250: Performed by: INTERNAL MEDICINE

## 2020-10-05 PROCEDURE — 99900035 HC TECH TIME PER 15 MIN (STAT)

## 2020-10-05 RX ORDER — ASPIRIN 81 MG/1
81 TABLET ORAL DAILY
Refills: 0 | COMMUNITY
Start: 2020-10-06 | End: 2020-10-22 | Stop reason: SDUPTHER

## 2020-10-05 RX ORDER — VALSARTAN 40 MG/1
40 TABLET ORAL DAILY
Status: DISCONTINUED | OUTPATIENT
Start: 2020-10-05 | End: 2020-10-05 | Stop reason: HOSPADM

## 2020-10-05 RX ORDER — CLOPIDOGREL BISULFATE 75 MG/1
75 TABLET ORAL DAILY
Qty: 25 TABLET | Refills: 0 | Status: SHIPPED | OUTPATIENT
Start: 2020-10-06 | End: 2020-10-22 | Stop reason: SDUPTHER

## 2020-10-05 RX ORDER — VALSARTAN 40 MG/1
40 TABLET ORAL DAILY
Qty: 30 TABLET | Refills: 0 | Status: SHIPPED | OUTPATIENT
Start: 2020-10-05 | End: 2020-10-22

## 2020-10-05 RX ORDER — ATORVASTATIN CALCIUM 40 MG/1
40 TABLET, FILM COATED ORAL DAILY
Qty: 90 TABLET | Refills: 0 | Status: SHIPPED | OUTPATIENT
Start: 2020-10-06 | End: 2020-10-22 | Stop reason: SDUPTHER

## 2020-10-05 RX ADMIN — ATORVASTATIN CALCIUM 40 MG: 40 TABLET, FILM COATED ORAL at 09:10

## 2020-10-05 RX ADMIN — CLOPIDOGREL 75 MG: 75 TABLET, FILM COATED ORAL at 09:10

## 2020-10-05 RX ADMIN — ASPIRIN 81 MG: 81 TABLET, COATED ORAL at 09:10

## 2020-10-05 NOTE — NURSING
Assumed care of patient from SAE Dorsey. Patient lying in bed resting, NAD noted. Safety Precautions maintained, will monitor.

## 2020-10-05 NOTE — PLAN OF CARE
10/05/20 1122   Final Note   Assessment Type Final Discharge Note   Anticipated Discharge Disposition Rehab   What phone number can be called within the next 1-3 days to see how you are doing after discharge? [phone]   Right Care Referral Info   Post Acute Recommendation IRF   Referral Type Rehab   Facility Name [address]   Post-Acute Status   Post-Acute Authorization Placement   Post-Acute Placement Status Set-up Complete   Discharge Delays (!) Other  (Waiting transportation.)

## 2020-10-05 NOTE — PLAN OF CARE
[facility]    FACILITY TRANSFER ORDERS      Patient Name: Rashad Rodríguez  YOB: 1945    PCP: Juanito Dejesus Jr, MD   PCP Address: [facility] [address]  PCP Phone Number: [phone]  PCP Fax: [phone]    Encounter Date: 10/05/2020    Admit to: [facility]    Vital Signs:  Routine    Diagnoses:   Active Hospital Problems    Diagnosis  POA    *Cerebrovascular accident (CVA) [I63.9]  Yes    Lacunar stroke [I63.81]  Yes    COPD (chronic obstructive pulmonary disease) [J44.9]  Yes     Emphysematous changes on ct.  Did not get pft.         Essential hypertension [I10]  Yes    Tobacco use disorder, continuous [F17.209]  Yes      Resolved Hospital Problems   No resolved problems to display.       Allergies:  Review of patient's allergies indicates:   Allergen Reactions    Cephalexin     Ciprofloxacin     Doxycycline Other (See Comments)     Stomach cramps    Bactrim [sulfamethoxazole-trimethoprim] Rash       Diet: 2 gram sodium diet    Activities: Activity as tolerated and Up with assistance with assistance    Nursing:    SN to complete comprehensive assessment including routine vital signs. Instruct on disease process and s/s of complications to report to MD. Review/verify medication list sent home with the patient at time of discharge  and instruct patient/caregiver as needed. Frequency may be adjusted depending on start of care date.    Notify MD if SBP > 160 or < 90; DBP > 90 or < 50; HR > 120 or < 50; Temp > 101    CONSULTS:    Physical Therapy to evaluate and treat. , Occupational Therapy to evaluate and treat. and Speech Therapy to evaluate and treat for Language and Swallowing.    Medications: Review discharge medications with patient and family and provide education.      Current Discharge Medication List      START taking these medications    Details   aspirin (ECOTRIN) 81 MG EC tablet Take 1 tablet (81 mg total) by mouth once daily.  Refills: 0       clopidogreL (PLAVIX) 75 mg tablet Take 1 tablet (75 mg total) by mouth once daily. for 25 days  Qty: 25 tablet, Refills: 0      valsartan (DIOVAN) 40 MG tablet Take 1 tablet (40 mg total) by mouth once daily.  Qty: 30 tablet, Refills: 0    Comments: .         CONTINUE these medications which have CHANGED    Details   atorvastatin (LIPITOR) 40 MG tablet Take 1 tablet (40 mg total) by mouth once daily.  Qty: 90 tablet, Refills: 0             Electronically signed  _________________________________  Moni Mehta MD  10/05/2020

## 2020-10-05 NOTE — NURSING
Handoff given to MUNA Collazo     Pt resting in bed quietly. NAD noted. No c/o pain.     Fall and safety precautions maintained. Bed alarm activated and audible.. Bed locked in lowest position, with side rails up x2. Call bell and personal items within reach

## 2020-10-05 NOTE — PLAN OF CARE
Problem: Physical Therapy Goal  Goal: Physical Therapy Goal  Description: Goals to be met by: 10/16     Patient will increase functional independence with mobility by performin. Sit to stand transfer with Modified [place]  2. Bed to chair transfer with Modified [place]   3. Gait  x 150 feet with Modified [place] using Rolling Walker.   4. Lower extremity exercise program x20 reps per handout, with supervision    Outcome: Ongoing, Progressing  Pt presented supine in bed, agreeable to PT. Supine>sit EOB with SPV, sit>stand with SBA to RW.  Gait training x 150ft with RW and CGA; narrow LANIE, decreased jefe, inconsistent R foot placement  slight  loss of balance in 180 degree turn requiring CGA to recover.  Seated therex: LAQ, heel/toe, HF 2 x 10. Pt return to supine with SPV.

## 2020-10-05 NOTE — PT/OT/SLP DISCHARGE
Occupational Therapy Discharge Summary    Rashad Rodríguez  MRN: [id number]   Principal Problem: Cerebrovascular accident (CVA)      Patient Discharged from acute Occupational Therapy on 10/5/20.  Please refer to prior OT notes for functional status.    Assessment:      Patient appropriate for care in another setting.    Objective:     GOALS:   Multidisciplinary Problems     Occupational Therapy Goals        Problem: Occupational Therapy Goal    Goal Priority Disciplines Outcome Interventions   Occupational Therapy Goal     OT, PT/OT Ongoing, Progressing    Description: Goals to be met by: 10/16/20    Patient will increase functional independence with ADLs by performing:    Feeding with Modified [place].  UE Dressing with Modified [place].  LE Dressing with Modified [place].  Grooming while standing at sink with Modified [place].  Toileting from toilet with Modified [place] for hygiene and clothing management.   Rolling to Bilateral with Modified [place].   Supine to sit with Modified [place].  Step transfer with Modified [place]  Toilet transfer to toilet with Modified [place].  Upper extremity exercise program x15 reps per handout, with independence.  Educate the patient re: neuromuscular re-education for RUE                     Reasons for Discontinuation of Therapy Services  Transfer to alternate level of care.      Plan:     Patient Discharged to: Inpatient Rehab    Carol Tirado OT  10/5/2020

## 2020-10-05 NOTE — PLAN OF CARE
Problem: Fall Injury Risk  Goal: Absence of Fall and Fall-Related Injury  Outcome: Met     Problem: Adult Inpatient Plan of Care  Goal: Plan of Care Review  Outcome: Met  Goal: Patient-Specific Goal (Individualization)  Outcome: Met  Goal: Absence of Hospital-Acquired Illness or Injury  Outcome: Met  Goal: Optimal Comfort and Wellbeing  Outcome: Met  Goal: Readiness for Transition of Care  Outcome: Met  Goal: Rounds/Family Conference  Outcome: Met     Problem: Adjustment to Illness (Stroke, Ischemic/Transient Ischemic Attack)  Goal: Optimal Coping  Outcome: Met     Problem: Bowel Elimination Impaired (Stroke, Ischemic/Transient Ischemic Attack)  Goal: Effective Bowel Elimination  Outcome: Met     Problem: Cerebral Tissue Perfusion Risk (Stroke, Ischemic/Transient Ischemic Attack)  Goal: Optimal Cerebral Tissue Perfusion  Outcome: Met     Problem: Communication Impairment (Stroke, Ischemic/Transient Ischemic Attack)  Goal: Improved Communication Skills  Outcome: Met     Problem: Eating/Swallowing Impairment (Stroke, Ischemic/Transient Ischemic Attack)  Goal: Oral Intake without Aspiration  Outcome: Met     Problem: Functional Ability Impaired (Stroke, Ischemic/Transient Ischemic Attack)  Goal: Optimal Functional Ability  Outcome: Met     Problem: Hemodynamic Instability (Stroke, Ischemic/Transient Ischemic Attack)  Goal: Vital Signs Remain in Desired Range  Outcome: Met     Problem: Pain (Stroke, Ischemic/Transient Ischemic Attack)  Goal: Acceptable Pain Control  Outcome: Met     Problem: Sensorimotor Impairment (Stroke, Ischemic/Transient Ischemic Attack)  Goal: Improved Sensorimotor Function  Outcome: Met     Problem: Urinary Elimination Impaired (Stroke, Ischemic/Transient Ischemic Attack)  Goal: Effective Urinary Elimination  Outcome: Met     Problem: Wound  Goal: Optimal Wound Healing  Outcome: Met     Problem: Infection  Goal: Infection Symptom Resolution  Outcome: Met

## 2020-10-05 NOTE — PT/OT/SLP PROGRESS
Physical Therapy Treatment    Patient Name:  Rashad Rodríguez   MRN:  [id number]    Recommendations:     Discharge Recommendations:  rehabilitation facility   Discharge Equipment Recommendations: bedside commode, walker, rolling, bath bench   Barriers to discharge: None    Assessment:     Rashad Rodríguez is a 75 y.o. male admitted with a medical diagnosis of Cerebrovascular accident (CVA).  He presents with the following impairments/functional limitations:  weakness, impaired endurance, impaired sensation, impaired self care skills, impaired functional mobilty, gait instability, impaired balance, decreased coordination, decreased lower extremity function, decreased upper extremity function, decreased safety awareness, impaired fine motor . Pt continues with functional mobility deficits and should benefit from continuing current PT POC to maximize I and safety decrease risk of further decline of injury.    Rehab Prognosis: Good; patient would benefit from acute skilled PT services to address these deficits and reach maximum level of function.    Recent Surgery: * No surgery found *      Plan:     During this hospitalization, patient to be seen 6 x/week to address the identified rehab impairments via gait training, therapeutic activities, therapeutic exercises, neuromuscular re-education and progress toward the following goals:    · Plan of Care Expires:       Subjective     Chief Complaint: pt without specific c/o  Patient/Family Comments/goals: pt reports looking for to going to rehab  Pain/Comfort:  · Pain Rating 1: 0/10  · Pain Rating Post-Intervention 1: 0/10      Objective:     Communicated with nurse Sunita prior to session.  Patient found supine with peripheral IV, telemetry upon PT entry to room.     General Precautions: Standard, fall   Orthopedic Precautions:N/A   Braces: N/A     Functional Mobility:  · Bed Mobility:     · Supine to Sit: supervision  · Sit to Supine: supervision  · Transfers:     · Sit to Stand:   stand by assistance with rolling walker  · Gait: x 150ft with RW and CGA; narrow LANIE, decreased jefe, inconsistent R foot placement  slight  loss of balance in 180 degree turn requiring CGA to recover.       AM-PAC 6 CLICK MOBILITY  Sitting down on and standing up from a chair with arms (e.g., wheelchair, bedside commode, etc.): 3  Moving from lying on back to sitting on the side of the bed?: 4  Moving to and from a bed to a chair (including a wheelchair)?: 3  Need to walk in hospital room?: 3  Climbing 3-5 steps with a railing?: 3       Therapeutic Activities and Exercises:  Pt presented supine in bed, agreeable to PT. Supine>sit EOB with SPV, sit>stand with SBA to RW.  Gait training x 150ft with RW and CGA; narrow LANIE, decreased jefe, inconsistent R foot placement  slight  loss of balance in 180 degree turn requiring CGA to recover.  Seated therex: LAQ, heel/toe, HF 2 x 10. Pt return to supine with SPV.    Patient left supine with all lines intact and call button in reach..    GOALS:   Multidisciplinary Problems     Physical Therapy Goals        Problem: Physical Therapy Goal    Goal Priority Disciplines Outcome Goal Variances Interventions   Physical Therapy Goal     PT, PT/OT Ongoing, Progressing     Description: Goals to be met by: 10/16     Patient will increase functional independence with mobility by performin. Sit to stand transfer with Modified [place]  2. Bed to chair transfer with Modified [place]   3. Gait  x 150 feet with Modified [place] using Rolling Walker.   4. Lower extremity exercise program x20 reps per handout, with supervision                     Time Tracking:     PT Received On: 10/05/20  PT Start Time: 1058     PT Stop Time: 1113  PT Total Time (min): 15 min     Billable Minutes: Gait Training 15    Treatment Type: Treatment  PT/PTA: PT     PTA Visit Number: 0     Kartik Aceves, PT  10/05/2020

## 2020-10-05 NOTE — PLAN OF CARE
10/05/20 1020   Medicare Message   Important Message from Medicare regarding Discharge Appeal Rights Given to patient/caregiver;Explained to patient/caregiver;Other (comments)  (Pt unable to sign due to weakness from stroke. SW provided pt a copy.)   Date IMM was signed 10/05/20   Time IMM was signed 1020

## 2020-10-05 NOTE — PLAN OF CARE
Pt agreeable to rehab. Pt preferences are [facility] and [facility]. Referral faxed to [facility] via [facility]. All referrals pending, JOSIAH, waiting on accepting facility.     9:17am  Pt accepted by [facility]. Pt first choice for rehab services is [facility] due to location. Message sent to Tyra to determine if they can accept today or if they would need authorization before pt can d/c.     11:16am  · JOSIAH received call report fromTyra, with [facility]. According to Tyra, pt will transfer to room 4116, and nurse to call report [phone].      11:28am  ADT 30 order placed for Van Transportation.  Requested  time: 1:00pm  If transportation does not arrive at ETA time nurse will be instructed to follow protocol for transportation below:   How can I get in touch directly with dispatch, if needed?                 Non-emergent dispatch: [phone]      +++NURSING:  If Van does not arrive at requested time please call the above Non Emergent Dispatcher.  If issue not resolved please escalate to your charge nurse for further instructions.    11:29am  Travel packet provided to pt's nurse, Magnolia. JOSIAH notified SAE Merida, transport has been arranged for 1:00pm.            10/05/20 0709   Discharge Reassessment   Assessment Type Discharge Planning Reassessment   Provided patient/caregiver education on the expected discharge date and the discharge plan No   Do you have any problems affording any of your prescribed medications? No   Discharge Plan A Rehab   DME Needed Upon Discharge  none   Anticipated Discharge Disposition Rehab   Can the patient/caregiver answer the patient profile reliably? Yes, cognitively intact   How does the patient rate their overall health at the present time? Good   Describe the patient's ability to walk at the present time. Minor restrictions or changes   How often would a person be available to care for the patient? Whenever needed   Number of comorbid conditions (as recorded on the  chart) Three   During the past month, has the patient often been bothered by feeling down, depressed or hopeless? No   During the past month, has the patient often been bothered by little interest or pleasure in doing things? No   Post-Acute Status   Post-Acute Authorization Placement   Post-Acute Placement Status Pending Payor Review   Patient choice form signed by patient/caregiver List with quality metrics by geographic area provided   Discharge Delays None known at this time

## 2020-10-06 NOTE — PT/OT/SLP DISCHARGE
Physical Therapy Discharge Summary    Name: Rashad Rodríguez  MRN: [id number]   Principal Problem: Cerebrovascular accident (CVA)     Patient Discharged from acute Physical Therapy on 10/5.  Please refer to prior PT noted date on 10/5 for functional status.     Assessment:     Goals partially met.    Objective:     GOALS:   Multidisciplinary Problems     Physical Therapy Goals        Problem: Physical Therapy Goal    Goal Priority Disciplines Outcome Goal Variances Interventions   Physical Therapy Goal     PT, PT/OT Ongoing, Progressing     Description: Goals to be met by: 10/16     Patient will increase functional independence with mobility by performin. Sit to stand transfer with Modified [place]  2. Bed to chair transfer with Modified [place]   3. Gait  x 150 feet with Modified [place] using Rolling Walker.   4. Lower extremity exercise program x20 reps per handout, with supervision                     Reasons for Discontinuation of Therapy Services  Transfer to alternate level of care.      Plan:     Patient Discharged to: Inpatient Rehab.    Susan Andujar, PT  10/6/2020

## 2020-10-08 NOTE — DISCHARGE SUMMARY
[facility] Hospital Medicine  Discharge Summary      Patient Name: Rashad Rodríguez  MRN: [id number]  Admission Date: 10/1/2020  Hospital Length of Stay: 4 days  Discharge Date and Time:  10/05/2020 8:29 PM  Attending Physician: No att. providers found   Discharging Provider: Moni Mehta MD  Primary Care Provider: Juanito Dejesus Jr, MD      HPI:   Rashad Rodríguez 75 y.o. male with HTN, tobacco abuse, and COPD presents to the hospital with a chief complaint of right sided weakness. He reports yesterday he noticed right sided heaviness and difficulty with speech. These symptoms worsened this morning and he presented to the ED. He has lost coordination of his right hand and is unable to grasp objects. He also finds he has been slurring his speech and his right leg feels heavy. He takes medication for HTN at home. He smokes 1ppd. He denies fever, chest pain, SOB, N/V, abdominal pain, dysuria, visual disturbance, syncope.     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     * No surgery found *      Hospital Course:   Mr Rodríguez presented with acute right hemiparesis and facial droop secondary to acute ischemic stroke. MRI brain with subactue left sided lacunar infarct to internal capsule. 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 Echo with concentric remodeling but no failure or other pathology. Per Cardiology, no arrhythmia to cause stroke nor consider anticoagulation. TSH WNL. HgbA1c 5.7%. Treated with aspirin, clopidogrel, statin and permissive hypertension x 48 hours. Symptoms improved some. PT/OT/ST recommended inpatient rehab. Accepted at rehab facility located at .Authorized by insurance. Patient very motivated to quit smoking. Low sodium diet. Activity as tolerated.      Consults:   Consults (From  admission, onward)        Status Ordering Provider     Inpatient consult to Neurology  Once     Provider:  Arnold Roca MD    Completed SHANNEN COREY     Inpatient consult to Registered Dietitian/Nutritionist  Once     Provider:  (Not yet assigned)    SHANNEN Rubalcava     IP consult to case management/social work  Once     Provider:  (Not yet assigned)    SHANNEN Rubalcava        Final Active Diagnoses:    Diagnosis Date Noted POA    PRINCIPAL PROBLEM:  Cerebrovascular accident (CVA) [I63.9] 10/01/2020 Yes    Lacunar stroke [I63.81] 10/01/2020 Yes    COPD (chronic obstructive pulmonary disease) [J44.9] 02/19/2019 Yes    Essential hypertension [I10] 02/15/2013 Yes    Tobacco use disorder, continuous [F17.209] 02/15/2013 Yes      Problems Resolved During this Admission:       Discharged Condition: stable    Disposition: Rehab Facility    Follow Up:  Follow-up Information     Juanito Dejesus Jr, MD On 10/6/2020.    Specialty: Family Medicine  Why: Outpatient Services, PCP, follow-up appointment at 8:00am.   Contact information:  [address] [facility] [address]  [phone]             [facility].    Specialty: Rehabilitation  Contact information:  [address] [place] [address].  [place] [address]  [phone]                 Medications:  Reconciled Home Medications:      Medication List      START taking these medications    aspirin 81 MG EC tablet  Commonly known as: ECOTRIN  Take 1 tablet (81 mg total) by mouth once daily.     clopidogreL 75 mg tablet  Commonly known as: PLAVIX  Take 1 tablet (75 mg total) by mouth once daily. for 25 days     valsartan 40 MG tablet  Commonly known as: DIOVAN  Take 1 tablet (40 mg total) by mouth once daily.        CHANGE how you take these medications    atorvastatin 40 MG tablet  Commonly known as: LIPITOR  Take 1 tablet (40 mg total) by mouth once daily.  What changed:   · medication strength  · how much to  take  · how to take this  · when to take this  · additional instructions            Indwelling Lines/Drains at time of discharge:   Lines/Drains/Airways     None                 Time spent on the discharge of patient: > 35 minutes  Patient was seen and examined on the date of discharge and determined to be suitable for discharge.         Moni Mehta MD  Department of Hospital Medicine  [facility]

## 2020-10-21 ENCOUNTER — OFFICE VISIT (OUTPATIENT)
Dept: CARDIOLOGY | Facility: CLINIC | Age: 75
End: 2020-10-21
Payer: MEDICARE

## 2020-10-21 VITALS
WEIGHT: 139.44 LBS | BODY MASS INDEX: 21.89 KG/M2 | DIASTOLIC BLOOD PRESSURE: 94 MMHG | SYSTOLIC BLOOD PRESSURE: 151 MMHG | HEIGHT: 67 IN | OXYGEN SATURATION: 98 % | HEART RATE: 83 BPM

## 2020-10-21 DIAGNOSIS — J98.4 CAVITARY LESION OF LUNG: ICD-10-CM

## 2020-10-21 DIAGNOSIS — A31.0 MAI (MYCOBACTERIUM AVIUM-INTRACELLULARE): ICD-10-CM

## 2020-10-21 DIAGNOSIS — J43.2 CENTRILOBULAR EMPHYSEMA: ICD-10-CM

## 2020-10-21 DIAGNOSIS — I63.9 CEREBROVASCULAR ACCIDENT (CVA), UNSPECIFIED MECHANISM: Primary | ICD-10-CM

## 2020-10-21 DIAGNOSIS — I10 ESSENTIAL HYPERTENSION: ICD-10-CM

## 2020-10-21 PROCEDURE — 99999 PR PBB SHADOW E&M-EST. PATIENT-LVL III: CPT | Mod: PBBFAC,,, | Performed by: INTERNAL MEDICINE

## 2020-10-21 PROCEDURE — 99999 PR PBB SHADOW E&M-EST. PATIENT-LVL III: ICD-10-PCS | Mod: PBBFAC,,, | Performed by: INTERNAL MEDICINE

## 2020-10-21 PROCEDURE — 3077F SYST BP >= 140 MM HG: CPT | Mod: CPTII,S$GLB,, | Performed by: INTERNAL MEDICINE

## 2020-10-21 PROCEDURE — 1159F PR MEDICATION LIST DOCUMENTED IN MEDICAL RECORD: ICD-10-PCS | Mod: S$GLB,,, | Performed by: INTERNAL MEDICINE

## 2020-10-21 PROCEDURE — 3008F BODY MASS INDEX DOCD: CPT | Mod: CPTII,S$GLB,, | Performed by: INTERNAL MEDICINE

## 2020-10-21 PROCEDURE — 1126F AMNT PAIN NOTED NONE PRSNT: CPT | Mod: S$GLB,,, | Performed by: INTERNAL MEDICINE

## 2020-10-21 PROCEDURE — 3077F PR MOST RECENT SYSTOLIC BLOOD PRESSURE >= 140 MM HG: ICD-10-PCS | Mod: CPTII,S$GLB,, | Performed by: INTERNAL MEDICINE

## 2020-10-21 PROCEDURE — 3080F PR MOST RECENT DIASTOLIC BLOOD PRESSURE >= 90 MM HG: ICD-10-PCS | Mod: CPTII,S$GLB,, | Performed by: INTERNAL MEDICINE

## 2020-10-21 PROCEDURE — 1101F PR PT FALLS ASSESS DOC 0-1 FALLS W/OUT INJ PAST YR: ICD-10-PCS | Mod: CPTII,S$GLB,, | Performed by: INTERNAL MEDICINE

## 2020-10-21 PROCEDURE — 1159F MED LIST DOCD IN RCRD: CPT | Mod: S$GLB,,, | Performed by: INTERNAL MEDICINE

## 2020-10-21 PROCEDURE — 3008F PR BODY MASS INDEX (BMI) DOCUMENTED: ICD-10-PCS | Mod: CPTII,S$GLB,, | Performed by: INTERNAL MEDICINE

## 2020-10-21 PROCEDURE — 3080F DIAST BP >= 90 MM HG: CPT | Mod: CPTII,S$GLB,, | Performed by: INTERNAL MEDICINE

## 2020-10-21 PROCEDURE — 1126F PR PAIN SEVERITY QUANTIFIED, NO PAIN PRESENT: ICD-10-PCS | Mod: S$GLB,,, | Performed by: INTERNAL MEDICINE

## 2020-10-21 PROCEDURE — 1101F PT FALLS ASSESS-DOCD LE1/YR: CPT | Mod: CPTII,S$GLB,, | Performed by: INTERNAL MEDICINE

## 2020-10-21 PROCEDURE — 99214 OFFICE O/P EST MOD 30 MIN: CPT | Mod: S$GLB,,, | Performed by: INTERNAL MEDICINE

## 2020-10-21 PROCEDURE — 99214 PR OFFICE/OUTPT VISIT, EST, LEVL IV, 30-39 MIN: ICD-10-PCS | Mod: S$GLB,,, | Performed by: INTERNAL MEDICINE

## 2020-10-21 NOTE — PROGRESS NOTES
Subjective:    Patient ID:  Rashad Rodríguez is a 75 y.o. male who presents for follow-up of Hospital Follow Up      HPI     Admitted 10/1/20  Rashad Rodríguez 75 y.o. male with HTN, tobacco abuse, and COPD presents to the hospital with a chief complaint of right sided weakness. He reports yesterday he noticed right sided heaviness and difficulty with speech. These symptoms worsened this morning and he presented to the ED. He has lost coordination of his right hand and is unable to grasp objects. He also finds he has been slurring his speech and his right leg feels heavy. He takes medication for HTN at home. He smokes 1ppd. He denies fever, chest pain, SOB, N/V, abdominal pain, dysuria, visual disturbance, syncope.      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     Mr Rodríguez presented with acute right hemiparesis and facial droop secondary to acute ischemic stroke. MRI brain with subactue left sided lacunar infarct to internal capsule. 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 Echo with concentric remodeling but no failure or other pathology. Per Cardiology, no arrhythmia to cause stroke nor consider anticoagulation. TSH WNL. HgbA1c 5.7%. Treated with aspirin, clopidogrel, statin and permissive hypertension x 48 hours. Symptoms improved some. PT/OT/ST recommended inpatient rehab. Accepted at rehab facility located at .Authorized by insurance. Patient very motivated to quit smoking. Low sodium diet. Activity as tolerated.      Denies CP or SOB  EKG NSR 61 with wenckebach - otherwise ok  Telemetry - mostly NSR and episodes of Mobitz I 2nd degree AVB. No A-fib or A-flutter seen    Lacunar stroke  Per neurology - permissive HTN. No clear A-fib or flutter seen on telemetry. Frequent episode of Mobitz I 2nd degree AVB seen  with adequate HR. Echo with good EF - no cardio-embolic source seen. Will f/u prn. Consider out patient event monitor or ILR if cardio-embolic source suspected     COPD (chronic obstructive pulmonary disease)  Per primary     Essential hypertension  Permissive HTN with CVA     Echo 10/1/20  · The left ventricle is normal in size with normal systolic function. The estimated ejection fraction is 55%.  · There is left ventricular concentric remodeling.  · Normal left ventricular diastolic function.  · Normal right ventricular systolic function.  · No pulmonary hypertension  · Normal central venous pressure (3 mmHg).  The estimated PA systolic pressure is 25 mmHg.     10/21/20 Denies CP or SOB  BP elevated today - not checking it at home  Denies dizziness or syncope    Review of Systems   Constitution: Negative for decreased appetite.   HENT: Negative for ear discharge.    Eyes: Negative for blurred vision.   Endocrine: Negative for polyphagia.   Skin: Negative for nail changes.   Genitourinary: Negative for bladder incontinence.   Neurological: Negative for aphonia.   Psychiatric/Behavioral: Negative for hallucinations.   Allergic/Immunologic: Negative for hives.        Objective:    Physical Exam   Constitutional: He is oriented to person, place, and time. He appears well-developed and well-nourished.   HENT:   Head: Normocephalic and atraumatic.   Eyes: Pupils are equal, round, and reactive to light. Conjunctivae are normal.   Neck: Normal range of motion. Neck supple.   Cardiovascular: Normal rate, normal heart sounds and intact distal pulses.   Pulmonary/Chest: Effort normal and breath sounds normal.   Abdominal: Soft. Bowel sounds are normal.   Musculoskeletal: Normal range of motion.   Neurological: He is alert and oriented to person, place, and time.   Skin: Skin is warm and dry.         Assessment:       1. Cerebrovascular accident (CVA), unspecified mechanism    2. Essential hypertension    3. Centrilobular  emphysema    4. Cavitary lesion of lung    5. BLAINE (mycobacterium avium-intracellulare)         Plan:       Needs to monitor home BP - will adjust medication if needed  OV 6 months  Continue Rx for HTN, CVA, COPD

## 2020-10-22 ENCOUNTER — OFFICE VISIT (OUTPATIENT)
Dept: FAMILY MEDICINE | Facility: CLINIC | Age: 75
End: 2020-10-22
Payer: COMMERCIAL

## 2020-10-22 ENCOUNTER — LAB VISIT (OUTPATIENT)
Dept: LAB | Facility: HOSPITAL | Age: 75
End: 2020-10-22
Attending: FAMILY MEDICINE
Payer: COMMERCIAL

## 2020-10-22 VITALS
SYSTOLIC BLOOD PRESSURE: 162 MMHG | OXYGEN SATURATION: 98 % | TEMPERATURE: 98 F | RESPIRATION RATE: 18 BRPM | WEIGHT: 140.44 LBS | HEART RATE: 92 BPM | BODY MASS INDEX: 22.04 KG/M2 | DIASTOLIC BLOOD PRESSURE: 98 MMHG | HEIGHT: 67 IN

## 2020-10-22 DIAGNOSIS — E78.5 DYSLIPIDEMIA: ICD-10-CM

## 2020-10-22 DIAGNOSIS — F17.200 SMOKING: ICD-10-CM

## 2020-10-22 DIAGNOSIS — J98.4 CAVITARY LESION OF LUNG: ICD-10-CM

## 2020-10-22 DIAGNOSIS — I10 ESSENTIAL HYPERTENSION: ICD-10-CM

## 2020-10-22 DIAGNOSIS — I63.81 LEFT SIDED LACUNAR INFARCTION: Primary | ICD-10-CM

## 2020-10-22 DIAGNOSIS — I63.81 LEFT SIDED LACUNAR INFARCTION: ICD-10-CM

## 2020-10-22 LAB
ALBUMIN SERPL BCP-MCNC: 3.6 G/DL (ref 3.5–5.2)
ALP SERPL-CCNC: 101 U/L (ref 55–135)
ALT SERPL W/O P-5'-P-CCNC: 36 U/L (ref 10–44)
ANION GAP SERPL CALC-SCNC: 9 MMOL/L (ref 8–16)
AST SERPL-CCNC: 26 U/L (ref 10–40)
BILIRUB SERPL-MCNC: 0.3 MG/DL (ref 0.1–1)
BUN SERPL-MCNC: 18 MG/DL (ref 8–23)
CALCIUM SERPL-MCNC: 9.5 MG/DL (ref 8.7–10.5)
CHLORIDE SERPL-SCNC: 105 MMOL/L (ref 95–110)
CHOLEST SERPL-MCNC: 140 MG/DL (ref 120–199)
CHOLEST/HDLC SERPL: 3 {RATIO} (ref 2–5)
CO2 SERPL-SCNC: 25 MMOL/L (ref 23–29)
CREAT SERPL-MCNC: 1 MG/DL (ref 0.5–1.4)
EST. GFR  (AFRICAN AMERICAN): >60 ML/MIN/1.73 M^2
EST. GFR  (NON AFRICAN AMERICAN): >60 ML/MIN/1.73 M^2
GLUCOSE SERPL-MCNC: 82 MG/DL (ref 70–110)
HDLC SERPL-MCNC: 46 MG/DL (ref 40–75)
HDLC SERPL: 32.9 % (ref 20–50)
LDLC SERPL CALC-MCNC: 77 MG/DL (ref 63–159)
NONHDLC SERPL-MCNC: 94 MG/DL
POTASSIUM SERPL-SCNC: 4 MMOL/L (ref 3.5–5.1)
PROT SERPL-MCNC: 7.9 G/DL (ref 6–8.4)
SODIUM SERPL-SCNC: 139 MMOL/L (ref 136–145)
TRIGL SERPL-MCNC: 85 MG/DL (ref 30–150)

## 2020-10-22 PROCEDURE — 80061 LIPID PANEL: CPT

## 2020-10-22 PROCEDURE — 1159F PR MEDICATION LIST DOCUMENTED IN MEDICAL RECORD: ICD-10-PCS | Mod: S$GLB,,, | Performed by: FAMILY MEDICINE

## 2020-10-22 PROCEDURE — 3080F PR MOST RECENT DIASTOLIC BLOOD PRESSURE >= 90 MM HG: ICD-10-PCS | Mod: CPTII,S$GLB,, | Performed by: FAMILY MEDICINE

## 2020-10-22 PROCEDURE — 3077F SYST BP >= 140 MM HG: CPT | Mod: CPTII,S$GLB,, | Performed by: FAMILY MEDICINE

## 2020-10-22 PROCEDURE — 1101F PR PT FALLS ASSESS DOC 0-1 FALLS W/OUT INJ PAST YR: ICD-10-PCS | Mod: CPTII,S$GLB,, | Performed by: FAMILY MEDICINE

## 2020-10-22 PROCEDURE — 3008F PR BODY MASS INDEX (BMI) DOCUMENTED: ICD-10-PCS | Mod: CPTII,S$GLB,, | Performed by: FAMILY MEDICINE

## 2020-10-22 PROCEDURE — 1101F PT FALLS ASSESS-DOCD LE1/YR: CPT | Mod: CPTII,S$GLB,, | Performed by: FAMILY MEDICINE

## 2020-10-22 PROCEDURE — 1126F AMNT PAIN NOTED NONE PRSNT: CPT | Mod: S$GLB,,, | Performed by: FAMILY MEDICINE

## 2020-10-22 PROCEDURE — 36415 COLL VENOUS BLD VENIPUNCTURE: CPT | Mod: PN

## 2020-10-22 PROCEDURE — 99214 PR OFFICE/OUTPT VISIT, EST, LEVL IV, 30-39 MIN: ICD-10-PCS | Mod: S$GLB,,, | Performed by: FAMILY MEDICINE

## 2020-10-22 PROCEDURE — 3080F DIAST BP >= 90 MM HG: CPT | Mod: CPTII,S$GLB,, | Performed by: FAMILY MEDICINE

## 2020-10-22 PROCEDURE — 3077F PR MOST RECENT SYSTOLIC BLOOD PRESSURE >= 140 MM HG: ICD-10-PCS | Mod: CPTII,S$GLB,, | Performed by: FAMILY MEDICINE

## 2020-10-22 PROCEDURE — 1159F MED LIST DOCD IN RCRD: CPT | Mod: S$GLB,,, | Performed by: FAMILY MEDICINE

## 2020-10-22 PROCEDURE — 3008F BODY MASS INDEX DOCD: CPT | Mod: CPTII,S$GLB,, | Performed by: FAMILY MEDICINE

## 2020-10-22 PROCEDURE — 99999 PR PBB SHADOW E&M-EST. PATIENT-LVL IV: ICD-10-PCS | Mod: PBBFAC,,, | Performed by: FAMILY MEDICINE

## 2020-10-22 PROCEDURE — 1126F PR PAIN SEVERITY QUANTIFIED, NO PAIN PRESENT: ICD-10-PCS | Mod: S$GLB,,, | Performed by: FAMILY MEDICINE

## 2020-10-22 PROCEDURE — 99214 OFFICE O/P EST MOD 30 MIN: CPT | Mod: S$GLB,,, | Performed by: FAMILY MEDICINE

## 2020-10-22 PROCEDURE — 99999 PR PBB SHADOW E&M-EST. PATIENT-LVL IV: CPT | Mod: PBBFAC,,, | Performed by: FAMILY MEDICINE

## 2020-10-22 PROCEDURE — 80053 COMPREHEN METABOLIC PANEL: CPT

## 2020-10-22 RX ORDER — VALSARTAN 160 MG/1
160 TABLET ORAL DAILY
Qty: 30 TABLET | Refills: 0
Start: 2020-10-22 | End: 2020-12-01 | Stop reason: SDUPTHER

## 2020-10-22 RX ORDER — ATORVASTATIN CALCIUM 40 MG/1
40 TABLET, FILM COATED ORAL DAILY
Qty: 90 TABLET | Refills: 0 | Status: SHIPPED | OUTPATIENT
Start: 2020-10-22 | End: 2021-02-01

## 2020-10-22 RX ORDER — ASPIRIN 81 MG/1
81 TABLET ORAL DAILY
Qty: 90 TABLET | Refills: 1 | Status: ON HOLD | OUTPATIENT
Start: 2020-10-22 | End: 2022-07-09

## 2020-10-22 RX ORDER — CLOPIDOGREL BISULFATE 75 MG/1
75 TABLET ORAL DAILY
Qty: 30 TABLET | Refills: 2 | Status: SHIPPED | OUTPATIENT
Start: 2020-10-22 | End: 2021-01-27

## 2020-10-23 ENCOUNTER — TELEPHONE (OUTPATIENT)
Dept: FAMILY MEDICINE | Facility: CLINIC | Age: 75
End: 2020-10-23

## 2020-10-23 PROBLEM — G46.5 PURE MOTOR LACUNAR SYNDROME: Status: ACTIVE | Noted: 2020-10-01

## 2020-10-23 NOTE — SUBJECTIVE & OBJECTIVE
"  Woke up with symptoms?: yes    Recent bleeding noted: no  Does the patient take any Blood Thinners? no  Medications: No relevant medications      Past Medical History: hypertension    Past Surgical History: no major surgeries within the last 2 weeks    Family History: no relevant history    Social History: drinking and smoker (active)    Allergies: Cephalexin  Ciprofloxacin  Doxycycline  Bactrim [Sulfamethoxazole-Trimethoprim]     Review of Systems   Constitutional: Negative for chills and fever.   HENT: Negative for congestion and sore throat.    Eyes: Negative for visual disturbance.   Respiratory: Negative for shortness of breath.    Cardiovascular: Negative for chest pain and palpitations.   Gastrointestinal: Negative for blood in stool, diarrhea, nausea and vomiting.   Genitourinary: Negative for difficulty urinating and hematuria.   Musculoskeletal: Negative for back pain and neck pain.   Neurological: Positive for facial asymmetry, speech difficulty and weakness. Negative for dizziness and headaches.     Objective:   Vitals: Blood pressure (!) 153/92, pulse 76, temperature 98 °F (36.7 °C), temperature source Oral, resp. rate 17, height 5' 7" (1.702 m), weight 63.5 kg (140 lb), SpO2 97 %.     CT READ: Yes  No hemmorhage. No mass effect. No early infarct signs.     Physical Exam  Vitals signs reviewed.   Constitutional:       Appearance: Normal appearance. He is well-developed.   HENT:      Head: Normocephalic and atraumatic.      Nose: Nose normal.   Eyes:      Pupils: Pupils are equal, round, and reactive to light.   Cardiovascular:      Rate and Rhythm: Normal rate and regular rhythm.   Pulmonary:      Effort: Pulmonary effort is normal.   Neurological:      Mental Status: He is alert and oriented to person, place, and time.      Cranial Nerves: Cranial nerve deficit, dysarthria and facial asymmetry present.      Sensory: No sensory deficit.      Motor: Weakness present.   Psychiatric:         Mood and " Affect: Mood normal.

## 2020-10-23 NOTE — HPI
This is a 75-year-old male who was in his usual state of health until 2200 on 09/29/2020 when he retired.  He woke up at 7:00 a.m. this morning with right hemiparesis and en sensory loss.

## 2020-10-23 NOTE — ASSESSMENT & PLAN NOTE
Pure motor lacunar syndrome  Antithrombotics for secondary stroke prevention: Antiplatelets: Aspirin: 81 mg daily  Clopidogrel: 75 mg daily    Statins for secondary stroke prevention and hyperlipidemia, if present:   Statins: Atorvastatin- 40 mg daily    Aggressive risk factor modification: HTN     Rehab efforts: The patient has been evaluated by a stroke team provider and the therapy needs have been fully considered based off the presenting complaints and exam findings. The following therapy evaluations are needed: PT evaluate and treat, OT evaluate and treat, SLP evaluate and treat, PM&R evaluate for appropriate placement    Diagnostics ordered/pending: Lipid Profile to assess cholesterol levels, MRA head to assess vasculature, MRA neck/arch to assess vasculature, MRI head without contrast to assess brain parenchyma, TTE to assess cardiac function/status     VTE prophylaxis: None: Reason for No Pharmacological VTE Prophylaxis: Ambulating with or without assistance    BP parameters: Infarct: No intervention, SBP <220

## 2020-10-23 NOTE — TELEPHONE ENCOUNTER
----- Message from Juanito Dejesus Jr., MD sent at 10/22/2020  5:10 PM CDT -----  Please let patient know labs looked good

## 2020-10-23 NOTE — CONSULTS
[facility]  Vascular Neurology  Comprehensive Stroke Center  Tele-Consultation Note      Consults    Consulting Provider: MARINA STEARNS  Current Providers  No providers found    Patient Location:  [facility] EMERGENCY DEPARTMENT Emergency Department  Spoke hospital nurse at bedside with patient assisting consultant.     Patient information was obtained from patient.         Assessment/Plan:     STROKE DOCUMENTATION     Acute Stroke Times:   Acute Stroke Times   Last Known Normal Date: 09/29/20  Last Known Normal Time: 2200  Symptom Onset Date: 09/29/20  Symptom Onset Time: 2200  Stroke Team Called Date: 09/30/20  Stroke Team Called Time: 1845  Stroke Team Arrival Date: 09/30/20  Stroke Team Arrival Time: 1853  CT Interpretation Time: 1853    NIH Scale:        Modified Costilla Score: 0  Gretta Coma Scale:    ABCD2 Score:    [id number] Score:   HAS -BLED Score:   ICH Score:   Hunt & Anthony Classification:       Diagnoses:   Pure motor lacunar syndrome  Pure motor lacunar syndrome  Antithrombotics for secondary stroke prevention: Antiplatelets: Aspirin: 81 mg daily  Clopidogrel: 75 mg daily    Statins for secondary stroke prevention and hyperlipidemia, if present:   Statins: Atorvastatin- 40 mg daily    Aggressive risk factor modification: HTN     Rehab efforts: The patient has been evaluated by a stroke team provider and the therapy needs have been fully considered based off the presenting complaints and exam findings. The following therapy evaluations are needed: PT evaluate and treat, OT evaluate and treat, SLP evaluate and treat, PM&R evaluate for appropriate placement    Diagnostics ordered/pending: Lipid Profile to assess cholesterol levels, MRA head to assess vasculature, MRA neck/arch to assess vasculature, MRI head without contrast to assess brain parenchyma, TTE to assess cardiac function/status     VTE prophylaxis: None: Reason for No Pharmacological VTE Prophylaxis:  "Ambulating with or without assistance    BP parameters: Infarct: No intervention, SBP <220            Blood pressure (!) 153/92, pulse 76, temperature 98 °F (36.7 °C), temperature source Oral, resp. rate 17, height 5' 7" (1.702 m), weight 63.5 kg (140 lb), SpO2 97 %.  Alteplase Eligible?: No  Alteplase Recommendation: Alteplase not recommended due to Outside of treatment window   Possible Interventional Revascularization Candidate? No; No large vessel occlusion    Disposition Recommendation: admit to inpatient  do not transfer    Subjective:     History of Present Illness:  This is a 75-year-old male who was in his usual state of health until 2200 on 09/29/2020 when he retired.  He woke up at 7:00 a.m. this morning with right hemiparesis and en sensory loss.       Woke up with symptoms?: yes    Recent bleeding noted: no  Does the patient take any Blood Thinners? no  Medications: No relevant medications      Past Medical History: hypertension    Past Surgical History: no major surgeries within the last 2 weeks    Family History: no relevant history    Social History: drinking and smoker (active)    Allergies: Cephalexin  Ciprofloxacin  Doxycycline  Bactrim [Sulfamethoxazole-Trimethoprim]     Review of Systems   Constitutional: Negative for chills and fever.   HENT: Negative for congestion and sore throat.    Eyes: Negative for visual disturbance.   Respiratory: Negative for shortness of breath.    Cardiovascular: Negative for chest pain and palpitations.   Gastrointestinal: Negative for blood in stool, diarrhea, nausea and vomiting.   Genitourinary: Negative for difficulty urinating and hematuria.   Musculoskeletal: Negative for back pain and neck pain.   Neurological: Positive for facial asymmetry, speech difficulty and weakness. Negative for dizziness and headaches.     Objective:   Vitals: Blood pressure (!) 153/92, pulse 76, temperature 98 °F (36.7 °C), temperature source Oral, resp. rate 17, height 5' 7" " (1.702 m), weight 63.5 kg (140 lb), SpO2 97 %.     CT READ: Yes  No hemmorhage. No mass effect. No early infarct signs.     Physical Exam  Vitals signs reviewed.   Constitutional:       Appearance: Normal appearance. He is well-developed.   HENT:      Head: Normocephalic and atraumatic.      Nose: Nose normal.   Eyes:      Pupils: Pupils are equal, round, and reactive to light.   Cardiovascular:      Rate and Rhythm: Normal rate and regular rhythm.   Pulmonary:      Effort: Pulmonary effort is normal.   Neurological:      Mental Status: He is alert and oriented to person, place, and time.      Cranial Nerves: Cranial nerve deficit, dysarthria and facial asymmetry present.      Sensory: No sensory deficit.      Motor: Weakness present.   Psychiatric:         Mood and Affect: Mood normal.               Recommended the emergency room physician to have a brief discussion with the patient and/or family if available regarding the risks and benefits of treatment, and to briefly document the occurrence of that discussion in his clinical encounter note.     The attending portion of this evaluation, treatment, and documentation was performed per Monserrat Gutierrez MD via audiovisual.    Billing code:  (non-intervention mild to moderate stroke, TIA, some mimics)    · This patient has a critical neurological condition/illness, with some potential for high morbidity and mortality.  · There is a moderate probability for acute neurological change leading to clinical and possibly life-threatening deterioration requiring highest level of physician preparedness for urgent intervention.  · Care was coordinated with other physicians involved in the patient's care.  · Radiologic studies and laboratory data were reviewed and interpreted, and plan of care was re-assessed based on the results.  · Diagnosis, treatment options and prognosis may have been discussed with the patient and/or family members or caregiver.      In your  opinion, this was a: Tier 2 Van Negative    Consult End Time: 1917       Monserrat Gutierrez MD  Comprehensive Stroke Center  Vascular Neurology   [facility]

## 2020-10-26 NOTE — PROGRESS NOTES
"Subjective:       Patient ID: Rashad Rodríguez is a 75 y.o. male.    Chief Complaint: Hospital Follow Up    HPI   75-year-old male comes in for hospital follow-up after having a stroke.  He is present with his wife.  He reports no residual weakness at present.  After stroke he went to rehab.  His wife reports that she thinks that his blood pressure medications are incorrect as his blood pressure is very high again.  His regimen had been adjusted after the stroke.  It appeared that while in rehab is the valsartan was increased but he never received a prescription for this increased dose.    Review of Systems   Constitutional: Negative for unexpected weight change.   Eyes: Negative for visual disturbance.   Respiratory: Negative for shortness of breath and wheezing.    Cardiovascular: Negative for chest pain, palpitations and claudication.   Gastrointestinal: Negative for abdominal pain and blood in stool.   Endocrine: Negative for polydipsia, polyphagia and polyuria.   Genitourinary: Negative for hematuria.   Neurological: Negative for dizziness, facial asymmetry, speech difficulty and numbness.         Objective:     BP (!) 162/98 (BP Location: Right arm, Patient Position: Sitting, BP Method: Medium (Manual))   Pulse 92   Temp 98 °F (36.7 °C) (Oral)   Resp 18   Ht 5' 7" (1.702 m)   Wt 63.7 kg (140 lb 6.9 oz)   SpO2 98%   BMI 21.99 kg/m²     Physical Exam  Vitals signs reviewed.   Constitutional:       Appearance: He is well-developed. He is not diaphoretic.   HENT:      Head: Normocephalic.      Right Ear: External ear normal.      Left Ear: External ear normal.      Nose: Nose normal.      Mouth/Throat:      Pharynx: No oropharyngeal exudate.   Neck:      Musculoskeletal: Normal range of motion and neck supple.      Trachea: No tracheal deviation.   Cardiovascular:      Rate and Rhythm: Normal rate and regular rhythm.      Heart sounds: Normal heart sounds. No murmur.   Pulmonary:      Effort: Pulmonary effort " is normal.      Breath sounds: Normal breath sounds. No wheezing or rales.   Abdominal:      General: Bowel sounds are normal.      Palpations: Abdomen is soft. Abdomen is not rigid. There is no mass.      Tenderness: There is no abdominal tenderness. There is no guarding or rebound.   Lymphadenopathy:      Cervical: No cervical adenopathy.   Neurological:      Mental Status: He is oriented to person, place, and time.      Gait: Gait normal.         Assessment:       1. Left sided lacunar infarction    2. Cavitary lesion of lung    3. Essential hypertension    4. Smoking    5. Dyslipidemia        Plan:       Rashad was seen today for hospital follow up.    Diagnoses and all orders for this visit:    Left sided lacunar infarction  -     aspirin (ECOTRIN) 81 MG EC tablet; Take 1 tablet (81 mg total) by mouth once daily.  -     atorvastatin (LIPITOR) 40 MG tablet; Take 1 tablet (40 mg total) by mouth once daily.  -     clopidogreL (PLAVIX) 75 mg tablet; Take 1 tablet (75 mg total) by mouth once daily.  -     Ambulatory referral/consult to Neurology; Future  -     Comprehensive Metabolic Panel; Future  -     Lipid Panel; Future  Medications reviewed with patient.  Will get Neurology input.    Cavitary lesion of lung  Patient had never done a CT previously ordered.  This was scheduled while he was present.    Essential hypertension  -     valsartan (DIOVAN) 160 MG tablet; Take 1 tablet (160 mg total) by mouth once daily.  Valsartan increased.  Recheck blood pressure within two weeks.  Discussed dangers of having a heart attack or another stroke with elevated blood pressures.    Smoking  Patient continues to smoke occasionally.  Dangers of stroke and heart attack discussed.  Encouraged smoking cessation program however he declines.    Dyslipidemia  -     Comprehensive Metabolic Panel; Future  -     Lipid Panel; Future  Check lipids since starting high-dose statin

## 2020-11-02 ENCOUNTER — TELEPHONE (OUTPATIENT)
Dept: FAMILY MEDICINE | Facility: CLINIC | Age: 75
End: 2020-11-02

## 2020-11-02 NOTE — TELEPHONE ENCOUNTER
----- Message from Brda Medina sent at 11/2/2020  3:24 PM CST -----  Type: RX Refill Request    Who Called: jani     Have you contacted your pharmacy: yes     Refill or New Rx: refill     RX Name and Strength: valsartan (DIOVAN) 160 MG tablet    How is the patient currently taking it? (ex. 1XDay):    Is this a 30 day or 90 day RX:    Preferred Pharmacy with phone number:   [id number] - [place] [address] [facility] [address] [place] [address]  Phone: [phone] Fax: [phone]    Local or Mail Order: local     Ordering Provider:    Would the patient rather a call back or a response via [facility]?  Call back     Best Call Back Number: [phone]    Additional Information:

## 2020-11-04 ENCOUNTER — HOSPITAL ENCOUNTER (OUTPATIENT)
Dept: RADIOLOGY | Facility: HOSPITAL | Age: 75
Discharge: HOME | End: 2020-11-04
Attending: FAMILY MEDICINE
Payer: COMMERCIAL

## 2020-11-04 DIAGNOSIS — R91.1 LUNG NODULE: ICD-10-CM

## 2020-11-04 PROCEDURE — 71250 CT THORAX DX C-: CPT | Mod: TC

## 2020-11-04 PROCEDURE — 71250 CT CHEST WITHOUT CONTRAST: ICD-10-PCS | Mod: 26,,, | Performed by: RADIOLOGY

## 2020-11-04 PROCEDURE — 71250 CT THORAX DX C-: CPT | Mod: 26,,, | Performed by: RADIOLOGY

## 2020-11-06 ENCOUNTER — TELEPHONE (OUTPATIENT)
Dept: FAMILY MEDICINE | Facility: CLINIC | Age: 75
End: 2020-11-06

## 2020-11-06 NOTE — TELEPHONE ENCOUNTER
----- Message from Juanito Dejesus Jr., MD sent at 11/5/2020 12:04 PM CST -----  Please let patient know that lung nodules are same in size from previous, which is good

## 2020-11-12 ENCOUNTER — OFFICE VISIT (OUTPATIENT)
Dept: NEUROLOGY | Facility: CLINIC | Age: 75
End: 2020-11-12
Payer: COMMERCIAL

## 2020-11-12 VITALS
SYSTOLIC BLOOD PRESSURE: 148 MMHG | BODY MASS INDEX: 21.48 KG/M2 | HEART RATE: 88 BPM | DIASTOLIC BLOOD PRESSURE: 91 MMHG | HEIGHT: 67 IN | WEIGHT: 136.88 LBS

## 2020-11-12 DIAGNOSIS — I63.81 LEFT SIDED LACUNAR INFARCTION: ICD-10-CM

## 2020-11-12 PROCEDURE — 99999 PR PBB SHADOW E&M-EST. PATIENT-LVL III: CPT | Mod: PBBFAC,,, | Performed by: NEUROLOGICAL SURGERY

## 2020-11-12 PROCEDURE — 99999 PR PBB SHADOW E&M-EST. PATIENT-LVL III: ICD-10-PCS | Mod: PBBFAC,,, | Performed by: NEUROLOGICAL SURGERY

## 2020-11-12 PROCEDURE — 1159F MED LIST DOCD IN RCRD: CPT | Mod: S$GLB,,, | Performed by: NEUROLOGICAL SURGERY

## 2020-11-12 PROCEDURE — 99215 OFFICE O/P EST HI 40 MIN: CPT | Mod: S$GLB,,, | Performed by: NEUROLOGICAL SURGERY

## 2020-11-12 PROCEDURE — 1101F PR PT FALLS ASSESS DOC 0-1 FALLS W/OUT INJ PAST YR: ICD-10-PCS | Mod: CPTII,S$GLB,, | Performed by: NEUROLOGICAL SURGERY

## 2020-11-12 PROCEDURE — 3288F PR FALLS RISK ASSESSMENT DOCUMENTED: ICD-10-PCS | Mod: CPTII,S$GLB,, | Performed by: NEUROLOGICAL SURGERY

## 2020-11-12 PROCEDURE — 3077F SYST BP >= 140 MM HG: CPT | Mod: CPTII,S$GLB,, | Performed by: NEUROLOGICAL SURGERY

## 2020-11-12 PROCEDURE — 1126F AMNT PAIN NOTED NONE PRSNT: CPT | Mod: S$GLB,,, | Performed by: NEUROLOGICAL SURGERY

## 2020-11-12 PROCEDURE — 3008F BODY MASS INDEX DOCD: CPT | Mod: CPTII,S$GLB,, | Performed by: NEUROLOGICAL SURGERY

## 2020-11-12 PROCEDURE — 1159F PR MEDICATION LIST DOCUMENTED IN MEDICAL RECORD: ICD-10-PCS | Mod: S$GLB,,, | Performed by: NEUROLOGICAL SURGERY

## 2020-11-12 PROCEDURE — 1101F PT FALLS ASSESS-DOCD LE1/YR: CPT | Mod: CPTII,S$GLB,, | Performed by: NEUROLOGICAL SURGERY

## 2020-11-12 PROCEDURE — 3080F PR MOST RECENT DIASTOLIC BLOOD PRESSURE >= 90 MM HG: ICD-10-PCS | Mod: CPTII,S$GLB,, | Performed by: NEUROLOGICAL SURGERY

## 2020-11-12 PROCEDURE — 3288F FALL RISK ASSESSMENT DOCD: CPT | Mod: CPTII,S$GLB,, | Performed by: NEUROLOGICAL SURGERY

## 2020-11-12 PROCEDURE — 3077F PR MOST RECENT SYSTOLIC BLOOD PRESSURE >= 140 MM HG: ICD-10-PCS | Mod: CPTII,S$GLB,, | Performed by: NEUROLOGICAL SURGERY

## 2020-11-12 PROCEDURE — 99215 PR OFFICE/OUTPT VISIT, EST, LEVL V, 40-54 MIN: ICD-10-PCS | Mod: S$GLB,,, | Performed by: NEUROLOGICAL SURGERY

## 2020-11-12 PROCEDURE — 1126F PR PAIN SEVERITY QUANTIFIED, NO PAIN PRESENT: ICD-10-PCS | Mod: S$GLB,,, | Performed by: NEUROLOGICAL SURGERY

## 2020-11-12 PROCEDURE — 3008F PR BODY MASS INDEX (BMI) DOCUMENTED: ICD-10-PCS | Mod: CPTII,S$GLB,, | Performed by: NEUROLOGICAL SURGERY

## 2020-11-12 PROCEDURE — 3080F DIAST BP >= 90 MM HG: CPT | Mod: CPTII,S$GLB,, | Performed by: NEUROLOGICAL SURGERY

## 2020-11-12 NOTE — PROGRESS NOTES
Chief Complaint   Patient presents with    Stroke        Rashad Rodríguez is a 75 y.o. male with a history of multiple medical diagnoses as listed below that presents for follow up after being evaluated in the hospital for right sided weakness due to left internal capsule stroke. He was placed on DAPT and has been taking his medications as recommended. He has been feeling like he is back to his baseline and has not had any complaints of side effects.     PAST MEDICAL HISTORY:  Past Medical History:   Diagnosis Date    Cigarette smoker     Hyperlipidemia     Hypertension     Stroke 10/01/2020       PAST SURGICAL HISTORY:  Past Surgical History:   Procedure Laterality Date    NO PAST SURGERIES         SOCIAL HISTORY:  Social History     Socioeconomic History    Marital status:      Spouse name: Not on file    Number of children: Not on file    Years of education: Not on file    Highest education level: Not on file   Occupational History    Not on file   Social Needs    Financial resource strain: Not on file    Food insecurity     Worry: Not on file     Inability: Not on file    Transportation needs     Medical: Not on file     Non-medical: Not on file   Tobacco Use    Smoking status: Current Every Day Smoker     Packs/day: 1.00     Years: 50.00     Pack years: 50.00     Types: Cigarettes    Smokeless tobacco: Never Used   Substance and Sexual Activity    Alcohol use: Yes     Alcohol/week: 2.0 standard drinks     Types: 2 Cans of beer per week     Comment: socially    Drug use: No    Sexual activity: Yes     Partners: Female   Lifestyle    Physical activity     Days per week: Not on file     Minutes per session: Not on file    Stress: Not on file   Relationships    Social connections     Talks on phone: Not on file     Gets together: Not on file     Attends Jew service: Not on file     Active member of club or organization: Not on file     Attends meetings of clubs or organizations: Not  on file     Relationship status: Not on file   Other Topics Concern    Not on file   Social History Narrative    Not on file       FAMILY HISTORY:  Family History   Problem Relation Age of Onset    Cancer Mother     Stroke Father     No Known Problems Sister     No Known Problems Brother     No Known Problems Maternal Aunt     No Known Problems Maternal Uncle     No Known Problems Paternal Aunt     No Known Problems Paternal Uncle     No Known Problems Maternal Grandmother     No Known Problems Maternal Grandfather     No Known Problems Paternal Grandmother     No Known Problems Paternal Grandfather     Amblyopia Neg Hx     Blindness Neg Hx     Cataracts Neg Hx     Diabetes Neg Hx     Glaucoma Neg Hx     Hypertension Neg Hx     Macular degeneration Neg Hx     Retinal detachment Neg Hx     Strabismus Neg Hx     Thyroid disease Neg Hx        ALLERGIES AND MEDICATIONS: updated and reviewed.  Review of patient's allergies indicates:   Allergen Reactions    Cephalexin     Ciprofloxacin     Doxycycline Other (See Comments)     Stomach cramps    Bactrim [sulfamethoxazole-trimethoprim] Rash     Current Outpatient Medications   Medication Sig Dispense Refill    aspirin (ECOTRIN) 81 MG EC tablet Take 1 tablet (81 mg total) by mouth once daily. 90 tablet 1    atorvastatin (LIPITOR) 40 MG tablet Take 1 tablet (40 mg total) by mouth once daily. 90 tablet 0    clopidogreL (PLAVIX) 75 mg tablet Take 1 tablet (75 mg total) by mouth once daily. 30 tablet 2    valsartan (DIOVAN) 160 MG tablet Take 1 tablet (160 mg total) by mouth once daily. 30 tablet 0    VIAGRA 100 mg tablet 1 tablet PO one hour prior to sex, maximum of 1 tablet in 24 hours 10 tablet 11     No current facility-administered medications for this visit.        Review of Systems   Constitutional: Negative for activity change, fatigue and unexpected weight change.   HENT: Negative for trouble swallowing and voice change.    Eyes:  Negative for photophobia, pain and visual disturbance.   Respiratory: Negative for apnea and shortness of breath.    Cardiovascular: Negative for chest pain and palpitations.   Gastrointestinal: Negative for constipation, nausea and vomiting.   Genitourinary: Negative for difficulty urinating.   Musculoskeletal: Negative for arthralgias, back pain, gait problem, myalgias and neck pain.   Skin: Negative for color change and rash.   Neurological: Negative for dizziness, seizures, syncope, speech difficulty, weakness, light-headedness, numbness and headaches.   Psychiatric/Behavioral: Negative for agitation, behavioral problems and confusion.       Neurologic Exam     Mental Status   Oriented to person, place, and time.   Registration: recalls 3 of 3 objects.   Attention: normal. Concentration: normal.   Speech: speech is normal   Level of consciousness: alert  Knowledge: good.     Cranial Nerves     CN II   Visual fields full to confrontation.   Right visual field deficit: none  Left visual field deficit: none     CN III, IV, VI   Pupils are equal, round, and reactive to light.  Extraocular motions are normal.   Right pupil: Size: 3 mm. Shape: regular. Accommodation: intact.   Left pupil: Size: 3 mm. Shape: regular. Accommodation: intact.   CN III: no CN III palsy  CN VI: no CN VI palsy  Nystagmus: none   Diplopia: none  Ophthalmoparesis: none  Upgaze: normal  Downgaze: normal  Conjugate gaze: present    CN V   Facial sensation intact.   Right facial sensation deficit: none  Left facial sensation deficit: none    CN VII   Facial expression full, symmetric.   Right facial weakness: none  Left facial weakness: none    CN VIII   CN VIII normal.     CN IX, X   CN IX normal.   CN X normal.   Palate: symmetric    CN XI   CN XI normal.   Right sternocleidomastoid strength: normal  Left sternocleidomastoid strength: normal  Right trapezius strength: normal  Left trapezius strength: normal    CN XII   CN XII normal.   Tongue  deviation: none    Motor Exam   Muscle bulk: normal  Overall muscle tone: normal  Right arm tone: normal  Left arm tone: normal  Right leg tone: normal  Left leg tone: normal    Strength   Strength 5/5 throughout.     Sensory Exam   Right arm light touch: normal  Left arm light touch: normal  Right leg light touch: normal  Left leg light touch: normal  Right arm vibration: normal  Left arm vibration: normal  Right leg vibration: normal  Left leg vibration: normal  Right arm proprioception: normal  Left arm proprioception: normal  Right leg proprioception: normal  Left leg proprioception: normal  Right arm pinprick: normal  Left arm pinprick: normal  Right leg pinprick: normal  Left leg pinprick: normal    Gait, Coordination, and Reflexes     Gait  Gait: normal    Coordination   Romberg: negative  Finger to nose coordination: normal  Heel to shin coordination: normal  Tandem walking coordination: normal    Tremor   Resting tremor: absent    Reflexes   Right brachioradialis: 2+  Left brachioradialis: 2+  Right biceps: 2+  Left biceps: 2+  Right triceps: 2+  Left triceps: 2+  Right patellar: 2+  Left patellar: 2+  Right achilles: 2+  Left achilles: 2+  Right plantar: normal  Left plantar: normal      Physical Exam  Constitutional:       Appearance: He is well-developed.   HENT:      Head: Normocephalic and atraumatic.   Eyes:      Extraocular Movements: EOM normal.      Pupils: Pupils are equal, round, and reactive to light.   Pulmonary:      Effort: Pulmonary effort is normal. No respiratory distress.   Musculoskeletal: Normal range of motion.   Skin:     General: Skin is warm and dry.   Neurological:      Mental Status: He is alert and oriented to person, place, and time.      Coordination: Finger-Nose-Finger Test, Heel to Shin Test and Romberg Test normal.      Gait: Gait is intact. Tandem walk normal.      Deep Tendon Reflexes: Strength normal.      Reflex Scores:       Tricep reflexes are 2+ on the right side and  "2+ on the left side.       Bicep reflexes are 2+ on the right side and 2+ on the left side.       Brachioradialis reflexes are 2+ on the right side and 2+ on the left side.       Patellar reflexes are 2+ on the right side and 2+ on the left side.       Achilles reflexes are 2+ on the right side and 2+ on the left side.  Psychiatric:         Speech: Speech normal.         Behavior: Behavior normal.         Vitals:    11/12/20 1307   BP: (!) 148/91   BP Location: Right arm   Patient Position: Sitting   BP Method: Large (Automatic)   Pulse: 88   Weight: 62.1 kg (136 lb 14.5 oz)   Height: 5' 7" (1.702 m)       Assessment & Plan:    Problem List Items Addressed This Visit     None      Visit Diagnoses     Left sided lacunar infarction            More than 50% of this 45 minute encounter was spent in counseling and coordinating care of stroke    Follow-up: Follow up in about 3 months (around 2/12/2021).    This note was done with the assistance of voice recognition software. Some errors may be present after proofreading.        "

## 2020-12-01 DIAGNOSIS — I10 ESSENTIAL HYPERTENSION: ICD-10-CM

## 2020-12-01 RX ORDER — VALSARTAN 160 MG/1
160 TABLET ORAL DAILY
Qty: 30 TABLET | Refills: 0 | Status: SHIPPED | OUTPATIENT
Start: 2020-12-01 | End: 2021-01-07 | Stop reason: SDUPTHER

## 2020-12-01 RX ORDER — VALSARTAN 160 MG/1
160 TABLET ORAL DAILY
Qty: 30 TABLET | Refills: 0
Start: 2020-12-01 | End: 2020-12-01 | Stop reason: SDUPTHER

## 2021-01-07 ENCOUNTER — IMMUNIZATION (OUTPATIENT)
Dept: OBSTETRICS AND GYNECOLOGY | Facility: CLINIC | Age: 76
End: 2021-01-07
Payer: COMMERCIAL

## 2021-01-07 DIAGNOSIS — I10 ESSENTIAL HYPERTENSION: ICD-10-CM

## 2021-01-07 DIAGNOSIS — Z23 NEED FOR VACCINATION: ICD-10-CM

## 2021-01-07 PROCEDURE — 91300 COVID-19, MRNA, LNP-S, PF, 30 MCG/0.3 ML DOSE VACCINE: CPT | Mod: PBBFAC | Performed by: FAMILY MEDICINE

## 2021-01-07 RX ORDER — VALSARTAN 160 MG/1
160 TABLET ORAL DAILY
Qty: 30 TABLET | Refills: 0 | Status: SHIPPED | OUTPATIENT
Start: 2021-01-07 | End: 2021-02-04 | Stop reason: SDUPTHER

## 2021-01-28 ENCOUNTER — IMMUNIZATION (OUTPATIENT)
Dept: OBSTETRICS AND GYNECOLOGY | Facility: CLINIC | Age: 76
End: 2021-01-28
Payer: COMMERCIAL

## 2021-01-28 DIAGNOSIS — Z23 NEED FOR VACCINATION: Primary | ICD-10-CM

## 2021-01-28 PROCEDURE — 91300 COVID-19, MRNA, LNP-S, PF, 30 MCG/0.3 ML DOSE VACCINE: CPT | Mod: PBBFAC | Performed by: FAMILY MEDICINE

## 2021-01-28 PROCEDURE — 0002A COVID-19, MRNA, LNP-S, PF, 30 MCG/0.3 ML DOSE VACCINE: CPT | Mod: PBBFAC | Performed by: FAMILY MEDICINE

## 2021-02-04 ENCOUNTER — TELEPHONE (OUTPATIENT)
Dept: FAMILY MEDICINE | Facility: CLINIC | Age: 76
End: 2021-02-04

## 2021-02-04 DIAGNOSIS — I10 ESSENTIAL HYPERTENSION: ICD-10-CM

## 2021-02-04 RX ORDER — VALSARTAN 160 MG/1
160 TABLET ORAL DAILY
Qty: 30 TABLET | Refills: 0 | Status: SHIPPED | OUTPATIENT
Start: 2021-02-04 | End: 2021-02-08 | Stop reason: SDUPTHER

## 2021-02-08 DIAGNOSIS — I10 ESSENTIAL HYPERTENSION: ICD-10-CM

## 2021-02-08 RX ORDER — VALSARTAN 160 MG/1
160 TABLET ORAL DAILY
Qty: 30 TABLET | Refills: 0 | Status: SHIPPED | OUTPATIENT
Start: 2021-02-08 | End: 2021-03-29 | Stop reason: SDUPTHER

## 2021-03-15 ENCOUNTER — PATIENT OUTREACH (OUTPATIENT)
Dept: ADMINISTRATIVE | Facility: HOSPITAL | Age: 76
End: 2021-03-15

## 2021-03-22 ENCOUNTER — LAB VISIT (OUTPATIENT)
Dept: LAB | Facility: HOSPITAL | Age: 76
End: 2021-03-22
Attending: FAMILY MEDICINE
Payer: COMMERCIAL

## 2021-03-22 DIAGNOSIS — I10 ESSENTIAL HYPERTENSION: ICD-10-CM

## 2021-03-22 DIAGNOSIS — E78.5 DYSLIPIDEMIA: ICD-10-CM

## 2021-03-22 LAB
ALBUMIN SERPL BCP-MCNC: 3.4 G/DL (ref 3.5–5.2)
ALP SERPL-CCNC: 110 U/L (ref 55–135)
ALT SERPL W/O P-5'-P-CCNC: 38 U/L (ref 10–44)
ANION GAP SERPL CALC-SCNC: 9 MMOL/L (ref 8–16)
AST SERPL-CCNC: 34 U/L (ref 10–40)
BILIRUB SERPL-MCNC: 0.7 MG/DL (ref 0.1–1)
BUN SERPL-MCNC: 13 MG/DL (ref 8–23)
CALCIUM SERPL-MCNC: 9.3 MG/DL (ref 8.7–10.5)
CHLORIDE SERPL-SCNC: 107 MMOL/L (ref 95–110)
CHOLEST SERPL-MCNC: 157 MG/DL (ref 120–199)
CHOLEST/HDLC SERPL: 2.8 {RATIO} (ref 2–5)
CO2 SERPL-SCNC: 25 MMOL/L (ref 23–29)
CREAT SERPL-MCNC: 1 MG/DL (ref 0.5–1.4)
EST. GFR  (AFRICAN AMERICAN): >60 ML/MIN/1.73 M^2
EST. GFR  (NON AFRICAN AMERICAN): >60 ML/MIN/1.73 M^2
GLUCOSE SERPL-MCNC: 86 MG/DL (ref 70–110)
HDLC SERPL-MCNC: 56 MG/DL (ref 40–75)
HDLC SERPL: 35.7 % (ref 20–50)
LDLC SERPL CALC-MCNC: 81.4 MG/DL (ref 63–159)
NONHDLC SERPL-MCNC: 101 MG/DL
POTASSIUM SERPL-SCNC: 4.2 MMOL/L (ref 3.5–5.1)
PROT SERPL-MCNC: 7.8 G/DL (ref 6–8.4)
SODIUM SERPL-SCNC: 141 MMOL/L (ref 136–145)
TRIGL SERPL-MCNC: 98 MG/DL (ref 30–150)

## 2021-03-22 PROCEDURE — 80061 LIPID PANEL: CPT | Performed by: FAMILY MEDICINE

## 2021-03-22 PROCEDURE — 80053 COMPREHEN METABOLIC PANEL: CPT | Performed by: FAMILY MEDICINE

## 2021-03-22 PROCEDURE — 36415 COLL VENOUS BLD VENIPUNCTURE: CPT | Mod: PN | Performed by: FAMILY MEDICINE

## 2021-03-29 ENCOUNTER — OFFICE VISIT (OUTPATIENT)
Dept: FAMILY MEDICINE | Facility: CLINIC | Age: 76
End: 2021-03-29
Payer: COMMERCIAL

## 2021-03-29 VITALS
RESPIRATION RATE: 18 BRPM | OXYGEN SATURATION: 99 % | WEIGHT: 133.38 LBS | SYSTOLIC BLOOD PRESSURE: 151 MMHG | HEART RATE: 86 BPM | DIASTOLIC BLOOD PRESSURE: 89 MMHG | TEMPERATURE: 98 F | BODY MASS INDEX: 20.93 KG/M2 | HEIGHT: 67 IN

## 2021-03-29 DIAGNOSIS — I10 ESSENTIAL HYPERTENSION: Primary | ICD-10-CM

## 2021-03-29 DIAGNOSIS — R19.5 POSITIVE COLORECTAL CANCER SCREENING USING COLOGUARD TEST: ICD-10-CM

## 2021-03-29 DIAGNOSIS — E78.5 DYSLIPIDEMIA: ICD-10-CM

## 2021-03-29 DIAGNOSIS — F17.200 SMOKING: ICD-10-CM

## 2021-03-29 PROCEDURE — 99214 OFFICE O/P EST MOD 30 MIN: CPT | Mod: S$GLB,,, | Performed by: FAMILY MEDICINE

## 2021-03-29 PROCEDURE — 99214 PR OFFICE/OUTPT VISIT, EST, LEVL IV, 30-39 MIN: ICD-10-PCS | Mod: S$GLB,,, | Performed by: FAMILY MEDICINE

## 2021-03-29 PROCEDURE — 3079F DIAST BP 80-89 MM HG: CPT | Mod: CPTII,S$GLB,, | Performed by: FAMILY MEDICINE

## 2021-03-29 PROCEDURE — 3288F FALL RISK ASSESSMENT DOCD: CPT | Mod: CPTII,S$GLB,, | Performed by: FAMILY MEDICINE

## 2021-03-29 PROCEDURE — 3077F PR MOST RECENT SYSTOLIC BLOOD PRESSURE >= 140 MM HG: ICD-10-PCS | Mod: CPTII,S$GLB,, | Performed by: FAMILY MEDICINE

## 2021-03-29 PROCEDURE — 3077F SYST BP >= 140 MM HG: CPT | Mod: CPTII,S$GLB,, | Performed by: FAMILY MEDICINE

## 2021-03-29 PROCEDURE — 1126F AMNT PAIN NOTED NONE PRSNT: CPT | Mod: S$GLB,,, | Performed by: FAMILY MEDICINE

## 2021-03-29 PROCEDURE — 3079F PR MOST RECENT DIASTOLIC BLOOD PRESSURE 80-89 MM HG: ICD-10-PCS | Mod: CPTII,S$GLB,, | Performed by: FAMILY MEDICINE

## 2021-03-29 PROCEDURE — 1101F PR PT FALLS ASSESS DOC 0-1 FALLS W/OUT INJ PAST YR: ICD-10-PCS | Mod: CPTII,S$GLB,, | Performed by: FAMILY MEDICINE

## 2021-03-29 PROCEDURE — 3008F PR BODY MASS INDEX (BMI) DOCUMENTED: ICD-10-PCS | Mod: CPTII,S$GLB,, | Performed by: FAMILY MEDICINE

## 2021-03-29 PROCEDURE — 1159F MED LIST DOCD IN RCRD: CPT | Mod: S$GLB,,, | Performed by: FAMILY MEDICINE

## 2021-03-29 PROCEDURE — 1159F PR MEDICATION LIST DOCUMENTED IN MEDICAL RECORD: ICD-10-PCS | Mod: S$GLB,,, | Performed by: FAMILY MEDICINE

## 2021-03-29 PROCEDURE — 3008F BODY MASS INDEX DOCD: CPT | Mod: CPTII,S$GLB,, | Performed by: FAMILY MEDICINE

## 2021-03-29 PROCEDURE — 3288F PR FALLS RISK ASSESSMENT DOCUMENTED: ICD-10-PCS | Mod: CPTII,S$GLB,, | Performed by: FAMILY MEDICINE

## 2021-03-29 PROCEDURE — 99999 PR PBB SHADOW E&M-EST. PATIENT-LVL IV: CPT | Mod: PBBFAC,,, | Performed by: FAMILY MEDICINE

## 2021-03-29 PROCEDURE — 99999 PR PBB SHADOW E&M-EST. PATIENT-LVL IV: ICD-10-PCS | Mod: PBBFAC,,, | Performed by: FAMILY MEDICINE

## 2021-03-29 PROCEDURE — 1101F PT FALLS ASSESS-DOCD LE1/YR: CPT | Mod: CPTII,S$GLB,, | Performed by: FAMILY MEDICINE

## 2021-03-29 PROCEDURE — 1126F PR PAIN SEVERITY QUANTIFIED, NO PAIN PRESENT: ICD-10-PCS | Mod: S$GLB,,, | Performed by: FAMILY MEDICINE

## 2021-03-29 RX ORDER — HYDROXYZINE HYDROCHLORIDE 10 MG/1
TABLET, FILM COATED ORAL
COMMUNITY
Start: 2021-03-01 | End: 2023-02-15

## 2021-03-29 RX ORDER — AMLODIPINE BESYLATE 5 MG/1
5 TABLET ORAL DAILY
Qty: 90 TABLET | Refills: 0 | Status: SHIPPED | OUTPATIENT
Start: 2021-03-29 | End: 2021-06-29 | Stop reason: SDUPTHER

## 2021-03-29 RX ORDER — VALSARTAN 160 MG/1
160 TABLET ORAL DAILY
Qty: 90 TABLET | Refills: 0 | Status: SHIPPED | OUTPATIENT
Start: 2021-03-29 | End: 2021-06-29 | Stop reason: SDUPTHER

## 2021-03-29 RX ORDER — CLOBETASOL PROPIONATE 0.5 MG/G
CREAM TOPICAL
COMMUNITY
Start: 2021-03-08 | End: 2022-07-09

## 2021-04-12 ENCOUNTER — CLINICAL SUPPORT (OUTPATIENT)
Dept: FAMILY MEDICINE | Facility: CLINIC | Age: 76
End: 2021-04-12
Payer: COMMERCIAL

## 2021-04-12 VITALS
RESPIRATION RATE: 18 BRPM | TEMPERATURE: 98 F | OXYGEN SATURATION: 98 % | SYSTOLIC BLOOD PRESSURE: 124 MMHG | DIASTOLIC BLOOD PRESSURE: 78 MMHG | HEART RATE: 80 BPM | BODY MASS INDEX: 20.89 KG/M2 | HEIGHT: 67 IN

## 2021-04-12 DIAGNOSIS — I10 ESSENTIAL HYPERTENSION: Primary | ICD-10-CM

## 2021-04-12 PROCEDURE — 99999 PR PBB SHADOW E&M-EST. PATIENT-LVL III: CPT | Mod: PBBFAC,,,

## 2021-04-12 PROCEDURE — 99499 NO LOS: ICD-10-PCS | Mod: S$GLB,,, | Performed by: FAMILY MEDICINE

## 2021-04-12 PROCEDURE — 99499 UNLISTED E&M SERVICE: CPT | Mod: S$GLB,,, | Performed by: FAMILY MEDICINE

## 2021-04-12 PROCEDURE — 99999 PR PBB SHADOW E&M-EST. PATIENT-LVL III: ICD-10-PCS | Mod: PBBFAC,,,

## 2021-04-29 ENCOUNTER — OFFICE VISIT (OUTPATIENT)
Dept: NEUROLOGY | Facility: CLINIC | Age: 76
End: 2021-04-29
Payer: MEDICARE

## 2021-04-29 VITALS
HEIGHT: 67 IN | WEIGHT: 137.13 LBS | SYSTOLIC BLOOD PRESSURE: 141 MMHG | DIASTOLIC BLOOD PRESSURE: 88 MMHG | BODY MASS INDEX: 21.52 KG/M2 | HEART RATE: 92 BPM

## 2021-04-29 DIAGNOSIS — Z86.73 HISTORY OF STROKE: Primary | ICD-10-CM

## 2021-04-29 DIAGNOSIS — F17.209 TOBACCO USE DISORDER, CONTINUOUS: ICD-10-CM

## 2021-04-29 DIAGNOSIS — I10 ESSENTIAL HYPERTENSION: ICD-10-CM

## 2021-04-29 PROBLEM — G46.5 PURE MOTOR LACUNAR SYNDROME: Status: RESOLVED | Noted: 2020-10-01 | Resolved: 2021-04-29

## 2021-04-29 PROCEDURE — 1159F PR MEDICATION LIST DOCUMENTED IN MEDICAL RECORD: ICD-10-PCS | Mod: S$GLB,,, | Performed by: NEUROLOGICAL SURGERY

## 2021-04-29 PROCEDURE — 3008F PR BODY MASS INDEX (BMI) DOCUMENTED: ICD-10-PCS | Mod: CPTII,S$GLB,, | Performed by: NEUROLOGICAL SURGERY

## 2021-04-29 PROCEDURE — 3288F PR FALLS RISK ASSESSMENT DOCUMENTED: ICD-10-PCS | Mod: CPTII,S$GLB,, | Performed by: NEUROLOGICAL SURGERY

## 2021-04-29 PROCEDURE — 3288F FALL RISK ASSESSMENT DOCD: CPT | Mod: CPTII,S$GLB,, | Performed by: NEUROLOGICAL SURGERY

## 2021-04-29 PROCEDURE — 1126F PR PAIN SEVERITY QUANTIFIED, NO PAIN PRESENT: ICD-10-PCS | Mod: S$GLB,,, | Performed by: NEUROLOGICAL SURGERY

## 2021-04-29 PROCEDURE — 1159F MED LIST DOCD IN RCRD: CPT | Mod: S$GLB,,, | Performed by: NEUROLOGICAL SURGERY

## 2021-04-29 PROCEDURE — 1126F AMNT PAIN NOTED NONE PRSNT: CPT | Mod: S$GLB,,, | Performed by: NEUROLOGICAL SURGERY

## 2021-04-29 PROCEDURE — 3008F BODY MASS INDEX DOCD: CPT | Mod: CPTII,S$GLB,, | Performed by: NEUROLOGICAL SURGERY

## 2021-04-29 PROCEDURE — 99999 PR PBB SHADOW E&M-EST. PATIENT-LVL III: CPT | Mod: PBBFAC,,, | Performed by: NEUROLOGICAL SURGERY

## 2021-04-29 PROCEDURE — 1101F PR PT FALLS ASSESS DOC 0-1 FALLS W/OUT INJ PAST YR: ICD-10-PCS | Mod: CPTII,S$GLB,, | Performed by: NEUROLOGICAL SURGERY

## 2021-04-29 PROCEDURE — 99215 PR OFFICE/OUTPT VISIT, EST, LEVL V, 40-54 MIN: ICD-10-PCS | Mod: S$GLB,,, | Performed by: NEUROLOGICAL SURGERY

## 2021-04-29 PROCEDURE — 99215 OFFICE O/P EST HI 40 MIN: CPT | Mod: S$GLB,,, | Performed by: NEUROLOGICAL SURGERY

## 2021-04-29 PROCEDURE — 1101F PT FALLS ASSESS-DOCD LE1/YR: CPT | Mod: CPTII,S$GLB,, | Performed by: NEUROLOGICAL SURGERY

## 2021-04-29 PROCEDURE — 99999 PR PBB SHADOW E&M-EST. PATIENT-LVL III: ICD-10-PCS | Mod: PBBFAC,,, | Performed by: NEUROLOGICAL SURGERY

## 2021-06-09 ENCOUNTER — OFFICE VISIT (OUTPATIENT)
Dept: FAMILY MEDICINE | Facility: CLINIC | Age: 76
End: 2021-06-09
Payer: COMMERCIAL

## 2021-06-09 VITALS
DIASTOLIC BLOOD PRESSURE: 78 MMHG | RESPIRATION RATE: 18 BRPM | BODY MASS INDEX: 21.03 KG/M2 | WEIGHT: 134.25 LBS | SYSTOLIC BLOOD PRESSURE: 122 MMHG | OXYGEN SATURATION: 98 % | HEART RATE: 89 BPM

## 2021-06-09 DIAGNOSIS — H61.22 IMPACTED CERUMEN OF LEFT EAR: ICD-10-CM

## 2021-06-09 DIAGNOSIS — H61.21 CERUMEN DEBRIS ON TYMPANIC MEMBRANE OF RIGHT EAR: ICD-10-CM

## 2021-06-09 DIAGNOSIS — H93.12 TINNITUS, LEFT EAR: Primary | ICD-10-CM

## 2021-06-09 PROCEDURE — 1159F PR MEDICATION LIST DOCUMENTED IN MEDICAL RECORD: ICD-10-PCS | Mod: S$GLB,,, | Performed by: NURSE PRACTITIONER

## 2021-06-09 PROCEDURE — 69210 REMOVE IMPACTED EAR WAX UNI: CPT | Mod: S$GLB,,, | Performed by: NURSE PRACTITIONER

## 2021-06-09 PROCEDURE — 1126F PR PAIN SEVERITY QUANTIFIED, NO PAIN PRESENT: ICD-10-PCS | Mod: S$GLB,,, | Performed by: NURSE PRACTITIONER

## 2021-06-09 PROCEDURE — 1159F MED LIST DOCD IN RCRD: CPT | Mod: S$GLB,,, | Performed by: NURSE PRACTITIONER

## 2021-06-09 PROCEDURE — 99999 PR PBB SHADOW E&M-EST. PATIENT-LVL IV: CPT | Mod: PBBFAC,,, | Performed by: NURSE PRACTITIONER

## 2021-06-09 PROCEDURE — 99999 PR PBB SHADOW E&M-EST. PATIENT-LVL IV: ICD-10-PCS | Mod: PBBFAC,,, | Performed by: NURSE PRACTITIONER

## 2021-06-09 PROCEDURE — 69210 EAR CERUMEN REMOVAL: ICD-10-PCS | Mod: S$GLB,,, | Performed by: NURSE PRACTITIONER

## 2021-06-09 PROCEDURE — 99214 PR OFFICE/OUTPT VISIT, EST, LEVL IV, 30-39 MIN: ICD-10-PCS | Mod: 25,S$GLB,, | Performed by: NURSE PRACTITIONER

## 2021-06-09 PROCEDURE — 3008F PR BODY MASS INDEX (BMI) DOCUMENTED: ICD-10-PCS | Mod: CPTII,S$GLB,, | Performed by: NURSE PRACTITIONER

## 2021-06-09 PROCEDURE — 99214 OFFICE O/P EST MOD 30 MIN: CPT | Mod: 25,S$GLB,, | Performed by: NURSE PRACTITIONER

## 2021-06-09 PROCEDURE — 1126F AMNT PAIN NOTED NONE PRSNT: CPT | Mod: S$GLB,,, | Performed by: NURSE PRACTITIONER

## 2021-06-09 PROCEDURE — 3008F BODY MASS INDEX DOCD: CPT | Mod: CPTII,S$GLB,, | Performed by: NURSE PRACTITIONER

## 2021-06-29 DIAGNOSIS — I10 ESSENTIAL HYPERTENSION: ICD-10-CM

## 2021-06-29 RX ORDER — VALSARTAN 160 MG/1
160 TABLET ORAL DAILY
Qty: 90 TABLET | Refills: 0 | Status: SHIPPED | OUTPATIENT
Start: 2021-06-29 | End: 2021-09-24

## 2021-06-29 RX ORDER — AMLODIPINE BESYLATE 5 MG/1
5 TABLET ORAL DAILY
Qty: 90 TABLET | Refills: 0 | Status: SHIPPED | OUTPATIENT
Start: 2021-06-29 | End: 2021-09-19

## 2021-08-09 ENCOUNTER — OFFICE VISIT (OUTPATIENT)
Dept: FAMILY MEDICINE | Facility: CLINIC | Age: 76
End: 2021-08-09
Payer: COMMERCIAL

## 2021-08-09 VITALS
SYSTOLIC BLOOD PRESSURE: 116 MMHG | DIASTOLIC BLOOD PRESSURE: 78 MMHG | HEART RATE: 89 BPM | HEIGHT: 67 IN | TEMPERATURE: 98 F | OXYGEN SATURATION: 96 % | BODY MASS INDEX: 21 KG/M2 | WEIGHT: 133.81 LBS

## 2021-08-09 DIAGNOSIS — N48.89 PENILE IRRITATION: ICD-10-CM

## 2021-08-09 DIAGNOSIS — N48.1 BALANITIS: Primary | ICD-10-CM

## 2021-08-09 PROCEDURE — 99214 PR OFFICE/OUTPT VISIT, EST, LEVL IV, 30-39 MIN: ICD-10-PCS | Mod: S$GLB,,, | Performed by: INTERNAL MEDICINE

## 2021-08-09 PROCEDURE — 3074F SYST BP LT 130 MM HG: CPT | Mod: CPTII,S$GLB,, | Performed by: INTERNAL MEDICINE

## 2021-08-09 PROCEDURE — 3008F BODY MASS INDEX DOCD: CPT | Mod: CPTII,S$GLB,, | Performed by: INTERNAL MEDICINE

## 2021-08-09 PROCEDURE — 1101F PT FALLS ASSESS-DOCD LE1/YR: CPT | Mod: CPTII,S$GLB,, | Performed by: INTERNAL MEDICINE

## 2021-08-09 PROCEDURE — 99214 OFFICE O/P EST MOD 30 MIN: CPT | Mod: S$GLB,,, | Performed by: INTERNAL MEDICINE

## 2021-08-09 PROCEDURE — 99999 PR PBB SHADOW E&M-EST. PATIENT-LVL IV: CPT | Mod: PBBFAC,,, | Performed by: INTERNAL MEDICINE

## 2021-08-09 PROCEDURE — 3078F PR MOST RECENT DIASTOLIC BLOOD PRESSURE < 80 MM HG: ICD-10-PCS | Mod: CPTII,S$GLB,, | Performed by: INTERNAL MEDICINE

## 2021-08-09 PROCEDURE — 1126F PR PAIN SEVERITY QUANTIFIED, NO PAIN PRESENT: ICD-10-PCS | Mod: CPTII,S$GLB,, | Performed by: INTERNAL MEDICINE

## 2021-08-09 PROCEDURE — 99999 PR PBB SHADOW E&M-EST. PATIENT-LVL IV: ICD-10-PCS | Mod: PBBFAC,,, | Performed by: INTERNAL MEDICINE

## 2021-08-09 PROCEDURE — 3288F PR FALLS RISK ASSESSMENT DOCUMENTED: ICD-10-PCS | Mod: CPTII,S$GLB,, | Performed by: INTERNAL MEDICINE

## 2021-08-09 PROCEDURE — 3008F PR BODY MASS INDEX (BMI) DOCUMENTED: ICD-10-PCS | Mod: CPTII,S$GLB,, | Performed by: INTERNAL MEDICINE

## 2021-08-09 PROCEDURE — 3288F FALL RISK ASSESSMENT DOCD: CPT | Mod: CPTII,S$GLB,, | Performed by: INTERNAL MEDICINE

## 2021-08-09 PROCEDURE — 3074F PR MOST RECENT SYSTOLIC BLOOD PRESSURE < 130 MM HG: ICD-10-PCS | Mod: CPTII,S$GLB,, | Performed by: INTERNAL MEDICINE

## 2021-08-09 PROCEDURE — 87591 N.GONORRHOEAE DNA AMP PROB: CPT | Performed by: INTERNAL MEDICINE

## 2021-08-09 PROCEDURE — 3078F DIAST BP <80 MM HG: CPT | Mod: CPTII,S$GLB,, | Performed by: INTERNAL MEDICINE

## 2021-08-09 PROCEDURE — 1159F PR MEDICATION LIST DOCUMENTED IN MEDICAL RECORD: ICD-10-PCS | Mod: CPTII,S$GLB,, | Performed by: INTERNAL MEDICINE

## 2021-08-09 PROCEDURE — 1159F MED LIST DOCD IN RCRD: CPT | Mod: CPTII,S$GLB,, | Performed by: INTERNAL MEDICINE

## 2021-08-09 PROCEDURE — 87491 CHLMYD TRACH DNA AMP PROBE: CPT | Performed by: INTERNAL MEDICINE

## 2021-08-09 PROCEDURE — 1160F PR REVIEW ALL MEDS BY PRESCRIBER/CLIN PHARMACIST DOCUMENTED: ICD-10-PCS | Mod: CPTII,S$GLB,, | Performed by: INTERNAL MEDICINE

## 2021-08-09 PROCEDURE — 1160F RVW MEDS BY RX/DR IN RCRD: CPT | Mod: CPTII,S$GLB,, | Performed by: INTERNAL MEDICINE

## 2021-08-09 PROCEDURE — 1101F PR PT FALLS ASSESS DOC 0-1 FALLS W/OUT INJ PAST YR: ICD-10-PCS | Mod: CPTII,S$GLB,, | Performed by: INTERNAL MEDICINE

## 2021-08-09 PROCEDURE — 1126F AMNT PAIN NOTED NONE PRSNT: CPT | Mod: CPTII,S$GLB,, | Performed by: INTERNAL MEDICINE

## 2021-08-09 RX ORDER — CLOTRIMAZOLE 1 %
CREAM (GRAM) TOPICAL 2 TIMES DAILY
Qty: 24 G | Refills: 0 | Status: SHIPPED | OUTPATIENT
Start: 2021-08-09 | End: 2022-07-09

## 2021-08-09 RX ORDER — HYDROCORTISONE 1 %
CREAM (GRAM) TOPICAL 2 TIMES DAILY
Qty: 26 G | Refills: 0 | Status: SHIPPED | OUTPATIENT
Start: 2021-08-09 | End: 2022-07-09

## 2021-08-10 LAB
C TRACH DNA SPEC QL NAA+PROBE: NOT DETECTED
N GONORRHOEA DNA SPEC QL NAA+PROBE: NOT DETECTED

## 2021-09-09 ENCOUNTER — OFFICE VISIT (OUTPATIENT)
Dept: FAMILY MEDICINE | Facility: CLINIC | Age: 76
End: 2021-09-09
Payer: COMMERCIAL

## 2021-09-09 DIAGNOSIS — N48.1 BALANITIS: ICD-10-CM

## 2021-09-09 DIAGNOSIS — I10 ESSENTIAL HYPERTENSION: Primary | ICD-10-CM

## 2021-09-09 DIAGNOSIS — E78.5 DYSLIPIDEMIA: ICD-10-CM

## 2021-09-09 PROCEDURE — 1160F PR REVIEW ALL MEDS BY PRESCRIBER/CLIN PHARMACIST DOCUMENTED: ICD-10-PCS | Mod: CPTII,95,, | Performed by: FAMILY MEDICINE

## 2021-09-09 PROCEDURE — 1159F MED LIST DOCD IN RCRD: CPT | Mod: CPTII,95,, | Performed by: FAMILY MEDICINE

## 2021-09-09 PROCEDURE — 99214 OFFICE O/P EST MOD 30 MIN: CPT | Mod: 95,,, | Performed by: FAMILY MEDICINE

## 2021-09-09 PROCEDURE — 4010F PR ACE/ARB THEARPY RXD/TAKEN: ICD-10-PCS | Mod: CPTII,95,, | Performed by: FAMILY MEDICINE

## 2021-09-09 PROCEDURE — 1160F RVW MEDS BY RX/DR IN RCRD: CPT | Mod: CPTII,95,, | Performed by: FAMILY MEDICINE

## 2021-09-09 PROCEDURE — 1159F PR MEDICATION LIST DOCUMENTED IN MEDICAL RECORD: ICD-10-PCS | Mod: CPTII,95,, | Performed by: FAMILY MEDICINE

## 2021-09-09 PROCEDURE — 4010F ACE/ARB THERAPY RXD/TAKEN: CPT | Mod: CPTII,95,, | Performed by: FAMILY MEDICINE

## 2021-09-09 PROCEDURE — 99214 PR OFFICE/OUTPT VISIT, EST, LEVL IV, 30-39 MIN: ICD-10-PCS | Mod: 95,,, | Performed by: FAMILY MEDICINE

## 2021-09-29 ENCOUNTER — IMMUNIZATION (OUTPATIENT)
Dept: OBSTETRICS AND GYNECOLOGY | Facility: CLINIC | Age: 76
End: 2021-09-29
Payer: COMMERCIAL

## 2021-09-29 DIAGNOSIS — Z23 NEED FOR VACCINATION: Primary | ICD-10-CM

## 2021-09-29 PROCEDURE — 0003A COVID-19, MRNA, LNP-S, PF, 30 MCG/0.3 ML DOSE VACCINE: CPT | Mod: PBBFAC | Performed by: FAMILY MEDICINE

## 2021-09-29 PROCEDURE — 91300 COVID-19, MRNA, LNP-S, PF, 30 MCG/0.3 ML DOSE VACCINE: CPT | Mod: PBBFAC | Performed by: FAMILY MEDICINE

## 2021-10-11 ENCOUNTER — LAB VISIT (OUTPATIENT)
Dept: LAB | Facility: HOSPITAL | Age: 76
End: 2021-10-11
Attending: FAMILY MEDICINE
Payer: COMMERCIAL

## 2021-10-11 DIAGNOSIS — I10 ESSENTIAL HYPERTENSION: ICD-10-CM

## 2021-10-11 DIAGNOSIS — E78.5 DYSLIPIDEMIA: ICD-10-CM

## 2021-10-11 LAB
ALBUMIN SERPL BCP-MCNC: 3.5 G/DL (ref 3.5–5.2)
ALP SERPL-CCNC: 105 U/L (ref 55–135)
ALT SERPL W/O P-5'-P-CCNC: 25 U/L (ref 10–44)
ANION GAP SERPL CALC-SCNC: 11 MMOL/L (ref 8–16)
AST SERPL-CCNC: 24 U/L (ref 10–40)
BASOPHILS # BLD AUTO: 0.07 K/UL (ref 0–0.2)
BASOPHILS NFR BLD: 0.8 % (ref 0–1.9)
BILIRUB SERPL-MCNC: 0.9 MG/DL (ref 0.1–1)
BUN SERPL-MCNC: 12 MG/DL (ref 8–23)
CALCIUM SERPL-MCNC: 9.8 MG/DL (ref 8.7–10.5)
CHLORIDE SERPL-SCNC: 107 MMOL/L (ref 95–110)
CHOLEST SERPL-MCNC: 212 MG/DL (ref 120–199)
CHOLEST/HDLC SERPL: 3.4 {RATIO} (ref 2–5)
CO2 SERPL-SCNC: 24 MMOL/L (ref 23–29)
CREAT SERPL-MCNC: 1 MG/DL (ref 0.5–1.4)
DIFFERENTIAL METHOD: ABNORMAL
EOSINOPHIL # BLD AUTO: 0.2 K/UL (ref 0–0.5)
EOSINOPHIL NFR BLD: 2.3 % (ref 0–8)
ERYTHROCYTE [DISTWIDTH] IN BLOOD BY AUTOMATED COUNT: 15.4 % (ref 11.5–14.5)
EST. GFR  (AFRICAN AMERICAN): >60 ML/MIN/1.73 M^2
EST. GFR  (NON AFRICAN AMERICAN): >60 ML/MIN/1.73 M^2
GLUCOSE SERPL-MCNC: 83 MG/DL (ref 70–110)
HCT VFR BLD AUTO: 40.7 % (ref 40–54)
HDLC SERPL-MCNC: 62 MG/DL (ref 40–75)
HDLC SERPL: 29.2 % (ref 20–50)
HGB BLD-MCNC: 13.5 G/DL (ref 14–18)
IMM GRANULOCYTES # BLD AUTO: 0.02 K/UL (ref 0–0.04)
IMM GRANULOCYTES NFR BLD AUTO: 0.2 % (ref 0–0.5)
LDLC SERPL CALC-MCNC: 131.8 MG/DL (ref 63–159)
LYMPHOCYTES # BLD AUTO: 2.6 K/UL (ref 1–4.8)
LYMPHOCYTES NFR BLD: 28.8 % (ref 18–48)
MCH RBC QN AUTO: 30.3 PG (ref 27–31)
MCHC RBC AUTO-ENTMCNC: 33.2 G/DL (ref 32–36)
MCV RBC AUTO: 92 FL (ref 82–98)
MONOCYTES # BLD AUTO: 0.9 K/UL (ref 0.3–1)
MONOCYTES NFR BLD: 9.9 % (ref 4–15)
NEUTROPHILS # BLD AUTO: 5.3 K/UL (ref 1.8–7.7)
NEUTROPHILS NFR BLD: 58 % (ref 38–73)
NONHDLC SERPL-MCNC: 150 MG/DL
NRBC BLD-RTO: 0 /100 WBC
PLATELET # BLD AUTO: 323 K/UL (ref 150–450)
PMV BLD AUTO: 11.2 FL (ref 9.2–12.9)
POTASSIUM SERPL-SCNC: 3.7 MMOL/L (ref 3.5–5.1)
PROT SERPL-MCNC: 8.1 G/DL (ref 6–8.4)
RBC # BLD AUTO: 4.45 M/UL (ref 4.6–6.2)
SODIUM SERPL-SCNC: 142 MMOL/L (ref 136–145)
TRIGL SERPL-MCNC: 91 MG/DL (ref 30–150)
WBC # BLD AUTO: 9.07 K/UL (ref 3.9–12.7)

## 2021-10-11 PROCEDURE — 80061 LIPID PANEL: CPT | Performed by: FAMILY MEDICINE

## 2021-10-11 PROCEDURE — 85025 COMPLETE CBC W/AUTO DIFF WBC: CPT | Performed by: FAMILY MEDICINE

## 2021-10-11 PROCEDURE — 80053 COMPREHEN METABOLIC PANEL: CPT | Performed by: FAMILY MEDICINE

## 2021-10-11 PROCEDURE — 36415 COLL VENOUS BLD VENIPUNCTURE: CPT | Mod: PN | Performed by: FAMILY MEDICINE

## 2021-11-10 ENCOUNTER — TELEPHONE (OUTPATIENT)
Dept: ORTHOPEDICS | Facility: CLINIC | Age: 76
End: 2021-11-10
Payer: COMMERCIAL

## 2021-11-10 DIAGNOSIS — I10 ESSENTIAL HYPERTENSION: Primary | ICD-10-CM

## 2021-11-10 DIAGNOSIS — D64.9 NORMOCYTIC ANEMIA: ICD-10-CM

## 2021-11-10 DIAGNOSIS — E78.5 DYSLIPIDEMIA: ICD-10-CM

## 2021-12-16 DIAGNOSIS — I10 ESSENTIAL HYPERTENSION: ICD-10-CM

## 2021-12-21 RX ORDER — AMLODIPINE BESYLATE 5 MG/1
TABLET ORAL
Qty: 90 TABLET | Refills: 2 | Status: SHIPPED | OUTPATIENT
Start: 2021-12-21 | End: 2021-12-21 | Stop reason: SDUPTHER

## 2021-12-21 RX ORDER — AMLODIPINE BESYLATE 5 MG/1
5 TABLET ORAL DAILY
Qty: 90 TABLET | Refills: 0 | Status: SHIPPED | OUTPATIENT
Start: 2021-12-21 | End: 2022-02-10 | Stop reason: SDUPTHER

## 2022-01-25 DIAGNOSIS — I63.81 LEFT SIDED LACUNAR INFARCTION: ICD-10-CM

## 2022-01-25 NOTE — TELEPHONE ENCOUNTER
No new care gaps identified.  Powered by [employer]. Reference number: [id number].   1/25/2022 3:28:20 AM CST

## 2022-01-27 RX ORDER — ATORVASTATIN CALCIUM 40 MG/1
TABLET, FILM COATED ORAL
Qty: 90 TABLET | Refills: 1 | Status: SHIPPED | OUTPATIENT
Start: 2022-01-27 | End: 2022-02-10 | Stop reason: SDUPTHER

## 2022-01-27 NOTE — TELEPHONE ENCOUNTER
Refill Authorization Note   Rashad Rodríguez  is requesting a refill authorization.  Brief Assessment and Rationale for Refill:  Approve     Medication Therapy Plan:       Medication Reconciliation Completed: No   Comments:   --->Care Gap information included below if applicable.   Orders Placed This Encounter    atorvastatin (LIPITOR) 40 MG tablet      Requested Prescriptions   Signed Prescriptions Disp Refills    atorvastatin (LIPITOR) 40 MG tablet 90 tablet 1     Sig: TAKE 1 TABLET(40 MG) BY MOUTH EVERY DAY       Cardiovascular:  Antilipid - Statins Passed - 1/25/2022  3:27 AM        Passed - Patient is at least 18 years old        Passed - Valid encounter within last 15 months     Recent Visits  Date Type Provider Dept   09/09/21 Office Visit Juanito Dejesus Jr., MD [facility]/ Peds   03/29/21 Office Visit Juanito Dejesus Jr., [place] [facility]   10/22/20 Office Visit Juanito Dejesus Jr., MD [facility]   09/21/20 Office Visit Juanito Dejesus Jr., [place] [facility]   03/21/20 Office Visit Juanito Dejesus Jr., MD [facility]   Showing recent visits within past 720 days and meeting all other requirements  Future Appointments  No visits were found meeting these conditions.  Showing future appointments within next 150 days and meeting all other requirements      Future Appointments              [facility] [place] [facility]     In 1 week LAB, [facility] [place] - Lab, [facility] -     In 2 weeks Juanito Dejesus Jr., MD [place] [facility]                Passed - ALT is 131 or below and within 360 days     ALT   Date Value Ref Range Status   10/11/2021 25 10 - 44 U/L Final   03/22/2021 38 10 - 44 U/L Final   10/22/2020 36 10 - 44 U/L Final              Passed - AST is 119 or below and within 360 days     AST   Date Value Ref Range Status   10/11/2021 24 10  - 40 U/L Final   03/22/2021 34 10 - 40 U/L Final   10/22/2020 26 10 - 40 U/L Final              Passed - Total Cholesterol within 360 days     Lab Results   Component Value Date    CHOL 212 (H) 10/11/2021    CHOL 157 03/22/2021    CHOL 140 10/22/2020              Passed - LDL within 360 days     LDL Cholesterol   Date Value Ref Range Status   10/11/2021 131.8 63.0 - 159.0 mg/dL Final     Comment:     The National Cholesterol Education Program (NCEP) has set the  following guidelines (reference values) for LDL Cholesterol:  Optimal.......................<130 mg/dL  Borderline High...............130-159 mg/dL  High..........................160-189 mg/dL  Very High.....................>190 mg/dL              Passed - HDL within 360 days     HDL   Date Value Ref Range Status   10/11/2021 62 40 - 75 mg/dL Final     Comment:     The National Cholesterol Education Program (NCEP) has set the  following guidelines (reference values) for HDL Cholesterol:  Low...............<40 mg/dL  Optimal...........>60 mg/dL              Passed - Triglycerides within 360 days     Lab Results   Component Value Date    TRIG 91 10/11/2021    TRIG 98 03/22/2021    TRIG 85 10/22/2020                  Appointments  past 12m or future 3m with PCP    Date Provider   Last Visit   9/9/2021 Juanito Dejesus Jr., MD   Next Visit   2/10/2022 Juanito Dejesus Jr., MD   ED visits in past 90 days: 0     Note composed:12:50 PM 01/27/2022

## 2022-02-03 ENCOUNTER — LAB VISIT (OUTPATIENT)
Dept: LAB | Facility: HOSPITAL | Age: 77
End: 2022-02-03
Attending: FAMILY MEDICINE
Payer: COMMERCIAL

## 2022-02-03 DIAGNOSIS — I10 ESSENTIAL HYPERTENSION: ICD-10-CM

## 2022-02-03 DIAGNOSIS — E78.5 DYSLIPIDEMIA: ICD-10-CM

## 2022-02-03 DIAGNOSIS — D64.9 NORMOCYTIC ANEMIA: ICD-10-CM

## 2022-02-03 LAB
ALBUMIN SERPL BCP-MCNC: 3.8 G/DL (ref 3.5–5.2)
ALP SERPL-CCNC: 112 U/L (ref 55–135)
ALT SERPL W/O P-5'-P-CCNC: 29 U/L (ref 10–44)
ANION GAP SERPL CALC-SCNC: 13 MMOL/L (ref 8–16)
AST SERPL-CCNC: 29 U/L (ref 10–40)
BASOPHILS # BLD AUTO: 0.07 K/UL (ref 0–0.2)
BASOPHILS NFR BLD: 0.8 % (ref 0–1.9)
BILIRUB SERPL-MCNC: 0.7 MG/DL (ref 0.1–1)
BUN SERPL-MCNC: 14 MG/DL (ref 8–23)
CALCIUM SERPL-MCNC: 9.6 MG/DL (ref 8.7–10.5)
CHLORIDE SERPL-SCNC: 107 MMOL/L (ref 95–110)
CHOLEST SERPL-MCNC: 156 MG/DL (ref 120–199)
CHOLEST/HDLC SERPL: 2.8 {RATIO} (ref 2–5)
CO2 SERPL-SCNC: 23 MMOL/L (ref 23–29)
CREAT SERPL-MCNC: 1 MG/DL (ref 0.5–1.4)
DIFFERENTIAL METHOD: NORMAL
EOSINOPHIL # BLD AUTO: 0.2 K/UL (ref 0–0.5)
EOSINOPHIL NFR BLD: 2.5 % (ref 0–8)
ERYTHROCYTE [DISTWIDTH] IN BLOOD BY AUTOMATED COUNT: 14.1 % (ref 11.5–14.5)
EST. GFR  (AFRICAN AMERICAN): >60 ML/MIN/1.73 M^2
EST. GFR  (NON AFRICAN AMERICAN): >60 ML/MIN/1.73 M^2
FERRITIN SERPL-MCNC: 83 NG/ML (ref 20–300)
GLUCOSE SERPL-MCNC: 88 MG/DL (ref 70–110)
HCT VFR BLD AUTO: 43.9 % (ref 40–54)
HDLC SERPL-MCNC: 55 MG/DL (ref 40–75)
HDLC SERPL: 35.3 % (ref 20–50)
HGB BLD-MCNC: 14.2 G/DL (ref 14–18)
IMM GRANULOCYTES # BLD AUTO: 0.03 K/UL (ref 0–0.04)
IMM GRANULOCYTES NFR BLD AUTO: 0.3 % (ref 0–0.5)
IRON SERPL-MCNC: 105 UG/DL (ref 45–160)
LDLC SERPL CALC-MCNC: 88.6 MG/DL (ref 63–159)
LYMPHOCYTES # BLD AUTO: 2.1 K/UL (ref 1–4.8)
LYMPHOCYTES NFR BLD: 22.6 % (ref 18–48)
MCH RBC QN AUTO: 30 PG (ref 27–31)
MCHC RBC AUTO-ENTMCNC: 32.3 G/DL (ref 32–36)
MCV RBC AUTO: 93 FL (ref 82–98)
MONOCYTES # BLD AUTO: 0.9 K/UL (ref 0.3–1)
MONOCYTES NFR BLD: 9.7 % (ref 4–15)
NEUTROPHILS # BLD AUTO: 5.9 K/UL (ref 1.8–7.7)
NEUTROPHILS NFR BLD: 64.1 % (ref 38–73)
NONHDLC SERPL-MCNC: 101 MG/DL
NRBC BLD-RTO: 0 /100 WBC
PLATELET # BLD AUTO: 323 K/UL (ref 150–450)
PMV BLD AUTO: 10.8 FL (ref 9.2–12.9)
POTASSIUM SERPL-SCNC: 4.6 MMOL/L (ref 3.5–5.1)
PROT SERPL-MCNC: 8.3 G/DL (ref 6–8.4)
RBC # BLD AUTO: 4.73 M/UL (ref 4.6–6.2)
SATURATED IRON: 26 % (ref 20–50)
SODIUM SERPL-SCNC: 143 MMOL/L (ref 136–145)
TOTAL IRON BINDING CAPACITY: 397 UG/DL (ref 250–450)
TRANSFERRIN SERPL-MCNC: 268 MG/DL (ref 200–375)
TRIGL SERPL-MCNC: 62 MG/DL (ref 30–150)
TSH SERPL DL<=0.005 MIU/L-ACNC: 0.84 UIU/ML (ref 0.4–4)
WBC # BLD AUTO: 9.26 K/UL (ref 3.9–12.7)

## 2022-02-03 PROCEDURE — 82728 ASSAY OF FERRITIN: CPT | Performed by: FAMILY MEDICINE

## 2022-02-03 PROCEDURE — 80053 COMPREHEN METABOLIC PANEL: CPT | Performed by: FAMILY MEDICINE

## 2022-02-03 PROCEDURE — 80061 LIPID PANEL: CPT | Performed by: FAMILY MEDICINE

## 2022-02-03 PROCEDURE — 84443 ASSAY THYROID STIM HORMONE: CPT | Performed by: FAMILY MEDICINE

## 2022-02-03 PROCEDURE — 85025 COMPLETE CBC W/AUTO DIFF WBC: CPT | Performed by: FAMILY MEDICINE

## 2022-02-03 PROCEDURE — 36415 COLL VENOUS BLD VENIPUNCTURE: CPT | Mod: PN | Performed by: FAMILY MEDICINE

## 2022-02-03 PROCEDURE — 84466 ASSAY OF TRANSFERRIN: CPT | Performed by: FAMILY MEDICINE

## 2022-02-10 ENCOUNTER — OFFICE VISIT (OUTPATIENT)
Dept: FAMILY MEDICINE | Facility: CLINIC | Age: 77
End: 2022-02-10
Payer: COMMERCIAL

## 2022-02-10 VITALS
HEART RATE: 64 BPM | OXYGEN SATURATION: 99 % | WEIGHT: 129.63 LBS | HEIGHT: 67 IN | TEMPERATURE: 98 F | SYSTOLIC BLOOD PRESSURE: 122 MMHG | RESPIRATION RATE: 18 BRPM | BODY MASS INDEX: 20.35 KG/M2 | DIASTOLIC BLOOD PRESSURE: 72 MMHG

## 2022-02-10 DIAGNOSIS — Z12.2 SCREENING FOR LUNG CANCER: ICD-10-CM

## 2022-02-10 DIAGNOSIS — Z87.891 PERSONAL HISTORY OF NICOTINE DEPENDENCE: ICD-10-CM

## 2022-02-10 DIAGNOSIS — E78.5 DYSLIPIDEMIA: ICD-10-CM

## 2022-02-10 DIAGNOSIS — N52.9 ERECTILE DYSFUNCTION, UNSPECIFIED ERECTILE DYSFUNCTION TYPE: ICD-10-CM

## 2022-02-10 DIAGNOSIS — I63.81 LEFT SIDED LACUNAR INFARCTION: ICD-10-CM

## 2022-02-10 DIAGNOSIS — I10 ESSENTIAL HYPERTENSION: Primary | ICD-10-CM

## 2022-02-10 DIAGNOSIS — F17.200 SMOKING: ICD-10-CM

## 2022-02-10 PROCEDURE — 1159F PR MEDICATION LIST DOCUMENTED IN MEDICAL RECORD: ICD-10-PCS | Mod: CPTII,S$GLB,, | Performed by: FAMILY MEDICINE

## 2022-02-10 PROCEDURE — 99214 OFFICE O/P EST MOD 30 MIN: CPT | Mod: S$GLB,,, | Performed by: FAMILY MEDICINE

## 2022-02-10 PROCEDURE — 1101F PT FALLS ASSESS-DOCD LE1/YR: CPT | Mod: CPTII,S$GLB,, | Performed by: FAMILY MEDICINE

## 2022-02-10 PROCEDURE — 99406 PR TOBACCO USE CESSATION INTERMEDIATE 3-10 MINUTES: ICD-10-PCS | Mod: S$GLB,,, | Performed by: FAMILY MEDICINE

## 2022-02-10 PROCEDURE — 3060F PR POS MICROALBUMINURIA RESULT DOCUMENTED/REVIEW: ICD-10-PCS | Mod: CPTII,S$GLB,, | Performed by: FAMILY MEDICINE

## 2022-02-10 PROCEDURE — 3288F FALL RISK ASSESSMENT DOCD: CPT | Mod: CPTII,S$GLB,, | Performed by: FAMILY MEDICINE

## 2022-02-10 PROCEDURE — 3288F PR FALLS RISK ASSESSMENT DOCUMENTED: ICD-10-PCS | Mod: CPTII,S$GLB,, | Performed by: FAMILY MEDICINE

## 2022-02-10 PROCEDURE — 3074F PR MOST RECENT SYSTOLIC BLOOD PRESSURE < 130 MM HG: ICD-10-PCS | Mod: CPTII,S$GLB,, | Performed by: FAMILY MEDICINE

## 2022-02-10 PROCEDURE — 3008F BODY MASS INDEX DOCD: CPT | Mod: CPTII,S$GLB,, | Performed by: FAMILY MEDICINE

## 2022-02-10 PROCEDURE — 3060F POS MICROALBUMINURIA REV: CPT | Mod: CPTII,S$GLB,, | Performed by: FAMILY MEDICINE

## 2022-02-10 PROCEDURE — 3074F SYST BP LT 130 MM HG: CPT | Mod: CPTII,S$GLB,, | Performed by: FAMILY MEDICINE

## 2022-02-10 PROCEDURE — 3066F NEPHROPATHY DOC TX: CPT | Mod: CPTII,S$GLB,, | Performed by: FAMILY MEDICINE

## 2022-02-10 PROCEDURE — 99999 PR PBB SHADOW E&M-EST. PATIENT-LVL V: CPT | Mod: PBBFAC,,, | Performed by: FAMILY MEDICINE

## 2022-02-10 PROCEDURE — 3066F PR DOCUMENTATION OF TREATMENT FOR NEPHROPATHY: ICD-10-PCS | Mod: CPTII,S$GLB,, | Performed by: FAMILY MEDICINE

## 2022-02-10 PROCEDURE — 1101F PR PT FALLS ASSESS DOC 0-1 FALLS W/OUT INJ PAST YR: ICD-10-PCS | Mod: CPTII,S$GLB,, | Performed by: FAMILY MEDICINE

## 2022-02-10 PROCEDURE — 99999 PR PBB SHADOW E&M-EST. PATIENT-LVL V: ICD-10-PCS | Mod: PBBFAC,,, | Performed by: FAMILY MEDICINE

## 2022-02-10 PROCEDURE — 1160F RVW MEDS BY RX/DR IN RCRD: CPT | Mod: CPTII,S$GLB,, | Performed by: FAMILY MEDICINE

## 2022-02-10 PROCEDURE — 1160F PR REVIEW ALL MEDS BY PRESCRIBER/CLIN PHARMACIST DOCUMENTED: ICD-10-PCS | Mod: CPTII,S$GLB,, | Performed by: FAMILY MEDICINE

## 2022-02-10 PROCEDURE — 99406 BEHAV CHNG SMOKING 3-10 MIN: CPT | Mod: S$GLB,,, | Performed by: FAMILY MEDICINE

## 2022-02-10 PROCEDURE — 3008F PR BODY MASS INDEX (BMI) DOCUMENTED: ICD-10-PCS | Mod: CPTII,S$GLB,, | Performed by: FAMILY MEDICINE

## 2022-02-10 PROCEDURE — 1126F PR PAIN SEVERITY QUANTIFIED, NO PAIN PRESENT: ICD-10-PCS | Mod: CPTII,S$GLB,, | Performed by: FAMILY MEDICINE

## 2022-02-10 PROCEDURE — 1159F MED LIST DOCD IN RCRD: CPT | Mod: CPTII,S$GLB,, | Performed by: FAMILY MEDICINE

## 2022-02-10 PROCEDURE — 3078F PR MOST RECENT DIASTOLIC BLOOD PRESSURE < 80 MM HG: ICD-10-PCS | Mod: CPTII,S$GLB,, | Performed by: FAMILY MEDICINE

## 2022-02-10 PROCEDURE — 4010F ACE/ARB THERAPY RXD/TAKEN: CPT | Mod: CPTII,S$GLB,, | Performed by: FAMILY MEDICINE

## 2022-02-10 PROCEDURE — 3078F DIAST BP <80 MM HG: CPT | Mod: CPTII,S$GLB,, | Performed by: FAMILY MEDICINE

## 2022-02-10 PROCEDURE — 4010F PR ACE/ARB THEARPY RXD/TAKEN: ICD-10-PCS | Mod: CPTII,S$GLB,, | Performed by: FAMILY MEDICINE

## 2022-02-10 PROCEDURE — 1126F AMNT PAIN NOTED NONE PRSNT: CPT | Mod: CPTII,S$GLB,, | Performed by: FAMILY MEDICINE

## 2022-02-10 PROCEDURE — 99214 PR OFFICE/OUTPT VISIT, EST, LEVL IV, 30-39 MIN: ICD-10-PCS | Mod: S$GLB,,, | Performed by: FAMILY MEDICINE

## 2022-02-10 RX ORDER — VALSARTAN 160 MG/1
160 TABLET ORAL DAILY
Qty: 90 TABLET | Refills: 1 | Status: SHIPPED | OUTPATIENT
Start: 2022-02-10 | End: 2022-08-08 | Stop reason: SDUPTHER

## 2022-02-10 RX ORDER — SILDENAFIL CITRATE 100 MG/1
TABLET, FILM COATED ORAL
Qty: 10 TABLET | Refills: 11 | Status: SHIPPED | OUTPATIENT
Start: 2022-02-10 | End: 2022-07-09

## 2022-02-10 RX ORDER — ATORVASTATIN CALCIUM 40 MG/1
40 TABLET, FILM COATED ORAL DAILY
Qty: 90 TABLET | Refills: 1 | Status: ON HOLD | OUTPATIENT
Start: 2022-02-10 | End: 2022-07-09 | Stop reason: SDUPTHER

## 2022-02-10 RX ORDER — CLOPIDOGREL BISULFATE 75 MG/1
75 TABLET ORAL DAILY
Qty: 90 TABLET | Refills: 1 | Status: SHIPPED | OUTPATIENT
Start: 2022-02-10 | End: 2022-03-22

## 2022-02-10 RX ORDER — AMLODIPINE BESYLATE 5 MG/1
5 TABLET ORAL DAILY
Qty: 90 TABLET | Refills: 1 | Status: SHIPPED | OUTPATIENT
Start: 2022-02-10 | End: 2022-08-08 | Stop reason: SDUPTHER

## 2022-02-10 NOTE — PROGRESS NOTES
02/10/22 0926   Depression Patient Health Questionnaire (PHQ-2)   Over the last two weeks how often have you been bothered by little interest or pleasure in doing things 0   Over the last two weeks how often have you been bothered by feeling down, depressed or hopeless 0   PHQ-2 Total Score 0

## 2022-02-14 NOTE — PROGRESS NOTES
Subjective:       Patient ID: Rashad Rodríguez is a 76 y.o. male.    Chief Complaint: Hypertension and Hyperlipidemia    Hypertension  This is a chronic problem. The current episode started more than 1 year ago. The problem is controlled. Pertinent negatives include no anxiety, blurred vision, chest pain, headaches, malaise/fatigue, neck pain, orthopnea, palpitations or shortness of breath. The current treatment provides significant improvement. There are no compliance problems.    Hyperlipidemia  This is a chronic problem. The problem is controlled. Pertinent negatives include no chest pain or shortness of breath. Current antihyperlipidemic treatment includes statins. The current treatment provides significant improvement of lipids. There are no compliance problems.      Review of Systems   Constitutional: Negative for malaise/fatigue and unexpected weight change.   Eyes: Negative for blurred vision and visual disturbance.   Respiratory: Negative for shortness of breath and wheezing.    Cardiovascular: Negative for chest pain, palpitations, orthopnea and claudication.   Gastrointestinal: Negative for abdominal pain and blood in stool.   Endocrine: Negative for polydipsia, polyphagia and polyuria.   Genitourinary: Negative for hematuria.   Musculoskeletal: Negative for neck pain.   Neurological: Negative for dizziness, facial asymmetry, speech difficulty, numbness and headaches.         Objective:      Physical Exam  Vitals reviewed.   Constitutional:       Appearance: He is well-developed. He is not diaphoretic.   HENT:      Head: Normocephalic.      Right Ear: External ear normal.      Left Ear: External ear normal.      Nose: Nose normal.      Mouth/Throat:      Pharynx: No oropharyngeal exudate.   Neck:      Trachea: No tracheal deviation.   Cardiovascular:      Rate and Rhythm: Normal rate and regular rhythm.      Heart sounds: Normal heart sounds.   Pulmonary:      Effort: Pulmonary effort is normal.      Breath  sounds: Normal breath sounds. No wheezing or rales.   Abdominal:      General: Bowel sounds are normal.      Palpations: Abdomen is soft. Abdomen is not rigid. There is no mass.      Tenderness: There is no abdominal tenderness. There is no guarding or rebound.   Musculoskeletal:      Cervical back: Normal range of motion and neck supple.   Lymphadenopathy:      Cervical: No cervical adenopathy.   Neurological:      Mental Status: He is oriented to person, place, and time.      Sensory: No sensory deficit.      Motor: No atrophy.      Gait: Gait normal.      Deep Tendon Reflexes:      Reflex Scores:       Patellar reflexes are 2+ on the right side and 2+ on the left side.        Assessment:       Problem List Items Addressed This Visit        Cardiac/Vascular    Essential hypertension - Primary    Relevant Medications    amLODIPine (NORVASC) 5 MG tablet    valsartan (DIOVAN) 160 MG tablet    Other Relevant Orders    Comprehensive Metabolic Panel    Lipid Panel      Other Visit Diagnoses     Dyslipidemia        Relevant Medications    atorvastatin (LIPITOR) 40 MG tablet    Other Relevant Orders    Comprehensive Metabolic Panel    Lipid Panel    Left sided lacunar infarction        Relevant Medications    atorvastatin (LIPITOR) 40 MG tablet    clopidogreL (PLAVIX) 75 mg tablet    Erectile dysfunction, unspecified erectile dysfunction type        Relevant Medications    VIAGRA 100 mg tablet    Smoking        Relevant Orders    Ambulatory referral/consult to Smoking Cessation Program    CT Chest Lung Screening Low Dose    Screening for lung cancer        Relevant Orders    CT Chest Lung Screening Low Dose    Personal history of nicotine dependence         Relevant Orders    CT Chest Lung Screening Low Dose          Plan:       Rashad was seen today for hypertension and hyperlipidemia.    Diagnoses and all orders for this visit:    Essential hypertension  -     amLODIPine (NORVASC) 5 MG tablet; Take 1 tablet (5 mg total)  by mouth once daily.  -     valsartan (DIOVAN) 160 MG tablet; Take 1 tablet (160 mg total) by mouth once daily.  -     Comprehensive Metabolic Panel; Future  -     Lipid Panel; Future  Current therapy effective, continue    Dyslipidemia  -     atorvastatin (LIPITOR) 40 MG tablet; Take 1 tablet (40 mg total) by mouth once daily.  -     Comprehensive Metabolic Panel; Future  -     Lipid Panel; Future  Continue Lipitor.    Left sided lacunar infarction  -     atorvastatin (LIPITOR) 40 MG tablet; Take 1 tablet (40 mg total) by mouth once daily.  -     clopidogreL (PLAVIX) 75 mg tablet; Take 1 tablet (75 mg total) by mouth once daily.  On Plavix and Lipitor for secondary prevention    Erectile dysfunction, unspecified erectile dysfunction type  -     VIAGRA 100 mg tablet; 1 tablet PO one hour prior to sex, maximum of 1 tablet in 24 hours    Smoking/Screening for lung cancer/Personal history of nicotine dependence   -     Ambulatory referral/consult to Smoking Cessation Program; Future  -     CT Chest Lung Screening Low Dose; Future  Importance of smoking cessation discussed with patient. 5 minutes spent counseling patient on risks of smoking, benefits of stopping and ways of stopping. Patient agrees to referral for smoking cessation    Discussed risks and benefits of screening for lung cancer with LDCT given smoking history and patient would like to proceed with test.

## 2022-02-22 ENCOUNTER — CLINICAL SUPPORT (OUTPATIENT)
Dept: SMOKING CESSATION | Facility: CLINIC | Age: 77
End: 2022-02-22
Payer: COMMERCIAL

## 2022-02-22 DIAGNOSIS — F17.200 NICOTINE DEPENDENCE: Primary | ICD-10-CM

## 2022-02-22 PROCEDURE — 99999 PR PBB SHADOW E&M-EST. PATIENT-LVL II: ICD-10-PCS | Mod: PBBFAC,,,

## 2022-02-22 PROCEDURE — 99404 PREV MED CNSL INDIV APPRX 60: CPT | Mod: S$GLB,,,

## 2022-02-22 PROCEDURE — 99404 PR PREVENT COUNSEL,INDIV,60 MIN: ICD-10-PCS | Mod: S$GLB,,,

## 2022-02-22 PROCEDURE — 99999 PR PBB SHADOW E&M-EST. PATIENT-LVL II: CPT | Mod: PBBFAC,,,

## 2022-02-22 RX ORDER — MICONAZOLE NITRATE 2 %
2 CREAM (GRAM) TOPICAL
Qty: 100 EACH | Refills: 0 | Status: SHIPPED | OUTPATIENT
Start: 2022-02-22 | End: 2022-03-22

## 2022-02-22 RX ORDER — IBUPROFEN 200 MG
1 TABLET ORAL DAILY
Qty: 14 PATCH | Refills: 0 | Status: SHIPPED | OUTPATIENT
Start: 2022-02-22 | End: 2022-03-08 | Stop reason: SDUPTHER

## 2022-02-22 NOTE — Clinical Note
Patient presents for intake smoking 1 pk per day of cigarettes, after assessment and discussion recommend the 21mg nicotine patch and the 2mg nicotine gum, recommend an abrupt quit, states he had issues with patch site irritation when he wore them years ago but can not remember the brand he used and if it was opaque or clear. Will have to use trial and error to see what works and what he can tolerate. intake note:  discussed strategies to help cut back and to help break the routine and habit associated with smoking, intake handout provided to the patient and discussed, all prescribed NRT discussed in depth as well as tobacco cessation

## 2022-02-23 ENCOUNTER — HOSPITAL ENCOUNTER (OUTPATIENT)
Dept: RADIOLOGY | Facility: HOSPITAL | Age: 77
Discharge: HOME | End: 2022-02-23
Attending: FAMILY MEDICINE
Payer: COMMERCIAL

## 2022-02-23 DIAGNOSIS — Z87.891 PERSONAL HISTORY OF NICOTINE DEPENDENCE: ICD-10-CM

## 2022-02-23 DIAGNOSIS — F17.200 SMOKING: ICD-10-CM

## 2022-02-23 DIAGNOSIS — Z12.2 SCREENING FOR LUNG CANCER: ICD-10-CM

## 2022-02-23 PROCEDURE — 71271 CT CHEST LUNG SCREENING LOW DOSE: ICD-10-PCS | Mod: 26,,, | Performed by: RADIOLOGY

## 2022-02-23 PROCEDURE — 71271 CT THORAX LUNG CANCER SCR C-: CPT | Mod: TC

## 2022-02-23 PROCEDURE — 71271 CT THORAX LUNG CANCER SCR C-: CPT | Mod: 26,,, | Performed by: RADIOLOGY

## 2022-02-24 ENCOUNTER — TELEPHONE (OUTPATIENT)
Dept: FAMILY MEDICINE | Facility: CLINIC | Age: 77
End: 2022-02-24
Payer: COMMERCIAL

## 2022-02-24 NOTE — TELEPHONE ENCOUNTER
----- Message from Juanito Dejesus Jr., MD sent at 2/24/2022 11:20 AM CST -----  CT shows stable findings

## 2022-03-08 ENCOUNTER — CLINICAL SUPPORT (OUTPATIENT)
Dept: SMOKING CESSATION | Facility: CLINIC | Age: 77
End: 2022-03-08
Payer: COMMERCIAL

## 2022-03-08 DIAGNOSIS — F17.200 NICOTINE DEPENDENCE: ICD-10-CM

## 2022-03-08 PROCEDURE — 99402 PR PREVENT COUNSEL,INDIV,30 MIN: ICD-10-PCS | Mod: S$GLB,,,

## 2022-03-08 PROCEDURE — 99402 PREV MED CNSL INDIV APPRX 30: CPT | Mod: S$GLB,,,

## 2022-03-08 RX ORDER — IBUPROFEN 200 MG
1 TABLET ORAL DAILY
Qty: 14 PATCH | Refills: 0 | Status: SHIPPED | OUTPATIENT
Start: 2022-03-08 | End: 2022-03-22 | Stop reason: SDUPTHER

## 2022-03-08 RX ORDER — DM/P-EPHED/ACETAMINOPH/DOXYLAM 30-7.5/3
2 LIQUID (ML) ORAL
Qty: 100 LOZENGE | Refills: 0 | Status: SHIPPED | OUTPATIENT
Start: 2022-03-08 | End: 2022-04-06 | Stop reason: SDUPTHER

## 2022-03-08 NOTE — Clinical Note
Comments: patient presents for follow up smoking 6 times per day down from a pack per day, he is relighting each cigarette therefore it is a total of 3 cigs per day. Informed him to be mindful of his smoking and in control therefore to keep track of the lights and not only the cigarettes, he is currently using the nicotine gum throughout the day about 2 times and wearing the 21mg nicotine patch daily, he is comfortable but struggling with habit times, he has 7 cigarettes left from the pack he purchased Saturday, recommend he try the 2mg nicotine lozenge to see if this will help him with his habit times so he can use these in place of his smoking, strategies and session handout discussed. Will follow

## 2022-03-08 NOTE — PROGRESS NOTES
Individual Follow-Up Form    3/8/2022    Quit Date: n/a    Clinical Status of Patient: Outpatient    Length of Service: 30 minutes    Continuing Medication: yes  Patches or Nicotine gum    Other Medications: n/a     Target Symptoms: Withdrawal and medication side effects. The following were  rated moderate (3) to severe (4) on TCRS:  · Moderate (3): 0  · Severe (4): 0    Comments: patient presents for follow up smoking 6 times per day down from a pack per day, he is relighting each cigarette therefore it is a total of 3 cigs per day. Informed him to be mindful of his smoking and in control therefore to keep track of the lights and not only the cigarettes, he is currently using the nicotine gum throughout the day about 2 times and wearing the 21mg nicotine patch daily, he is comfortable but struggling with habit times, he has 7 cigarettes left from the pack he purchased Saturday, recommend he try the 2mg nicotine lozenge to see if this will help him with his habit times so he can use these in place of his smoking, strategies and session handout discussed. Will follow     Diagnosis: F17.210    Next Visit: 2 weeks

## 2022-03-22 ENCOUNTER — CLINICAL SUPPORT (OUTPATIENT)
Dept: SMOKING CESSATION | Facility: CLINIC | Age: 77
End: 2022-03-22
Payer: COMMERCIAL

## 2022-03-22 DIAGNOSIS — F17.200 NICOTINE DEPENDENCE: Primary | ICD-10-CM

## 2022-03-22 PROCEDURE — 99402 PR PREVENT COUNSEL,INDIV,30 MIN: ICD-10-PCS | Mod: S$GLB,,,

## 2022-03-22 PROCEDURE — 99402 PREV MED CNSL INDIV APPRX 30: CPT | Mod: S$GLB,,,

## 2022-03-22 RX ORDER — IBUPROFEN 200 MG
1 TABLET ORAL DAILY
Qty: 14 PATCH | Refills: 0 | Status: SHIPPED | OUTPATIENT
Start: 2022-03-22 | End: 2022-04-06 | Stop reason: SDUPTHER

## 2022-03-22 NOTE — Clinical Note
Comments: patient presents for follow up telephonically, he is wearing the nicotine patch daily however is having a reaction to the adhesive, further discussed what type of patch was provide and patient informed tts that it was the  opaque version therefore will order the clear version of the nicotine patch to see if he can tolerate a better, he is using the 2mg nicotine gum and lozenge throughout the day, he is smoking 5 cigarettes per day, recommend using the nicotine gum and lozenge in place of the cigarette he is smoking, strategies and sessio handout discussed. Will follow

## 2022-03-22 NOTE — PROGRESS NOTES
Individual Follow-Up Form    3/22/2022    Quit Date: 3/22    Clinical Status of Patient: Outpatient    Length of Service: 30 minutes    Continuing Medication: yes  Patches, Nicotine gum or Nicotine Lozenges    Other Medications: n/a     Target Symptoms: Withdrawal and medication side effects. The following were  rated moderate (3) to severe (4) on TCRS:  · Moderate (3): 0  · Severe (4): 0    Comments: patient presents for follow up telephonically, he is wearing the nicotine patch daily however is having a reaction to the adhesive, further discussed what type of patch was provide and patient informed tts that it was the  opaque version therefore will order the clear version of the nicotine patch to see if he can tolerate a better, he is using the 2mg nicotine gum and lozenge throughout the day, he is smoking 5 cigarettes per day, recommend using the nicotine gum and lozenge in place of the cigarette he is smoking, strategies and sessio handout discussed. Will follow     Diagnosis: F17.210    Next Visit: 2 weeks    Entered chart on 4/11 to correct a charge.

## 2022-04-06 ENCOUNTER — CLINICAL SUPPORT (OUTPATIENT)
Dept: SMOKING CESSATION | Facility: CLINIC | Age: 77
End: 2022-04-06
Payer: COMMERCIAL

## 2022-04-06 DIAGNOSIS — F17.200 NICOTINE DEPENDENCE: ICD-10-CM

## 2022-04-06 PROCEDURE — 99402 PREV MED CNSL INDIV APPRX 30: CPT | Mod: S$GLB,,,

## 2022-04-06 PROCEDURE — 99999 PR PBB SHADOW E&M-EST. PATIENT-LVL II: ICD-10-PCS | Mod: PBBFAC,,,

## 2022-04-06 PROCEDURE — 99999 PR PBB SHADOW E&M-EST. PATIENT-LVL II: CPT | Mod: PBBFAC,,,

## 2022-04-06 PROCEDURE — 99402 PR PREVENT COUNSEL,INDIV,30 MIN: ICD-10-PCS | Mod: S$GLB,,,

## 2022-04-06 RX ORDER — DM/P-EPHED/ACETAMINOPH/DOXYLAM 30-7.5/3
2 LIQUID (ML) ORAL
Qty: 100 LOZENGE | Refills: 0 | Status: SHIPPED | OUTPATIENT
Start: 2022-04-06 | End: 2022-05-07

## 2022-04-06 RX ORDER — IBUPROFEN 200 MG
1 TABLET ORAL DAILY
Qty: 14 PATCH | Refills: 0 | Status: SHIPPED | OUTPATIENT
Start: 2022-04-06 | End: 2022-05-04

## 2022-04-06 NOTE — Clinical Note
Comments: patient presents for follow up telephonically, he is smoking very little and less then he has ever smoked before, he is currently on the 21mg nicotine patch daily and the 2mg nicotine lozenge, he had a reaction to the opaque version therefore the clear was ordered to see if this would be a better patch for him, he is doing great on this clear version therefore will continue, strategies to help eliminate tobacco and work on an over all quit discussed, will follow

## 2022-04-06 NOTE — PROGRESS NOTES
Individual Follow-Up Form    4/6/2022    Quit Date: n/a    Clinical Status of Patient: Outpatient    Length of Service: 30 minutes    Continuing Medication: yes  Patches or Nicotine Lozenges    Other Medications: n/a     Target Symptoms: Withdrawal and medication side effects. The following were  rated moderate (3) to severe (4) on TCRS:  · Moderate (3): 0  · Severe (4): 0    Comments: patient presents for follow up telephonically, he is smoking very little and less then he has ever smoked before, he is currently on the 21mg nicotine patch daily and the 2mg nicotine lozenge, he had a reaction to the opaque version therefore the clear was ordered to see if this would be a better patch for him, he is doing great on this clear version therefore will continue, strategies to help eliminate tobacco and work on an over all quit discussed, will follow     Diagnosis: F17.210    Next Visit: 2 weeks

## 2022-06-23 DIAGNOSIS — I63.81 LEFT SIDED LACUNAR INFARCTION: ICD-10-CM

## 2022-06-23 RX ORDER — CLOPIDOGREL BISULFATE 75 MG/1
75 TABLET ORAL DAILY
Qty: 90 TABLET | Refills: 0 | Status: ON HOLD | OUTPATIENT
Start: 2022-06-23 | End: 2022-07-09 | Stop reason: SDUPTHER

## 2022-06-23 NOTE — TELEPHONE ENCOUNTER
No new care gaps identified.  [facility] Embedded Care Gaps. Reference number: [id number]. 6/23/2022   9:24:15 AM CDT

## 2022-07-07 ENCOUNTER — HOSPITAL ENCOUNTER (INPATIENT)
Facility: HOSPITAL | Age: 77
LOS: 2 days | Discharge: HOME | DRG: 066 | End: 2022-07-09
Attending: EMERGENCY MEDICINE | Admitting: EMERGENCY MEDICINE
Payer: COMMERCIAL

## 2022-07-07 DIAGNOSIS — I63.81 LEFT SIDED LACUNAR INFARCTION: ICD-10-CM

## 2022-07-07 DIAGNOSIS — I63.9 STROKE: ICD-10-CM

## 2022-07-07 DIAGNOSIS — R06.02 SOB (SHORTNESS OF BREATH): ICD-10-CM

## 2022-07-07 DIAGNOSIS — E78.5 DYSLIPIDEMIA: ICD-10-CM

## 2022-07-07 DIAGNOSIS — I63.9 CEREBROVASCULAR ACCIDENT (CVA), UNSPECIFIED MECHANISM: Primary | ICD-10-CM

## 2022-07-07 LAB
ALBUMIN SERPL BCP-MCNC: 4.1 G/DL (ref 3.5–5.2)
ALP SERPL-CCNC: 109 U/L (ref 55–135)
ALT SERPL W/O P-5'-P-CCNC: 29 U/L (ref 10–44)
AMPHET+METHAMPHET UR QL: NEGATIVE
ANION GAP SERPL CALC-SCNC: 10 MMOL/L (ref 8–16)
AST SERPL-CCNC: 27 U/L (ref 10–40)
BARBITURATES UR QL SCN>200 NG/ML: NEGATIVE
BASOPHILS # BLD AUTO: 0.06 K/UL (ref 0–0.2)
BASOPHILS NFR BLD: 0.6 % (ref 0–1.9)
BENZODIAZ UR QL SCN>200 NG/ML: NEGATIVE
BILIRUB SERPL-MCNC: 1.2 MG/DL (ref 0.1–1)
BUN SERPL-MCNC: 11 MG/DL (ref 8–23)
BZE UR QL SCN: NEGATIVE
CALCIUM SERPL-MCNC: 10.2 MG/DL (ref 8.7–10.5)
CANNABINOIDS UR QL SCN: NEGATIVE
CHLORIDE SERPL-SCNC: 105 MMOL/L (ref 95–110)
CHOLEST SERPL-MCNC: 176 MG/DL (ref 120–199)
CHOLEST/HDLC SERPL: 2.9 {RATIO} (ref 2–5)
CO2 SERPL-SCNC: 26 MMOL/L (ref 23–29)
CREAT SERPL-MCNC: 1.1 MG/DL (ref 0.5–1.4)
CREAT UR-MCNC: 164.3 MG/DL (ref 23–375)
CTP QC/QA: YES
DIFFERENTIAL METHOD: NORMAL
EOSINOPHIL # BLD AUTO: 0.1 K/UL (ref 0–0.5)
EOSINOPHIL NFR BLD: 0.9 % (ref 0–8)
ERYTHROCYTE [DISTWIDTH] IN BLOOD BY AUTOMATED COUNT: 13.9 % (ref 11.5–14.5)
EST. GFR  (AFRICAN AMERICAN): >60 ML/MIN/1.73 M^2
EST. GFR  (NON AFRICAN AMERICAN): >60 ML/MIN/1.73 M^2
GLUCOSE SERPL-MCNC: 87 MG/DL (ref 70–110)
HCT VFR BLD AUTO: 46.9 % (ref 40–54)
HDLC SERPL-MCNC: 61 MG/DL (ref 40–75)
HDLC SERPL: 34.7 % (ref 20–50)
HGB BLD-MCNC: 16.2 G/DL (ref 14–18)
IMM GRANULOCYTES # BLD AUTO: 0.03 K/UL (ref 0–0.04)
IMM GRANULOCYTES NFR BLD AUTO: 0.3 % (ref 0–0.5)
INR PPP: 1 (ref 0.8–1.2)
LDLC SERPL CALC-MCNC: 95.6 MG/DL (ref 63–159)
LYMPHOCYTES # BLD AUTO: 1.9 K/UL (ref 1–4.8)
LYMPHOCYTES NFR BLD: 20.4 % (ref 18–48)
MCH RBC QN AUTO: 30.4 PG (ref 27–31)
MCHC RBC AUTO-ENTMCNC: 34.5 G/DL (ref 32–36)
MCV RBC AUTO: 88 FL (ref 82–98)
METHADONE UR QL SCN>300 NG/ML: NEGATIVE
MONOCYTES # BLD AUTO: 0.6 K/UL (ref 0.3–1)
MONOCYTES NFR BLD: 6.4 % (ref 4–15)
NEUTROPHILS # BLD AUTO: 6.8 K/UL (ref 1.8–7.7)
NEUTROPHILS NFR BLD: 71.4 % (ref 38–73)
NONHDLC SERPL-MCNC: 115 MG/DL
NRBC BLD-RTO: 0 /100 WBC
OPIATES UR QL SCN: NEGATIVE
PCP UR QL SCN>25 NG/ML: NEGATIVE
PLATELET # BLD AUTO: 303 K/UL (ref 150–450)
PMV BLD AUTO: 10.8 FL (ref 9.2–12.9)
POCT GLUCOSE: 98 MG/DL (ref 70–110)
POTASSIUM SERPL-SCNC: 4.3 MMOL/L (ref 3.5–5.1)
PROT SERPL-MCNC: 9.1 G/DL (ref 6–8.4)
PROTHROMBIN TIME: 10.4 SEC (ref 9–12.5)
RBC # BLD AUTO: 5.33 M/UL (ref 4.6–6.2)
SARS-COV-2 RDRP RESP QL NAA+PROBE: NEGATIVE
SODIUM SERPL-SCNC: 141 MMOL/L (ref 136–145)
TOXICOLOGY INFORMATION: NORMAL
TRIGL SERPL-MCNC: 97 MG/DL (ref 30–150)
TSH SERPL DL<=0.005 MIU/L-ACNC: 0.81 UIU/ML (ref 0.4–4)
WBC # BLD AUTO: 9.52 K/UL (ref 3.9–12.7)

## 2022-07-07 PROCEDURE — 93010 ELECTROCARDIOGRAM REPORT: CPT | Mod: ,,, | Performed by: INTERNAL MEDICINE

## 2022-07-07 PROCEDURE — 80061 LIPID PANEL: CPT | Performed by: EMERGENCY MEDICINE

## 2022-07-07 PROCEDURE — 85610 PROTHROMBIN TIME: CPT | Performed by: EMERGENCY MEDICINE

## 2022-07-07 PROCEDURE — 12000002 HC ACUTE/MED SURGE SEMI-PRIVATE ROOM

## 2022-07-07 PROCEDURE — 80053 COMPREHEN METABOLIC PANEL: CPT | Performed by: EMERGENCY MEDICINE

## 2022-07-07 PROCEDURE — 25000003 PHARM REV CODE 250: Performed by: EMERGENCY MEDICINE

## 2022-07-07 PROCEDURE — 93010 EKG 12-LEAD: ICD-10-PCS | Mod: ,,, | Performed by: INTERNAL MEDICINE

## 2022-07-07 PROCEDURE — 83036 HEMOGLOBIN GLYCOSYLATED A1C: CPT | Performed by: HOSPITALIST

## 2022-07-07 PROCEDURE — U0002 COVID-19 LAB TEST NON-CDC: HCPCS | Performed by: EMERGENCY MEDICINE

## 2022-07-07 PROCEDURE — 80307 DRUG TEST PRSMV CHEM ANLYZR: CPT | Performed by: HOSPITALIST

## 2022-07-07 PROCEDURE — 84443 ASSAY THYROID STIM HORMONE: CPT | Performed by: EMERGENCY MEDICINE

## 2022-07-07 PROCEDURE — 63600175 PHARM REV CODE 636 W HCPCS: Performed by: HOSPITALIST

## 2022-07-07 PROCEDURE — 93005 ELECTROCARDIOGRAM TRACING: CPT

## 2022-07-07 PROCEDURE — 85025 COMPLETE CBC W/AUTO DIFF WBC: CPT | Performed by: EMERGENCY MEDICINE

## 2022-07-07 PROCEDURE — 99285 EMERGENCY DEPT VISIT HI MDM: CPT | Mod: 25

## 2022-07-07 RX ORDER — SODIUM CHLORIDE 0.9 % (FLUSH) 0.9 %
10 SYRINGE (ML) INJECTION
Status: DISCONTINUED | OUTPATIENT
Start: 2022-07-07 | End: 2022-07-09 | Stop reason: HOSPADM

## 2022-07-07 RX ORDER — ONDANSETRON 2 MG/ML
4 INJECTION INTRAMUSCULAR; INTRAVENOUS EVERY 4 HOURS PRN
Status: DISCONTINUED | OUTPATIENT
Start: 2022-07-07 | End: 2022-07-09 | Stop reason: HOSPADM

## 2022-07-07 RX ORDER — CLOPIDOGREL BISULFATE 75 MG/1
75 TABLET ORAL DAILY
Status: DISCONTINUED | OUTPATIENT
Start: 2022-07-08 | End: 2022-07-09 | Stop reason: HOSPADM

## 2022-07-07 RX ORDER — LOPERAMIDE HYDROCHLORIDE 2 MG/1
2 CAPSULE ORAL 4 TIMES DAILY PRN
Status: DISCONTINUED | OUTPATIENT
Start: 2022-07-07 | End: 2022-07-09 | Stop reason: HOSPADM

## 2022-07-07 RX ORDER — ATORVASTATIN CALCIUM 40 MG/1
40 TABLET, FILM COATED ORAL DAILY
Status: DISCONTINUED | OUTPATIENT
Start: 2022-07-08 | End: 2022-07-09 | Stop reason: HOSPADM

## 2022-07-07 RX ORDER — TALC
6 POWDER (GRAM) TOPICAL NIGHTLY PRN
Status: DISCONTINUED | OUTPATIENT
Start: 2022-07-07 | End: 2022-07-09 | Stop reason: HOSPADM

## 2022-07-07 RX ORDER — ASPIRIN 81 MG/1
81 TABLET ORAL DAILY
Status: DISCONTINUED | OUTPATIENT
Start: 2022-07-08 | End: 2022-07-09 | Stop reason: HOSPADM

## 2022-07-07 RX ORDER — ACETAMINOPHEN 325 MG/1
650 TABLET ORAL EVERY 6 HOURS PRN
Status: DISCONTINUED | OUTPATIENT
Start: 2022-07-07 | End: 2022-07-09 | Stop reason: HOSPADM

## 2022-07-07 RX ORDER — ENOXAPARIN SODIUM 100 MG/ML
40 INJECTION SUBCUTANEOUS EVERY 24 HOURS
Status: DISCONTINUED | OUTPATIENT
Start: 2022-07-07 | End: 2022-07-09 | Stop reason: HOSPADM

## 2022-07-07 RX ORDER — NICOTINE 7MG/24HR
1 PATCH, TRANSDERMAL 24 HOURS TRANSDERMAL DAILY
Status: DISCONTINUED | OUTPATIENT
Start: 2022-07-08 | End: 2022-07-09 | Stop reason: HOSPADM

## 2022-07-07 RX ORDER — LABETALOL HYDROCHLORIDE 5 MG/ML
10 INJECTION, SOLUTION INTRAVENOUS EVERY 4 HOURS PRN
Status: DISCONTINUED | OUTPATIENT
Start: 2022-07-07 | End: 2022-07-09 | Stop reason: HOSPADM

## 2022-07-07 RX ORDER — ASPIRIN 325 MG
325 TABLET ORAL
Status: COMPLETED | OUTPATIENT
Start: 2022-07-07 | End: 2022-07-07

## 2022-07-07 RX ADMIN — ASPIRIN 325 MG ORAL TABLET 325 MG: 325 PILL ORAL at 02:07

## 2022-07-07 RX ADMIN — ENOXAPARIN SODIUM 40 MG: 100 INJECTION SUBCUTANEOUS at 07:07

## 2022-07-07 NOTE — ED PROVIDER NOTES
Encounter Date: 7/7/2022    SCRIBE #1 NOTE: I, Daniel Solorzano, am scribing for, and in the presence of, Kristin Craft MD.       History     Chief Complaint   Patient presents with    Gait Problem     Pt chief complaint is an unsteady gait, pt states has a difficulty walking, reports a headache and slurring speech. Pt states that symptoms began sometime Tuesday.       Rashad Rodríguez is a 77 y.o. male with a PMHx of CVA, HTN, and HLD who presents to the Emergency Department for evaluation of a 3 day history of unsteady gait, bilateral foot numbness and paresthesias, and slurred speech. Patient explains that he feels as though he will fall to the right when ambulating. He clarifies that he can usually walk without complications. He states he has had some intermittent fatigue; none currently. He is also complaining of frontal head pressure. Patient denies any hip pain or other associated symptoms.     The history is provided by the patient.     Review of patient's allergies indicates:   Allergen Reactions    Cephalexin     Ciprofloxacin     Doxycycline Other (See Comments)     Stomach cramps    Bactrim [sulfamethoxazole-trimethoprim] Rash     Past Medical History:   Diagnosis Date    Cigarette smoker     Hyperlipidemia     Hypertension     Stroke 10/01/2020     Past Surgical History:   Procedure Laterality Date    NO PAST SURGERIES       Family History   Problem Relation Age of Onset    Cancer Mother     Stroke Father     No Known Problems Sister     No Known Problems Brother     No Known Problems Maternal Aunt     No Known Problems Maternal Uncle     No Known Problems Paternal Aunt     No Known Problems Paternal Uncle     No Known Problems Maternal Grandmother     No Known Problems Maternal Grandfather     No Known Problems Paternal Grandmother     No Known Problems Paternal Grandfather     Amblyopia Neg Hx     Blindness Neg Hx     Cataracts Neg Hx     Diabetes Neg Hx     Glaucoma Neg Hx      Hypertension Neg Hx     Macular degeneration Neg Hx     Retinal detachment Neg Hx     Strabismus Neg Hx     Thyroid disease Neg Hx      Social History     Tobacco Use    Smoking status: Current Every Day Smoker     Packs/day: 1.00     Years: 50.00     Pack years: 50.00     Types: Cigarettes    Smokeless tobacco: Never Used   Substance Use Topics    Alcohol use: Yes     Alcohol/week: 2.0 standard drinks     Types: 2 Cans of beer per week     Comment: socially    Drug use: No     Review of Systems   Constitutional: Positive for fatigue. Negative for activity change, diaphoresis and fever.   HENT: Negative for congestion, dental problem, drooling and sore throat.    Eyes: Negative for pain.   Respiratory: Negative for cough and shortness of breath.    Cardiovascular: Negative for chest pain.   Gastrointestinal: Negative for abdominal pain, nausea and vomiting.   Genitourinary: Negative for dysuria, flank pain, genital sores and urgency.   Musculoskeletal: Positive for gait problem. Negative for back pain.   Skin: Negative for rash.   Neurological: Positive for speech difficulty, numbness and headaches. Negative for weakness.   Hematological: Negative for adenopathy.   Psychiatric/Behavioral: Negative for agitation and confusion.       Physical Exam     Initial Vitals [07/07/22 1100]   BP Pulse Resp Temp SpO2   (!) 135/91 80 18 98.7 °F (37.1 °C) 98 %      MAP       --         Physical Exam    Nursing note and vitals reviewed.  Constitutional: He appears well-developed and well-nourished. He is not diaphoretic. No distress.   HENT:   Head: Normocephalic and atraumatic.   Right Ear: External ear normal.   Left Ear: External ear normal.   Nose: Nose normal.   Mouth/Throat: Oropharynx is clear and moist. No oropharyngeal exudate.   Eyes: Conjunctivae and EOM are normal. Pupils are equal, round, and reactive to light. Right eye exhibits no discharge. Left eye exhibits no discharge. No scleral icterus.   Neck:  Neck supple. No thyromegaly present. No tracheal deviation present.   Normal range of motion.  Cardiovascular: Normal rate, regular rhythm and intact distal pulses. Exam reveals no friction rub.    No murmur heard.  Pulmonary/Chest: Breath sounds normal. No stridor. No respiratory distress.   Abdominal: Abdomen is soft. He exhibits no distension and no mass. There is no abdominal tenderness.   Musculoskeletal:         General: No tenderness or edema. Normal range of motion.      Cervical back: Normal range of motion and neck supple.     Neurological: He is alert and oriented to person, place, and time. No cranial nerve deficit or sensory deficit. GCS score is 15. GCS eye subscore is 4. GCS verbal subscore is 5. GCS motor subscore is 6.   Past points on right.    Skin: Skin is warm and dry. No rash noted. No erythema.   Psychiatric: He has a normal mood and affect. Thought content normal.         ED Course   Procedures  Labs Reviewed   CBC W/ AUTO DIFFERENTIAL   COMPREHENSIVE METABOLIC PANEL   PROTIME-INR   TSH   LIPID PANEL   SARS-COV-2 RDRP GENE   POCT GLUCOSE     EKG Readings: (Independently Interpreted)   Initial Reading: No STEMI. Rhythm: Sinus Arrhythmia. Heart Rate: 61. Ectopy: No Ectopy. Conduction: Normal. ST Segments: Normal ST Segments. T Waves: Normal. Axis: Normal.       Imaging Results    None          Medications - No data to display  Medical Decision Making:   History:   Old Medical Records: I decided to obtain old medical records.  Clinical Tests:   Lab Tests: Reviewed and Ordered  Radiological Study: Ordered and Reviewed  Medical Tests: Reviewed and Ordered  76yo M with h/o multiple medical problems presents to ED with slurred speech and gait changes x 3 days. Denies f/c but reports mild frontal HA. DDx includes but not limited to CVA, migraine ha, tensions ha, dehydration, IC mass. Labs and imaging reviewed. There is concern for new CVA on MRI. Patient given ASA. BP at goal without intervention.  Given duration of symptoms, no stroke activation was called. Will admit for CVA workup, PT/OT and further eval and tx. Dr. Castillo agrees to accept but would like vascular neurology to weigh in on patient's care plan. Transfer center has been called. Await call back.  Kristin Craft M.D.  6:55 PM 7/7/2022      I spoke with Dr. Murphy with vascular neurology who does not feel the patient warrants vascular intervention  Kristin Craft M.D.  7:19 PM 7/7/2022            Scribe Attestation:   Scribe #1: I performed the above scribed service and the documentation accurately describes the services I performed. I attest to the accuracy of the note.                 Clinical Impression:   Final diagnoses:  [I63.9] Stroke               I personally performed the services described in this documentation. All medical record entries made by the scribe were at my direction and in my presence. I have reviewed the chart and agree that the record reflects my personal performance and is accurate and complete.       Kristin Craft MD  07/07/22 0876       Kristin Craft MD  07/07/22 1918

## 2022-07-07 NOTE — ED TRIAGE NOTES
"Pt to the ED with complaints of frontal head "tightness", bilateral lower extremity weakness and unsteady gait x2 days. Pt also reports intermittent shortness of breath since yesterday and excessive salivation that he sometimes "chokes on". Pt with hx of stroke in 2020 and states these feel like the same symptoms. Pt denies blurry vision or visual changes, chest pain, abdominal pain, N/V/D, dysuria, or any other symptoms.   "

## 2022-07-08 LAB
ALBUMIN SERPL BCP-MCNC: 3.6 G/DL (ref 3.5–5.2)
ALP SERPL-CCNC: 98 U/L (ref 55–135)
ALT SERPL W/O P-5'-P-CCNC: 26 U/L (ref 10–44)
ANION GAP SERPL CALC-SCNC: 10 MMOL/L (ref 8–16)
APTT BLDCRRT: 27.7 SEC (ref 21–32)
ASCENDING AORTA: 3.17 CM
AST SERPL-CCNC: 27 U/L (ref 10–40)
AV INDEX (PROSTH): 0.92
AV MEAN GRADIENT: 3 MMHG
AV PEAK GRADIENT: 4 MMHG
AV VALVE AREA: 3.36 CM2
AV VELOCITY RATIO: 0.96
BASOPHILS # BLD AUTO: 0.06 K/UL (ref 0–0.2)
BASOPHILS NFR BLD: 0.6 % (ref 0–1.9)
BILIRUB SERPL-MCNC: 1.2 MG/DL (ref 0.1–1)
BSA FOR ECHO PROCEDURE: 1.67 M2
BUN SERPL-MCNC: 14 MG/DL (ref 8–23)
CALCIUM SERPL-MCNC: 9.4 MG/DL (ref 8.7–10.5)
CHLORIDE SERPL-SCNC: 108 MMOL/L (ref 95–110)
CK MB SERPL-MCNC: 4.3 NG/ML (ref 0.1–6.5)
CK MB SERPL-RTO: 1.3 % (ref 0–5)
CK SERPL-CCNC: 338 U/L (ref 20–200)
CO2 SERPL-SCNC: 22 MMOL/L (ref 23–29)
CREAT SERPL-MCNC: 0.9 MG/DL (ref 0.5–1.4)
CV ECHO LV RWT: 0.65 CM
DIFFERENTIAL METHOD: NORMAL
DOP CALC AO PEAK VEL: 1.01 M/S
DOP CALC AO VTI: 18.54 CM
DOP CALC LVOT AREA: 3.7 CM2
DOP CALC LVOT DIAMETER: 2.16 CM
DOP CALC LVOT PEAK VEL: 0.97 M/S
DOP CALC LVOT STROKE VOLUME: 62.23 CM3
DOP CALCLVOT PEAK VEL VTI: 16.99 CM
E WAVE DECELERATION TIME: 241.54 MSEC
E/A RATIO: 0.84
E/E' RATIO: 9.69 M/S
ECHO LV POSTERIOR WALL: 1.16 CM (ref 0.6–1.1)
EJECTION FRACTION: 60 %
EOSINOPHIL # BLD AUTO: 0.1 K/UL (ref 0–0.5)
EOSINOPHIL NFR BLD: 0.9 % (ref 0–8)
ERYTHROCYTE [DISTWIDTH] IN BLOOD BY AUTOMATED COUNT: 13.7 % (ref 11.5–14.5)
EST. GFR  (AFRICAN AMERICAN): >60 ML/MIN/1.73 M^2
EST. GFR  (NON AFRICAN AMERICAN): >60 ML/MIN/1.73 M^2
ESTIMATED AVG GLUCOSE: 114 MG/DL (ref 68–131)
FRACTIONAL SHORTENING: 46 % (ref 28–44)
GLUCOSE SERPL-MCNC: 78 MG/DL (ref 70–110)
HBA1C MFR BLD: 5.6 % (ref 4–5.6)
HCT VFR BLD AUTO: 43.1 % (ref 40–54)
HGB BLD-MCNC: 14.7 G/DL (ref 14–18)
IMM GRANULOCYTES # BLD AUTO: 0.03 K/UL (ref 0–0.04)
IMM GRANULOCYTES NFR BLD AUTO: 0.3 % (ref 0–0.5)
INR PPP: 1 (ref 0.8–1.2)
INTERVENTRICULAR SEPTUM: 1.02 CM (ref 0.6–1.1)
IVRT: 108.47 MSEC
LA MAJOR: 4.14 CM
LA MINOR: 4.42 CM
LA WIDTH: 3.86 CM
LEFT ATRIUM SIZE: 3.69 CM
LEFT ATRIUM VOLUME INDEX: 30.8 ML/M2
LEFT ATRIUM VOLUME: 51.76 CM3
LEFT INTERNAL DIMENSION IN SYSTOLE: 1.92 CM (ref 2.1–4)
LEFT VENTRICLE DIASTOLIC VOLUME INDEX: 31.58 ML/M2
LEFT VENTRICLE DIASTOLIC VOLUME: 53.05 ML
LEFT VENTRICLE MASS INDEX: 72 G/M2
LEFT VENTRICLE SYSTOLIC VOLUME INDEX: 6.8 ML/M2
LEFT VENTRICLE SYSTOLIC VOLUME: 11.44 ML
LEFT VENTRICULAR INTERNAL DIMENSION IN DIASTOLE: 3.56 CM (ref 3.5–6)
LEFT VENTRICULAR MASS: 120.41 G
LV LATERAL E/E' RATIO: 7.88 M/S
LV SEPTAL E/E' RATIO: 12.6 M/S
LYMPHOCYTES # BLD AUTO: 1.8 K/UL (ref 1–4.8)
LYMPHOCYTES NFR BLD: 18.8 % (ref 18–48)
MAGNESIUM SERPL-MCNC: 2 MG/DL (ref 1.6–2.6)
MCH RBC QN AUTO: 30.1 PG (ref 27–31)
MCHC RBC AUTO-ENTMCNC: 34.1 G/DL (ref 32–36)
MCV RBC AUTO: 88 FL (ref 82–98)
MONOCYTES # BLD AUTO: 0.9 K/UL (ref 0.3–1)
MONOCYTES NFR BLD: 8.8 % (ref 4–15)
MV PEAK A VEL: 0.75 M/S
MV PEAK E VEL: 0.63 M/S
MV STENOSIS PRESSURE HALF TIME: 70.05 MS
MV VALVE AREA P 1/2 METHOD: 3.14 CM2
NEUTROPHILS # BLD AUTO: 6.9 K/UL (ref 1.8–7.7)
NEUTROPHILS NFR BLD: 70.6 % (ref 38–73)
NRBC BLD-RTO: 0 /100 WBC
PISA TR MAX VEL: 2.47 M/S
PLATELET # BLD AUTO: 281 K/UL (ref 150–450)
PMV BLD AUTO: 10.7 FL (ref 9.2–12.9)
POTASSIUM SERPL-SCNC: 4 MMOL/L (ref 3.5–5.1)
PROT SERPL-MCNC: 7.8 G/DL (ref 6–8.4)
PROTHROMBIN TIME: 10.9 SEC (ref 9–12.5)
PV PEAK VELOCITY: 0.69 CM/S
RA MAJOR: 4.44 CM
RA PRESSURE: 3 MMHG
RA WIDTH: 2.93 CM
RBC # BLD AUTO: 4.89 M/UL (ref 4.6–6.2)
RIGHT VENTRICULAR END-DIASTOLIC DIMENSION: 3.78 CM
RV TISSUE DOPPLER FREE WALL SYSTOLIC VELOCITY 1 (APICAL 4 CHAMBER VIEW): 12.6 CM/S
SODIUM SERPL-SCNC: 140 MMOL/L (ref 136–145)
STJ: 2.34 CM
TDI LATERAL: 0.08 M/S
TDI SEPTAL: 0.05 M/S
TDI: 0.07 M/S
TR MAX PG: 24 MMHG
TRICUSPID ANNULAR PLANE SYSTOLIC EXCURSION: 2.22 CM
TV REST PULMONARY ARTERY PRESSURE: 27 MMHG
WBC # BLD AUTO: 9.75 K/UL (ref 3.9–12.7)

## 2022-07-08 PROCEDURE — 63600175 PHARM REV CODE 636 W HCPCS: Performed by: HOSPITALIST

## 2022-07-08 PROCEDURE — 92610 EVALUATE SWALLOWING FUNCTION: CPT

## 2022-07-08 PROCEDURE — 80053 COMPREHEN METABOLIC PANEL: CPT | Performed by: HOSPITALIST

## 2022-07-08 PROCEDURE — 99222 1ST HOSP IP/OBS MODERATE 55: CPT | Mod: ,,, | Performed by: PSYCHIATRY & NEUROLOGY

## 2022-07-08 PROCEDURE — 36415 COLL VENOUS BLD VENIPUNCTURE: CPT | Performed by: HOSPITALIST

## 2022-07-08 PROCEDURE — 85730 THROMBOPLASTIN TIME PARTIAL: CPT | Performed by: HOSPITALIST

## 2022-07-08 PROCEDURE — 25000003 PHARM REV CODE 250: Performed by: HOSPITALIST

## 2022-07-08 PROCEDURE — 97535 SELF CARE MNGMENT TRAINING: CPT

## 2022-07-08 PROCEDURE — 99222 PR INITIAL HOSPITAL CARE,LEVL II: ICD-10-PCS | Mod: ,,, | Performed by: PSYCHIATRY & NEUROLOGY

## 2022-07-08 PROCEDURE — 85610 PROTHROMBIN TIME: CPT | Performed by: HOSPITALIST

## 2022-07-08 PROCEDURE — 83735 ASSAY OF MAGNESIUM: CPT | Performed by: HOSPITALIST

## 2022-07-08 PROCEDURE — 85025 COMPLETE CBC W/AUTO DIFF WBC: CPT | Performed by: HOSPITALIST

## 2022-07-08 PROCEDURE — 11000001 HC ACUTE MED/SURG PRIVATE ROOM

## 2022-07-08 PROCEDURE — S4991 NICOTINE PATCH NONLEGEND: HCPCS | Performed by: HOSPITALIST

## 2022-07-08 PROCEDURE — 94761 N-INVAS EAR/PLS OXIMETRY MLT: CPT

## 2022-07-08 PROCEDURE — 82553 CREATINE MB FRACTION: CPT | Performed by: HOSPITALIST

## 2022-07-08 PROCEDURE — 97165 OT EVAL LOW COMPLEX 30 MIN: CPT

## 2022-07-08 PROCEDURE — 97161 PT EVAL LOW COMPLEX 20 MIN: CPT

## 2022-07-08 RX ADMIN — ENOXAPARIN SODIUM 40 MG: 100 INJECTION SUBCUTANEOUS at 05:07

## 2022-07-08 RX ADMIN — NICOTINE 1 PATCH: 7 PATCH, EXTENDED RELEASE TRANSDERMAL at 10:07

## 2022-07-08 RX ADMIN — ATORVASTATIN CALCIUM 40 MG: 40 TABLET, FILM COATED ORAL at 10:07

## 2022-07-08 RX ADMIN — CLOPIDOGREL 75 MG: 75 TABLET, FILM COATED ORAL at 10:07

## 2022-07-08 RX ADMIN — ASPIRIN 81 MG: 81 TABLET, COATED ORAL at 10:07

## 2022-07-08 NOTE — NURSING
Patient off of unit to get echo in  via transport. Denies pain/discomfort at this time. Tele monitor tech notified patient is off unit.

## 2022-07-08 NOTE — H&P
"[facility] Medicine  History & Physical    Patient Name: Rashad Rodríguez  MRN: [id number]  Patient Class: IP- Inpatient  Admission Date: 7/7/2022  Attending Physician: Pascual Castillo MD  Primary Care Provider: Juanito Dejesus Jr, MD         Patient information was obtained from patient, past medical records and ER records.     Subjective:     Principal Problem:CVA (cerebral vascular accident)    Chief Complaint:   Chief Complaint   Patient presents with    Gait Problem     Pt chief complaint is an unsteady gait, pt states has a difficulty walking, reports a headache and slurring speech. Pt states that symptoms began sometime Tuesday.         HPI: 77 y.o. male PMHx CVA, HTN presents with 2-3 days of unsteady gait. Patient explains that he feels as though he will fall to the right when ambulating. Symptoms associated with trouble speaking and head pressure.  Per report patient was also c/o  bilateral foot numbness, paresthesias, and fatigue. However, he does not report these symptoms to me.  He admits to previously being diagnosed with CVA and was taking aspirin.  He stopped taking aspirin "a long time ago."  Symptoms better than onset.  Rest of history limited by patient request.    ED course:  CTH Impression:  Sequela of chronic microvascular ischemic change noting more focal regions of low attenuation involve the right corona radiata and right basal ganglia.  The region in the right basal ganglia may be new since the previous examination however not confirmed. ED MD discussed case with Dr. Murphy with vascular neurology who does not feel the patient warrants vascular intervention. Other labs were relatively unremarkable.    Admitted to Hospital Medicine for further evaluation    Past Medical History:   Diagnosis Date    Cigarette smoker     Hyperlipidemia     Hypertension     Stroke 10/01/2020     Past Surgical History:   Procedure Laterality Date    NO PAST SURGERIES       Social History "     Tobacco Use    Smoking status: Current Every Day Smoker     Packs/day: 0.50     Years: 50.00     Pack years: 25.00     Types: Cigarettes    Smokeless tobacco: Never Used   Substance Use Topics    Alcohol use: Yes     Alcohol/week: 2.0 standard drinks     Types: 2 Cans of beer per week     Comment: socially    Drug use: No     Family History   Problem Relation Age of Onset    Cancer Mother     Stroke Father     No Known Problems Sister     No Known Problems Brother     No Known Problems Maternal Aunt     No Known Problems Maternal Uncle     No Known Problems Paternal Aunt     No Known Problems Paternal Uncle     No Known Problems Maternal Grandmother     No Known Problems Maternal Grandfather     No Known Problems Paternal Grandmother     No Known Problems Paternal Grandfather     Amblyopia Neg Hx     Blindness Neg Hx     Cataracts Neg Hx     Diabetes Neg Hx     Glaucoma Neg Hx     Hypertension Neg Hx     Macular degeneration Neg Hx     Retinal detachment Neg Hx     Strabismus Neg Hx     Thyroid disease Neg Hx      Review of patient's allergies indicates:   Allergen Reactions    Cephalexin     Ciprofloxacin     Doxycycline Other (See Comments)     Stomach cramps    Bactrim [sulfamethoxazole-trimethoprim] Rash       Current Outpatient Medications:     amLODIPine (NORVASC) 5 MG tablet, Take 1 tablet (5 mg total) by mouth once daily., Disp: 90 tablet, Rfl: 1    atorvastatin (LIPITOR) 40 MG tablet, Take 1 tablet (40 mg total) by mouth once daily., Disp: 90 tablet, Rfl: 1    clopidogreL (PLAVIX) 75 mg tablet, TAKE 1 TABLET(75 MG) BY MOUTH EVERY DAY, Disp: 90 tablet, Rfl: 0    clopidogreL (PLAVIX) 75 mg tablet, Take 1 tablet (75 mg total) by mouth once daily., Disp: 90 tablet, Rfl: 0    hydrOXYzine HCL (ATARAX) 10 MG Tab, TAKE 1 TABLET BY MOUTH EVERY 6 HOURS AS NEEDED FOR ITCHING, Disp: , Rfl:     valsartan (DIOVAN) 160 MG tablet, Take 1 tablet (160 mg total) by mouth once daily.,  "Disp: 90 tablet, Rfl: 1    aspirin (ECOTRIN) 81 MG EC tablet, Take 1 tablet (81 mg total) by mouth once daily., Disp: 90 tablet, Rfl: 1    clobetasoL (TEMOVATE) 0.05 % cream, APPLY TO THE AFFECTED AREA ON BODY TWICE DAILY., Disp: , Rfl:     clotrimazole (LOTRIMIN) 1 % cream, Apply topically 2 (two) times daily., Disp: 24 g, Rfl: 0    hydrocortisone 1 % cream, Apply topically 2 (two) times daily., Disp: 26 g, Rfl: 0    VIAGRA 100 mg tablet, 1 tablet PO one hour prior to sex, maximum of 1 tablet in 24 hours, Disp: 10 tablet, Rfl: 11       Review of Systems as per HPI  Review of Systems   Constitutional: Positive for malaise/fatigue.   Eyes: Negative for blurred vision, double vision, photophobia, pain, discharge and redness.   Gastrointestinal: Negative for abdominal pain, diarrhea, nausea and vomiting.   Musculoskeletal: Negative for back pain, falls, joint pain and neck pain.   Neurological: Positive for tingling, sensory change, speech change and headaches. Negative for tremors, seizures and loss of consciousness.   All other systems reviewed and are negative.      Physical Exam     ED Triage Vitals [07/07/22 1100]   Enc Vitals Group      BP (!) 135/91      Pulse 80      Resp 18      Temp 98.7 °F (37.1 °C)      Temp src Oral      SpO2 98 %      Weight 130 lb      Height 5' 7"      Head Circumference       Peak Flow       Pain Score       Pain Loc       Pain Edu?       Excl. in GC?      Vitals:    07/07/22 1502 07/07/22 1503 07/07/22 1533 07/07/22 1534   BP: (!) 170/94  (!) 149/86    BP Location:       Patient Position:       Pulse: 63   60   Resp:  20 18    Temp:       TempSrc:       SpO2: 99%  96%    Weight:       Height:           Vital signs and nursing assessment noted: elevated BP    GEN:   NAD, A & Ox3, atraumatic, well appearing, nontoxic appearing  HEENT:  PERRLA, EOMI, moist membranes, nl conjunctiva, no scleral icterus, no nystagmus, no nodes/nodules, soft, supple, FROM, no trachial deviation, nexus " negative  CV:   RRR no m/r/g, 2+ radial pulses, <2sec cap refill, no obvious JVD  RESP:  CTA B, no w/r/r, equal and bilateral chest rise, no respiratory distress  ABD:   soft, Nontender, Nondistended, +BS, no guarding/rebound  :   Deferred  BACK:  FROM, no midline tenderness, no paraspinal tenderness  EXT:   FROM, CUMMINGS x 4, no swelling, no edema, no calf tenderness, no bony tenderness, no warmth or redness, no crepitus, no obvious deformity  LYMPH:  no gross adenopathy  NEURO:   Alert, GCS 15, CN II-XII grossly intact, normal gaze, normal vision, no facial palsy, no motor deficits,  No sensory deficits, No limb ataxia,  no aphasia, normal articulation, no neglect, no tremor, no nystagmus, nl coordination  PSYCH:   no SI/HI, no anxiety, nl mood/affect, nl judgement/thought process  SKIN:  Warm, dry, intact, no rashes/lesions or masses, nl color, no pallor       Tests   Significant Labs and/or Imaging: I have reviewed all pertinent results/findings within the past 24 hours.  Labs Reviewed   COMPREHENSIVE METABOLIC PANEL - Abnormal; Notable for the following components:       Result Value    Total Protein 9.1 (*)     Total Bilirubin 1.2 (*)     All other components within normal limits   CBC W/ AUTO DIFFERENTIAL   PROTIME-INR   TSH   LIPID PANEL   DRUG SCREEN PANEL, URINE EMERGENCY    Narrative:     Specimen Source->Urine   HEMOGLOBIN A1C   SARS-COV-2 RDRP GENE   POCT GLUCOSE     MRI Brain Without Contrast   Final Result   Abnormal      Acute ischemic change right upper basal ganglia posterior frontal parietal junction at level of central gyrus.      Additional numerous areas of posterior frontal parietooccipital periventricular areas cristy and cerebral peduncle and bilateral cerebellar hemisphere areas of remote gliosis, microvascular ischemic change favored.      Occlusion or slow flow distal right vertebral artery.      This report was flagged in Epic as abnormal.         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  This could represent vertebral artery occlusion versus small-vessel.  When correlated with the routine MRI, there appears to be a normal sized V4 segment and this likely represents right vertebral occlusion.      Short segment 50% stenosis in the RIGHT M1 vessel.      Small caliber LEFT M1.      A track RIGHT A1 segment, likely congenital.      This report was flagged in Epic as abnormal.         Electronically signed by: Surinder Hatch   Date:    07/07/2022   Time:    17:15      X-Ray Chest AP Portable   Final Result      See above         Electronically signed by: Jason Trejo MD   Date:    07/07/2022   Time:    13:18      CT Head Without Contrast   Final Result      1. Sequela of chronic microvascular ischemic change noting more focal regions of low attenuation involve the right corona radiata and right basal ganglia.  The region in the right basal ganglia may be new since the previous examination however not confirmed.  Correlation with current symptomatology advised.  Further evaluation as warranted.         Electronically signed by: Sergo Monahan MD   Date:    07/07/2022   Time:    13:26        Results for orders placed or performed during the hospital encounter of 07/07/22   ECG 12 lead    Collection Time: 07/07/22 12:07 PM    Narrative    Test Reason : I63.9,    Vent. Rate : 061 BPM     Atrial Rate : 061 BPM     P-R Int : 216 ms          QRS Dur : 076 ms      QT Int : 418 ms       P-R-T Axes : 065 040 018 degrees     QTc Int : 420 ms    Sinus rhythm with 1st degree A-V block  Minimal voltage criteria for LVH, may be normal variant  Borderline Abnormal ECG  When compared with ECG of 02-OCT-2020 23:05,  Significant changes have occurred  Confirmed by Amadou Pedro MD ([id number]) on 7/7/2022 7:36:34 PM    Referred By: [email or web address]   SELF           Confirmed  By:Amadou Pedro MD     EKG    No STEMI  Sinus rhythm with 1st degree A-V block  Minimal voltage criteria for LVH, may be normal variant  Borderline Abnormal ECG  When compared with ECG of 02-OCT-2020 23:05,  Significant changes have occurred  Confirmed by Amadou Pedro MD ([id number]) on 7/7/2022 7:36:34 PM  Assessment/Plan:     * CVA (cerebral vascular accident)  Cardiac monitoring, neuro checks, neurology consulted    intravenous alteplase held secondary to CVA being outside the window of onset.  Mechanical thrombectomy is NOT indicated for patient  Antithrombotic therapy with aspirin initiated within 48 hours of stroke onset  Lipid lowering with high intensity statin therapy. Check fasting lipid panel   Blood pressure reduction after the acute phase of ischemic stroke has passed  Behavioral and lifestyle changes including smoking cessation, exercise, weight reduction for patients with obesity, and a Mediterranean style diet  Prophylaxis for deep venous thrombosis and pulmonary embolism        Essential hypertension  Blood pressure reduction after the acute phase of ischemic stroke has passed  Continue home medication      COPD (chronic obstructive pulmonary disease)  With hypoxemia will give supplemental oxygen be titrated to a target of 88 to 92 percent pulse oxygen saturation, rather than using high-flow, nontitrated oxygen, inhaled short-acting bronchodilator therapy    VTE Risk Mitigation (From admission, onward)         Ordered     enoxaparin injection 40 mg  Daily         07/07/22 1828     IP VTE HIGH RISK PATIENT  Once         07/07/22 1828     Place sequential compression device  Until discontinued         07/07/22 1828              Critical care was necessary to treat or prevent imminent or life-threatening deterioration.   Critical care time: 13  min  Time spent at the bedside, reviewing test results, discussing the case with staff and/or consult(s), documenting the medical record.    The time involved in the  performance of separately reportable procedures was not counted toward critical care time.          Tapan Henry MD  Department of Hospital Medicine   [facility] - Emergency Dept

## 2022-07-08 NOTE — ED NOTES
"Upon arrival into pt room, pt laying at the end of bed. Pt repositioned in bed. Pt informed of NPO status, pt became upset and states "that's a stupid rule if you cough after taking a sip of water". Pt re educated on indications and the risk for aspiration pneumonia. Pt   "

## 2022-07-08 NOTE — ASSESSMENT & PLAN NOTE
Cardiac monitoring, neuro checks, neurology consulted    intravenous alteplase held secondary to CVA being outside the window of onset.  Mechanical thrombectomy is NOT indicated for patient  Antithrombotic therapy with aspirin initiated within 48 hours of stroke onset  Lipid lowering with high intensity statin therapy. Check fasting lipid panel   Blood pressure reduction after the acute phase of ischemic stroke has passed  Behavioral and lifestyle changes including smoking cessation, exercise, weight reduction for patients with obesity, and a Mediterranean style diet  Prophylaxis for deep venous thrombosis and pulmonary embolism

## 2022-07-08 NOTE — ASSESSMENT & PLAN NOTE
-Admitted to inpatient status  -MRI/MRA brain showed Acute ischemic change right upper basal ganglia posterior frontal parietal junction at level of central gyrus.  Additional numerous areas of posterior frontal parietooccipital periventricular areas cristy and cerebral peduncle and bilateral cerebellar hemisphere areas of remote gliosis, microvascular ischemic change favored. Occlusion or slow flow distal right vertebral artery.  -Echo showed EF 60%, normal diastolic function and no report of PFO  -LDL  95.6  TSH normal.  UDS normal.    -Carotid US without any hemodynamically significant stenosis  -Vascular Neurology discussed with ER - no vascular intervention indicated at this time.  -PT/OT/SLP consulted  -Await neurology consultation  -No tPA given as he was far outside window for treatment.  -Treating with permissive hypertension, aspirin and plavix x21 days, and statin

## 2022-07-08 NOTE — HPI
"HPI: 77 y.o. male PMHx CVA, HTN presents with 2-3 days of unsteady gait. Patient explains that he feels as though he will fall to the right when ambulating. Symptoms associated with trouble speaking and head pressure.  Per report patient was also c/o  bilateral foot numbness, paresthesias, and fatigue. However, he does not report these symptoms to me.  He admits to previously being diagnosed with CVA and was taking aspirin.  He stopped taking aspirin "a long time ago."  Symptoms better than onset.  Rest of history limited by patient request.    ED course:  CTH Impression:  Sequela of chronic microvascular ischemic change noting more focal regions of low attenuation involve the right corona radiata and right basal ganglia.  The region in the right basal ganglia may be new since the previous examination however not confirmed. ED MD discussed case with Dr. Murphy with vascular neurology who does not feel the patient warrants vascular intervention. Other labs were relatively unremarkable.    Admitted to Hospital Medicine for further evaluation  "

## 2022-07-08 NOTE — CONSULTS
Food & Nutrition  Education    Diet Education: Cardiac  Time Spent: 0 Minutes   Learners: Patient      Nutrition Education provided with handouts:   Heart Healthy, Low Na Nutrition Therapy  + Clinical Reference attachments to d/c documents    Comments: Pt sleeping at time of visit, family member/friend in room at time. Requested to come back at later time so pt can rest, Handout provided to them. Pt seen by SLP - cleared for regular diet with thin liquids and not further ST needs.      All questions and concerns answered. Dietitian's contact information provided.   Please Re-consult as needed  Thanks!

## 2022-07-08 NOTE — PLAN OF CARE
[facility] - Med Surg  Initial Discharge Assessment       Primary Care Provider: Juanito Dejesus Jr, MD    Admission Diagnosis: SOB (shortness of breath) [R06.02]  Stroke [I63.9]    Admission Date: 7/7/2022  Expected Discharge Date:     Discharge Barriers Identified: None    Payor: BLUE CROSS BLUE SHIELD / Plan: [facility] FEDERAL STANDARD / Product Type: PPO /     Extended Emergency Contact Information  Primary Emergency Contact: Shauna Rodríguez   [facility]  Home Phone: [phone]  Mobile Phone: [phone]  Relation: Sister [id number] - [place] - [address] AT [facility] [address]  [address]  [place] [address]  Phone: [phone] Fax: [phone]      Initial Assessment (most recent)     Adult Discharge Assessment - 07/08/22 0914        Discharge Assessment    Assessment Type Discharge Planning Assessment     Confirmed/corrected address, phone number and insurance Yes     Confirmed Demographics Correct on Facesheet     Source of Information patient;health record     When was your last doctors appointment? 02/10/22     Communicated LEX with patient/caregiver Date not available/Unable to determine     Reason For Admission CVA     Lives With alone     Do you expect to return to your current living situation? Yes     Do you have help at home or someone to help you manage your care at home? Yes     Who are your caregiver(s) and their phone number(s)? Shauna desai [phone]     Prior to hospitilization cognitive status: Alert/Oriented     Current cognitive status: Alert/Oriented     Walking or Climbing Stairs Difficulty none     Dressing/Bathing Difficulty none     Equipment Currently Used at Home none     Readmission within 30 days? No     Patient currently being followed by outpatient case management? No     Do you currently have service(s) that help you manage your care at home? No     Do you take prescription medications? Yes      Do you have prescription coverage? Yes     Coverage BCBS Federal     Do you have any problems affording any of your prescribed medications? No     Is the patient taking medications as prescribed? yes     Who is going to help you get home at discharge? Shauna Rodríguez; sister [phone]     How do you get to doctors appointments? car, drives self;family or friend will provide     Are you on dialysis? No     Do you take coumadin? No     DME Needed Upon Discharge  none     Discharge Barriers Identified None

## 2022-07-08 NOTE — PLAN OF CARE
B/s swallow eval completed. ST recs regular diet with thin liquids. Meds whole. No further ST is warranted at this time, as the pt presented with swallowing fx, as well as cognitive-linguistic abilities that are WFL.

## 2022-07-08 NOTE — PLAN OF CARE
Pt is AAOx4. Slowed speech still present. Tele maintained.  No falls or injuries reported during shift, safety precautions maintained.     Problem: Adult Inpatient Plan of Care  Goal: Plan of Care Review  Outcome: Ongoing, Progressing     Problem: Adult Inpatient Plan of Care  Goal: Optimal Comfort and Wellbeing  Outcome: Ongoing, Progressing

## 2022-07-08 NOTE — PLAN OF CARE
Problem: Physical Therapy  Goal: Physical Therapy Goal  Description: Goals to be met by: 22     Patient will increase functional independence with mobility by performin. Pt to ambulate 150' w/sc mod I  2. Pt to demonstrate good dynamic standing balance.    Outcome: Ongoing, Progressing   Initial eval completed.  Pt may benefit from advanced balance training.

## 2022-07-08 NOTE — PT/OT/SLP EVAL
Physical Therapy Evaluation    Patient Name:  Rashad Rodríguez   MRN:  [id number]    Recommendations:     Discharge Recommendations:  outpatient PT   Discharge Equipment Recommendations: cane, straight       Assessment:     Rashad Rodríguez is a 77 y.o. male admitted with a medical diagnosis of CVA (cerebral vascular accident).  He presents with the following impairments/functional limitations:  gait instability, impaired balance .    Rehab Prognosis: Good; patient would benefit from acute skilled PT services to address these deficits and reach maximum level of function.    Recent Surgery: * No surgery found *      Plan:     During this hospitalization, patient to be seen 2 x/week to address the identified rehab impairments via gait training, therapeutic activities and progress toward the following goals:    · Plan of Care Expires:  07/12/22    Subjective     Chief Complaint: Decreased balance  Patient/Family Comments/goals: Pt agreeable to PT eval  Pain/Comfort:  · Pain Rating 1: 0/10    Patients cultural, spiritual, [employer] conflicts given the current situation: no    Living Environment:  PTA pt lived alone in a 1 story house with no steps to enter.  Prior to admission, patients level of function was independent.  Equipment used at home: none.  DME owned (not currently used): rolling walker.  Upon discharge, patient will have assistance from sister.    Objective:     Communicated with nurseSalma prior to session.  Patient found supine with telemetry  upon PT entry to room.    General Precautions: Standard,     Orthopedic Precautions:N/A   Braces: N/A  Respiratory Status: Room air    Exams:  · RLE ROM: WFL  · RLE Strength: WFL  · LLE ROM: WFL  · LLE Strength: WFL    Functional Mobility:  · Bed Mobility:     · Supine to Sit: modified independence  · Transfers:     · Sit to Stand:  modified independence with no AD  · Gait: 150' /s AD CGA/SBA, decreased dynamic standing balance; required occasional CGA due to  LOB  · Balance: Static good; dynamic standing Fair/Fair-    Therapeutic Activities:   Pt educated on role of PT and POC; recommend sc for safety with gait.    AM-PAC 6 CLICK MOBILITY  Total Score:22     Patient left up in chair with call button in reach and sister present.    GOALS:   Multidisciplinary Problems     Physical Therapy Goals        Problem: Physical Therapy    Goal Priority Disciplines Outcome Goal Variances Interventions   Physical Therapy Goal     PT, PT/OT Ongoing, Progressing     Description: Goals to be met by: 22     Patient will increase functional independence with mobility by performin. Pt to ambulate 150' w/sc mod I  2. Pt to demonstrate good dynamic standing balance.                     History:     Past Medical History:   Diagnosis Date    Cigarette smoker     Hyperlipidemia     Hypertension     Stroke 10/01/2020       Past Surgical History:   Procedure Laterality Date    NO PAST SURGERIES         Time Tracking:     PT Received On: 22  PT Start Time: 1113     PT Stop Time: 1123  PT Total Time (min): 10 min     Billable Minutes: Evaluation 10      2022

## 2022-07-08 NOTE — ASSESSMENT & PLAN NOTE
With hypoxemia will give supplemental oxygen be titrated to a target of 88 to 92 percent pulse oxygen saturation, rather than using high-flow, nontitrated oxygen, inhaled short-acting bronchodilator therapy

## 2022-07-08 NOTE — CONSULTS
[facility] Surg  Neurology  Consult Note    Patient Name: Rashad Rodríguez  MRN: [id number]  Admission Date: 7/7/2022  Hospital Length of Stay: 1 days  Code Status: Full Code   Attending Provider: Pascual Castillo MD   Consulting Provider: Arnold Roca MD  Primary Care Physician: Juanito Dejesus Jr, MD  Principal Problem:CVA (cerebral vascular accident)    Inpatient consult to Neurology  Consult performed by: Arnold Roca MD  Consult ordered by: Pascual Castillo MD        Subjective:     Chief Complaint: Stroke    HPI: 77 y.o. male PMHx CVA, HTN presents due to unsteady gait for three days. Associated slurred speech. Pt found to have stroke on right frontal lobe/basal ganglia stroke. He is supposed to be on ASA due to a previous stroke but has not been taking for years. No headaches, visual or speech disturbances, vertigo, numbness.    Past Medical History:   Diagnosis Date    Cigarette smoker     Hyperlipidemia     Hypertension     Stroke 10/01/2020       Past Surgical History:   Procedure Laterality Date    NO PAST SURGERIES         Review of patient's allergies indicates:   Allergen Reactions    Cephalexin     Ciprofloxacin     Doxycycline Other (See Comments)     Stomach cramps    Bactrim [sulfamethoxazole-trimethoprim] Rash       Current Neurological Medications:     No current facility-administered medications on file prior to encounter.     Current Outpatient Medications on File Prior to Encounter   Medication Sig    amLODIPine (NORVASC) 5 MG tablet Take 1 tablet (5 mg total) by mouth once daily.    atorvastatin (LIPITOR) 40 MG tablet Take 1 tablet (40 mg total) by mouth once daily.    clopidogreL (PLAVIX) 75 mg tablet TAKE 1 TABLET(75 MG) BY MOUTH EVERY DAY    clopidogreL (PLAVIX) 75 mg tablet Take 1 tablet (75 mg total) by mouth once daily.    hydrOXYzine HCL (ATARAX) 10 MG Tab TAKE 1 TABLET BY MOUTH EVERY 6 HOURS AS NEEDED FOR ITCHING    valsartan (DIOVAN) 160 MG tablet Take 1 tablet (160 mg  total) by mouth once daily.    aspirin (ECOTRIN) 81 MG EC tablet Take 1 tablet (81 mg total) by mouth once daily.    clobetasoL (TEMOVATE) 0.05 % cream APPLY TO THE AFFECTED AREA ON BODY TWICE DAILY.    clotrimazole (LOTRIMIN) 1 % cream Apply topically 2 (two) times daily.    hydrocortisone 1 % cream Apply topically 2 (two) times daily.    VIAGRA 100 mg tablet 1 tablet PO one hour prior to sex, maximum of 1 tablet in 24 hours      Family History     Problem Relation (Age of Onset)    Cancer Mother    No Known Problems Sister, Brother, Maternal Aunt, Maternal Uncle, Paternal Aunt, Paternal Uncle, Maternal Grandmother, Maternal Grandfather, Paternal Grandmother, Paternal Grandfather    Stroke Father        Tobacco Use    Smoking status: Current Every Day Smoker     Packs/day: 0.50     Years: 50.00     Pack years: 25.00     Types: Cigarettes    Smokeless tobacco: Never Used   Substance and Sexual Activity    Alcohol use: Yes     Alcohol/week: 2.0 standard drinks     Types: 2 Cans of beer per week     Comment: socially    Drug use: No    Sexual activity: Yes     Partners: Female     Review of Systems   Constitutional: Negative for fever.   HENT: Negative for trouble swallowing.    Eyes: Negative for photophobia.   Respiratory: Negative for shortness of breath.    Cardiovascular: Negative for chest pain.   Gastrointestinal: Negative for anal bleeding.   Genitourinary: Negative for dysuria.   Musculoskeletal: Negative for back pain.   Neurological: Negative for headaches.   Psychiatric/Behavioral: Negative for confusion.     Objective:     Vital Signs (Most Recent):  Temp: 98.2 °F (36.8 °C) (07/08/22 0757)  Pulse: 71 (07/08/22 0757)  Resp: 18 (07/08/22 0757)  BP: 129/83 (07/08/22 0757)  SpO2: 99 % (07/08/22 0757) Vital Signs (24h Range):  Temp:  [97.6 °F (36.4 °C)-98.2 °F (36.8 °C)] 98.2 °F (36.8 °C)  Pulse:  [60-75] 71  Resp:  [16-20] 18  SpO2:  [96 %-99 %] 99 %  BP: (128-187)/(78-94) 129/83     Weight: 59 kg  (130 lb)  Body mass index is 20.36 kg/m².    Physical Exam  Constitutional:       General: He is not in acute distress.  HENT:      Head: Normocephalic.      Right Ear: External ear normal.      Left Ear: External ear normal.   Eyes:      General:         Right eye: No discharge.         Left eye: No discharge.   Cardiovascular:      Rate and Rhythm: Normal rate.   Pulmonary:      Breath sounds: Normal breath sounds.   Abdominal:      Palpations: Abdomen is soft.   Musculoskeletal:         General: No swelling.      Cervical back: Neck supple.   Skin:     Findings: No rash.              Significant Labs:   CBC:   Recent Labs   Lab 07/07/22  1200 07/08/22  0442 07/09/22  0342   WBC 9.52 9.75 10.98   HGB 16.2 14.7 14.4   HCT 46.9 43.1 42.9    281 288     CMP:   Recent Labs   Lab 07/07/22  1200 07/08/22  0442 07/09/22  0342   GLU 87 78 90    140 141   K 4.3 4.0 3.8    108 108   CO2 26 22* 24   BUN 11 14 19   CREATININE 1.1 0.9 1.0   CALCIUM 10.2 9.4 9.4   MG  --  2.0  --    PROT 9.1* 7.8 7.7   ALBUMIN 4.1 3.6 3.5   BILITOT 1.2* 1.2* 0.9   ALKPHOS 109 98 104   AST 27 27 38   ALT 29 26 31   ANIONGAP 10 10 9   EGFRNONAA >60 >60 >60       Significant Imaging: I have reviewed all pertinent imaging results/findings within the past 24 hours.     Head CT  Impression:     Acute ischemic change right upper basal ganglia posterior frontal parietal junction at level of central gyrus.     Additional numerous areas of posterior frontal parietooccipital periventricular areas cristy and cerebral peduncle and bilateral cerebellar hemisphere areas of remote gliosis, microvascular ischemic change favored.     Occlusion or slow flow distal right vertebral artery.     This report was flagged in Epic as abnormal.        Electronically signed by: Dutch Ruiz MD  Date:                                            07/07/2022  Time:                                           17:03                      Assessment and Plan:      78 y/o male consulted for acute stroke    1. Stroke: right MCA branch stroke on MRI.   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 50% stenosis at M1 on the right. Pt was out of therapeutic window for thrombolytics. No LVO.   -ASA 81 mg daily. Clopidogrel 75 mg daily x 21 days.   -Statin.   -PT/OT.    Active Diagnoses:    Diagnosis Date Noted POA    PRINCIPAL PROBLEM:  CVA (cerebral vascular accident) [I63.9] 07/07/2022 Yes    COPD (chronic obstructive pulmonary disease) [J44.9] 02/19/2019 Yes    Essential hypertension [I10] 02/15/2013 Yes      Problems Resolved During this Admission:       VTE Risk Mitigation (From admission, onward)         Ordered     enoxaparin injection 40 mg  Daily         07/07/22 1828     IP VTE HIGH RISK PATIENT  Once         07/07/22 1828     Place sequential compression device  Until discontinued         07/07/22 1828                Thank you for your consult. I will follow-up with patient. Please contact us if you have any additional questions.    Arnold Roca MD  Neurology  [facility] - Med Surg

## 2022-07-08 NOTE — ASSESSMENT & PLAN NOTE
Blood pressure reduction after the acute phase of ischemic stroke has passed  Continue home medication

## 2022-07-08 NOTE — PT/OT/SLP EVAL
"Occupational Therapy   Evaluation    Name: Rashad Rodríguez  MRN: [id number]  Admitting Diagnosis:  CVA (cerebral vascular accident)  Recent Surgery: * No surgery found *      Recommendations:     Discharge Recommendations: Home w/ Outpatient PT/OT and family assist   Discharge Equipment Recommendations:  SPC per PT recs  Barriers to discharge:  None    Assessment:     Rashad Rodríguez is a 77 y.o. male with a medical diagnosis of CVA (cerebral vascular accident).  Performance deficits affecting function: weakness, impaired endurance, impaired self care skills, impaired functional mobilty, gait instability, impaired balance, decreased upper extremity function, decreased lower extremity function, decreased coordination, decreased safety awareness.      Pt very pleasant and willing to participate in tx session this date; pt w/ mild complaints of unsteadiness but was able perform bed mobility w/ SBA to stand and ambulate functional distances in room w/ CGA-SBA and no AD. Pt w/o LOB but noted w/ mild sway. Pt's UE strength and ROM WFL, though noted w/ mildly delayed coordination (though still WFL). Pt tolerated tx session well and will continue to benefit from skilled acute OT services to maximize functional capacity for safe performance w/ ADLs and functional mobility.     Rehab Prognosis: Good; patient would benefit from acute skilled OT services to address these deficits and reach maximum level of function.       Plan:     Patient to be seen 3 x/week to address the above listed problems via self-care/home management, therapeutic activities, therapeutic exercises, neuromuscular re-education  · Plan of Care Expires: 07/22/22  · Plan of Care Reviewed with: patient    Subjective     Chief Complaint: Symptoms have mostly resolved   Patient/Family Comments/goals: Willing to participate. "I was doing well after my last stroke; I went to rehab after my last stroke and did well."    Occupational Profile:  Living Environment: Pt " lives alone in [facility] w/ 0 ELKIN; pt has access to t/s combo w/ SC.   Previous level of function: Pt was (I) w/ ADLs and functional mobility; pt drives and performs all IADLs w/o concerns.   Roles and Routines: Retired from [employer].   Equipment Used at Home:  walker, rolling, shower chair (was not using)  Assistance upon Discharge: sisters and brothers     Pain/Comfort:  · Pain Rating 1: 0/10  · Pain Rating Post-Intervention 1: 0/10    Patients cultural, spiritual, Orthodoxy conflicts given the current situation: no    Objective:     Communicated with: Nurse prior to session.  Patient found HOB elevated with telemetry, peripheral IV upon OT entry to room.    General Precautions: Standard, fall   Orthopedic Precautions:N/A   Braces: N/A  Respiratory Status: Room air    Occupational Performance:    Bed Mobility:    · Patient completed Scooting hips to EOB with stand by assistance  · Patient completed Supine to Sit with stand by assistance and increased time/ HOB flattened     Functional Mobility/Transfers:  · Patient completed Sit <> Stand Transfer with stand by assistance  with  no assistive device   · Functional Mobility: Pt was able to ambulate from EOB>sink>chair w/ CGA-SBA and no AD. Pt noted w/ reaching for bed rail x 1 trial for stability; pt w/ mild sway but no LOB occurred.     Activities of Daily Living:  · Grooming: supervision and stand by assistance for performing standing oral care at sink   · Upper Body Dressing: minimum assistance for donning gown over back   · Lower Body Dressing: stand by assistance and contact guard assistance for donning socks while sitting EOB unsupported; pt noted w/ mild LOB when crossing RLE over L, but was able to self-recover     Cognitive/Visual Perceptual:  Cognitive/Psychosocial Skills:     -       Oriented to: Person, Place, Time and Situation   -       Follows Commands/attention:Follows multistep  commands  -       Communication: mostly clear/fluent; mildly slow but  no major concerns   -       Memory: No Deficits noted  -       Safety awareness/insight to disability: intact     Physical Exam:  Balance:    -       fair + dynamic; good sitting; fair standing w/o AD  Postural examination/scapula alignment:    -       No postural abnormalities identified  Skin integrity: Visible skin intact  Edema:  None noted  Sensation:    -       Intact  Upper Extremity Range of Motion:     -       Right Upper Extremity: WFL  -       Left Upper Extremity: WFL  Upper Extremity Strength:    -       Right Upper Extremity: WFL  -       Left Upper Extremity: WFL   Strength:    -       Right Upper Extremity: WFL  -       Left Upper Extremity: WFL  Fine Motor Coordination:    -       Overall, mildly slow/delayed but grossly WFL; Intact  Left hand, finger to nose, Right hand, finger to nose, Left hand thumb/finger opposition skills, Right hand thumb/finger opposition skills, Left hand, manipulation of objects, Right hand, manipulation of objects, RLE heel shin and LLE heel shin    AMPAC 6 Click ADL:  AMPAC Total Score: 24    Treatment & Education:  -Initial eval complete.   -Pt educated on role of OT and POC.   -Pt educated on safe functional mobility and ADL performance.   -Pt educated on importance of mobilizing in room w/ staff to prevent debility and weakness due to excessive bed rest.   -Pt educated on home safety tips to prevent falls at home; emphasized importance of using DME/AD as recommended at this time such as SC. Deferred walking device recs to PT.   -Questions and concerns addressed within OT scope.   Education:    Patient left up in chair with all lines intact, call button in reach and nurse present    GOALS:   Multidisciplinary Problems     Occupational Therapy Goals        Problem: Occupational Therapy    Goal Priority Disciplines Outcome Interventions   Occupational Therapy Goal     OT, PT/OT Ongoing, Progressing    Description: Goals to be met by: 7/22/22     Patient will increase  functional independence with ADLs by performing:    LE Dressing with Modified [place].  Grooming while standing at sink with Modified [place].  Toileting from toilet with Modified [place] for hygiene and clothing management.   Supine to sit with Modified [place].  Step transfer with Modified [place]  Toilet transfer to toilet with Modified [place].  Upper extremity exercise program x15 reps per handout, with independence.                     History:     Past Medical History:   Diagnosis Date    Cigarette smoker     Hyperlipidemia     Hypertension     Stroke 10/01/2020       Past Surgical History:   Procedure Laterality Date    NO PAST SURGERIES         Time Tracking:     OT Date of Treatment: 07/08/22  OT Start Time: 0858  OT Stop Time: 0921  OT Total Time (min): 23 min    Billable Minutes:Evaluation 15  Self Care/Home Management 8  Total Time 23    7/8/2022

## 2022-07-08 NOTE — PT/OT/SLP EVAL
Speech Language Pathology Evaluation  Bedside Swallow    Patient Name:  Rashad Rodríguez   MRN:  [id number]  Admitting Diagnosis: CVA (cerebral vascular accident)    Recommendations:                 General Recommendations:  Follow-up not indicated  Diet recommendations:  Regular Diet - IDDSI Level 7, Thin   Aspiration Precautions: Meds whole 1 at a time and Standard aspiration precautions   General Precautions: Standard,    Communication strategies:  none    History:     Past Medical History:   Diagnosis Date    Cigarette smoker     Hyperlipidemia     Hypertension     Stroke 10/01/2020       Past Surgical History:   Procedure Laterality Date    NO PAST SURGERIES         Social History: Patient lives with alone and was indep for all ADL's prior to admit. Pt with pmhx of CVA in 2020 with no residual deficits.     Prior Intubation HX:  none    Modified Barium Swallow: none    Chest X-Rays:   7/7/22    FINDINGS:  Cardiac size is normal.  Mass at the right apex appears similar to our previous exam no new pulmonary lesions are identified.  No infiltrate or vascular congestion.     MRI Brain Without Contrast:  7/7/22    Impression:     Acute ischemic change right upper basal ganglia posterior frontal parietal junction at level of central gyrus.     Additional numerous areas of posterior frontal parietooccipital periventricular areas cristy and cerebral peduncle and bilateral cerebellar hemisphere areas of remote gliosis, microvascular ischemic change favored.     Occlusion or slow flow distal right vertebral artery.       Prior diet: Unrestricted, per pt.    Subjective     Pt was lying in bed, with sister present at b/s upon ST entrance. Pt was eager to consume po items 2/2 being NPO for 24 hours. Pt was able to recall all events that led to current admit, with trace dysarthria noted, however, this did not affect the pt's ability to communicate effectively.     Patient goals: Resume po intake     Pain/Comfort:  · Pain  Rating 1: 0/10    Respiratory Status: Room air    Objective:     Oral Musculature Evaluation  · Oral Musculature: WFL  · Dentition: present and adequate  · Secretion Management: adequate  · Mucosal Quality: good  · Oral Labial Strength and Mobility: WFL  · Lingual Strength and Mobility: WFL    Bedside Swallow Eval:   Consistencies Assessed:  · Thin liquids -One cup of water via straw sips  · Puree -One cup of applesauce  · Solids -x2 bites of peña cracker     Oral Phase:   · WFL    Pharyngeal Phase:   · no overt clinical signs/symptoms of aspiration  · no overt clinical signs/symptoms of pharyngeal dysphagia    Compensatory Strategies  · None    Treatment: It should be noted that silent aspiration cannot be r/o via b/s assessment. ST discussed diet recs of regular and thin liquids with the pt and the pt's sister, as well as recs for no further ST at this time 2/2 pt with functional cognitive-linguistic skills as well as adequate swallowing fx. Both were in agreement to diet recs, and discont of ST services at this time. Diet handout attached to pt's chart.     Pt's nurse notified of diet recs. MD notified of diet recs via secure chat.     Assessment:     Rashad Rodríguez is a 77 y.o. male with a dx of CVA (cerebral vascular accident) who presents with adequate swallowing fx to resume regular diet with thin liquids. No further ST is warranted at this time.     Goals:   Multidisciplinary Problems     SLP Goals     Not on file                Plan:     · Patient to be seen:      · Plan of Care expires:     · Plan of Care reviewed with:  patient, spouse   · SLP Follow-Up:  No       Discharge recommendations:      Barriers to Discharge:  None    Time Tracking:     SLP Treatment Date:   07/08/22  Speech Start Time:  1022  Speech Stop Time:  1039     Speech Total Time (min):  17 min    Billable Minutes: Eval Swallow and Oral Function 17    07/08/2022

## 2022-07-08 NOTE — PLAN OF CARE
Problem: Occupational Therapy  Goal: Occupational Therapy Goal  Description: Goals to be met by: 7/22/22     Patient will increase functional independence with ADLs by performing:    LE Dressing with Modified [place].  Grooming while standing at sink with Modified [place].  Toileting from toilet with Modified [place] for hygiene and clothing management.   Supine to sit with Modified [place].  Step transfer with Modified [place]  Toilet transfer to toilet with Modified [place].  Upper extremity exercise program x15 reps per handout, with independence.    Outcome: Ongoing, Progressing    Pt very pleasant and willing to participate in tx session this date; pt w/ mild complaints of unsteadiness but was able perform bed mobility w/ SBA to stand and ambulate functional distances in room w/ CGA-SBA and no AD. Pt w/o LOB but noted w/ mild sway. Pt's UE strength and ROM WFL, though noted w/ mildly delayed coordination (though still WFL). Pt tolerated tx session well and will continue to benefit from skilled acute OT services to maximize functional capacity for safe performance w/ ADLs and functional mobility.

## 2022-07-09 VITALS
HEART RATE: 71 BPM | DIASTOLIC BLOOD PRESSURE: 86 MMHG | WEIGHT: 130 LBS | SYSTOLIC BLOOD PRESSURE: 142 MMHG | RESPIRATION RATE: 17 BRPM | BODY MASS INDEX: 20.4 KG/M2 | TEMPERATURE: 99 F | OXYGEN SATURATION: 96 % | HEIGHT: 67 IN

## 2022-07-09 LAB
ALBUMIN SERPL BCP-MCNC: 3.5 G/DL (ref 3.5–5.2)
ALP SERPL-CCNC: 104 U/L (ref 55–135)
ALT SERPL W/O P-5'-P-CCNC: 31 U/L (ref 10–44)
ANION GAP SERPL CALC-SCNC: 9 MMOL/L (ref 8–16)
AST SERPL-CCNC: 38 U/L (ref 10–40)
BASOPHILS # BLD AUTO: 0.04 K/UL (ref 0–0.2)
BASOPHILS NFR BLD: 0.4 % (ref 0–1.9)
BILIRUB SERPL-MCNC: 0.9 MG/DL (ref 0.1–1)
BUN SERPL-MCNC: 19 MG/DL (ref 8–23)
CALCIUM SERPL-MCNC: 9.4 MG/DL (ref 8.7–10.5)
CHLORIDE SERPL-SCNC: 108 MMOL/L (ref 95–110)
CO2 SERPL-SCNC: 24 MMOL/L (ref 23–29)
CREAT SERPL-MCNC: 1 MG/DL (ref 0.5–1.4)
DIFFERENTIAL METHOD: ABNORMAL
EOSINOPHIL # BLD AUTO: 0.1 K/UL (ref 0–0.5)
EOSINOPHIL NFR BLD: 1.1 % (ref 0–8)
ERYTHROCYTE [DISTWIDTH] IN BLOOD BY AUTOMATED COUNT: 13.6 % (ref 11.5–14.5)
EST. GFR  (AFRICAN AMERICAN): >60 ML/MIN/1.73 M^2
EST. GFR  (NON AFRICAN AMERICAN): >60 ML/MIN/1.73 M^2
GLUCOSE SERPL-MCNC: 90 MG/DL (ref 70–110)
HCT VFR BLD AUTO: 42.9 % (ref 40–54)
HGB BLD-MCNC: 14.4 G/DL (ref 14–18)
IMM GRANULOCYTES # BLD AUTO: 0.04 K/UL (ref 0–0.04)
IMM GRANULOCYTES NFR BLD AUTO: 0.4 % (ref 0–0.5)
LYMPHOCYTES # BLD AUTO: 2.2 K/UL (ref 1–4.8)
LYMPHOCYTES NFR BLD: 20.3 % (ref 18–48)
MCH RBC QN AUTO: 29.9 PG (ref 27–31)
MCHC RBC AUTO-ENTMCNC: 33.6 G/DL (ref 32–36)
MCV RBC AUTO: 89 FL (ref 82–98)
MONOCYTES # BLD AUTO: 1.1 K/UL (ref 0.3–1)
MONOCYTES NFR BLD: 9.7 % (ref 4–15)
NEUTROPHILS # BLD AUTO: 7.5 K/UL (ref 1.8–7.7)
NEUTROPHILS NFR BLD: 68.1 % (ref 38–73)
NRBC BLD-RTO: 0 /100 WBC
PLATELET # BLD AUTO: 288 K/UL (ref 150–450)
PMV BLD AUTO: 11.1 FL (ref 9.2–12.9)
POTASSIUM SERPL-SCNC: 3.8 MMOL/L (ref 3.5–5.1)
PROT SERPL-MCNC: 7.7 G/DL (ref 6–8.4)
RBC # BLD AUTO: 4.82 M/UL (ref 4.6–6.2)
SODIUM SERPL-SCNC: 141 MMOL/L (ref 136–145)
WBC # BLD AUTO: 10.98 K/UL (ref 3.9–12.7)

## 2022-07-09 PROCEDURE — 97116 GAIT TRAINING THERAPY: CPT | Mod: CQ

## 2022-07-09 PROCEDURE — S4991 NICOTINE PATCH NONLEGEND: HCPCS | Performed by: HOSPITALIST

## 2022-07-09 PROCEDURE — 94761 N-INVAS EAR/PLS OXIMETRY MLT: CPT

## 2022-07-09 PROCEDURE — 36415 COLL VENOUS BLD VENIPUNCTURE: CPT | Performed by: HOSPITALIST

## 2022-07-09 PROCEDURE — 85025 COMPLETE CBC W/AUTO DIFF WBC: CPT | Performed by: HOSPITALIST

## 2022-07-09 PROCEDURE — 25000003 PHARM REV CODE 250: Performed by: HOSPITALIST

## 2022-07-09 PROCEDURE — 80053 COMPREHEN METABOLIC PANEL: CPT | Performed by: HOSPITALIST

## 2022-07-09 RX ORDER — ASPIRIN 81 MG/1
81 TABLET ORAL DAILY
Qty: 90 TABLET | Refills: 1 | Status: SHIPPED | OUTPATIENT
Start: 2022-07-09 | End: 2022-08-08 | Stop reason: SDUPTHER

## 2022-07-09 RX ORDER — ATORVASTATIN CALCIUM 40 MG/1
40 TABLET, FILM COATED ORAL DAILY
Qty: 30 TABLET | Refills: 1 | Status: SHIPPED | OUTPATIENT
Start: 2022-07-09 | End: 2022-08-08 | Stop reason: SDUPTHER

## 2022-07-09 RX ORDER — NICOTINE 7MG/24HR
1 PATCH, TRANSDERMAL 24 HOURS TRANSDERMAL DAILY
Qty: 30 PATCH | Refills: 1 | Status: SHIPPED | OUTPATIENT
Start: 2022-07-10 | End: 2023-02-15

## 2022-07-09 RX ORDER — CLOPIDOGREL BISULFATE 75 MG/1
75 TABLET ORAL DAILY
Qty: 21 TABLET | Refills: 0 | Status: SHIPPED | OUTPATIENT
Start: 2022-07-09 | End: 2022-08-08 | Stop reason: SDUPTHER

## 2022-07-09 RX ADMIN — ASPIRIN 81 MG: 81 TABLET, COATED ORAL at 08:07

## 2022-07-09 RX ADMIN — CLOPIDOGREL 75 MG: 75 TABLET, FILM COATED ORAL at 08:07

## 2022-07-09 RX ADMIN — ATORVASTATIN CALCIUM 40 MG: 40 TABLET, FILM COATED ORAL at 08:07

## 2022-07-09 RX ADMIN — NICOTINE 1 PATCH: 7 PATCH, EXTENDED RELEASE TRANSDERMAL at 08:07

## 2022-07-09 NOTE — PT/OT/SLP PROGRESS
Physical Therapy Treatment    Patient Name:  Rashad Rodríguez   MRN:  [id number]    Recommendations:     Discharge Recommendations:  outpatient PT   Discharge Equipment Recommendations: cane, straight   Barriers to discharge: None    Assessment:     Rashad Rodríguez is a 77 y.o. male admitted with a medical diagnosis of CVA (cerebral vascular accident).  He presents with the following impairments/functional limitations:  weakness, gait instability, impaired balance, impaired sensation, impaired endurance, impaired functional mobilty .    Rehab Prognosis: Good; patient would benefit from acute skilled PT services to address these deficits and reach maximum level of function.    Recent Surgery: * No surgery found *      Plan:     During this hospitalization, patient to be seen 2 x/week to address the identified rehab impairments via gait training, therapeutic activities, therapeutic exercises and progress toward the following goals:    · Plan of Care Expires:  07/12/22    Subjective     Chief Complaint: I am tired  Patient/Family Comments/goals: I want to go home  Pain/Comfort:  · Pain Addressed 1: Pre-medicate for activity, Reposition, Distraction      Objective:     Communicated with nurse Orta prior to session.  Patient found supine with   upon PT entry to room.     General Precautions: Standard,     Orthopedic Precautions:N/A   Braces:    Respiratory Status: Room air     Functional Mobility:  · Bed Mobility:     · Rolling Left:  supervision  · Rolling Right: modified independence  · Scooting: modified independence  · Transfers:     · Sit to Stand:  supervision with no AD  · Gait: pt able to walk 110ft. with close SBA. Pt with slight deviations in gait sequencing when attempting to increase speed. Noted short steps when attempting to make turns causing instability. Pt able to improve gaitsequencing with verbal commands.  · Balance: good in sitting, fair standing.       AM-PAC 6 CLICK MOBILITY  Turning over in bed  (including adjusting bedclothes, sheets and blankets)?: 4  Sitting down on and standing up from a chair with arms (e.g., wheelchair, bedside commode, etc.): 4  Moving from lying on back to sitting on the side of the bed?: 4  Moving to and from a bed to a chair (including a wheelchair)?: 4  Need to walk in hospital room?: 3  Climbing 3-5 steps with a railing?: 3  Basic Mobility Total Score: 22       Therapeutic Activities and Exercises:   Pt able to demonstrate LE exercises:. AP's, LAQs, Heel Raises.    Patient left supine with all lines intact, call button in reach, bed alarm on and family friend present..    GOALS:   Multidisciplinary Problems     Physical Therapy Goals        Problem: Physical Therapy    Goal Priority Disciplines Outcome Goal Variances Interventions   Physical Therapy Goal     PT, PT/OT Ongoing, Progressing     Description: Goals to be met by: 22     Patient will increase functional independence with mobility by performin. Pt to ambulate 150' w/sc mod I  2. Pt to demonstrate good dynamic standing balance.                     Time Tracking:     PT Received On: 22  PT Start Time: 1037     PT Stop Time: 1105  PT Total Time (min): 28 min     Billable Minutes: Gait Training 28    Treatment Type: Treatment  PT/PTA: PTA     PTA Visit Number: 1     2022

## 2022-07-09 NOTE — CONSULTS
Appointments are scheduled as follows:  PCP on 7/19/22  Neuro:  Unable to schedule due to Saturday DC.  Message sent to the staff.

## 2022-07-09 NOTE — PLAN OF CARE
07/09/22 0955   Final Note   Assessment Type Final Discharge Note   Anticipated Discharge Disposition Home   Hospital Resources/Appts/Education Provided Appointments scheduled and added to AVS   Post-Acute Status   Post-Acute Authorization Other   Other Status No Post-Acute Service Needs   Discharge Delays None known at this time   NurseSun notified that patient is clear for DC from a CM standpoint

## 2022-07-09 NOTE — PROGRESS NOTES
"[facility] Medicine  Progress Note    Patient Name: Rashad Rodríguez  MRN: [id number]  Patient Class: IP- Inpatient   Admission Date: 7/7/2022  Length of Stay: 1 days  Attending Physician: Pascual Castillo MD  Primary Care Provider: Juanito Dejesus Jr, MD        Subjective:     Principal Problem:CVA (cerebral vascular accident)        HPI:  HPI: 77 y.o. male PMHx CVA, HTN presents with 2-3 days of unsteady gait. Patient explains that he feels as though he will fall to the right when ambulating. Symptoms associated with trouble speaking and head pressure.  Per report patient was also c/o  bilateral foot numbness, paresthesias, and fatigue. However, he does not report these symptoms to me.  He admits to previously being diagnosed with CVA and was taking aspirin.  He stopped taking aspirin "a long time ago."  Symptoms better than onset.  Rest of history limited by patient request.    ED course:  CTH Impression:  Sequela of chronic microvascular ischemic change noting more focal regions of low attenuation involve the right corona radiata and right basal ganglia.  The region in the right basal ganglia may be new since the previous examination however not confirmed. ED MD discussed case with Dr. Murphy with vascular neurology who does not feel the patient warrants vascular intervention. Other labs were relatively unremarkable.    Admitted to Hospital Medicine for further evaluation      Overview/Hospital Course:  No notes on file    Interval History: No acute events.  Feels like he is improving.  Denies pain.  All questions answered and patient had no further complaints.      Review of Systems   Constitutional:  Negative for activity change, appetite change and fever.   HENT:  Negative for congestion and dental problem.    Eyes:  Negative for discharge and itching.   Respiratory:  Negative for apnea, cough, chest tightness and shortness of breath.    Cardiovascular:  Negative for chest pain and leg swelling. "   Gastrointestinal:  Negative for abdominal distention and abdominal pain.   Endocrine: Negative for cold intolerance and heat intolerance.   Genitourinary:  Negative for difficulty urinating and dysuria.   Musculoskeletal:  Negative for arthralgias and back pain.   Allergic/Immunologic: Negative for environmental allergies and food allergies.   Neurological:  Positive for dizziness, speech difficulty and weakness. Negative for facial asymmetry and light-headedness.   Hematological:  Negative for adenopathy. Does not bruise/bleed easily.   Psychiatric/Behavioral:  Negative for agitation and behavioral problems.    Objective:     Vital Signs (Most Recent):  Temp: 98.5 °F (36.9 °C) (07/08/22 1917)  Pulse: 75 (07/08/22 1917)  Resp: 16 (07/08/22 1917)  BP: 131/83 (07/08/22 1917)  SpO2: 99 % (07/08/22 2015) Vital Signs (24h Range):  Temp:  [97.6 °F (36.4 °C)-98.5 °F (36.9 °C)] 98.5 °F (36.9 °C)  Pulse:  [64-90] 75  Resp:  [16-19] 16  SpO2:  [96 %-100 %] 99 %  BP: (128-140)/(79-88) 131/83     Weight: 59 kg (130 lb)  Body mass index is 20.36 kg/m².    Intake/Output Summary (Last 24 hours) at 7/8/2022 2100  Last data filed at 7/8/2022 1723  Gross per 24 hour   Intake 240 ml   Output --   Net 240 ml      Physical Exam  Vitals and nursing note reviewed.   Constitutional:       General: He is not in acute distress.     Appearance: He is well-developed. He is not ill-appearing, toxic-appearing or diaphoretic.   HENT:      Head: Normocephalic and atraumatic.      Right Ear: External ear normal.      Left Ear: External ear normal.      Nose: Nose normal.      Mouth/Throat:      Mouth: Mucous membranes are moist.   Eyes:      Extraocular Movements: Extraocular movements intact.      Conjunctiva/sclera: Conjunctivae normal.   Cardiovascular:      Rate and Rhythm: Normal rate and regular rhythm.      Heart sounds: Normal heart sounds.   Pulmonary:      Effort: Pulmonary effort is normal. No respiratory distress.      Breath sounds:  Normal breath sounds.   Abdominal:      General: Bowel sounds are normal. There is no distension.      Palpations: Abdomen is soft.      Tenderness: There is no abdominal tenderness.   Musculoskeletal:         General: Normal range of motion.      Cervical back: Normal range of motion. No rigidity.   Skin:     General: Skin is warm and dry.   Neurological:      Mental Status: He is alert and oriented to person, place, and time.      Cranial Nerves: No cranial nerve deficit.      Coordination: Coordination abnormal.   Psychiatric:         Behavior: Behavior normal.       Significant Labs: All pertinent labs within the past 24 hours have been reviewed.    Significant Imaging: I have reviewed all pertinent imaging results/findings within the past 24 hours.      Assessment/Plan:      * CVA (cerebral vascular accident)  -Admitted to inpatient status  -MRI/MRA brain showed Acute ischemic change right upper basal ganglia posterior frontal parietal junction at level of central gyrus.  Additional numerous areas of posterior frontal parietooccipital periventricular areas cristy and cerebral peduncle and bilateral cerebellar hemisphere areas of remote gliosis, microvascular ischemic change favored. Occlusion or slow flow distal right vertebral artery.  -Echo showed EF 60%, normal diastolic function and no report of PFO  -LDL  95.6  TSH normal.  UDS normal.    -Carotid US without any hemodynamically significant stenosis  -Vascular Neurology discussed with ER - no vascular intervention indicated at this time.  -PT/OT/SLP consulted  -Await neurology consultation  -No tPA given as he was far outside window for treatment.  -Treating with permissive hypertension, aspirin and plavix x21 days, and statin    COPD (chronic obstructive pulmonary disease)  -Had hypoxemia on admit now stable on room air without wheezing  -Continue PRN nebs    Essential hypertension  -Permissive hypertension for now  -BP is currently normal.      VTE Risk  Mitigation (From admission, onward)         Ordered     enoxaparin injection 40 mg  Daily         07/07/22 1828     IP VTE HIGH RISK PATIENT  Once         07/07/22 1828     Place sequential compression device  Until discontinued         07/07/22 1828                Discharge Planning   LEX:      Code Status: Full Code   Is the patient medically ready for discharge?:     Reason for patient still in hospital (select all that apply): Treatment                     Pascual Castillo MD  Department of Hospital Medicine   [facility]

## 2022-07-09 NOTE — ASSESSMENT & PLAN NOTE
-Permissive hypertension but bp not notably elevated.  -Resume home bp meds at discharge  -F/u with pcp within 1 week for bp check

## 2022-07-09 NOTE — PLAN OF CARE
Problem: Adjustment to Illness (Stroke, Ischemic/Transient Ischemic Attack)  Goal: Optimal Coping  Outcome: Met     Problem: Bowel Elimination Impaired (Stroke, Ischemic/Transient Ischemic Attack)  Goal: Effective Bowel Elimination  Outcome: Met     Problem: Cerebral Tissue Perfusion (Stroke, Ischemic/Transient Ischemic Attack)  Goal: Optimal Cerebral Tissue Perfusion  Outcome: Met     Problem: Cognitive Impairment (Stroke, Ischemic/Transient Ischemic Attack)  Goal: Optimal Cognitive Function  Outcome: Met     Problem: Communication Impairment (Stroke, Ischemic/Transient Ischemic Attack)  Goal: Improved Communication Skills  Outcome: Met     Problem: Functional Ability Impaired (Stroke, Ischemic/Transient Ischemic Attack)  Goal: Optimal Functional Ability  Outcome: Met     Problem: Respiratory Compromise (Stroke, Ischemic/Transient Ischemic Attack)  Goal: Effective Oxygenation and Ventilation  Outcome: Met     Problem: Sensorimotor Impairment (Stroke, Ischemic/Transient Ischemic Attack)  Goal: Improved Sensorimotor Function  Outcome: Met     Problem: Swallowing Impairment (Stroke, Ischemic/Transient Ischemic Attack)  Goal: Optimal Eating and Swallowing without Aspiration  Outcome: Met     Problem: Urinary Elimination Impaired (Stroke, Ischemic/Transient Ischemic Attack)  Goal: Effective Urinary Elimination  Outcome: Met     Problem: Fall Injury Risk  Goal: Absence of Fall and Fall-Related Injury  Outcome: Met     Problem: Adult Inpatient Plan of Care  Goal: Plan of Care Review  Outcome: Met  Goal: Patient-Specific Goal (Individualized)  Outcome: Met  Goal: Absence of Hospital-Acquired Illness or Injury  Outcome: Met  Goal: Optimal Comfort and Wellbeing  Outcome: Met  Goal: Readiness for Transition of Care  Outcome: Met     Problem: Infection  Goal: Absence of Infection Signs and Symptoms  Outcome: Met

## 2022-07-09 NOTE — ASSESSMENT & PLAN NOTE
-Admitted to inpatient status  -CT Head showed Sequela of chronic microvascular ischemic change noting more focal regions of low attenuation involve the right corona radiata and right basal ganglia.  The region in the right basal ganglia may be new since the previous examination   -MRI/MRA brain showed Acute ischemic change right upper basal ganglia posterior frontal parietal junction at level of central gyrus.  Additional numerous areas of posterior frontal parietooccipital periventricular areas cristy and cerebral peduncle and bilateral cerebellar hemisphere areas of remote gliosis, microvascular ischemic change favored. Occlusion or slow flow distal right vertebral artery.  -Echo showed EF 60%, normal diastolic function and no report of PFO  -LDL  95.6  TSH normal.  UDS normal.    -Carotid US without any hemodynamically significant stenosis  -Vascular Neurology discussed with ER - no vascular intervention indicated at this time.  -PT/OT/SLP consulted and recommended outpatient pt/ot  -Neurology consulted and input appreciated.  Discussed with Dr. Roca.  Etiology remains unclear - ?small vessel disease?  No evidence of arrhythmia on telemetry or by history so doubt cardio-embolic.  -No tPA given as he was far outside window for treatment.  -Clinically improved and stable for discharge.  Discussed plans with patient and wife and he is happy to go home today  -Continue aspirin daily, plavix 75mg x 21 days, statin and antihypertensives  -Placed ambulatory referral to vascular neurology and will need f/u with pcp within 1 week.

## 2022-07-09 NOTE — DISCHARGE INSTRUCTIONS
Take all medications as prescribed.  Eat a strict low salt cardiac diet.  Follow up with your physicians as scheduled - pcp within 1 week and neurology within 2 weeks.  Thank you for trusting [facility] and Dr. Castillo with your care.  We are honored that you entrusted us with your healthcare needs. Your satisfaction is very important to us and we hope you have been very pleased with your experience at [facility]. After your discharge you may receive a survey asking you to rate your hospital experience. We encourage you to take the time to complete the survey as your feedback allows us to identify areas for improvement as well as recognize our staff.   We hope that you have received the very best care possible during your hospitalization at [facility], as your satisfaction is our top priority.

## 2022-07-09 NOTE — NURSING
Discharge instructions explained and handed to pt. Pt verbalizes understanding. Denies any questions. IV discontinued and catheter intact informed patient to keep coban in place 30 minutes. Vital signs stable, NADN, and afebrile. Cardiac monitoring dc and tele monitor notified. Discharged home via wheelchair with family at bedside, surgical mask in place.

## 2022-07-09 NOTE — PLAN OF CARE
Problem: Physical Therapy  Goal: Physical Therapy Goal  Description: Goals to be met by: 22     Patient will increase functional independence with mobility by performin. Pt to ambulate 150' w/sc mod I  2. Pt to demonstrate good dynamic standing balance.    Outcome: Ongoing, Progressing   Pt able to ambulate 110ft with close SBA no AD.

## 2022-07-09 NOTE — DISCHARGE SUMMARY
"[facility] Medicine  Discharge Summary      Patient Name: Rashad Rodríguez  MRN: [id number]  Patient Class: IP- Inpatient  Admission Date: 7/7/2022  Hospital Length of Stay: 2 days  Discharge Date and Time: No discharge date for patient encounter.  Attending Physician: Ellie Castillo MD   Discharging Provider: Ellie Castillo MD  Primary Care Provider: Juanito Dejesus Jr, MD      HPI:   HPI: 77 y.o. male PMHx CVA, HTN presents with 2-3 days of unsteady gait. Patient explains that he feels as though he will fall to the right when ambulating. Symptoms associated with trouble speaking and head pressure.  Per report patient was also c/o  bilateral foot numbness, paresthesias, and fatigue. However, he does not report these symptoms to me.  He admits to previously being diagnosed with CVA and was taking aspirin.  He stopped taking aspirin "a long time ago."  Symptoms better than onset.  Rest of history limited by patient request.    ED course:  CTH Impression:  Sequela of chronic microvascular ischemic change noting more focal regions of low attenuation involve the right corona radiata and right basal ganglia.  The region in the right basal ganglia may be new since the previous examination however not confirmed. ED MD discussed case with Dr. Murphy with vascular neurology who does not feel the patient warrants vascular intervention. Other labs were relatively unremarkable.    Admitted to Hospital Medicine for further evaluation    Goals of Care Treatment Preferences:  Code Status: Full Code      Consults:   Consults (From admission, onward)        Status Ordering Provider     Inpatient consult to Social Work  Once        Provider:  (Not yet assigned)    Completed ELLIE CASTILLO     Inpatient consult to Registered Dietitian/Nutritionist  Once        Provider:  (Not yet assigned)    Completed ELLIE CASTILLO     IP consult to case management/social work  Once        Provider:  (Not yet assigned)    Completed " ELLIE FARIAS     Inpatient consult to Registered Dietitian/Nutritionist  Once        Provider:  (Not yet assigned)    Completed ELLIE FARIAS     IP consult to case management/social work  Once        Provider:  (Not yet assigned)    Completed ELLIE FARIAS     Inpatient consult to Neurology  Once        Provider:  (Not yet assigned)    Completed ELLIE FARIAS        Hospital Course By Problem:   * CVA (cerebral vascular accident)  -Admitted to inpatient status  -CT Head showed Sequela of chronic microvascular ischemic change noting more focal regions of low attenuation involve the right corona radiata and right basal ganglia.  The region in the right basal ganglia may be new since the previous examination   -MRI/MRA brain showed Acute ischemic change right upper basal ganglia posterior frontal parietal junction at level of central gyrus.  Additional numerous areas of posterior frontal parietooccipital periventricular areas cristy and cerebral peduncle and bilateral cerebellar hemisphere areas of remote gliosis, microvascular ischemic change favored. Occlusion or slow flow distal right vertebral artery.  -Echo showed EF 60%, normal diastolic function and no report of PFO  -LDL  95.6  TSH normal.  UDS normal.    -Carotid US without any hemodynamically significant stenosis  -Vascular Neurology discussed with ER - no vascular intervention indicated at this time.  -PT/OT/SLP consulted and recommended outpatient pt/ot  -Neurology consulted and input appreciated.  Discussed with Dr. Roca.  Etiology remains unclear - ?small vessel disease?  No evidence of arrhythmia on telemetry or by history so doubt cardio-embolic.  -No tPA given as he was far outside window for treatment.  -Clinically improved and stable for discharge.  Discussed plans with patient and wife and he is happy to go home today  -Continue aspirin daily, plavix 75mg x 21 days, statin and antihypertensives  -Placed ambulatory referral to vascular  neurology and will need f/u with pcp within 1 week.    COPD (chronic obstructive pulmonary disease)  -Had hypoxemia on admit now stable on room air without wheezing    Essential hypertension  -Permissive hypertension but bp not notably elevated.  -Resume home bp meds at discharge  -F/u with pcp within 1 week for bp check      Final Active Diagnoses:    Diagnosis Date Noted POA    PRINCIPAL PROBLEM:  CVA (cerebral vascular accident) [I63.9] 07/07/2022 Yes    COPD (chronic obstructive pulmonary disease) [J44.9] 02/19/2019 Yes    Essential hypertension [I10] 02/15/2013 Yes      Problems Resolved During this Admission:       Discharged Condition: stable    Disposition: Home or Self Care    Follow Up:   Follow-up Information     Juanito Dejesus Jr, MD .    Specialty: Family Medicine  Contact information:  [address] [id number]  [phone]             Scott Mcfadden MD. Schedule an appointment as soon as possible for a visit in 2 week(s).    Specialty: Neurology  Why: For Neurology follow up.  A MESSAGE HAS BEEN SENT TO THE STAFF TO CALL AND ASSIST YOU WITH OBTAINING AN APPOINTMENT.  Contact information:  [address] [address]  [address]  [place] [id number]  [phone]                       Patient Instructions:      Ambulatory referral/consult to Physical/Occupational Therapy   Standing Status: Future   Referral Priority: Routine Referral Type: Physical Medicine   Referral Reason: Specialty Services Required   Number of Visits Requested: 1     Ambulatory referral/consult to Vascular Neurology   Standing Status: Future   Referral Priority: Routine Referral Type: Consultation   Referral Reason: Specialty Services Required   Requested Specialty: Vascular Neurology     Ambulatory referral/consult to Neurology   Standing Status: Future   Referral Priority: Routine Referral Type: Consultation   Referral Reason: Specialty Services Required   Requested Specialty: Neurology   Number of Visits Requested: 1     Diet  Cardiac     Notify your health care provider if you experience any of the following:  increased confusion or weakness     Notify your health care provider if you experience any of the following:  persistent dizziness, light-headedness, or visual disturbances     Notify your health care provider if you experience any of the following:  worsening rash     Notify your health care provider if you experience any of the following:  severe persistent headache     Notify your health care provider if you experience any of the following:  difficulty breathing or increased cough     Notify your health care provider if you experience any of the following:  severe uncontrolled pain     Notify your health care provider if you experience any of the following:  persistent nausea and vomiting or diarrhea     Notify your health care provider if you experience any of the following:  temperature >100.4     Activity as tolerated       Significant Diagnostic Studies: Labs:   BMP:   Recent Labs   Lab 07/08/22 0442 07/09/22 0342   GLU 78 90    141   K 4.0 3.8    108   CO2 22* 24   BUN 14 19   CREATININE 0.9 1.0   CALCIUM 9.4 9.4   MG 2.0  --    , CMP   Recent Labs   Lab 07/08/22 0442 07/09/22 0342    141   K 4.0 3.8    108   CO2 22* 24   GLU 78 90   BUN 14 19   CREATININE 0.9 1.0   CALCIUM 9.4 9.4   PROT 7.8 7.7   ALBUMIN 3.6 3.5   BILITOT 1.2* 0.9   ALKPHOS 98 104   AST 27 38   ALT 26 31   ANIONGAP 10 9   ESTGFRAFRICA >60 >60   EGFRNONAA >60 >60   , CBC   Recent Labs   Lab 07/08/22 0442 07/09/22 0342   WBC 9.75 10.98   HGB 14.7 14.4   HCT 43.1 42.9    288   , INR   Lab Results   Component Value Date    INR 1.0 07/08/2022    INR 1.0 07/07/2022    INR 1.0 10/01/2020   , Lipid Panel   Lab Results   Component Value Date    CHOL 176 07/07/2022    HDL 61 07/07/2022    LDLCALC 95.6 07/07/2022    TRIG 97 07/07/2022    CHOLHDL 34.7 07/07/2022   , Troponin No results for input(s): TROPONINI in the last 168  hours. and A1C:   Recent Labs   Lab 07/07/22 1954   HGBA1C 5.6       Pending Diagnostic Studies:     None         Medications:  Reconciled Home Medications:      Medication List      START taking these medications    nicotine 7 mg/24 hr  Commonly known as: NICODERM CQ  Place 1 patch onto the skin once daily.  Start taking on: July 10, 2022        CHANGE how you take these medications    clopidogreL 75 mg tablet  Commonly known as: PLAVIX  Take 1 tablet (75 mg total) by mouth once daily.  What changed: Another medication with the same name was removed. Continue taking this medication, and follow the directions you see here.        CONTINUE taking these medications    amLODIPine 5 MG tablet  Commonly known as: NORVASC  Take 1 tablet (5 mg total) by mouth once daily.     aspirin 81 MG EC tablet  Commonly known as: ECOTRIN  Take 1 tablet (81 mg total) by mouth once daily.     atorvastatin 40 MG tablet  Commonly known as: LIPITOR  Take 1 tablet (40 mg total) by mouth once daily.     hydrOXYzine HCL 10 MG Tab  Commonly known as: ATARAX  TAKE 1 TABLET BY MOUTH EVERY 6 HOURS AS NEEDED FOR ITCHING     valsartan 160 MG tablet  Commonly known as: DIOVAN  Take 1 tablet (160 mg total) by mouth once daily.        STOP taking these medications    clobetasoL 0.05 % cream  Commonly known as: TEMOVATE     clotrimazole 1 % cream  Commonly known as: LOTRIMIN     hydrocortisone 1 % cream     VIAGRA 100 MG tablet  Generic drug: sildenafiL            Indwelling Lines/Drains at time of discharge:   Lines/Drains/Airways     None                 Time spent on the discharge of patient: 35 minutes         Pascual Castillo MD  Department of Hospital Medicine  [facility]

## 2022-07-09 NOTE — SUBJECTIVE & OBJECTIVE
Interval History: No acute events.  Feels like he is improving.  Denies pain.  All questions answered and patient had no further complaints.      Review of Systems   Constitutional:  Negative for activity change, appetite change and fever.   HENT:  Negative for congestion and dental problem.    Eyes:  Negative for discharge and itching.   Respiratory:  Negative for apnea, cough, chest tightness and shortness of breath.    Cardiovascular:  Negative for chest pain and leg swelling.   Gastrointestinal:  Negative for abdominal distention and abdominal pain.   Endocrine: Negative for cold intolerance and heat intolerance.   Genitourinary:  Negative for difficulty urinating and dysuria.   Musculoskeletal:  Negative for arthralgias and back pain.   Allergic/Immunologic: Negative for environmental allergies and food allergies.   Neurological:  Positive for dizziness, speech difficulty and weakness. Negative for facial asymmetry and light-headedness.   Hematological:  Negative for adenopathy. Does not bruise/bleed easily.   Psychiatric/Behavioral:  Negative for agitation and behavioral problems.    Objective:     Vital Signs (Most Recent):  Temp: 98.5 °F (36.9 °C) (07/08/22 1917)  Pulse: 75 (07/08/22 1917)  Resp: 16 (07/08/22 1917)  BP: 131/83 (07/08/22 1917)  SpO2: 99 % (07/08/22 2015) Vital Signs (24h Range):  Temp:  [97.6 °F (36.4 °C)-98.5 °F (36.9 °C)] 98.5 °F (36.9 °C)  Pulse:  [64-90] 75  Resp:  [16-19] 16  SpO2:  [96 %-100 %] 99 %  BP: (128-140)/(79-88) 131/83     Weight: 59 kg (130 lb)  Body mass index is 20.36 kg/m².    Intake/Output Summary (Last 24 hours) at 7/8/2022 2100  Last data filed at 7/8/2022 1723  Gross per 24 hour   Intake 240 ml   Output --   Net 240 ml      Physical Exam  Vitals and nursing note reviewed.   Constitutional:       General: He is not in acute distress.     Appearance: He is well-developed. He is not ill-appearing, toxic-appearing or diaphoretic.   HENT:      Head: Normocephalic and  atraumatic.      Right Ear: External ear normal.      Left Ear: External ear normal.      Nose: Nose normal.      Mouth/Throat:      Mouth: Mucous membranes are moist.   Eyes:      Extraocular Movements: Extraocular movements intact.      Conjunctiva/sclera: Conjunctivae normal.   Cardiovascular:      Rate and Rhythm: Normal rate and regular rhythm.      Heart sounds: Normal heart sounds.   Pulmonary:      Effort: Pulmonary effort is normal. No respiratory distress.      Breath sounds: Normal breath sounds.   Abdominal:      General: Bowel sounds are normal. There is no distension.      Palpations: Abdomen is soft.      Tenderness: There is no abdominal tenderness.   Musculoskeletal:         General: Normal range of motion.      Cervical back: Normal range of motion. No rigidity.   Skin:     General: Skin is warm and dry.   Neurological:      Mental Status: He is alert and oriented to person, place, and time.      Cranial Nerves: No cranial nerve deficit.      Coordination: Coordination abnormal.   Psychiatric:         Behavior: Behavior normal.       Significant Labs: All pertinent labs within the past 24 hours have been reviewed.    Significant Imaging: I have reviewed all pertinent imaging results/findings within the past 24 hours.

## 2022-07-10 ENCOUNTER — TELEPHONE (OUTPATIENT)
Dept: INTERNAL MEDICINE | Facility: CLINIC | Age: 77
End: 2022-07-10
Payer: COMMERCIAL

## 2022-07-10 ENCOUNTER — NURSE TRIAGE (OUTPATIENT)
Dept: ADMINISTRATIVE | Facility: CLINIC | Age: 77
End: 2022-07-10
Payer: COMMERCIAL

## 2022-07-10 NOTE — TELEPHONE ENCOUNTER
When eating he is choking, coughing up thick mucous. Started Tuesday night, in hospital after stroke, discharged home yesterday. Difficultly only present when swallowing food/fluid, feels like thick mucous but no cold-like symptoms. Able to swallow secretion.     S/w Dr. Perry: Reviewed discharge notes from speech therapy, discharge MD and OT. Resume regular diet with thin liquids per discharge eval. Try thin liquids and pureed foods for today, f/u tomorrow. ED precautions given. VU and agreed    Reason for Disposition   [1] Coughing spells AND [2] occur during eating/feedings or within 2 hours    Additional Information   Negative: [1] Severe difficulty swallowing (e.g., drooling or spitting) AND [2] started suddenly after taking a medicine or allergic food   Negative: Wheezing, stridor, hoarseness, or difficulty breathing   Negative: [1] Swollen tongue AND [2] sudden onset   Negative: Sounds like a life-threatening emergency to the triager   Negative: SEVERE difficulty swallowing (e.g., drooling or spitting, can't swallow water)   Negative: [1] Symptoms of blocked esophagus (e.g., can't swallow normal secretions, drooling) AND [2] present now   Negative: Symptoms of food or bone stuck in throat or esophagus (e.g., pain in throat or chest, FB sensation, blood-tinged saliva)   Negative: SEVERE symptoms of pill stuck in throat or esophagus (e.g., severe pain, bleeding, or inability to swallow liquids)   Negative: [1] Drinking very little AND [2] dehydration suspected (e.g., no urine > 12 hours, very dry mouth, very lightheaded)   Negative: [1] Refuses to drink anything AND [2] for > 12 hours   Negative: Patient sounds very sick or weak to the triager   Negative: Fever > 100.4 F (38.0 C)    Protocols used: SWALLOWING DIFFICULTY-A-AH

## 2022-07-10 NOTE — TELEPHONE ENCOUNTER
Phone call from [facility]    Some trouble swallowing, s/p CVA.  Was seen by speech therapy but I do not see a swallow study    On 7/8 was able to sip water, eat applesauce ane peña crackers    No fever or SOB apparently    Unclear how severe deficit it    Can certainly go to ER if SOB (not presently) or fever or worsening sx.  Otherwise keep diet with thin liquids (apparently could tolerate these with straw) and pureed food.  Follow up with PCP and Neurology in 1 day    [facility] nurse to review alarm sx

## 2022-07-11 ENCOUNTER — TELEPHONE (OUTPATIENT)
Dept: NEUROLOGY | Facility: CLINIC | Age: 77
End: 2022-07-11
Payer: COMMERCIAL

## 2022-07-11 ENCOUNTER — TELEPHONE (OUTPATIENT)
Dept: FAMILY MEDICINE | Facility: CLINIC | Age: 77
End: 2022-07-11
Payer: COMMERCIAL

## 2022-07-11 NOTE — TELEPHONE ENCOUNTER
Spoke with patient's sister, Shauna, over the phone and she stated that patient was having aspiration issues after he experienced a stroke last Thursday 07/07/22 and was discharged on 07/09/22. She stated that he had a swallow study test performed by [facility] and they cleared him to eat peña crackers, water, and applesauce. RN strongly advise that he keep his upcoming appointment with Dr. Dejesus on 07/19/22 and if he experienced aspiration again, to return to ER due to risk for aspiration pneumonia. She and the patient verbalized understanding.

## 2022-07-11 NOTE — TELEPHONE ENCOUNTER
----- Message from Shar Marie sent at 7/9/2022  9:50 AM CDT -----  Regarding: Appointment  Please schedule patient for a hospital follow up appointment and call patient/family with details.    Patient refused appointment said he will call Yaya Amaya

## 2022-07-11 NOTE — TELEPHONE ENCOUNTER
Returned pt sister call, advised pt that 7/19 is the soonest appointment that Dr Dejesus has, pt wanted to know can you send something into the pharmcy because its hard for him to swallow and mucus is trying to come up but is not coming up.  ----- Message from Edelmira Lott sent at 7/11/2022  8:08 AM CDT -----  Contact: Shauna Rodríguez (Sister)  Type: Call Back      Who called: Shauna Rodríguez (Sister)      What is the request in detail:Shauna Rodríguez (Sister) is requesting a call back. She states that he was admitted into the hospital last week for a stroke. She states that she feels the patient needs to be seen sooner. She states that he is having problems swallowing. She states that he has a lot of thick mucus in his throat. Please advise.        Can the clinic reply by [facility]? No      Would the patient rather a call back or a response via [facility]? Call back       Best call back number: [phone] or [phone] (home)       Additional Information:

## 2022-07-14 ENCOUNTER — CLINICAL SUPPORT (OUTPATIENT)
Dept: REHABILITATION | Facility: HOSPITAL | Age: 77
End: 2022-07-14
Attending: HOSPITALIST
Payer: COMMERCIAL

## 2022-07-14 DIAGNOSIS — R26.89 IMPAIRED BALANCE AS LATE EFFECT OF CEREBROVASCULAR ACCIDENT: ICD-10-CM

## 2022-07-14 DIAGNOSIS — I63.9 CEREBROVASCULAR ACCIDENT (CVA), UNSPECIFIED MECHANISM: ICD-10-CM

## 2022-07-14 DIAGNOSIS — Z74.09 IMPAIRED FUNCTIONAL MOBILITY AND ACTIVITY TOLERANCE: ICD-10-CM

## 2022-07-14 DIAGNOSIS — I69.398 IMPAIRED BALANCE AS LATE EFFECT OF CEREBROVASCULAR ACCIDENT: ICD-10-CM

## 2022-07-14 PROCEDURE — 97110 THERAPEUTIC EXERCISES: CPT | Mod: PN

## 2022-07-14 PROCEDURE — 97161 PT EVAL LOW COMPLEX 20 MIN: CPT | Mod: PN

## 2022-07-14 NOTE — PROGRESS NOTES
See initial evaluation in Plan of Care.     Sophia Ybarra, PT, DPT, NCS  Neurological Physical Therapist  7/14/22

## 2022-07-15 ENCOUNTER — TELEPHONE (OUTPATIENT)
Dept: NEUROLOGY | Facility: CLINIC | Age: 77
End: 2022-07-15
Payer: COMMERCIAL

## 2022-07-15 NOTE — TELEPHONE ENCOUNTER
----- Message from Vidal Hassan sent at 7/15/2022  3:53 PM CDT -----  Type: Patient Call Back    Who called:Self    What is the request in detail: Pt requesting a call back. Soonest appt to reschedule to in 8/1. Pt states he was just seen for a stroke in the ED    Can the clinic reply by [facility]?no    Would the patient rather a call back or a response via [facility]? Call back    Best call back number:[phone]     Additional Information:

## 2022-07-18 ENCOUNTER — TELEPHONE (OUTPATIENT)
Dept: FAMILY MEDICINE | Facility: CLINIC | Age: 77
End: 2022-07-18
Payer: COMMERCIAL

## 2022-07-18 ENCOUNTER — OFFICE VISIT (OUTPATIENT)
Dept: NEUROLOGY | Facility: CLINIC | Age: 77
End: 2022-07-18
Payer: COMMERCIAL

## 2022-07-18 VITALS
DIASTOLIC BLOOD PRESSURE: 83 MMHG | WEIGHT: 126.31 LBS | SYSTOLIC BLOOD PRESSURE: 130 MMHG | HEIGHT: 67 IN | HEART RATE: 73 BPM | BODY MASS INDEX: 19.82 KG/M2

## 2022-07-18 DIAGNOSIS — Z86.73 HISTORY OF STROKE: ICD-10-CM

## 2022-07-18 DIAGNOSIS — I10 ESSENTIAL HYPERTENSION: ICD-10-CM

## 2022-07-18 DIAGNOSIS — I63.9 CEREBROVASCULAR ACCIDENT (CVA), UNSPECIFIED MECHANISM: ICD-10-CM

## 2022-07-18 DIAGNOSIS — F17.209 TOBACCO USE DISORDER, CONTINUOUS: ICD-10-CM

## 2022-07-18 DIAGNOSIS — R47.1 DYSARTHRIA: Primary | ICD-10-CM

## 2022-07-18 PROCEDURE — 1101F PT FALLS ASSESS-DOCD LE1/YR: CPT | Mod: CPTII,S$GLB,,

## 2022-07-18 PROCEDURE — 3060F PR POS MICROALBUMINURIA RESULT DOCUMENTED/REVIEW: ICD-10-PCS | Mod: CPTII,S$GLB,,

## 2022-07-18 PROCEDURE — 1126F PR PAIN SEVERITY QUANTIFIED, NO PAIN PRESENT: ICD-10-PCS | Mod: CPTII,S$GLB,,

## 2022-07-18 PROCEDURE — 3079F PR MOST RECENT DIASTOLIC BLOOD PRESSURE 80-89 MM HG: ICD-10-PCS | Mod: CPTII,S$GLB,,

## 2022-07-18 PROCEDURE — 99213 PR OFFICE/OUTPT VISIT, EST, LEVL III, 20-29 MIN: ICD-10-PCS | Mod: S$GLB,,,

## 2022-07-18 PROCEDURE — 99213 OFFICE O/P EST LOW 20 MIN: CPT | Mod: S$GLB,,,

## 2022-07-18 PROCEDURE — 1159F MED LIST DOCD IN RCRD: CPT | Mod: CPTII,S$GLB,,

## 2022-07-18 PROCEDURE — 3060F POS MICROALBUMINURIA REV: CPT | Mod: CPTII,S$GLB,,

## 2022-07-18 PROCEDURE — 3288F FALL RISK ASSESSMENT DOCD: CPT | Mod: CPTII,S$GLB,,

## 2022-07-18 PROCEDURE — 1160F RVW MEDS BY RX/DR IN RCRD: CPT | Mod: CPTII,S$GLB,,

## 2022-07-18 PROCEDURE — 1126F AMNT PAIN NOTED NONE PRSNT: CPT | Mod: CPTII,S$GLB,,

## 2022-07-18 PROCEDURE — 3066F NEPHROPATHY DOC TX: CPT | Mod: CPTII,S$GLB,,

## 2022-07-18 PROCEDURE — 1160F PR REVIEW ALL MEDS BY PRESCRIBER/CLIN PHARMACIST DOCUMENTED: ICD-10-PCS | Mod: CPTII,S$GLB,,

## 2022-07-18 PROCEDURE — 3079F DIAST BP 80-89 MM HG: CPT | Mod: CPTII,S$GLB,,

## 2022-07-18 PROCEDURE — 99999 PR PBB SHADOW E&M-EST. PATIENT-LVL V: ICD-10-PCS | Mod: PBBFAC,,,

## 2022-07-18 PROCEDURE — 3066F PR DOCUMENTATION OF TREATMENT FOR NEPHROPATHY: ICD-10-PCS | Mod: CPTII,S$GLB,,

## 2022-07-18 PROCEDURE — 4010F PR ACE/ARB THEARPY RXD/TAKEN: ICD-10-PCS | Mod: CPTII,S$GLB,,

## 2022-07-18 PROCEDURE — 3075F PR MOST RECENT SYSTOLIC BLOOD PRESS GE 130-139MM HG: ICD-10-PCS | Mod: CPTII,S$GLB,,

## 2022-07-18 PROCEDURE — 3288F PR FALLS RISK ASSESSMENT DOCUMENTED: ICD-10-PCS | Mod: CPTII,S$GLB,,

## 2022-07-18 PROCEDURE — 1159F PR MEDICATION LIST DOCUMENTED IN MEDICAL RECORD: ICD-10-PCS | Mod: CPTII,S$GLB,,

## 2022-07-18 PROCEDURE — 3044F HG A1C LEVEL LT 7.0%: CPT | Mod: CPTII,S$GLB,,

## 2022-07-18 PROCEDURE — 1111F PR DISCHARGE MEDS RECONCILED W/ CURRENT OUTPATIENT MED LIST: ICD-10-PCS | Mod: CPTII,S$GLB,,

## 2022-07-18 PROCEDURE — 1111F DSCHRG MED/CURRENT MED MERGE: CPT | Mod: CPTII,S$GLB,,

## 2022-07-18 PROCEDURE — 3044F PR MOST RECENT HEMOGLOBIN A1C LEVEL <7.0%: ICD-10-PCS | Mod: CPTII,S$GLB,,

## 2022-07-18 PROCEDURE — 99999 PR PBB SHADOW E&M-EST. PATIENT-LVL V: CPT | Mod: PBBFAC,,,

## 2022-07-18 PROCEDURE — 3075F SYST BP GE 130 - 139MM HG: CPT | Mod: CPTII,S$GLB,,

## 2022-07-18 PROCEDURE — 1101F PR PT FALLS ASSESS DOC 0-1 FALLS W/OUT INJ PAST YR: ICD-10-PCS | Mod: CPTII,S$GLB,,

## 2022-07-18 PROCEDURE — 4010F ACE/ARB THERAPY RXD/TAKEN: CPT | Mod: CPTII,S$GLB,,

## 2022-07-18 NOTE — TELEPHONE ENCOUNTER
Unable to leave voicemail for both numbers provided for patient's number - reminder for patient's appointment with Dr. Dejesus tomorrow.

## 2022-07-18 NOTE — TELEPHONE ENCOUNTER
----- Message from Kylieenrique Garcia sent at 7/18/2022  3:09 PM CDT -----  Type:  Patient Returning Call    Who Called: self    Who Left Message for Patient: Reina    Does the patient know what this is regarding?:?    Would the patient rather a call back or a response via [facility]? :call    Best Call Back Number: .[phone]

## 2022-07-19 ENCOUNTER — OFFICE VISIT (OUTPATIENT)
Dept: FAMILY MEDICINE | Facility: CLINIC | Age: 77
End: 2022-07-19
Payer: COMMERCIAL

## 2022-07-19 VITALS
TEMPERATURE: 98 F | SYSTOLIC BLOOD PRESSURE: 126 MMHG | HEART RATE: 72 BPM | WEIGHT: 127.19 LBS | DIASTOLIC BLOOD PRESSURE: 68 MMHG | OXYGEN SATURATION: 99 % | HEIGHT: 67 IN | BODY MASS INDEX: 19.96 KG/M2

## 2022-07-19 DIAGNOSIS — I63.9 CEREBROVASCULAR ACCIDENT (CVA), UNSPECIFIED MECHANISM: Primary | ICD-10-CM

## 2022-07-19 DIAGNOSIS — R05.9 COUGH: ICD-10-CM

## 2022-07-19 DIAGNOSIS — I10 ESSENTIAL HYPERTENSION: ICD-10-CM

## 2022-07-19 DIAGNOSIS — F17.200 SMOKING: ICD-10-CM

## 2022-07-19 DIAGNOSIS — E78.5 DYSLIPIDEMIA: ICD-10-CM

## 2022-07-19 PROBLEM — I69.398 IMPAIRED BALANCE AS LATE EFFECT OF CEREBROVASCULAR ACCIDENT: Status: ACTIVE | Noted: 2022-07-19

## 2022-07-19 PROBLEM — R26.89 IMPAIRED BALANCE AS LATE EFFECT OF CEREBROVASCULAR ACCIDENT: Status: ACTIVE | Noted: 2022-07-19

## 2022-07-19 PROBLEM — Z74.09 IMPAIRED FUNCTIONAL MOBILITY AND ACTIVITY TOLERANCE: Status: ACTIVE | Noted: 2022-07-19

## 2022-07-19 PROCEDURE — 99999 PR PBB SHADOW E&M-EST. PATIENT-LVL V: CPT | Mod: PBBFAC,,, | Performed by: FAMILY MEDICINE

## 2022-07-19 PROCEDURE — 1160F PR REVIEW ALL MEDS BY PRESCRIBER/CLIN PHARMACIST DOCUMENTED: ICD-10-PCS | Mod: CPTII,S$GLB,, | Performed by: FAMILY MEDICINE

## 2022-07-19 PROCEDURE — 4010F PR ACE/ARB THEARPY RXD/TAKEN: ICD-10-PCS | Mod: CPTII,S$GLB,, | Performed by: FAMILY MEDICINE

## 2022-07-19 PROCEDURE — 1111F PR DISCHARGE MEDS RECONCILED W/ CURRENT OUTPATIENT MED LIST: ICD-10-PCS | Mod: CPTII,S$GLB,, | Performed by: FAMILY MEDICINE

## 2022-07-19 PROCEDURE — 3074F PR MOST RECENT SYSTOLIC BLOOD PRESSURE < 130 MM HG: ICD-10-PCS | Mod: CPTII,S$GLB,, | Performed by: FAMILY MEDICINE

## 2022-07-19 PROCEDURE — 3074F SYST BP LT 130 MM HG: CPT | Mod: CPTII,S$GLB,, | Performed by: FAMILY MEDICINE

## 2022-07-19 PROCEDURE — 99214 PR OFFICE/OUTPT VISIT, EST, LEVL IV, 30-39 MIN: ICD-10-PCS | Mod: S$GLB,,, | Performed by: FAMILY MEDICINE

## 2022-07-19 PROCEDURE — 99999 PR PBB SHADOW E&M-EST. PATIENT-LVL V: ICD-10-PCS | Mod: PBBFAC,,, | Performed by: FAMILY MEDICINE

## 2022-07-19 PROCEDURE — 1111F DSCHRG MED/CURRENT MED MERGE: CPT | Mod: CPTII,S$GLB,, | Performed by: FAMILY MEDICINE

## 2022-07-19 PROCEDURE — 1160F RVW MEDS BY RX/DR IN RCRD: CPT | Mod: CPTII,S$GLB,, | Performed by: FAMILY MEDICINE

## 2022-07-19 PROCEDURE — 3288F PR FALLS RISK ASSESSMENT DOCUMENTED: ICD-10-PCS | Mod: CPTII,S$GLB,, | Performed by: FAMILY MEDICINE

## 2022-07-19 PROCEDURE — 1126F AMNT PAIN NOTED NONE PRSNT: CPT | Mod: CPTII,S$GLB,, | Performed by: FAMILY MEDICINE

## 2022-07-19 PROCEDURE — 3066F PR DOCUMENTATION OF TREATMENT FOR NEPHROPATHY: ICD-10-PCS | Mod: CPTII,S$GLB,, | Performed by: FAMILY MEDICINE

## 2022-07-19 PROCEDURE — 1101F PR PT FALLS ASSESS DOC 0-1 FALLS W/OUT INJ PAST YR: ICD-10-PCS | Mod: CPTII,S$GLB,, | Performed by: FAMILY MEDICINE

## 2022-07-19 PROCEDURE — 3044F HG A1C LEVEL LT 7.0%: CPT | Mod: CPTII,S$GLB,, | Performed by: FAMILY MEDICINE

## 2022-07-19 PROCEDURE — 3078F PR MOST RECENT DIASTOLIC BLOOD PRESSURE < 80 MM HG: ICD-10-PCS | Mod: CPTII,S$GLB,, | Performed by: FAMILY MEDICINE

## 2022-07-19 PROCEDURE — 99214 OFFICE O/P EST MOD 30 MIN: CPT | Mod: S$GLB,,, | Performed by: FAMILY MEDICINE

## 2022-07-19 PROCEDURE — 1159F MED LIST DOCD IN RCRD: CPT | Mod: CPTII,S$GLB,, | Performed by: FAMILY MEDICINE

## 2022-07-19 PROCEDURE — 3060F POS MICROALBUMINURIA REV: CPT | Mod: CPTII,S$GLB,, | Performed by: FAMILY MEDICINE

## 2022-07-19 PROCEDURE — 3066F NEPHROPATHY DOC TX: CPT | Mod: CPTII,S$GLB,, | Performed by: FAMILY MEDICINE

## 2022-07-19 PROCEDURE — 3078F DIAST BP <80 MM HG: CPT | Mod: CPTII,S$GLB,, | Performed by: FAMILY MEDICINE

## 2022-07-19 PROCEDURE — 1101F PT FALLS ASSESS-DOCD LE1/YR: CPT | Mod: CPTII,S$GLB,, | Performed by: FAMILY MEDICINE

## 2022-07-19 PROCEDURE — 3060F PR POS MICROALBUMINURIA RESULT DOCUMENTED/REVIEW: ICD-10-PCS | Mod: CPTII,S$GLB,, | Performed by: FAMILY MEDICINE

## 2022-07-19 PROCEDURE — 1159F PR MEDICATION LIST DOCUMENTED IN MEDICAL RECORD: ICD-10-PCS | Mod: CPTII,S$GLB,, | Performed by: FAMILY MEDICINE

## 2022-07-19 PROCEDURE — 3288F FALL RISK ASSESSMENT DOCD: CPT | Mod: CPTII,S$GLB,, | Performed by: FAMILY MEDICINE

## 2022-07-19 PROCEDURE — 1126F PR PAIN SEVERITY QUANTIFIED, NO PAIN PRESENT: ICD-10-PCS | Mod: CPTII,S$GLB,, | Performed by: FAMILY MEDICINE

## 2022-07-19 PROCEDURE — 3044F PR MOST RECENT HEMOGLOBIN A1C LEVEL <7.0%: ICD-10-PCS | Mod: CPTII,S$GLB,, | Performed by: FAMILY MEDICINE

## 2022-07-19 PROCEDURE — 4010F ACE/ARB THERAPY RXD/TAKEN: CPT | Mod: CPTII,S$GLB,, | Performed by: FAMILY MEDICINE

## 2022-07-19 RX ORDER — BENZONATATE 100 MG/1
100 CAPSULE ORAL 3 TIMES DAILY PRN
Qty: 30 CAPSULE | Refills: 1 | Status: SHIPPED | OUTPATIENT
Start: 2022-07-19 | End: 2022-07-29

## 2022-07-19 NOTE — PROGRESS NOTES
Subjective:       Patient ID: Rashad Rodríguez is a 77 y.o. male.    Chief Complaint:  Stroke      History of Present Illness  77 year old male who presents today for follow up management of stroke. Past Medical history below. He states he went to the ER after having  2-3 days of unsteady gait, trouble speaking, dizziness, left sided weakness, and head pressure . MRI Brain showed acute ischemic change right upper basal ganglia posterior frontal parietal junction at level of central gyrus.  Patient explains that he does have a history of stroke and has been complaint with ASA and atorvastatin.  He reports tobacco use since that age of 18. He reports smoking a pack a day to cutting down to 1-2 cigarettes per day. Currently lives alone but reports great support from family. He still has left sided weakness, dysarthria, and consistent clearing of the throat. He denies choking but does feel like their are times he has trouble swallowing.     Past Medical History:   Diagnosis Date    Cigarette smoker     Hyperlipidemia     Hypertension     Stroke 10/01/2020       Past Surgical History:   Procedure Laterality Date    NO PAST SURGERIES         Family History   Problem Relation Age of Onset    Cancer Mother     Stroke Father     No Known Problems Sister     No Known Problems Brother     No Known Problems Maternal Aunt     No Known Problems Maternal Uncle     No Known Problems Paternal Aunt     No Known Problems Paternal Uncle     No Known Problems Maternal Grandmother     No Known Problems Maternal Grandfather     No Known Problems Paternal Grandmother     No Known Problems Paternal Grandfather     Amblyopia Neg Hx     Blindness Neg Hx     Cataracts Neg Hx     Diabetes Neg Hx     Glaucoma Neg Hx     Hypertension Neg Hx     Macular degeneration Neg Hx     Retinal detachment Neg Hx     Strabismus Neg Hx     Thyroid disease Neg Hx        Social History     Socioeconomic History    Marital status:     Tobacco Use    Smoking status: Current Every Day Smoker     Packs/day: 0.50     Years: 50.00     Pack years: 25.00     Types: Cigarettes    Smokeless tobacco: Never Used   Substance and Sexual Activity    Alcohol use: Yes     Alcohol/week: 2.0 standard drinks     Types: 2 Cans of beer per week     Comment: socially    Drug use: No    Sexual activity: Yes     Partners: Female       Current Outpatient Medications   Medication Sig Dispense Refill    amLODIPine (NORVASC) 5 MG tablet Take 1 tablet (5 mg total) by mouth once daily. 90 tablet 1    aspirin (ECOTRIN) 81 MG EC tablet Take 1 tablet (81 mg total) by mouth once daily. 90 tablet 1    atorvastatin (LIPITOR) 40 MG tablet Take 1 tablet (40 mg total) by mouth once daily. 30 tablet 1    clopidogreL (PLAVIX) 75 mg tablet Take 1 tablet (75 mg total) by mouth once daily. 21 tablet 0    hydrOXYzine HCL (ATARAX) 10 MG Tab TAKE 1 TABLET BY MOUTH EVERY 6 HOURS AS NEEDED FOR ITCHING      nicotine (NICODERM CQ) 7 mg/24 hr Place 1 patch onto the skin once daily. 30 patch 1    valsartan (DIOVAN) 160 MG tablet Take 1 tablet (160 mg total) by mouth once daily. 90 tablet 1     No current facility-administered medications for this visit.       Review of patient's allergies indicates:   Allergen Reactions    Cephalexin     Ciprofloxacin     Doxycycline Other (See Comments)     Stomach cramps    Bactrim [sulfamethoxazole-trimethoprim] Rash       Review of Systems  Review of Systems   Constitutional: Negative.    HENT: Positive for trouble swallowing.    Eyes: Negative.    Respiratory: Negative.    Cardiovascular: Negative.    Gastrointestinal: Negative.    Endocrine: Negative.    Genitourinary: Negative.    Musculoskeletal: Positive for gait problem.   Allergic/Immunologic: Negative.    Neurological: Positive for dizziness, speech difficulty, weakness and headaches.   Hematological: Negative.    Psychiatric/Behavioral: Negative.        Objective:       Neurologic Exam     Mental Status   Oriented to person, place, and time.   Registration: recalls 3 of 3 objects.   Attention: normal. Concentration: normal.   Speech: slurred   Level of consciousness: alert  Knowledge: good.   Able to name object.     Cranial Nerves     CN II   Visual fields full to confrontation.     CN III, IV, VI   Pupils are equal, round, and reactive to light.  Extraocular motions are normal.   CN III: no CN III palsy  CN VI: no CN VI palsy  Nystagmus: none     CN V   Facial sensation intact.     CN VII   Facial expression full, symmetric.     CN VIII   CN VIII normal.     CN IX, X   CN IX normal.   CN X normal.     CN XI   CN XI normal.     CN XII   CN XII normal.   Tongue deviation: none    Motor Exam   Muscle bulk: normal  Overall muscle tone: normal  Right arm pronator drift: absent  Left arm pronator drift: absent    Strength   Right biceps: 5/5  Left biceps: 4/5  Right triceps: 5/5  Left triceps: 4/5  Right quadriceps: 5/5  Left quadriceps: 4/5  Right anterior tibial: 5/5  Left anterior tibial: 4/5  Right posterior tibial: 5/5  Left posterior tibial: 4/5    Sensory Exam   Light touch normal.     Gait, Coordination, and Reflexes     Gait  Gait: wide-based    Coordination   Romberg: negative    Tremor   Resting tremor: absent  Intention tremor: absent  Action tremor: absent    Reflexes   Right brachioradialis: 2+  Left brachioradialis: 2+  Right biceps: 2+  Left biceps: 2+  Right triceps: 2+  Left triceps: 2+  Right patellar: 2+  Left patellar: 2+  Right achilles: 2+  Left achilles: 2+  Right : 2+  Left : 2+      Physical Exam  HENT:      Head: Normocephalic and atraumatic.   Eyes:      Extraocular Movements: EOM normal.      Pupils: Pupils are equal, round, and reactive to light.   Cardiovascular:      Rate and Rhythm: Normal rate.   Pulmonary:      Effort: Pulmonary effort is normal.   Skin:     General: Skin is warm and dry.   Neurological:      Mental Status: He is alert and  oriented to person, place, and time.      Coordination: Romberg Test normal.      Deep Tendon Reflexes:      Reflex Scores:       Tricep reflexes are 2+ on the right side and 2+ on the left side.       Bicep reflexes are 2+ on the right side and 2+ on the left side.       Brachioradialis reflexes are 2+ on the right side and 2+ on the left side.       Patellar reflexes are 2+ on the right side and 2+ on the left side.       Achilles reflexes are 2+ on the right side and 2+ on the left side.  Psychiatric:         Mood and Affect: Mood is anxious.         Speech: Speech is slurred.         Behavior: Behavior is cooperative.           Assessment and Plan:       1. Cerebrovascular accident (CVA), unspecified mechanism  - Ambulatory referral/consult to Speech Therapy; Future  - Ambulatory consult to Cardiology; Future  -continue ASA, Lipitor and Plavix   -continue physical therapy  -Educated patient on s/s of stroke such as facial droop, speech changes, numbness/tingling, arm/leg weakness. Emphasized the importance of time and brain loss. If s/s of stroke are present and suspected patient/family educated to call 911. Patient and family verbalized understanding     2. Dysarthria  - Ambulatory referral/consult to Speech Therapy; Future    3. Essential hypertension  - Ambulatory consult to Cardiology; Future  - continue Norvasc and Diovan     4. History of stroke  - follow up in 1 year     5. Tobacco use disorder, continuous  -continue to cut down on tobacco use  -continue Nicotine patches

## 2022-07-20 ENCOUNTER — CLINICAL SUPPORT (OUTPATIENT)
Dept: REHABILITATION | Facility: HOSPITAL | Age: 77
End: 2022-07-20
Attending: HOSPITALIST
Payer: COMMERCIAL

## 2022-07-20 DIAGNOSIS — I69.398 IMPAIRED BALANCE AS LATE EFFECT OF CEREBROVASCULAR ACCIDENT: ICD-10-CM

## 2022-07-20 DIAGNOSIS — Z74.09 IMPAIRED FUNCTIONAL MOBILITY AND ACTIVITY TOLERANCE: Primary | ICD-10-CM

## 2022-07-20 DIAGNOSIS — R26.89 IMPAIRED BALANCE AS LATE EFFECT OF CEREBROVASCULAR ACCIDENT: ICD-10-CM

## 2022-07-20 PROCEDURE — 97110 THERAPEUTIC EXERCISES: CPT | Mod: PN

## 2022-07-20 PROCEDURE — 97112 NEUROMUSCULAR REEDUCATION: CPT | Mod: PN

## 2022-07-20 NOTE — PLAN OF CARE
[facility]  Physical Therapy Neurological Rehabilitation Initial Evaluation    Name: Rashad Rodríguez  Clinic Number: [id number]    Therapy Diagnosis:   Encounter Diagnoses   Name Primary?    Cerebrovascular accident (CVA), unspecified mechanism     Impaired functional mobility and activity tolerance     Impaired balance as late effect of cerebrovascular accident      Physician: Pascual Castillo MD    Physician Orders: PT Eval and Treat   Medical Diagnosis from Referral: Cerebrovascular accident (CVA), unspecified mechanism [I63.9]  Evaluation Date: 7/14/2022  Authorization Period Expiration: 7/9/23  Plan of Care Expiration: 9/26/22  Visit # / Visits authorized: 1/ 1    Time In: 5:00  Time Out: 6:00  Total Billable Time: 20 minutes  Total Time: 60 minutes     Precautions: Standard and Fall    Subjective   Date of onset: 7/7/22 - left internal capsule stroke    History of current condition - Rashad reports: hx of left lacunar stroke - October 2020. Pt is not sure why he is here or if therapy can help at this time.     7/7/22 - 7/9/22: reporting to hospital to 2-3 day stay following 2-3 days of unsteady gait - falling to the right        Medical History:   Past Medical History:   Diagnosis Date    Cigarette smoker     Hyperlipidemia     Hypertension     Stroke 10/01/2020       Surgical History:   Rashad Rodríguez  has a past surgical history that includes No past surgeries.    Medications:   Rashad has a current medication list which includes the following prescription(s): amlodipine, aspirin, atorvastatin, benzonatate, clopidogrel, hydroxyzine hcl, nicotine, valsartan, [DISCONTINUED] clobetasol, [DISCONTINUED] clotrimazole, [DISCONTINUED] hydrocortisone, and [DISCONTINUED] viagra.    Allergies:   Review of patient's allergies indicates:   Allergen Reactions    Cephalexin     Ciprofloxacin     Doxycycline Other (See Comments)     Stomach cramps    Bactrim  [sulfamethoxazole-trimethoprim] Rash        Imaging, MRI studies, CT scan films: 7/7/22    Prior Therapy: acute PT only   Social History: lives alone - sister can help if needed   Falls: none   DME: Walker, Shower Chair    Home Environment: house - no steps; tub shower with chair   Exercise Routine / History: right leg exercises - likes to draw and color   Family Present at time of Eval: none   Occupation: retired   Prior Level of Function: independent   Current Level of Function: walking is hard - Joselito     Pain:  Current 0/10    Pts goals: improve walking and speech     Objective     - Follows commands: simple, single   - Speech: slow and decreased volume production     Mental status: alert, oriented to person, place, and time, normal mood, behavior, speech, dress, motor activity, and thought processes  Appearance: Casually dressed  Behavior:  calm, cooperative and adequate rapport can be established  Attention Span and Concentration:  Normal    Dominant hand:  right     Posture Alignment :slouched posture    Skin integrity:  Intact    Sensation:  Light Touch: Intact           Proprioception:   Intact    Tone: 1-  Slight increase in muscle tone, manifested by a catch and release or by minimal resistance at the end of the range of motino when the affected part(s) is moved in flexion or extension  Limbs/muscles affected: left hip ADDuctors, left hip extensors     Visual/Auditory: since stroke - near vision is poor - needs reading glasses for close work     Coordination:   - fine motor: decreased speed left>R  - UE coordination: finger to nose: decreased speed left>R                     Pronation/ supination: decreased speed left>R  - LE coordination:  alternating toe taps: decreased speed left>R                                Heel to shin: decreased speed left>R    ROM:   UPPER EXTREMITY--AROM/PROM  (R) UE: WFLs  (L) UE: WFLs         RANGE OF MOTION--LOWER EXTREMITIES  (R) LE Hip: full   Knee: full   Ankle:  normal    (L) LE: Hip: full   Knee: full   Ankle: normal    Strength: manual muscle test grades below   Upper Extremity Strength   RUE LUE   Shoulder Flexion: 4/5 4/5   Shoulder Abduction: 4/5 4/5   Shoulder External  Rotation:   3+/5 3+/5   Shoulder Internal  Rotation:   4/5 4/5   Elbow Flexion:     4-/5 4/5   Elbow Extension: 4-/5 4-/5   Wrist Flexion: 4+/5 4+/5   Wrist Extension: 4+/5 4+/5   : 4/5 4/5     Lower Extremity Strength   RLE LLE   Hip Flexion: 4/5 4+/5   Hip Extension:  Can bridge  Can bridge    Hip Abduction: 4/5 4+/5   Hip Adduction: 4/5 4+/5   Knee Extension: 4/5 4+/5   Knee Flexion: 4/5 4+/5   Ankle Dorsiflexion: 4/5 4+/5     Abdominal Strength: 4/5     Evaluation   Single Limb Stance R LE NT  (<10 sec = HIGH FALL RISK)   Single Limb Stance L LE NT  (<10 sec = HIGH FALL RISK)   5 times sit-stand 19 seconds - BUE    >12 sec= fall risk for general elderly  >16 sec= fall risk for Parkinson's disease  >10 sec= balance/vestibular dysfunction (<59 y/o)  >14.2 sec= balance/vestibular dysfunction (>59 y/o)  >12 sec= fall risk for CVA   FGA 15/30     Postural control:  MCTSIB:  1. Eyes Open/feet together/Firm: 30 seconds  2. Eyes Closed/feet together/Firm: 24 seconds  3. Eyes Open/feet together/Foam: 30 seconds  4. Eyes Closed/feet together/Foam: 6 seconds    Functional Gait Assessment:   1. Gait on level surface =  1 - 10 sec    (3) Normal: less than 5.5 sec, no A.D., no imbalance, normal gait pattern, deviates< 6in   (2) Mild impairment: 7-5.6 sec, uses A.D., mild gait deviations, or deviates 6-10 in   (1) Moderate impairment: > 7 sec, slow speed, imbalance, deviates 10-15 in.   (0) Severe impairment: needs assist, deviates >15 in, reach/touch wall  2. Change in Gait Speed = 2   (3) Normal: smooth change w/o loss of balance or gait deviation, deviates < 6 in, significant difference between speeds   (2) Mild impairment: changes speed, but demonstrates mild gait deviations, deviates 6-10 in, OR no  deviations but unable to significantly speed, OR uses A.D.   (1) Moderate impairment: minor changes to speed, OR changes speed w/ significant deviations, deviates 10-15 in, OR  Changes speed , but loses balance & recovers   (0) Severe impairment: cannot change speed, deviates >15 in, or loses balance & needs assist  3. Gait with horizontal head turns  = 2   (3) Normal: no change in gait, deviates <6 in   (2) Mild impairment: slight change in speed, deviates 6-10 in, OR uses A.D.   (1) Moderate impairment: moderate change in speed, deviates 10-15 in   (0) Severe impairment: severe disruption of gait, deviates >15in  4. Gait with vertical head turns = 2   (3) Normal: no change in gait, deviates <6 in   (2) Mild impairment: slight change in speed, deviates 6-10 in OR uses A.D.   (1) Moderate impairment: moderate change in speed, deviates 10-15 in   (0) Severe impairment: severe disruption of gait, deviates >15 in  5. Gait with pivot turns = 1   (3) Normal: performs safely in 3 sec, no LOB   (2) Mild impairment: performs in >3 sec & no LOB, OR turns safely & requires several steps to regain LOB   (1) Moderate impairment: turns slow, OR requires several small steps for balance following turn & stop   (0) Severe impairment: cannot turn safely, needs assist  6. Step over obstacle = 2   (3) Normal: steps over 2 stacked boxes w/o change in speed or LOB   (2) Mild impairment: able to step over 1 box w/o change in speed or LOB   (1) Moderate impairment: steps over 1 box but must slow down, may require VC   (0) Severe impairment: cannot perform w/o assist  7. Gait with Narrow LANIE = 1   (3) Normal: 10 steps no staggering   (2) Mild impairment: 7-9 steps   (1) Moderate impairment: 4-7 steps   (0) Severe impairment: < 4 steps or cannot perform w/o assist  8. Gait with eyes closed = 1 - 14 sec   (3) Normal: < 7 sec, no A.D., no LOB, normal gait pattern, deviates <6 in   (2) Mild impairment: 7.1-9 sec, mild gait deviations, deviates  6-10 in   (1) Moderate impairment: > 9 sec, abnormal pattern, LOB, deviates 10-15 in   (0) Severe impairment: cannot perform w/o assist, LOB, deviates >15in  9. Ambulating Backwards = 2   (3) Normal: no A.D., no LOB, normal gait pattern, deviates <6in   (2) Mild impairment: uses A.D., slower speed, mild gait deviations, deviates 6-10 in   (1) Moderate impairment: slow speed, abnormal gait pattern, LOB, deviates 10-15 in   (0) Severe impairment: severe gait deviations or LOB, deviates >15in  10. Steps = 1   (3) Normal: alternating feet, no rail   (2) Mild Impairment: alternating feet, uses rail   (1) Moderate impairment: step-to, uses rail   (0) Severe impairment: cannot perform safely  Total Score 15/30     Score:   <22/30 fall risk   <20/30 fall risk in older adults   <18/30 fall risk in Parkinsons     Scores by Decade:                        Age    Score                        40-49  (24-30)                       50-59  (25-30)                       60-69  (20-30)                       70-79  (16-30)    Gait Assessment:   - AD used: none  - Assistance: SBA  - Distance: 100+ ft   - Stairs: Mod I    GAIT DEVIATIONS:  Gait component performance: decreased speed, limited weightshifting, narrow base of support with mild to moderate instability throughout, limited bilateral foot clearance, limited hip / knee flexion    Endurance Deficit: moderate      Evaluation   Timed Up and Go 15 sec - with upper extremity    < 20 sec safe for independent transfers,     < 30 sec assist required for transfers   8 meter walk test 0.8 m/s   6 min walk test NT     Functional Mobility (Bed mobility, transfers)  Bed mobility: Mod I  Supine to sit: Mod I  Sit to supine: Mod I  Rolling: Mod I  Transfers to bed: Mod I  Transfers to toilet: Mod I  Sit to stand:  Mod I  Stand pivot:  Mod I  Car transfers: Mod I  Wheelchair mobility: NA  Floor transfers: NT      CMS Impairment/Limitation/Restriction for FOTO Stroke lower extremity  Survey    Therapist reviewed FOTO scores for Rashad Rodríguez on 7/14/2022.   FOTO documents entered into [employer] - see Media section.    Limitation Score: 49%  Category: Mobility         TREATMENT   Treatment Time In: 5:40  Treatment Time Out: 6:00  Total Treatment time separate from Evaluation: 20 minutes    Rashad received therapeutic exercises to develop strength, endurance, posture and core stabilization for 20 minutes including:    home exercise program:   1. Bridge 10x3   2. SLR 10x3   3. Clamshell 10x3 bilateral   4. Hip ABD 10x3 bilateral       Home Exercises and Patient Education Provided    Education provided:   - role of PT, plan of care (POC), scheduling, balance education, cerebral vascular accident education    Written Home Exercises Provided: yes.  Exercises were reviewed and Rashad was able to demonstrate them prior to the end of the session.  Rashad demonstrated good  understanding of the education provided.     See EMR under Patient Instructions for exercises provided 7/14/2022.    Assessment   Rashad is a 77 y.o. male referred to outpatient Physical Therapy with a medical diagnosis of CVA. Pt presents with decreased lower extremity strength, mild decrease in coordination (left> right), limited static and dynamic balance and is currently at risk for falls through objective measure performance (Functional Gait Assessment) and through observation with mild to moderate instability/sway observed with narrow base of support and limited bilateral lower extremity clearance.     Pt prognosis is Good.   Pt will benefit from skilled outpatient Physical Therapy to address the deficits stated above and in the chart below, provide pt/family education, and to maximize pt's level of independence.     Plan of care discussed with patient: Yes  Pt's spiritual, cultural and educational needs considered and patient is agreeable to the plan of care and goals as stated below:     Anticipated Barriers for therapy:  scheduling    Medical Necessity is demonstrated by the following  History  Co-morbidities and personal factors that may impact the plan of care Co-morbidities:   advanced age, COPD/asthma and HTN    Personal Factors:   age  lifestyle     high   Examination  Body Structures and Functions, activity limitations and participation restrictions that may impact the plan of care Body Regions:   lower extremities  upper extremities  trunk    Body Systems:    strength  balance  gait  transfers    Participation Restrictions:   Joselito - all functional mobility     Activity limitations:   Learning and applying knowledge  no deficits    General Tasks and Commands  no deficits    Communication  communicating with/receiving spoken language    Mobility  lifting and carrying objects  fine hand use (grasping/picking up)  walking    Self care  Joselito - all tasks    Domestic Life  Joselito - all tasks    Interactions/Relationships  no deficits    Life Areas  no deficits    Community and Social Life  community life  recreation and leisure         high   Clinical Presentation stable and uncomplicated low   Decision Making/ Complexity Score: low     Goals:  Short Term Goals: 5 weeks   1. Patient will improve static standing balance to 30s on conditions 1 and 2 of MCTSIB to reduce risk of falling.   2. Demonstrate ability to ambulate with head movements in pitch and yaw without change in speed or staggering outside 10 path to enable safe scanning of environment for obstacles or pivot turn R/L without LOB.  3. Patient will improve FGA score by 4 points to promote dynamic balance.  4. Patient will improve 5xSTS score by 3 seconds to promote functional strength.    Long Term Goals: 10 weeks   1. Patient will be independent with home exercise program (HEP) to promote continued rehabilitation following discharge.  2. Pt will ambulate 250 feet without assistive device and at independence with good balance to reduce risk of falls.  3. Patient will  improve FGA score by 8 points to promote dynamic balance.  4. Patient will improve 5xSTS score by 6 seconds to promote functional strength.  5. Patient will improve static standing balance to 30s on conditions 1-4 of MCTSIB to reduce risk of falling.       Plan   Plan of care Certification: 7/14/2022 to 9/26/22.     Outpatient Physical Therapy 2 times weekly for 10 weeks to include the following interventions: Electrical Stimulation, Gait Training, Moist Heat/ Ice, Neuromuscular Re-ed, Patient Education, Therapeutic Activities and Therapeutic Exercise.     Occupational Therapy and Speech Therapy referrals will benefit this patient and I will contact referring provider.     Sophia Ybarra, PT, DPT, NCS  07/19/2022

## 2022-07-20 NOTE — PROGRESS NOTES
[facility]   Physical Therapy Treatment Note     Name: Rashad [facility] Number: [id number]    Therapy Diagnosis:   Encounter Diagnoses   Name Primary?   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      Physician: Pascual Castillo MD    Visit Date: 7/20/2022    Physician: Pascual Castillo MD     Physician Orders: PT Eval and Treat   Medical Diagnosis from Referral: Cerebrovascular accident (CVA), unspecified mechanism [I63.9]  Evaluation Date: 7/14/2022  Authorization Period Expiration: 12/31/22  Plan of Care Expiration: 9/26/22  Visit # / Visits authorized: 1/ 20    PTA Visit #: 0/5     Time In: 09:30  Time Out: 10:20  Total Billable Time: 50 minutes    SUBJECTIVE     Pt reports: I'm trying to quit smoking, but this patch isn't helping much.  Patient reports he tries to do exercises at home.  He was compliant with home exercise program.  Response to previous treatment: a little tired  Functional change: on going    Pain: 0/10  Location: denies any pain    OBJECTIVE     Objective Measures updated at progress report unless specified.     Treatment     Rashad received the treatments listed below:      therapeutic exercises to develop strength, posture and endurance for 35  minutes including:  Bridge x 15  Hook lying hip add with ball squeeze x 15  Bridge with ball squeeze x 15  Hooklying abdominal brace with opposite upper extremity/ lower extremity isometric hold 5 sec x 10 each side  Sidelying clams x x12 left lower extremity     Sit to stand x 10  Sit to stand with increased left lower extremity weight bearing x 10     Step tap onto 4in step   Step up onto 4in step     Nustep level 4 x 6 min for cardio endurance    neuromuscular re-education activities to improve: Balance, Coordination and Proprioception for 15 minutes. The following activities were included:  Standing on foam:    Feet together 30 sec hold      Feet together head rotation right left x 10 each direction    Feet together head nods up/down x 10 each    Feet together eyes closed x 10-15 sec hold x 4 trials     Walking with head turns right and left 40ft x 2  Walking with head turns up and down 40ft x 2    Fwd walking lunge 40 ft x 2, pt with 2 loss of balance requires min A to correct     gait training to improve functional mobility and safety for 0  minutes, including:        Patient Education and Home Exercises     Home Exercises Provided and Patient Education Provided     Education provided:   - Physical Therapy Plan of Care, schedule, HEP, smoking cessation, stroke mobile program     Written Home Exercises Provided: Patient instructed to cont prior HEP. Exercises were reviewed and Rashad was able to demonstrate them prior to the end of the session.  Rashad demonstrated good  understanding of the education provided. See EMR under Patient Instructions for exercises provided during therapy sessions    ASSESSMENT     Mr. Solorzano tolerate first follow up session well today.  He demo's good knowledge of supine and seated HEP to perform.  He had some instability with head turns while standing on foam and with ambulation.  He continues to demo left lower extremity weakness with functional tasks such as sit to stand.  He was educated on importance of exercise, smoking cessation and healthy diet for stroke prevention.  He will continue to benefit from skilled Physical Therapy to address impairments     Rashad Is progressing well towards his goals.   Pt prognosis is Good.     Pt will continue to benefit from skilled outpatient physical therapy to address the deficits listed in the problem list box on initial evaluation, provide pt/family education and to maximize pt's level of independence in the home and community environment.     Pt's spiritual, cultural and educational needs considered and pt agreeable to plan of care and goals.     Anticipated barriers to  physical therapy: transportation (patient is brought in by his brother)    Goals:   Short Term Goals: 5 weeks   1. Patient will improve static standing balance to 30s on conditions 1 and 2 of MCTSIB to reduce risk of falling. on going   2. Demonstrate ability to ambulate with head movements in pitch and yaw without change in speed or staggering outside 10 path to enable safe scanning of environment for obstacles or pivot turn R/L without LOB. on going   3. Patient will improve FGA score by 4 points to promote dynamic balance. on going   4. Patient will improve 5xSTS score by 3 seconds to promote functional strength. on going      Long Term Goals: 10 weeks   1. Patient will be independent with home exercise program (HEP) to promote continued rehabilitation following discharge. on going   2. Pt will ambulate 250 feet without assistive device and at independence with good balance to reduce risk of falls. on going   3. Patient will improve FGA score by 8 points to promote dynamic balance. on going   4. Patient will improve 5xSTS score by 6 seconds to promote functional strength. on going   5. Patient will improve static standing balance to 30s on conditions 1-4 of MCTSIB to reduce risk of falling. on going     PLAN     Plan of care Certification: 7/14/2022 to 9/26/22.  Cont to progress strengthening and balance    Lila Underwood, PT

## 2022-07-25 ENCOUNTER — CLINICAL SUPPORT (OUTPATIENT)
Dept: REHABILITATION | Facility: HOSPITAL | Age: 77
End: 2022-07-25
Payer: COMMERCIAL

## 2022-07-25 DIAGNOSIS — I63.9 CEREBROVASCULAR ACCIDENT (CVA), UNSPECIFIED MECHANISM: ICD-10-CM

## 2022-07-25 DIAGNOSIS — R47.1 DYSARTHRIA: ICD-10-CM

## 2022-07-25 PROCEDURE — 92610 EVALUATE SWALLOWING FUNCTION: CPT | Mod: PN

## 2022-07-25 NOTE — PROGRESS NOTES
Please see initial plan of care for evaluation details.     BRADLY Ortiz, CCC-SLP  Speech Language Pathologist   7/25/2022

## 2022-07-25 NOTE — PLAN OF CARE
Outpatient Neurological Rehabilitation  Speech and Language Therapy Evaluation    Date: 7/25/2022     Name: Rashad Rodríguez   MRN: [id number]    Therapy Diagnosis:   Encounter Diagnoses   Name Primary?    Cerebrovascular accident (CVA), unspecified mechanism     Dysarthria       Physician: Jannette Alfaro NP  Physician Orders: [id number] - AMB REFERRAL/CONSULT TO SPEECH THERAPY   Medical Diagnosis from Referral: I63.9 (ICD-10-CM) - Cerebrovascular accident (CVA), unspecified mechanism R47.1 (ICD-10-CM) - Dysarthria       Visit #/Visits authorized: 1/ 1  Date of Evaluation:  7/25/2022   Insurance Authorization Period: 7/18/22-12/31/22   Plan of Care Expiration:  7/25/2022 to 9/5/22     Time In: 4:58pm  Time Out: 5:30pm     Procedure Min.   Swallow and Oral Function Evaluation   32          Precautions:Standard  Subjective   Date of Onset: most recent stroke 7/7/22; previous stroke 10/2020   History of Current Condition:   Pt. Reports difficulty with swallow following his second stroke on 7/8/22. He endorses difficulties with thin liquids only, no difficulties with solids. Reports straws are easier and occasionally holds water in mouth prior to swallowing. Inconsistently comes out nose. Pt. Presents with mild dysarthria from previous stroke, however reports it is has improved and does not want to target in therapy.     Past Medical History: Rashad Rodríguez  has a past medical history of Cigarette smoker, Hyperlipidemia, Hypertension, and Stroke (10/01/2020).  Rashad Rodríguez  has a past surgical history that includes No past surgeries.  Medical Hx and Allergies:  Rashad has a current medication list which includes the following prescription(s): amlodipine, aspirin, atorvastatin, benzonatate, clopidogrel, hydroxyzine hcl, nicotine, valsartan, [DISCONTINUED] clobetasol, [DISCONTINUED] clotrimazole, [DISCONTINUED] hydrocortisone, and [DISCONTINUED] viagra.   Review of patient's allergies indicates:   Allergen Reactions     "Cephalexin     Ciprofloxacin     Doxycycline Other (See Comments)     Stomach cramps    Bactrim [sulfamethoxazole-trimethoprim] Rash     Imaging: MRI scan 7/7/22  "Acute ischemic change right upper basal ganglia posterior frontal parietal junction at level of central gyrus.  Additional numerous areas of posterior frontal parietooccipital periventricular areas cristy and cerebral peduncle and bilateral cerebellar hemisphere areas of remote gliosis, microvascular ischemic change favored.  Occlusion or slow flow distal right vertebral artery."  Prior Therapy:  [facility] after 2020 after first stroke   Social History:  Retired; lives alone  Prior Level of Function: no problems   Current Level of Function: problems with swallowing liquids   Pain:   0/10  Pain Location / Description: n/a  Nutrition:  WN:  Patient's Therapy Goals:  Swallowing    Objective   Formal Assessment:     Auditory Comprehension: No concerns reported or observed; WFL for the purpose of assessment and conversation.  Verbal Expression:  No concerns reported or observed; WFL for the purpose of assessment and conversation.   Reading Comprehension:  Did not assess. No concerns reported or observed.    Written Expression:  Did not assess. No concerns reported or observed.     Cognition:  No concerns reported or observed. Pt and alert and cooperative throughout evaluation, was oriented x 4 independently. Pt did not require cues to attend to evaluation tasks throughout session. Pt demonstrated adequate topic maintenance and reasoning skills throughout evaluation. Cognitive-linguistic skills WFL for the purpose of assessment and conversation.      Motor Speech/Fluency/Voice:    Did not assess. Pt. Informally assessed with results of mild dysarthria with good intelligibility in known and unknown context with familiar and unfamiliar listeners. He reports he does not want to target motor speech in speech therapy at this time.     Swallowing:  Eating Assessment " Tool (EAT-10) is a questionnaire given to the patient to  her swallowing function. They ranked their swallowing function as a 7/40 (If the score is greater than 3 there may be swallowing problems.)    0=No problem  4= severe problem    1. My swallowing problem has caused me to lose weight: 0  2. My swallowing problem interferes with my ability to go out for meals: 0  3. Swallowing liquid takes extra effort: 2  4. Swallowing solids takes extra effort: 0  5. Swallowing pills takes extra effort: 0  6. Swallowing is painful: 0  7. The pleasure of my eating is affected by my swallowin  8. When I swallow food sticks in my throat: 3  9. I cough when I eat: 2  10. Swallowing is stressful: 0  Total: 7    Interpretation: Score of greater than 3 is indicative of a swallowing disorder (Sabina et al., 2008); higher scores correlate with increased penetration-aspiration  scale scores, and scores greater than 15 results in the patient being 2.2 times more likely to aspirate (Dwight et al., 2015)     References:   CRISTINO Muhammad., DOREEN Hobbs., MICHELLE Mcknight., TOVA Moore., Leeann, JILL N., TOVA Horan, & Jean, WENDY RENDON. (2008). Validity and reliability of the Eating Assessment Tool (EAT-10). Annals of Otology, Rhinology & Laryngology, 117(12), 919-924. https://doi.org/10.1177/[email or web address]  DOREEN Quintanilla., NEWTON Boyd, TOVA Martins., Julianne, MNathan A., & CRISTINO Muhammad. (2015). The ability of the 10-item eating assessment tool (EAT-10) to predict aspiration risk in persons with dysphagia. Annals of Otology, Rhinology & Laryngology, 124(5), 351-354. https://doi.org/10.1177/5950411697130050    Recommend MBSS: yes- scheduled tomorrow    Clinical Swallow Exam/ Oral Mechanism Exam:  Mandibular Strength and Mobility - Trigeminal Nerve (CNV) Rest: WNL   Lateralization: WNL  Protrusion: WNL  Retraction:  WNL  Involuntary Movement: absent    Oral Labial Strength and Mobility -  Facial Nerve (CN VII)  Rest: WNL   Lateralization:  WNL  Protrusion: WNL  Retraction:  WNL  Involuntary Movement: absent    Lingual Strength and Mobility- Hypoglossal Nerve (CN XII)  Rest: WNL   Lateralization: WNL  Protrusion: WNL  Elevation:  WNL  Involuntary Movement: absent    Velar Elevation -   Glossopharyngeal Nerve (CN IX) and Vagus (CN X) Rest: WNL   Symmetry: WNL   Elevation: WNL   Sustained elevation: WNL   Involuntary movement: absent     Buccal Strength and Mobility -   Facial Nerve (CN VII)  reduced    Facial Sensation and Movement -   Facial Nerve (CN VII) Symmetrical at rest: yes  Wrinkle forehead: yes  Able to close eyes tightly: yes  Taste to Anterior 2/3 of Tongue: no change      Structure Abnormalities: no  Dentition: WNL  Secretion Management: reports execcive salivia in mouth   Mucosal Quality: WNL  Volitional Cough: reduced  Volitional Swallow: WNL  Voice Prior to PO Intake: low, dysatrhtira, quiet     [place] Swallow Protocol:  FAIL- immediate throat clear, gurly voice, delayed cough   [place] Swallow Protocol dictates pt remain NPO if fail screener; (Jasonr et al. 2014) however, as objective swallow assessment is not available for greater than a week, pt will remain on current diet until objective assessment is completed unless otherwise indicated.     *Thicken liquids were not used in this assessment. OLinh (2018) reported that thickened liquids have no sound evidence as reducing risk of pneumonia in patients with dysphagia and can cause harm by increasing risk of dehydration. It also presents an increased risk of UTI, electrolyte imbalance, constipation, fecal impaction, cognitive impairment, functional decline, and even death (Sigrid, 2002; Keith, 2016).  This supports the assertion that we should confirm a patient requires thickened liquids with an instrumental swallow study prior to recommending them.    Clinical Swallow Examination:   Pt presented with:   THIN:- self regulated thin liquid via cup  PUREE:- tsp bites of pudding x2  SOLID:  "-bite of peña melo x1     INTERPRETATION:    The patient had no anterior loss of the bolus with full closure of the lips around the spoon. No residue remained in the oral cavity following the swallow. Pt with overt clinical sign/symptoms of aspiration on the thin and puree consistence trail c/b s/s of aspiration/penetration. Within puree trial, pt. Independently assessed spoonful after being directed "do what you would do at home", took heaping spoonful of therapy. Pt. demonstrated with delayed throat clear following puree trial. When asked if he usually takes spoonfuls that big, he reports he does because he doesn't have any problems with applesauce. Following thin liquid trial, pt. immediately coughed and presented with gurly voice. No difficulty reported or observed with solid consistency.  Pt's presents with a possibly inefficient swallow as indicated by multiple s/s of aspiration/penetration.      Clinical signs of dysphagia, likely correlated to CVA. Instrumental swallow study is indicated to assess the swallowing physiology and rule out aspiration of various consistencies. Given no reported/charted history of pneumonia, pt should continue current diet prior to instrumental study.    Reference:   American Speech-Language-Hearing Association. (n.d.b). Adult dysphagia. (Practice Portal). https://www.guille.org/practice-portal/clinical-topics/adult-dysphagia/  Shelly S. T. (2018). Use of modified diets to prevent aspiration in oropharyngeal dysphagia: Is current practice justified?. BMC Geriatrics, 18(1), 1-10.  ROSALINA Matta., GRETCHEN Du, CRISTINO Salas, & ROMAIN Kat. (2002). Predictors of aspiration pneumonia in nursing home residents.  Dysphagia, 17(4), 298-307.  GRETCHEN Parker (2016). Best practices for dehydration prevention. Perspectives of the [place] Special Interest Groups - SIG 13, 1(2), 72- 80.      Hearing / Vision: no difficulty reported, functional for purpose of evaluation.        Treatment   Treatment " Time In: n/a  Treatment Time Out: n/a  Total Treatment Time: n/a  no treatment performed 2/2 time to complete evaluation.    Education: Plan of Care, role of SLP in care and aspiration precautions  was discussed with pt. Patient and/or family members expressed understanding.     Home Program: not yet established   Assessment     Rashad presents to [facility] s/p medical diagnosis of  CVA.  Demonstrates impairments including limitations as described in the problem list. He presents with a possibly inefficient swallow as indicated by multiple s/s of aspiration/penetrationon thin liquid and puree consistencies.  Positive prognostic factors include motivation. Negative prognostic factors include none. No barriers to therapy identified. Patient will benefit from skilled, outpatient neurological rehabilitation speech therapy.    Rehab Potential: good  Pt's spiritual, cultural and educational needs considered and pt agreeable to plan of care and goals.    Short Term Goals (4 weeks):     1. Patient will participate in Modified Barium Swallow Study to objectively assess  their swallow, rule out silent aspiration, and determine the safest possible diet.  2. Patient will recall and utilize safe swallow strategies and aspiration precautions independently   3. Patient will participate in po trials of thin liquids and solid consistencies with no overt signs/symptoms of aspiration.    Long Term Goals (6 weeks):   1. He  will maintain adequate hydration/nutrition with optimum safety and efficiency of swallowing function on PO intake without overt signs and symptoms of aspiration for optimal diet level.  2.  He  will utilize compensatory strategies with optimum safety and efficiency of swallowing function on PO intake without overt signs and symptoms of aspiration for optimal diet level.     Plan     Plan of Care Certification Period: 7/25/2022  to 9/5/22    Recommended Treatment Plan:  Patient will  participate in the [facility] program for speech therapy 1-2 times per week (depending on formal swallow assessment) to address his  Swallow deficits, to educate patient and their family, and to participate in a home exercise program.    Other Recommendations: not at this time     Therapist's Name:   BRADLY Ortiz, CCC-SLP  Speech Language Pathologist   7/25/2022

## 2022-07-26 ENCOUNTER — CLINICAL SUPPORT (OUTPATIENT)
Dept: REHABILITATION | Facility: HOSPITAL | Age: 77
End: 2022-07-26
Attending: HOSPITALIST
Payer: COMMERCIAL

## 2022-07-26 ENCOUNTER — HOSPITAL ENCOUNTER (OUTPATIENT)
Dept: RADIOLOGY | Facility: HOSPITAL | Age: 77
Discharge: HOME | End: 2022-07-26
Attending: FAMILY MEDICINE
Payer: COMMERCIAL

## 2022-07-26 DIAGNOSIS — R26.89 IMPAIRED BALANCE AS LATE EFFECT OF CEREBROVASCULAR ACCIDENT: ICD-10-CM

## 2022-07-26 DIAGNOSIS — I69.398 IMPAIRED BALANCE AS LATE EFFECT OF CEREBROVASCULAR ACCIDENT: ICD-10-CM

## 2022-07-26 DIAGNOSIS — Z74.09 IMPAIRED FUNCTIONAL MOBILITY AND ACTIVITY TOLERANCE: Primary | ICD-10-CM

## 2022-07-26 DIAGNOSIS — I63.9 CEREBROVASCULAR ACCIDENT (CVA), UNSPECIFIED MECHANISM: ICD-10-CM

## 2022-07-26 PROCEDURE — 97110 THERAPEUTIC EXERCISES: CPT | Mod: PN

## 2022-07-26 PROCEDURE — 74230 FL MODIFIED BARIUM SWALLOW SPEECH STUDY: ICD-10-PCS | Mod: 26,,, | Performed by: INTERNAL MEDICINE

## 2022-07-26 PROCEDURE — 97535 SELF CARE MNGMENT TRAINING: CPT

## 2022-07-26 PROCEDURE — A9698 NON-RAD CONTRAST MATERIALNOC: HCPCS | Performed by: FAMILY MEDICINE

## 2022-07-26 PROCEDURE — 74230 X-RAY XM SWLNG FUNCJ C+: CPT | Mod: TC

## 2022-07-26 PROCEDURE — 97112 NEUROMUSCULAR REEDUCATION: CPT | Mod: PN

## 2022-07-26 PROCEDURE — 74230 X-RAY XM SWLNG FUNCJ C+: CPT | Mod: 26,,, | Performed by: INTERNAL MEDICINE

## 2022-07-26 PROCEDURE — 92611 MOTION FLUOROSCOPY/SWALLOW: CPT

## 2022-07-26 PROCEDURE — 25500020 PHARM REV CODE 255: Performed by: FAMILY MEDICINE

## 2022-07-26 RX ADMIN — BARIUM SULFATE 60 ML: 0.81 POWDER, FOR SUSPENSION ORAL at 10:07

## 2022-07-26 NOTE — PROCEDURES
Modified Barium Swallow    Patient Name:  Rashad Rodríguez   MRN:  [id number]      Recommendations:     Recommendations:                General Recommendations:  Dysphagia therapy  Diet recommendations:   ,   Regular solids with nectar thick liquids  Aspiration Precautions: Thicken liquids to nectar consistency, 1 bite/sip at a time, Alternating bites/sips, Double swallow with each bite/sip, Small bites/sips and Standard aspiration precautions   General Precautions: Standard,    Communication strategies:  none    Referral     Reason for Referral  Patient was referred for a Modified Barium Swallow Study to assess the efficiency of his/her swallow function, rule out aspiration and make recommendations regarding safe dietary consistencies, effective compensatory strategies, and safe eating environment.     Diagnosis: CVA  Primary Problem:   Pt. reports difficulty swallowing liquids, specifically water following his second stroke on 7/8/22. He stated inconsistent difficulty with thin occurs with both cup and straw swallows. No difficulty with solids reported.         History:     Past Medical History:   Diagnosis Date    Cigarette smoker     Hyperlipidemia     Hypertension     Stroke 10/01/2020       Objective:     Current Respiratory Status:  adequate    Alert: yes    Cooperative: yes    Follows Directions: yes    Visualization  · Patient was seen in the lateral view    Oral Peripheral Examination  ·  Dentition: present and adequate  · Secretion management: WFL  · Lingual strength and mobility: WFL  · Voice prior to PO intake clear with low volume    Consistencies Assessed  · Thin via cup x2 trials; straw x1 trial (3oz total)  · Nectar thick via cup rim x2  · Puree x1 trial  · Soft solids x1 trial  · Solids x1 trial   · Barum tablet    Oral Preparation/Oral Phase  · WFL- Pt with adequate bolus acceptance and timely A-P transfer across consistencies . Premature spillage to level of valleculae across consistencies (thin  liquids, pureed, soft and regular solids.    Pharyngeal Phase   Decreased laryngeal excursion led to trace aspiration of thin liquids via cup and straw swallows. Inconsistent coughing observed with aspiration episodes. Decreased BOT retraction led to moderate vallecular stasis across consistencies post swallow. Cued throat clear and additional dry swallows were successful in decreasing amount of stasis in valleculae to minimum. Pt instructed to perform chin tuck posture when drinking thin liquid trial. This posture was unsuccessful in providing airway protection resulting in penetration and eventual aspiration of thin liquids. Pt also required multiple swallows to clear barium table from valleculae.    Cervical Esophageal Phase  · Decreased UES opening    Assessment:     Impressions  · Pt presents with moderate pharyngeal dysphagia c/b inconsistent silent aspiration of thin liquids and moderate pharyngeal stasis across consistencies.    Prognosis: Good    Barriers:  · None    Plan  Continue outpt speech therapy to address dysphagia.    Education  Results were discussed with patient. Pt instructed on aspiration precautions and thickening liquid to nectar consistency.Thicken liquids to nectar consistency, 1 bite/sip at a time, Alternating bites/sips, Double swallow with each bite/sip, Small bites/sips and Standard aspiration precautions. Pt verbalized understanding of education provided.    Goals:   Multidisciplinary Problems     SLP Goals     Not on file                Plan:   · Plan of Care reviewed with:     Patient      Discharge recommendations:    con't outpt speech therapy  Barriers to Discharge:  None    Time Tracking:   SLP Treatment Date:    7/26/2022  Speech Start Time:   10:30  Speech Stop Time:      10:56  Speech Total Time (min):   26 minutes    07/26/2022

## 2022-07-26 NOTE — PROGRESS NOTES
[facility]   Physical Therapy Treatment Note     Name: Rashad [facility] Number: [id number]    Therapy Diagnosis:   Encounter Diagnoses   Name Primary?   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      Physician: Pascual Castillo MD    Visit Date: 7/26/2022    Physician: Pascual Castillo MD     Physician Orders: PT Eval and Treat   Medical Diagnosis from Referral: Cerebrovascular accident (CVA), unspecified mechanism [I63.9]  Evaluation Date: 7/14/2022  Authorization Period Expiration: 12/31/22  Plan of Care Expiration: 9/26/22  Visit # / Visits authorized: 2/ 20    PTA Visit #: 0/5     Time In: 11:15  Time Out: 12:00  Total Billable Time: 45 minutes    Precautions: Standard and Fall, trouble with thin liquids     SUBJECTIVE     Pt reports: had speech therapy evaluation / swallow testing, denies falls or loss of balance; has high copay and is seeing 2 skilled services - possibly 3 and would like to discuss this      He was compliant with home exercise program.  Response to previous treatment: a little tired  Functional change: on going    Pain: 0/10  Location: denies any pain    OBJECTIVE     Objective Measures updated at progress report unless specified.     Treatment   Italics= not performed    Rashad received the treatments listed below:      therapeutic exercises to develop strength, posture and endurance for 30 minutes including:    Nustep level 4 x 7 min for cardio endurance    Sit to stand with increased left lower extremity weight bearing 10x2    SpO2 93% post --> 96% 2 min     Step up onto 6in step - BUE, bilateral, 10x2   Step lateral step down/heel tap 6in step - BUE, bilateral, 10x2   FORWARD step down 6in step - BUE, bilateral, 10x2       home exercise program:   1. Bridge 10x3   2. SLR 10x3   3. Clamshell 10x3 bilateral   4. Hip ABD 10x3 bilateral       neuromuscular re-education activities to  improve: Balance, Coordination and Proprioception for 15 minutes. The following activities were included:    Standing on foam:    Feet together 2x30 sec     Feet together pitch/yaw 2x30 sec each direction    Feet together eyes closed 2x30 sec     Step taps 6in step - no upper extremity, 10x2     Lateral stepping - green theraband at knees, 4x 25 ft       Fwd walking lunge 40 ft x 2, pt with 2 loss of balance requires min A to correct       gait training to improve functional mobility and safety for 00 minutes, including:    NA    Patient Education and Home Exercises     Home Exercises Provided and Patient Education Provided     Education provided:   - Physical Therapy Plan of Care, schedule, HEP, smoking cessation, stroke mobile program     Written Home Exercises Provided: Patient instructed to cont prior HEP. Exercises were reviewed and Rashad was able to demonstrate them prior to the end of the session.  Rashad demonstrated good  understanding of the education provided. See EMR under Patient Instructions for exercises provided during therapy sessions    ASSESSMENT   Mr. Solorzano demonstrating increased fatigue within bilateral lower extremities, mild decline in SpO2 with sit to stand exercise and mild unsteadiness with gait/weightshifting within gait. Pt with 1 loss of balance when laterally stepping without upper extremity support requiring modA from therapist to regain upright posturing. With static and dynamic balance tasks right loss of balance requiring verbal and tactile cues to improve upright posturing. Addressed schedule this date to assist in financial needs moving forward. He will continue to benefit from skilled Physical Therapy to address impairments       Rashad Is progressing well towards his goals.   Pt prognosis is Good.     Pt will continue to benefit from skilled outpatient physical therapy to address the deficits listed in the problem list box on initial evaluation, provide pt/family education  and to maximize pt's level of independence in the home and community environment.     Pt's spiritual, cultural and educational needs considered and pt agreeable to plan of care and goals.     Anticipated barriers to physical therapy: transportation (patient is brought in by his brother)    Goals:   Short Term Goals: 5 weeks   1. Patient will improve static standing balance to 30s on conditions 1 and 2 of MCTSIB to reduce risk of falling. on going   2. Demonstrate ability to ambulate with head movements in pitch and yaw without change in speed or staggering outside 10 path to enable safe scanning of environment for obstacles or pivot turn R/L without LOB. on going   3. Patient will improve FGA score by 4 points to promote dynamic balance. on going   4. Patient will improve 5xSTS score by 3 seconds to promote functional strength. on going      Long Term Goals: 10 weeks   1. Patient will be independent with home exercise program (HEP) to promote continued rehabilitation following discharge. on going   2. Pt will ambulate 250 feet without assistive device and at independence with good balance to reduce risk of falls. on going   3. Patient will improve FGA score by 8 points to promote dynamic balance. on going   4. Patient will improve 5xSTS score by 6 seconds to promote functional strength. on going   5. Patient will improve static standing balance to 30s on conditions 1-4 of MCTSIB to reduce risk of falling. on going     PLAN     Plan of care Certification: 7/14/2022 to 9/26/22.  Cont to progress strengthening and balance    Sophia Ybarra, PT, DPT, NCS  07/26/2022

## 2022-07-31 NOTE — PROGRESS NOTES
Subjective:       Patient ID: Rashad Rodríguez is a 77 y.o. male.    Chief Complaint: Hospital Follow Up    HPI   77 year old male comes in for hospital follow up since having a stroke. Hospital course was reviewed and discharge summary in chart read from 7/7/2022. He continues to smoke.  States he is having coughing when drinking liquids. No concerns with solid foods.        Review of Systems   Constitutional: Negative for unexpected weight change.   HENT: Negative for ear pain and sore throat.    Eyes: Negative for visual disturbance.   Respiratory: Positive for cough. Negative for shortness of breath.    Cardiovascular: Negative for chest pain.   Gastrointestinal: Negative for abdominal pain and blood in stool.   Endocrine: Negative for cold intolerance and heat intolerance.   Genitourinary: Negative for dysuria and frequency.   Integumentary:  Negative for rash.   Neurological: Positive for weakness. Negative for numbness and headaches.   Hematological: Negative for adenopathy.   Psychiatric/Behavioral: Negative for suicidal ideas.         Objective:      Physical Exam  Vitals reviewed.   Constitutional:       Appearance: He is well-developed. He is not diaphoretic.   HENT:      Head: Normocephalic.      Right Ear: External ear normal.      Left Ear: External ear normal.      Nose: Nose normal.      Mouth/Throat:      Pharynx: No oropharyngeal exudate.   Neck:      Trachea: No tracheal deviation.   Cardiovascular:      Rate and Rhythm: Normal rate and regular rhythm.      Heart sounds: Normal heart sounds.   Pulmonary:      Effort: Pulmonary effort is normal.      Breath sounds: Normal breath sounds. No wheezing or rales.   Abdominal:      General: Bowel sounds are normal.      Palpations: Abdomen is soft. Abdomen is not rigid. There is no mass.      Tenderness: There is no abdominal tenderness. There is no guarding or rebound.   Musculoskeletal:      Cervical back: Normal range of motion and neck supple.    Lymphadenopathy:      Cervical: No cervical adenopathy.   Neurological:      Mental Status: He is oriented to person, place, and time.      Motor: Weakness present. No atrophy.      Gait: Gait normal.         Assessment:       Problem List Items Addressed This Visit        Neuro    CVA (cerebral vascular accident) - Primary    Relevant Orders    Fl Modified Barium Swallow Speech (Completed)    SLP video swallow    Ambulatory referral/consult to Physical/Occupational Therapy       Cardiac/Vascular    Essential hypertension      Other Visit Diagnoses     Cough        Dyslipidemia        Smoking              Plan:       Rashad was seen today for hospital follow up.    Diagnoses and all orders for this visit:    Cerebrovascular accident (CVA), unspecified mechanism  -     Fl Modified Barium Swallow Speech; Future  -     SLP video swallow; Future  -     Ambulatory referral/consult to Physical/Occupational Therapy; Future  Check swallow eval  Continue PT  Referral for ST scheduled  Referral for OT placed    Cough  -     benzonatate (TESSALON) 100 MG capsule; Take 1 capsule (100 mg total) by mouth 3 (three) times daily as needed for Cough.  Swallow study scheduled    Dyslipidemia  Continue current regimen    Essential hypertension  Continue current regimen    Smoking  Discussed importance of smoking cessation and contributions to further complications.

## 2022-08-03 ENCOUNTER — CLINICAL SUPPORT (OUTPATIENT)
Dept: REHABILITATION | Facility: HOSPITAL | Age: 77
End: 2022-08-03
Attending: HOSPITALIST
Payer: COMMERCIAL

## 2022-08-03 DIAGNOSIS — I63.9 CEREBROVASCULAR ACCIDENT (CVA), UNSPECIFIED MECHANISM: ICD-10-CM

## 2022-08-03 DIAGNOSIS — Z86.73 HISTORY OF STROKE: Primary | ICD-10-CM

## 2022-08-03 PROCEDURE — 92507 TX SP LANG VOICE COMM INDIV: CPT | Mod: PN

## 2022-08-03 NOTE — PROGRESS NOTES
[facility]  Speech Therapy Treatment Note- Dysphagia  Date: 8/3/2022     Name: Rashad Rodríguez   MRN: [id number]   Therapy Diagnosis:   Encounter Diagnoses   Name Primary?    History of stroke Yes    Cerebrovascular accident (CVA), unspecified mechanism       Physician: Pascual Castillo MD  Physician Orders: [id number] - AMB REFERRAL/CONSULT TO PHYSICAL THERAPY/OCCUPATIONAL THERAPY   Medical Diagnosis: I63.9 (ICD-10-CM) - Cerebral infarction, unspecified R47.1 (ICD-10-CM) - Dysarthria and anarthria       Visit #/ Visits Authorized: 1/ 20 (plus eval)   Date of Evaluation:  7/25/22  Insurance Authorization Period: 7/25/22-12/31/22  Plan of Care Expiration Date: 7/25/2022 to 9/5/22   Extended Plan of Care:  n/a   Progress Note: 8/25/22   Visits Cancelled: 0  Visits No Show: 0    Time In:  3:30pm  Time Out:  4:05pm  Total Billable Time: 35 mins      Precautions: Standard  Subjective:   Patient reports: patient request extensive home program due to difficulty with finances at this time.   He was compliant to home exercise program.   Response to previous treatment: n/q   Pain Scale:  0/10 on a Visual Analog Scale currently.   Pain Location: n.a  Objective:   TIMED  Procedure Min.                   UNTIMED  Procedure Min.   dysphagia therapy   35        Total Timed Units: 0  Total Untimed Units: 1  Charges Billed/Number of units: 1    Short Term Goals: (4 weeks) Current Progress:   1. Patient will participate in Modified Barium Swallow Study to objectively assess their swallow, rule out silent aspiration, and determine the safest possible diet.       Progressing/ Not Met 8/3/2022   Completed 7/26/22    2. Patient will recall and utilize safe swallow strategies and aspiration precautions independently        Progressing/ Not Met 8/3/2022   Discussed in extensive details.    3. Patient will participate in po trials of thin liquids and solid consistencies with no overt signs/symptoms of aspiration.        Progressing/ Not Met 8/3/2022   Following MBSS, recommended pt. Continue with nectar thick liquids.     4. Patient will participate in tongue base retraction exercises (I.e., hard effortful swallow) 50-75 times Independently  to improve tongue base retraction and overall swallow function.     Progressing/ Not Met 8/3/2022   Introduced today with nectar thick liquid, completed 20 hard swallows with 3 s/s of aspiration/penetrtaion (immediate and delayed throat clear)   5. Patient will participate in chin tuck against resistance (CTAR) hold for 60 seconds for 3 sets Independently  to improve hyolaryngeal elevation / excursion and overall swallow function.     Progressing/ Not Met 8/3/2022   Completed one hold today with min A    6. Patient will participate in chin tuck against resistance (CTAR) repetitions 60 times Independently   to improve hyolaryngeal elevation / excursion and overall swallow function.     Progressing/ Not Met 8/3/2022   Completed 40 repetitions today with min A       Patient Education/Response:   Extensive education provided today regarding importance of thickener, aspiration precautions, and exercises for home program. Patient requested intense home program today due to difficulty with copay. HEP included all exercises written with explanation following practice within session, and aspiration precautions.     Home program established: yes-intensive home program   Exercises were reviewed and Rashad was able to demonstrate them prior to the end of the session.  Rashad demonstrated good  understanding of the education provided.     See Electronic Medical Record under Patient Instructions for exercises provided throughout therapy.  Assessment:   Rashad is progressing well towards his goals. Created and provided extensive HEP plan due to patient requesting to do swallowing therapy at home. Practiced all exercises given within session, pt. Was stimulable for all exercises. Requested update MBSS in  4-5 weeks.  Current goals remain appropriate. Goals to be updated as necessary.     Patient prognosis is Good. Patient will continue to benefit from skilled outpatient speech and language therapy to address the deficits listed in the problem list on initial evaluation, provide patient/family education and to maximize patient's level of independence in the home and community environment.   Medical necessity is demonstrated by the following IMPAIRMENTS:  Decreased safety and efficiency of swallowing function on PO intake without overt s/s of aspiration for the highest diet level.  Barriers to Therapy: finances   Patient's spiritual, cultural and educational needs considered and patient agreeable to plan of care and goals.  Plan:   Continue Plan of Care with focus on swallowing.     BRADLY Ortiz, CCC-SLP   8/3/2022

## 2022-08-05 ENCOUNTER — LAB VISIT (OUTPATIENT)
Dept: LAB | Facility: HOSPITAL | Age: 77
End: 2022-08-05
Attending: FAMILY MEDICINE
Payer: COMMERCIAL

## 2022-08-05 DIAGNOSIS — E78.5 DYSLIPIDEMIA: ICD-10-CM

## 2022-08-05 DIAGNOSIS — I10 ESSENTIAL HYPERTENSION: ICD-10-CM

## 2022-08-05 LAB
ALBUMIN SERPL BCP-MCNC: 3.6 G/DL (ref 3.5–5.2)
ALP SERPL-CCNC: 103 U/L (ref 55–135)
ALT SERPL W/O P-5'-P-CCNC: 37 U/L (ref 10–44)
ANION GAP SERPL CALC-SCNC: 7 MMOL/L (ref 8–16)
AST SERPL-CCNC: 30 U/L (ref 10–40)
BILIRUB SERPL-MCNC: 0.7 MG/DL (ref 0.1–1)
BUN SERPL-MCNC: 15 MG/DL (ref 8–23)
CALCIUM SERPL-MCNC: 9.7 MG/DL (ref 8.7–10.5)
CHLORIDE SERPL-SCNC: 108 MMOL/L (ref 95–110)
CHOLEST SERPL-MCNC: 139 MG/DL (ref 120–199)
CHOLEST/HDLC SERPL: 2.8 {RATIO} (ref 2–5)
CO2 SERPL-SCNC: 27 MMOL/L (ref 23–29)
CREAT SERPL-MCNC: 1 MG/DL (ref 0.5–1.4)
EST. GFR  (NO RACE VARIABLE): >60 ML/MIN/1.73 M^2
GLUCOSE SERPL-MCNC: 88 MG/DL (ref 70–110)
HDLC SERPL-MCNC: 50 MG/DL (ref 40–75)
HDLC SERPL: 36 % (ref 20–50)
LDLC SERPL CALC-MCNC: 78.4 MG/DL (ref 63–159)
NONHDLC SERPL-MCNC: 89 MG/DL
POTASSIUM SERPL-SCNC: 4.3 MMOL/L (ref 3.5–5.1)
PROT SERPL-MCNC: 7.7 G/DL (ref 6–8.4)
SODIUM SERPL-SCNC: 142 MMOL/L (ref 136–145)
TRIGL SERPL-MCNC: 53 MG/DL (ref 30–150)

## 2022-08-05 PROCEDURE — 80053 COMPREHEN METABOLIC PANEL: CPT | Performed by: FAMILY MEDICINE

## 2022-08-05 PROCEDURE — 36415 COLL VENOUS BLD VENIPUNCTURE: CPT | Mod: PN | Performed by: FAMILY MEDICINE

## 2022-08-05 PROCEDURE — 80061 LIPID PANEL: CPT | Performed by: FAMILY MEDICINE

## 2022-08-08 ENCOUNTER — CLINICAL SUPPORT (OUTPATIENT)
Dept: REHABILITATION | Facility: HOSPITAL | Age: 77
End: 2022-08-08
Attending: HOSPITALIST
Payer: COMMERCIAL

## 2022-08-08 ENCOUNTER — OFFICE VISIT (OUTPATIENT)
Dept: FAMILY MEDICINE | Facility: CLINIC | Age: 77
End: 2022-08-08
Payer: COMMERCIAL

## 2022-08-08 VITALS
BODY MASS INDEX: 19.66 KG/M2 | OXYGEN SATURATION: 98 % | WEIGHT: 125.25 LBS | TEMPERATURE: 98 F | RESPIRATION RATE: 16 BRPM | HEART RATE: 86 BPM | DIASTOLIC BLOOD PRESSURE: 74 MMHG | HEIGHT: 67 IN | SYSTOLIC BLOOD PRESSURE: 112 MMHG

## 2022-08-08 DIAGNOSIS — I10 ESSENTIAL HYPERTENSION: ICD-10-CM

## 2022-08-08 DIAGNOSIS — Z12.11 SCREENING FOR COLORECTAL CANCER: ICD-10-CM

## 2022-08-08 DIAGNOSIS — R05.9 COUGH: Primary | ICD-10-CM

## 2022-08-08 DIAGNOSIS — Z74.09 IMPAIRED FUNCTIONAL MOBILITY AND ACTIVITY TOLERANCE: Primary | ICD-10-CM

## 2022-08-08 DIAGNOSIS — Z12.12 SCREENING FOR COLORECTAL CANCER: ICD-10-CM

## 2022-08-08 DIAGNOSIS — R26.89 IMPAIRED BALANCE AS LATE EFFECT OF CEREBROVASCULAR ACCIDENT: ICD-10-CM

## 2022-08-08 DIAGNOSIS — E78.5 DYSLIPIDEMIA: ICD-10-CM

## 2022-08-08 DIAGNOSIS — I69.398 IMPAIRED BALANCE AS LATE EFFECT OF CEREBROVASCULAR ACCIDENT: ICD-10-CM

## 2022-08-08 DIAGNOSIS — Z87.891 HISTORY OF SMOKING: ICD-10-CM

## 2022-08-08 DIAGNOSIS — I63.9 CEREBROVASCULAR ACCIDENT (CVA), UNSPECIFIED MECHANISM: ICD-10-CM

## 2022-08-08 PROCEDURE — 99214 OFFICE O/P EST MOD 30 MIN: CPT | Mod: S$GLB,,, | Performed by: FAMILY MEDICINE

## 2022-08-08 PROCEDURE — 1101F PT FALLS ASSESS-DOCD LE1/YR: CPT | Mod: CPTII,S$GLB,, | Performed by: FAMILY MEDICINE

## 2022-08-08 PROCEDURE — 3066F NEPHROPATHY DOC TX: CPT | Mod: CPTII,S$GLB,, | Performed by: FAMILY MEDICINE

## 2022-08-08 PROCEDURE — 99999 PR PBB SHADOW E&M-EST. PATIENT-LVL IV: ICD-10-PCS | Mod: PBBFAC,,, | Performed by: FAMILY MEDICINE

## 2022-08-08 PROCEDURE — 3060F PR POS MICROALBUMINURIA RESULT DOCUMENTED/REVIEW: ICD-10-PCS | Mod: CPTII,S$GLB,, | Performed by: FAMILY MEDICINE

## 2022-08-08 PROCEDURE — 3044F HG A1C LEVEL LT 7.0%: CPT | Mod: CPTII,S$GLB,, | Performed by: FAMILY MEDICINE

## 2022-08-08 PROCEDURE — 4010F PR ACE/ARB THEARPY RXD/TAKEN: ICD-10-PCS | Mod: CPTII,S$GLB,, | Performed by: FAMILY MEDICINE

## 2022-08-08 PROCEDURE — 99214 PR OFFICE/OUTPT VISIT, EST, LEVL IV, 30-39 MIN: ICD-10-PCS | Mod: S$GLB,,, | Performed by: FAMILY MEDICINE

## 2022-08-08 PROCEDURE — 4010F ACE/ARB THERAPY RXD/TAKEN: CPT | Mod: CPTII,S$GLB,, | Performed by: FAMILY MEDICINE

## 2022-08-08 PROCEDURE — 1126F AMNT PAIN NOTED NONE PRSNT: CPT | Mod: CPTII,S$GLB,, | Performed by: FAMILY MEDICINE

## 2022-08-08 PROCEDURE — 97110 THERAPEUTIC EXERCISES: CPT | Mod: PN

## 2022-08-08 PROCEDURE — 3066F PR DOCUMENTATION OF TREATMENT FOR NEPHROPATHY: ICD-10-PCS | Mod: CPTII,S$GLB,, | Performed by: FAMILY MEDICINE

## 2022-08-08 PROCEDURE — 3044F PR MOST RECENT HEMOGLOBIN A1C LEVEL <7.0%: ICD-10-PCS | Mod: CPTII,S$GLB,, | Performed by: FAMILY MEDICINE

## 2022-08-08 PROCEDURE — 3074F SYST BP LT 130 MM HG: CPT | Mod: CPTII,S$GLB,, | Performed by: FAMILY MEDICINE

## 2022-08-08 PROCEDURE — 1111F DSCHRG MED/CURRENT MED MERGE: CPT | Mod: CPTII,S$GLB,, | Performed by: FAMILY MEDICINE

## 2022-08-08 PROCEDURE — 1159F MED LIST DOCD IN RCRD: CPT | Mod: CPTII,S$GLB,, | Performed by: FAMILY MEDICINE

## 2022-08-08 PROCEDURE — 1111F PR DISCHARGE MEDS RECONCILED W/ CURRENT OUTPATIENT MED LIST: ICD-10-PCS | Mod: CPTII,S$GLB,, | Performed by: FAMILY MEDICINE

## 2022-08-08 PROCEDURE — 3078F PR MOST RECENT DIASTOLIC BLOOD PRESSURE < 80 MM HG: ICD-10-PCS | Mod: CPTII,S$GLB,, | Performed by: FAMILY MEDICINE

## 2022-08-08 PROCEDURE — 3074F PR MOST RECENT SYSTOLIC BLOOD PRESSURE < 130 MM HG: ICD-10-PCS | Mod: CPTII,S$GLB,, | Performed by: FAMILY MEDICINE

## 2022-08-08 PROCEDURE — 99999 PR PBB SHADOW E&M-EST. PATIENT-LVL IV: CPT | Mod: PBBFAC,,, | Performed by: FAMILY MEDICINE

## 2022-08-08 PROCEDURE — 3288F FALL RISK ASSESSMENT DOCD: CPT | Mod: CPTII,S$GLB,, | Performed by: FAMILY MEDICINE

## 2022-08-08 PROCEDURE — 1101F PR PT FALLS ASSESS DOC 0-1 FALLS W/OUT INJ PAST YR: ICD-10-PCS | Mod: CPTII,S$GLB,, | Performed by: FAMILY MEDICINE

## 2022-08-08 PROCEDURE — 3288F PR FALLS RISK ASSESSMENT DOCUMENTED: ICD-10-PCS | Mod: CPTII,S$GLB,, | Performed by: FAMILY MEDICINE

## 2022-08-08 PROCEDURE — 3060F POS MICROALBUMINURIA REV: CPT | Mod: CPTII,S$GLB,, | Performed by: FAMILY MEDICINE

## 2022-08-08 PROCEDURE — 1159F PR MEDICATION LIST DOCUMENTED IN MEDICAL RECORD: ICD-10-PCS | Mod: CPTII,S$GLB,, | Performed by: FAMILY MEDICINE

## 2022-08-08 PROCEDURE — 1126F PR PAIN SEVERITY QUANTIFIED, NO PAIN PRESENT: ICD-10-PCS | Mod: CPTII,S$GLB,, | Performed by: FAMILY MEDICINE

## 2022-08-08 PROCEDURE — 3078F DIAST BP <80 MM HG: CPT | Mod: CPTII,S$GLB,, | Performed by: FAMILY MEDICINE

## 2022-08-08 PROCEDURE — 97112 NEUROMUSCULAR REEDUCATION: CPT | Mod: PN

## 2022-08-08 RX ORDER — CLOPIDOGREL BISULFATE 75 MG/1
75 TABLET ORAL DAILY
Qty: 90 TABLET | Refills: 3 | Status: SHIPPED | OUTPATIENT
Start: 2022-08-08 | End: 2023-02-15 | Stop reason: SDUPTHER

## 2022-08-08 RX ORDER — ATORVASTATIN CALCIUM 40 MG/1
40 TABLET, FILM COATED ORAL DAILY
Qty: 90 TABLET | Refills: 3 | Status: SHIPPED | OUTPATIENT
Start: 2022-08-08 | End: 2023-02-15 | Stop reason: SDUPTHER

## 2022-08-08 RX ORDER — ASPIRIN 81 MG/1
81 TABLET ORAL DAILY
Qty: 90 TABLET | Refills: 3 | Status: SHIPPED | OUTPATIENT
Start: 2022-08-08 | End: 2023-02-15 | Stop reason: SDUPTHER

## 2022-08-08 RX ORDER — VALSARTAN 160 MG/1
160 TABLET ORAL DAILY
Qty: 90 TABLET | Refills: 3 | Status: SHIPPED | OUTPATIENT
Start: 2022-08-08 | End: 2023-02-15 | Stop reason: SDUPTHER

## 2022-08-08 RX ORDER — AMLODIPINE BESYLATE 5 MG/1
5 TABLET ORAL DAILY
Qty: 90 TABLET | Refills: 3 | Status: SHIPPED | OUTPATIENT
Start: 2022-08-08 | End: 2022-09-22

## 2022-08-08 NOTE — PROGRESS NOTES
Subjective:       Patient ID: Rashad Rodríguez is a 77 y.o. male.    Chief Complaint: Cerebrovascular Accident, Hypertension, and Hyperlipidemia    Cerebrovascular Accident  Episode onset: 7/7/2022. Pertinent negatives include no chest pain, headaches or neck pain. Associated symptoms comments: Has been working with PT/ST. States swallow is improving. No longer having cough. States he is doing ST exercises at home. .   Hypertension  This is a chronic problem. The problem is controlled. Pertinent negatives include no anxiety, blurred vision, chest pain, headaches, malaise/fatigue, neck pain, orthopnea, palpitations, peripheral edema, PND, shortness of breath or sweats. Risk factors for coronary artery disease include dyslipidemia and male gender. Past treatments include calcium channel blockers and angiotensin blockers. The current treatment provides significant improvement. There are no compliance problems.  Hypertensive end-organ damage includes CVA. There is no history of chronic renal disease.   Hyperlipidemia  The problem is controlled. He has no history of chronic renal disease, diabetes, hypothyroidism, liver disease, obesity or nephrotic syndrome. Pertinent negatives include no chest pain or shortness of breath. Current antihyperlipidemic treatment includes statins. The current treatment provides significant improvement of lipids. There are no compliance problems.  Risk factors for coronary artery disease include dyslipidemia, male sex and hypertension.     Review of Systems   Constitutional: Negative for malaise/fatigue.   Eyes: Negative for blurred vision.   Respiratory: Negative for shortness of breath.    Cardiovascular: Negative for chest pain, palpitations, orthopnea and PND.   Musculoskeletal: Negative for neck pain.   Neurological: Negative for headaches.         Objective:      Physical Exam  Vitals reviewed.   Constitutional:       General: He is not in acute distress.     Appearance: He is  well-developed. He is not ill-appearing or diaphoretic.   HENT:      Head: Normocephalic.      Right Ear: External ear normal.      Left Ear: External ear normal.      Nose: Nose normal.      Mouth/Throat:      Pharynx: No oropharyngeal exudate.   Neck:      Trachea: No tracheal deviation.   Cardiovascular:      Rate and Rhythm: Normal rate and regular rhythm.      Heart sounds: Normal heart sounds.   Pulmonary:      Effort: Pulmonary effort is normal.      Breath sounds: Normal breath sounds. No wheezing or rales.   Abdominal:      General: Bowel sounds are normal.      Palpations: Abdomen is soft. Abdomen is not rigid. There is no mass.      Tenderness: There is no abdominal tenderness. There is no guarding or rebound.   Musculoskeletal:      Cervical back: Normal range of motion and neck supple.   Lymphadenopathy:      Cervical: No cervical adenopathy.   Neurological:      Mental Status: He is alert and oriented to person, place, and time.      Gait: Gait normal.          Lab Visit on 08/05/2022   Component Date Value Ref Range Status    Sodium 08/05/2022 142  136 - 145 mmol/L Final    Potassium 08/05/2022 4.3  3.5 - 5.1 mmol/L Final    Chloride 08/05/2022 108  95 - 110 mmol/L Final    CO2 08/05/2022 27  23 - 29 mmol/L Final    Glucose 08/05/2022 88  70 - 110 mg/dL Final    BUN 08/05/2022 15  8 - 23 mg/dL Final    Creatinine 08/05/2022 1.0  0.5 - 1.4 mg/dL Final    Calcium 08/05/2022 9.7  8.7 - 10.5 mg/dL Final    Total Protein 08/05/2022 7.7  6.0 - 8.4 g/dL Final    Albumin 08/05/2022 3.6  3.5 - 5.2 g/dL Final    Total Bilirubin 08/05/2022 0.7  0.1 - 1.0 mg/dL Final    Comment: For infants and newborns, interpretation of results should be based  on gestational age, weight and in agreement with clinical  observations.    Premature Infant recommended reference ranges:  Up to 24 hours.............<8.0 mg/dL  Up to 48 hours............<12.0 mg/dL  3-5 days..................<15.0 mg/dL  6-29  days.................<15.0 mg/dL      Alkaline Phosphatase 08/05/2022 103  55 - 135 U/L Final    AST 08/05/2022 30  10 - 40 U/L Final    ALT 08/05/2022 37  10 - 44 U/L Final    Anion Gap 08/05/2022 7 (A) 8 - 16 mmol/L Final    eGFR 08/05/2022 >60  >60 mL/min/1.73 m^2 Final    Cholesterol 08/05/2022 139  120 - 199 mg/dL Final    Comment: The National Cholesterol Education Program (NCEP) has set the  following guidelines (reference ranges) for Cholesterol:  Optimal.....................<200 mg/dL  Borderline High.............200-239 mg/dL  High........................> or = 240 mg/dL      Triglycerides 08/05/2022 53  30 - 150 mg/dL Final    Comment: The National Cholesterol Education Program (NCEP) has set the  following guidelines (reference values) for triglycerides:  Normal......................<150 mg/dL  Borderline High.............150-199 mg/dL  High........................200-499 mg/dL      HDL 08/05/2022 50  40 - 75 mg/dL Final    Comment: The National Cholesterol Education Program (NCEP) has set the  following guidelines (reference values) for HDL Cholesterol:  Low...............<40 mg/dL  Optimal...........>60 mg/dL      LDL Cholesterol 08/05/2022 78.4  63.0 - 159.0 mg/dL Final    Comment: The National Cholesterol Education Program (NCEP) has set the  following guidelines (reference values) for LDL Cholesterol:  Optimal.......................<130 mg/dL  Borderline High...............130-159 mg/dL  High..........................160-189 mg/dL  Very High.....................>190 mg/dL      HDL/Cholesterol Ratio 08/05/2022 36.0  20.0 - 50.0 % Final    Total Cholesterol/HDL Ratio 08/05/2022 2.8  2.0 - 5.0 Final    Non-HDL Cholesterol 08/05/2022 89  mg/dL Final    Comment: Risk category and Non-HDL cholesterol goals:  Coronary heart disease (CHD)or equivalent (10-year risk of CHD >20%):  Non-HDL cholesterol goal     <130 mg/dL  Two or more CHD risk factors and 10-year risk of CHD <= 20%:  Non-HDL  cholesterol goal     <160 mg/dL  0 to 1 CHD risk factor:  Non-HDL cholesterol goal     <190 mg/dL     No results displayed because visit has over 200 results.          Assessment:       Problem List Items Addressed This Visit        Neuro    CVA (cerebral vascular accident)    Relevant Medications    atorvastatin (LIPITOR) 40 MG tablet    clopidogreL (PLAVIX) 75 mg tablet    aspirin (ECOTRIN) 81 MG EC tablet       Cardiac/Vascular    Essential hypertension    Relevant Medications    amLODIPine (NORVASC) 5 MG tablet    valsartan (DIOVAN) 160 MG tablet    Other Relevant Orders    Comprehensive Metabolic Panel    CBC Auto Differential    Lipid Panel      Other Visit Diagnoses     Cough    -  Primary    Dyslipidemia        Relevant Medications    atorvastatin (LIPITOR) 40 MG tablet    Other Relevant Orders    Comprehensive Metabolic Panel    CBC Auto Differential    Lipid Panel    History of smoking        Screening for colorectal cancer        Relevant Orders    Case Request Endoscopy: COLONOSCOPY (Completed)    Left sided lacunar infarction              Plan:       Rashad was seen today for cerebrovascular accident, hypertension and hyperlipidemia.    Diagnoses and all orders for this visit:    Cough  Much improved.  Continue ST exercises.    Cerebrovascular accident (CVA), unspecified mechanism  -     atorvastatin (LIPITOR) 40 MG tablet; Take 1 tablet (40 mg total) by mouth once daily.  -     clopidogreL (PLAVIX) 75 mg tablet; Take 1 tablet (75 mg total) by mouth once daily.  -     aspirin (ECOTRIN) 81 MG EC tablet; Take 1 tablet (81 mg total) by mouth once daily.  Continue ASA and plavix as per neuro recommendations    Essential hypertension  -     Comprehensive Metabolic Panel; Future  -     CBC Auto Differential; Future  -     Lipid Panel; Future  -     amLODIPine (NORVASC) 5 MG tablet; Take 1 tablet (5 mg total) by mouth once daily.  -     valsartan (DIOVAN) 160 MG tablet; Take 1 tablet (160 mg total) by mouth  once daily.  Current therapy effective, will continue    Dyslipidemia  -     Comprehensive Metabolic Panel; Future  -     CBC Auto Differential; Future  -     Lipid Panel; Future  -     atorvastatin (LIPITOR) 40 MG tablet; Take 1 tablet (40 mg total) by mouth once daily.  Current therapy effective, will continue    History of smoking  States last cigarette was last week.  Patient applauded for quitting.    Screening for colorectal cancer  -     Case Request Endoscopy: COLONOSCOPY  Strongly recommended to have colonoscopy done, which he has declined in the past. Discussed recommendation given previous positive FiTKit.

## 2022-08-08 NOTE — PROGRESS NOTES
[facility]   Physical Therapy Treatment Note     Name: Rashad [facility] Number: [id number]    Therapy Diagnosis:   Encounter Diagnoses   Name Primary?   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      Physician: Pascual Castillo MD    Visit Date: 8/8/2022    Physician: Pascual Castillo MD     Physician Orders: PT Eval and Treat   Medical Diagnosis from Referral: Cerebrovascular accident (CVA), unspecified mechanism [I63.9]  Evaluation Date: 7/14/2022  Authorization Period Expiration: 12/31/22  Plan of Care Expiration: 9/26/22  Visit # / Visits authorized: 3/ 20 + eval     PTA Visit #: 0/5     Time In: 8:45  Time Out: 9:30  Total Billable Time: 45 minutes    Precautions: Standard and Fall, trouble with thin liquids     SUBJECTIVE     Pt reports: doing well, no falls/LOB, has been doing home exercise program daily and feels he should be walking more.       He was compliant with home exercise program.  Response to previous treatment: a little tired  Functional change: ongoing    Pain: 0/10  Location: NA    OBJECTIVE     Objective Measures updated at progress report unless specified.     Treatment   Italics= not performed    Rashad received the treatments listed below:      therapeutic exercises to develop strength, posture and endurance for 35 minutes including:    Nustep level 6 x 8 min for cardio endurance    Sit to stand    - with increased left lower extremity weight bearing    - 10x2    - no upper extremity support     Step up onto 6in step - no upper extremity, bilateral, 10x2   Step lateral step down/heel tap 6in step - BUE, bilateral, 10x2   FORWARD step down 6in step - BUE, bilateral, 10x2       home exercise program:   1. Bridge 10x3   2. SLR 10x3   3. Clamshell 10x3 bilateral   4. Hip ABD 10x3 bilateral       neuromuscular re-education activities to improve: Balance, Coordination and Proprioception  for 10 minutes. The following activities were included:    Step taps 6in step - no upper extremity, 10x2, 20x2 alt     Lateral stepping - green theraband at knees, 25 ft, 4 lengths, hand hold assist (HHA)        Patient Education and Home Exercises     Home Exercises Provided and Patient Education Provided     Education provided:   - Physical Therapy Plan of Care, schedule, HEP, smoking cessation, stroke mobile program     Written Home Exercises Provided: Patient instructed to cont prior HEP. Exercises were reviewed and Rashad was able to demonstrate them prior to the end of the session.  Rashad demonstrated good  understanding of the education provided. See EMR under Patient Instructions for exercises provided during therapy sessions    ASSESSMENT   Mr. Solorzano demonstrating improved strength with nustep and step exercise performance, however is limited in static and dynamic balance with decreased confidence in single leg stance. Pt limited by coordination of bilateral lower extremities as well with left > right challenged in foot tap placement and foot positioning throughout. He will continue to benefit from skilled Physical Therapy to address impairments       Rashad Is progressing well towards his goals.   Pt prognosis is Good.     Pt will continue to benefit from skilled outpatient physical therapy to address the deficits listed in the problem list box on initial evaluation, provide pt/family education and to maximize pt's level of independence in the home and community environment.     Pt's spiritual, cultural and educational needs considered and pt agreeable to plan of care and goals.     Anticipated barriers to physical therapy: transportation (patient is brought in by his brother)    Goals:   Short Term Goals: 5 weeks   1. Patient will improve static standing balance to 30s on conditions 1 and 2 of MCTSIB to reduce risk of falling. on going   2. Demonstrate ability to ambulate with head movements in pitch  and yaw without change in speed or staggering outside 10 path to enable safe scanning of environment for obstacles or pivot turn R/L without LOB. on going   3. Patient will improve FGA score by 4 points to promote dynamic balance. on going   4. Patient will improve 5xSTS score by 3 seconds to promote functional strength. on going      Long Term Goals: 10 weeks   1. Patient will be independent with home exercise program (HEP) to promote continued rehabilitation following discharge. on going   2. Pt will ambulate 250 feet without assistive device and at independence with good balance to reduce risk of falls. on going   3. Patient will improve FGA score by 8 points to promote dynamic balance. on going   4. Patient will improve 5xSTS score by 6 seconds to promote functional strength. on going   5. Patient will improve static standing balance to 30s on conditions 1-4 of MCTSIB to reduce risk of falling. on going     PLAN   Plan of care Certification: 7/14/2022 to 9/26/22.  Cont to progress strengthening and balance    Sophia Ybarra, PT, DPT, NCS  08/08/2022

## 2022-08-15 ENCOUNTER — CLINICAL SUPPORT (OUTPATIENT)
Dept: REHABILITATION | Facility: HOSPITAL | Age: 77
End: 2022-08-15
Attending: HOSPITALIST
Payer: COMMERCIAL

## 2022-08-15 DIAGNOSIS — R26.89 IMPAIRED BALANCE AS LATE EFFECT OF CEREBROVASCULAR ACCIDENT: ICD-10-CM

## 2022-08-15 DIAGNOSIS — Z74.09 IMPAIRED FUNCTIONAL MOBILITY AND ACTIVITY TOLERANCE: Primary | ICD-10-CM

## 2022-08-15 DIAGNOSIS — I69.398 IMPAIRED BALANCE AS LATE EFFECT OF CEREBROVASCULAR ACCIDENT: ICD-10-CM

## 2022-08-15 PROCEDURE — 97110 THERAPEUTIC EXERCISES: CPT | Mod: PN

## 2022-08-15 PROCEDURE — 97112 NEUROMUSCULAR REEDUCATION: CPT | Mod: PN

## 2022-08-15 NOTE — PROGRESS NOTES
[facility]   Physical Therapy Treatment Note - Progress Note    Name: Rashad Rodríguez  [facility] Number: [id number]    Therapy Diagnosis:   Encounter Diagnoses   Name Primary?   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      Physician: Pascual Castillo MD    Visit Date: 8/15/2022    Physician: Pascual Castillo MD     Physician Orders: PT Eval and Treat   Medical Diagnosis from Referral: Cerebrovascular accident (CVA), unspecified mechanism [I63.9]  Evaluation Date: 7/14/2022  Authorization Period Expiration: 12/31/22  Plan of Care Expiration: 9/26/22  Visit # / Visits authorized: 4/ 20 + eval    PN: 9/15/22    PTA Visit #: 0/5     Time In: 8:45  Time Out: 9:40  Total Billable Time: 55 minutes    Precautions: Standard and Fall, trouble with thin liquids     SUBJECTIVE     Pt reports: doing well, feels that if he moves fast he will fall so he likes to move very slow       He was compliant with home exercise program.  Response to previous treatment: a little tired  Functional change: ongoing    Pain: 0/10  Location: NA    OBJECTIVE     Objective Measures updated at progress report unless specified.        Evaluation 8/15/22   Single Limb Stance R LE NT  (<10 sec = HIGH FALL RISK) NT   Single Limb Stance L LE NT  (<10 sec = HIGH FALL RISK) NT   5 times sit-stand 19 seconds - BUE     >12 sec= fall risk for general elderly  >16 sec= fall risk for Parkinson's disease  >10 sec= balance/vestibular dysfunction (<59 y/o)  >14.2 sec= balance/vestibular dysfunction (>59 y/o)  >12 sec= fall risk for CVA 19 seconds - BUE   FGA 15/30 17/30      Postural control:  MCTSIB:  1. Eyes Open/feet together/Firm: 30 seconds  2. Eyes Closed/feet together/Firm: 30 seconds  3. Eyes Open/feet together/Foam: 30 seconds  4. Eyes Closed/feet together/Foam: 3 seconds     Functional Gait Assessment:   1. Gait on level surface =  1 - 10 sec                (3) Normal: less than 5.5 sec, no A.D., no imbalance, normal gait pattern, deviates< 6in              (2) Mild impairment: 7-5.6 sec, uses A.D., mild gait deviations, or deviates 6-10 in              (1) Moderate impairment: > 7 sec, slow speed, imbalance, deviates 10-15 in.              (0) Severe impairment: needs assist, deviates >15 in, reach/touch wall  2. Change in Gait Speed = 3              (3) Normal: smooth change w/o loss of balance or gait deviation, deviates < 6 in, significant difference between  speeds              (2) Mild impairment: changes speed, but demonstrates mild gait deviations, deviates 6-10 in, OR no deviations but  unable to significantly speed, OR uses A.D.              (1) Moderate impairment: minor changes to speed, OR changes speed w/ significant deviations, deviates 10-15 in,  OR  Changes speed , but loses balance & recovers              (0) Severe impairment: cannot change speed, deviates >15 in, or loses balance & needs assist  3. Gait with horizontal head turns  = 3              (3) Normal: no change in gait, deviates <6 in              (2) Mild impairment: slight change in speed, deviates 6-10 in, OR uses A.D.              (1) Moderate impairment: moderate change in speed, deviates 10-15 in              (0) Severe impairment: severe disruption of gait, deviates >15in  4. Gait with vertical head turns = 3              (3) Normal: no change in gait, deviates <6 in              (2) Mild impairment: slight change in speed, deviates 6-10 in OR uses A.D.              (1) Moderate impairment: moderate change in speed, deviates 10-15 in              (0) Severe impairment: severe disruption of gait, deviates >15 in  5. Gait with pivot turns = 1              (3) Normal: performs safely in 3 sec, no LOB              (2) Mild impairment: performs in >3 sec & no LOB, OR turns safely & requires several steps to regain LOB              (1) Moderate impairment: turns slow, OR requires  several small steps for balance following turn & stop              (0) Severe impairment: cannot turn safely, needs assist  6. Step over obstacle = 2              (3) Normal: steps over 2 stacked boxes w/o change in speed or LOB              (2) Mild impairment: able to step over 1 box w/o change in speed or LOB              (1) Moderate impairment: steps over 1 box but must slow down, may require VC              (0) Severe impairment: cannot perform w/o assist  7. Gait with Narrow LANIE = 1              (3) Normal: 10 steps no staggering              (2) Mild impairment: 7-9 steps              (1) Moderate impairment: 4-7 steps              (0) Severe impairment: < 4 steps or cannot perform w/o assist  8. Gait with eyes closed = 1 - 13 sec              (3) Normal: < 7 sec, no A.D., no LOB, normal gait pattern, deviates <6 in              (2) Mild impairment: 7.1-9 sec, mild gait deviations, deviates 6-10 in              (1) Moderate impairment: > 9 sec, abnormal pattern, LOB, deviates 10-15 in              (0) Severe impairment: cannot perform w/o assist, LOB, deviates >15in  9. Ambulating Backwards = 1              (3) Normal: no A.D., no LOB, normal gait pattern, deviates <6in              (2) Mild impairment: uses A.D., slower speed, mild gait deviations, deviates 6-10 in              (1) Moderate impairment: slow speed, abnormal gait pattern, LOB, deviates 10-15 in              (0) Severe impairment: severe gait deviations or LOB, deviates >15in  10. Steps = 1              (3) Normal: alternating feet, no rail              (2) Mild Impairment: alternating feet, uses rail              (1) Moderate impairment: step-to, uses rail              (0) Severe impairment: cannot perform safely    Total Score 17/30      Score:   <22/30 fall risk   <20/30 fall risk in older adults   <18/30 fall risk in Parkinsons      Scores by Decade:                        Age    Score                        40-49   (24-30)                       50-59  (25-30)                       60-69  (20-30)                       70-79  (16-30)      Endurance Deficit: moderate        Evaluation 8/15/22   Timed Up and Go 15 sec - with upper extremity     < 20 sec safe for independent transfers,     < 30 sec assist required for transfers 19 seconds - BUE   8 meter walk test 0.8 m/s 0.8 m/s    6 min walk test NT NT      Functional Mobility (Bed mobility, transfers)  Bed mobility: Mod I  Supine to sit: Mod I  Sit to supine: Mod I  Rolling: Mod I  Transfers to bed: Mod I  Transfers to toilet: Mod I  Sit to stand:  Mod I  Stand pivot:  Mod I  Car transfers: Mod I  Wheelchair mobility: NA  Floor transfers: NT     CMS Impairment/Limitation/Restriction for FOTO Stroke lower extremity Survey     Therapist reviewed FOTO scores for Rashad Rodríguez on 8/15/22.   FOTO documents entered into [employer] - see Media section.     Limitation Score: 43%  Category: Mobility           Treatment   Italics= not performed    Rashad received the treatments listed below:      therapeutic exercises to develop strength, posture and endurance for 15 minutes including:    Objective as above     Nustep level 6 x 8 min - BUE/LE       NOT PERFORMED:    Step lateral step down/heel tap 6in step - BUE, bilateral, 10x2    FORWARD step down 6in step - BUE, bilateral, 10x2       home exercise program:   1. Bridge 10x3   2. SLR 10x3   3. Clamshell 10x3 bilateral   4. Hip ABD 10x3 bilateral   5. Feet together eyes closed  6. Feet semi-tandem eyes closed       neuromuscular re-education activities to improve: Balance, Coordination and Proprioception for 40 minutes. The following activities were included:    Objective as above     //bars:    //bars:    Feet Apart EO/EC, firm surface, 30s x3    Feet Together EO/EC, firm surface, 30s x3    Feet Semi-Tandem EO/EC, firm surface, 30s x3    Feet Together EO/EC, foam surface, 30s x3     Step on/off foam 10x2 bilateral    No upper extremity  "   Angelica     Step taps to 6" step from foam 10x2 bilateral    No upper extremity    Angelica       NOT PERFORMED:   Lateral stepping - green theraband at knees, 25 ft, 4 lengths, hand hold assist (HHA)        Patient Education and Home Exercises     Home Exercises Provided and Patient Education Provided     Education provided:   - Physical Therapy Plan of Care, schedule, HEP, smoking cessation, stroke mobile program     Written Home Exercises Provided: Patient instructed to cont prior HEP. Exercises were reviewed and Rashad was able to demonstrate them prior to the end of the session.  Rashad demonstrated good  understanding of the education provided. See EMR under Patient Instructions for exercises provided during therapy sessions    ASSESSMENT   Mr. Solorzano demonstrating continued mild strength improvements at bilateral lower extremity, however continues to be limited by speed of motion, confidence with dynamic movement and is limited in stability at the left knee. Pt demonstrating mild improvements in static and dynamic balance overall, however is still at risk for falls following completion of the Functional Gait Assessment. Pt would benefit from continuance of skilled PT services targeting dynamic balance, static balance and stability at the left lower extremity (knee).       Rashad Is progressing well towards his goals.   Pt prognosis is Good.     Pt will continue to benefit from skilled outpatient physical therapy to address the deficits listed in the problem list box on initial evaluation, provide pt/family education and to maximize pt's level of independence in the home and community environment.     Pt's spiritual, cultural and educational needs considered and pt agreeable to plan of care and goals.     Anticipated barriers to physical therapy: transportation (patient is brought in by his brother)    Goals: as of 8/15/22  Short Term Goals: 5 weeks   1. Patient will improve static standing balance to 30s on " conditions 1 and 2 of MCTSIB to reduce risk of falling. -MET 8/15/22   2. Demonstrate ability to ambulate with head movements in pitch and yaw without change in speed or staggering outside 10 path to enable safe scanning of environment for obstacles or pivot turn R/L without LOB. -MET 8/15/22   3. Patient will improve FGA score by 4 points to promote dynamic balance. -ongoing  4. Patient will improve 5xSTS score by 3 seconds to promote functional strength. -ongoing     Long Term Goals: 10 weeks   1. Patient will be independent with home exercise program (HEP) to promote continued rehabilitation following discharge. -ongoing  2. Pt will ambulate 250 feet without assistive device and at independence with good balance to reduce risk of falls. -ongoing  3. Patient will improve FGA score by 8 points to promote dynamic balance. -ongoing  4. Patient will improve 5xSTS score by 6 seconds to promote functional strength. -ongoing  5. Patient will improve static standing balance to 30s on conditions 1-4 of MCTSIB to reduce risk of falling. -ongoing     PLAN   Plan of care Certification: 7/14/2022 to 9/26/22.    Continue targeting dynamic balance, static balance and stability at the left lower extremity (knee).     Sophia Ybarra, PT, DPT, NCS  08/15/2022

## 2022-08-31 ENCOUNTER — CLINICAL SUPPORT (OUTPATIENT)
Dept: REHABILITATION | Facility: HOSPITAL | Age: 77
End: 2022-08-31
Attending: HOSPITALIST
Payer: COMMERCIAL

## 2022-08-31 DIAGNOSIS — R26.89 IMPAIRED BALANCE AS LATE EFFECT OF CEREBROVASCULAR ACCIDENT: ICD-10-CM

## 2022-08-31 DIAGNOSIS — Z74.09 IMPAIRED FUNCTIONAL MOBILITY AND ACTIVITY TOLERANCE: Primary | ICD-10-CM

## 2022-08-31 DIAGNOSIS — I69.398 IMPAIRED BALANCE AS LATE EFFECT OF CEREBROVASCULAR ACCIDENT: ICD-10-CM

## 2022-08-31 PROCEDURE — 97110 THERAPEUTIC EXERCISES: CPT | Mod: PN

## 2022-08-31 PROCEDURE — 97112 NEUROMUSCULAR REEDUCATION: CPT | Mod: PN

## 2022-08-31 NOTE — PROGRESS NOTES
"[facility]   Physical Therapy Treatment Note - Progress Note    Name: Rashad Rodríguez  [facility] Number: [id number]    Therapy Diagnosis:   Encounter Diagnoses   Name Primary?    Impaired functional mobility and activity tolerance Yes    Impaired balance as late effect of cerebrovascular accident        Physician: Pascual Castillo MD    Visit Date: 8/31/2022    Physician: Pascual Castillo MD     Physician Orders: PT Eval and Treat   Medical Diagnosis from Referral: Cerebrovascular accident (CVA), unspecified mechanism [I63.9]  Evaluation Date: 7/14/2022  Authorization Period Expiration: 12/31/22  Plan of Care Expiration: 9/26/22  Visit # / Visits authorized: 5/ 20 + eval    PN: 9/15/22    PTA Visit #: 0/5     Time In: 8:00  Time Out: 8:45  Total Billable Time: 45 minutes    Precautions: Standard and Fall, trouble with thin liquids     SUBJECTIVE     Pt reports: home stressors at this time but is active with rehabilitation process at home through home exercise program performance, etc. Feels there is some progress being made however balance is limited.       He was compliant with home exercise program.  Response to previous treatment: a little tired  Functional change: ongoing    Pain: 0/10  Location: NA    OBJECTIVE     Objective Measures updated at progress report unless specified.      Treatment   Italics= not performed    Rashad received the treatments listed below:      therapeutic exercises to develop strength, posture and endurance for 20 minutes including:    Nustep level 6 x 8 min - BUE/LE     Step ups to 8" box from foam 10x2 bilateral    CGA   UUE     Lateral stepping - red theraband at knees, x8 lengths //bars       home exercise program:   1. Bridge 10x3   2. SLR 10x3   3. Clamshell 10x3 bilateral   4. Hip ABD 10x3 bilateral   5. Feet together eyes closed  6. Feet semi-tandem eyes closed       neuromuscular re-education activities to improve: Balance, Coordination and Proprioception " "for 25 minutes. The following activities were included:    //bars:    //bars:    Feet Apart EC, firm surface, 30s   Feet Together EC, firm surface, 30s    Feet Semi-Tandem EC, firm surface, 30s    Feet apart, EC, foam, 30s   Feet Together EC, foam surface, 30s    Step on/off foam 10x2 bilateral    No upper extremity    CGA    Step taps to 8" step 10x2 bilateral    No upper extremity    Angelica/CGA    Step taps to 8" step from foam 10x2 bilateral    CGA    UUE    Lateral stepping - small robert x1 - x10 reps - UUE    FORWARD/BWD stepping - small robert x1 - x10 reps - UUE        Patient Education and Home Exercises     Home Exercises Provided and Patient Education Provided     Education provided:   - Physical Therapy Plan of Care, schedule, HEP, smoking cessation, stroke mobile program     Written Home Exercises Provided: Patient instructed to cont prior HEP. Exercises were reviewed and Rashad was able to demonstrate them prior to the end of the session.  Rashad demonstrated good  understanding of the education provided. See EMR under Patient Instructions for exercises provided during therapy sessions    ASSESSMENT   Rashad demonstrating improved balance with decreased upper extremity support and decreased assistance necessary for balance safety. Increased exercise tolerance observed without rest required. Pt would benefit from continuance of skilled PT services targeting dynamic balance, static balance and stability at the left lower extremity (knee).       Rashad Is progressing well towards his goals.   Pt prognosis is Good.     Pt will continue to benefit from skilled outpatient physical therapy to address the deficits listed in the problem list box on initial evaluation, provide pt/family education and to maximize pt's level of independence in the home and community environment.     Pt's spiritual, cultural and educational needs considered and pt agreeable to plan of care and goals.     Anticipated barriers to " physical therapy: transportation (patient is brought in by his brother)    Goals: as of 8/15/22  Short Term Goals: 5 weeks   1. Patient will improve static standing balance to 30s on conditions 1 and 2 of MCTSIB to reduce risk of falling. -MET 8/15/22   2. Demonstrate ability to ambulate with head movements in pitch and yaw without change in speed or staggering outside 10 path to enable safe scanning of environment for obstacles or pivot turn R/L without LOB. -MET 8/15/22   3. Patient will improve FGA score by 4 points to promote dynamic balance. -ongoing  4. Patient will improve 5xSTS score by 3 seconds to promote functional strength. -ongoing     Long Term Goals: 10 weeks   1. Patient will be independent with home exercise program (HEP) to promote continued rehabilitation following discharge. -ongoing  2. Pt will ambulate 250 feet without assistive device and at independence with good balance to reduce risk of falls. -ongoing  3. Patient will improve FGA score by 8 points to promote dynamic balance. -ongoing  4. Patient will improve 5xSTS score by 6 seconds to promote functional strength. -ongoing  5. Patient will improve static standing balance to 30s on conditions 1-4 of MCTSIB to reduce risk of falling. -ongoing     PLAN   Plan of care Certification: 7/14/2022 to 9/26/22.    Continue targeting dynamic balance, static balance and stability at the left lower extremity (knee).     Sophia Ybarra, PT, DPT, NCS  08/31/2022

## 2022-09-06 ENCOUNTER — CLINICAL SUPPORT (OUTPATIENT)
Dept: REHABILITATION | Facility: HOSPITAL | Age: 77
End: 2022-09-06
Attending: HOSPITALIST
Payer: COMMERCIAL

## 2022-09-06 DIAGNOSIS — Z74.09 IMPAIRED FUNCTIONAL MOBILITY AND ACTIVITY TOLERANCE: Primary | ICD-10-CM

## 2022-09-06 DIAGNOSIS — I69.398 IMPAIRED BALANCE AS LATE EFFECT OF CEREBROVASCULAR ACCIDENT: ICD-10-CM

## 2022-09-06 DIAGNOSIS — R26.89 IMPAIRED BALANCE AS LATE EFFECT OF CEREBROVASCULAR ACCIDENT: ICD-10-CM

## 2022-09-06 PROCEDURE — 97112 NEUROMUSCULAR REEDUCATION: CPT | Mod: PN

## 2022-09-06 PROCEDURE — 97110 THERAPEUTIC EXERCISES: CPT | Mod: PN

## 2022-09-06 NOTE — PROGRESS NOTES
"[facility]   Physical Therapy Treatment Note    Name: Rashad Rodríguez  [facility] Number: [id number]    Therapy Diagnosis:   Encounter Diagnoses   Name Primary?    Impaired functional mobility and activity tolerance Yes    Impaired balance as late effect of cerebrovascular accident      Physician: Pascual Castillo MD    Visit Date: 9/6/2022    Physician: Pascual Castillo MD     Physician Orders: PT Eval and Treat   Medical Diagnosis from Referral: Cerebrovascular accident (CVA), unspecified mechanism [I63.9]  Evaluation Date: 7/14/2022  Authorization Period Expiration: 12/31/22  Plan of Care Expiration: 9/26/22  Visit # / Visits authorized: 6/ 20 + eval    PN: 9/15/22    PTA Visit #: 0/5     Time In: 9:30  Time Out: 10:15   Total Billable Time: 45 minutes    Precautions: Standard and Fall, trouble with thin liquids     SUBJECTIVE     Pt reports: nothing new to report     He was compliant with home exercise program.  Response to previous treatment: a little tired  Functional change: ongoing    Pain: 0/10  Location: NA    OBJECTIVE     Objective Measures updated at progress report unless specified.      Treatment   Italics= not performed    Rashad received the treatments listed below:      therapeutic exercises to develop strength, posture and endurance for 15 minutes including:    Recumbent BIKE level 6 x 8 min - BLE    Step ups to 8" box from foam 10x2 bilateral    CGA / Angelica    No UE    Lateral stepping - red theraband at knees, x8 lengths //bars    - no upper extremity    - SBA       home exercise program:   1. Bridge 10x3   2. SLR 10x3   3. Clamshell 10x3 bilateral   4. Hip ABD 10x3 bilateral   5. Feet together eyes closed  6. Feet semi-tandem eyes closed       neuromuscular re-education activities to improve: Balance, Coordination and Proprioception for 30 minutes. The following activities were included:    Step on/off foam 10x2 bilateral    No upper extremity    CGA/Angelica     Step taps to 8" " step from foam 10x2 bilateral    CGA/Angelica   No UE    Lateral stepping - small robert x1   - x10 reps - UUE  - x10 reps - no upper extremity  - Angelica / CGA     FORWARD/BWD stepping - small robert x1   - x10 reps - UUE    - x10 reps - no upper extremity   - Angelica / CGA        Patient Education and Home Exercises     Home Exercises Provided and Patient Education Provided     Education provided:   - Physical Therapy Plan of Care, schedule, HEP, smoking cessation, stroke mobile program     Written Home Exercises Provided: Patient instructed to cont prior HEP. Exercises were reviewed and Rashad was able to demonstrate them prior to the end of the session.  Rashad demonstrated good  understanding of the education provided. See EMR under Patient Instructions for exercises provided during therapy sessions    ASSESSMENT   Rashad demonstrating limited confidence in single leg stance on left and decreased weightshifting throughout step tap, step up and lateral stepping. Angelica required throughout all exercises and x3 mild loss of balance with recovery.  Pt would benefit from continuance of skilled PT services targeting dynamic balance, static balance and stability at the left lower extremity (knee).       Rashad Is progressing well towards his goals.   Pt prognosis is Good.     Pt will continue to benefit from skilled outpatient physical therapy to address the deficits listed in the problem list box on initial evaluation, provide pt/family education and to maximize pt's level of independence in the home and community environment.     Pt's spiritual, cultural and educational needs considered and pt agreeable to plan of care and goals.     Anticipated barriers to physical therapy: transportation (patient is brought in by his brother)    Goals: as of 8/15/22  Short Term Goals: 5 weeks   1. Patient will improve static standing balance to 30s on conditions 1 and 2 of MCTSIB to reduce risk of falling. -MET 8/15/22   2. Demonstrate  ability to ambulate with head movements in pitch and yaw without change in speed or staggering outside 10 path to enable safe scanning of environment for obstacles or pivot turn R/L without LOB. -MET 8/15/22   3. Patient will improve FGA score by 4 points to promote dynamic balance. -ongoing  4. Patient will improve 5xSTS score by 3 seconds to promote functional strength. -ongoing     Long Term Goals: 10 weeks   1. Patient will be independent with home exercise program (HEP) to promote continued rehabilitation following discharge. -ongoing  2. Pt will ambulate 250 feet without assistive device and at independence with good balance to reduce risk of falls. -ongoing  3. Patient will improve FGA score by 8 points to promote dynamic balance. -ongoing  4. Patient will improve 5xSTS score by 6 seconds to promote functional strength. -ongoing  5. Patient will improve static standing balance to 30s on conditions 1-4 of MCTSIB to reduce risk of falling. -ongoing     PLAN   Plan of care Certification: 7/14/2022 to 9/26/22.    Continue targeting dynamic balance, static balance and stability at the left lower extremity (knee).     Sophia Ybarra, PT, DPT, NCS  09/06/2022

## 2022-09-15 ENCOUNTER — TELEPHONE (OUTPATIENT)
Dept: SMOKING CESSATION | Facility: CLINIC | Age: 77
End: 2022-09-15
Payer: COMMERCIAL

## 2022-09-21 ENCOUNTER — CLINICAL SUPPORT (OUTPATIENT)
Dept: REHABILITATION | Facility: HOSPITAL | Age: 77
End: 2022-09-21
Payer: COMMERCIAL

## 2022-09-21 DIAGNOSIS — I69.398 IMPAIRED BALANCE AS LATE EFFECT OF CEREBROVASCULAR ACCIDENT: ICD-10-CM

## 2022-09-21 DIAGNOSIS — R26.89 IMPAIRED BALANCE AS LATE EFFECT OF CEREBROVASCULAR ACCIDENT: ICD-10-CM

## 2022-09-21 DIAGNOSIS — Z74.09 IMPAIRED FUNCTIONAL MOBILITY AND ACTIVITY TOLERANCE: Primary | ICD-10-CM

## 2022-09-21 PROCEDURE — 97110 THERAPEUTIC EXERCISES: CPT | Mod: PN

## 2022-09-21 PROCEDURE — 97112 NEUROMUSCULAR REEDUCATION: CPT | Mod: PN

## 2022-09-22 DIAGNOSIS — I10 ESSENTIAL HYPERTENSION: ICD-10-CM

## 2022-09-22 RX ORDER — AMLODIPINE BESYLATE 5 MG/1
5 TABLET ORAL DAILY
Qty: 90 TABLET | Refills: 3 | Status: SHIPPED | OUTPATIENT
Start: 2022-09-22 | End: 2023-02-15 | Stop reason: SDUPTHER

## 2022-09-22 NOTE — TELEPHONE ENCOUNTER
Refill Decision Note   Rashad Rodríguez  is requesting a refill authorization.  Brief Assessment and Rationale for Refill:  Approve     Medication Therapy Plan:       Medication Reconciliation Completed: No   Comments:     No Care Gaps recommended.     Note composed:12:42 PM 09/22/2022

## 2022-09-22 NOTE — TELEPHONE ENCOUNTER
No new care gaps identified.  [facility] Embedded Care Gaps. Reference number: [id number]. 9/22/2022   8:05:15 AM CDT

## 2022-09-28 ENCOUNTER — CLINICAL SUPPORT (OUTPATIENT)
Dept: REHABILITATION | Facility: HOSPITAL | Age: 77
End: 2022-09-28
Payer: COMMERCIAL

## 2022-09-28 DIAGNOSIS — I69.398 IMPAIRED BALANCE AS LATE EFFECT OF CEREBROVASCULAR ACCIDENT: ICD-10-CM

## 2022-09-28 DIAGNOSIS — R26.89 IMPAIRED BALANCE AS LATE EFFECT OF CEREBROVASCULAR ACCIDENT: ICD-10-CM

## 2022-09-28 DIAGNOSIS — Z74.09 IMPAIRED FUNCTIONAL MOBILITY AND ACTIVITY TOLERANCE: Primary | ICD-10-CM

## 2022-09-28 PROCEDURE — 97110 THERAPEUTIC EXERCISES: CPT | Mod: PN

## 2022-09-28 PROCEDURE — 97112 NEUROMUSCULAR REEDUCATION: CPT | Mod: PN

## 2022-09-28 NOTE — PROGRESS NOTES
[facility]   Physical Therapy Treatment Note    Name: Rashad [facility] Number: [id number]    Therapy Diagnosis:   Encounter Diagnoses   Name Primary?    Impaired functional mobility and activity tolerance Yes    Impaired balance as late effect of cerebrovascular accident        Physician: Pascual Castillo MD    Visit Date: 9/28/2022    Physician: Pascual Castillo MD     Physician Orders: PT Eval and Treat   Medical Diagnosis from Referral: Cerebrovascular accident (CVA), unspecified mechanism [I63.9]  Evaluation Date: 7/14/2022  Authorization Period Expiration: 12/31/22  Plan of Care Expiration: 9/26/22  Visit # / Visits authorized: 8/ 20 + eval    PN: 9/15/22    PTA Visit #: 0/5     Time In: 8:00  Time Out: 8:45  Total Billable Time: 45 minutes    Precautions: Standard and Fall, trouble with thin liquids     SUBJECTIVE     Pt reports: He went to the dentist and they gave him an antibiotic and pain medication to decrease tooth pain.     He was compliant with home exercise program.  Response to previous treatment: a little tired  Functional change: ongoing    Pain: 0/10  Location: NA    OBJECTIVE          Evaluation 9/28/22   Single Limb Stance R LE NT  (<10 sec = HIGH FALL RISK) 3-4 sec   Single Limb Stance L LE NT  (<10 sec = HIGH FALL RISK) 1-2   5 times sit-stand 19 seconds - BUE     >12 sec= fall risk for general elderly  >16 sec= fall risk for Parkinson's disease  >10 sec= balance/vestibular dysfunction (<61 y/o)  >14.2 sec= balance/vestibular dysfunction (>61 y/o)  >12 sec= fall risk for CVA 20 sec no upper extremity assist   FGA 15/30 19/30      Objective Measures updated at progress report unless specified.      Treatment   Italics= not performed    Rashad received the treatments listed below:      therapeutic exercises to develop strength, posture and endurance for 15 minutes including:    Recumbent BIKE level 6 x 5 min - BLE    5 times sit to stand: 21 sec    Functional Gait  Assessment:   1. Gait on level surface =  2   (3) Normal: less than 5.5 sec, no A.D., no imbalance, normal gait pattern, deviates< 6in   (2) Mild impairment: 7-5.6 sec, uses A.D., mild gait deviations, or deviates 6-10 in   (1) Moderate impairment: > 7 sec, slow speed, imbalance, deviates 10-15 in.   (0) Severe impairment: needs assist, deviates >15 in, reach/touch wall  2. Change in Gait Speed = 2   (3) Normal: smooth change w/o loss of balance or gait deviation, deviates < 6 in, significant difference between speeds   (2) Mild impairment: changes speed, but demonstrates mild gait deviations, deviates 6-10 in, OR no deviations but unable to significantly speed, OR uses A.D.   (1) Moderate impairment: minor changes to speed, OR changes speed w/ significant deviations, deviates 10-15 in, OR  Changes speed , but loses balance & recovers   (0) Severe impairment: cannot change speed, deviates >15 in, or loses balance & needs assist  3. Gait with horizontal head turns  = 2   (3) Normal: no change in gait, deviates <6 in   (2) Mild impairment: slight change in speed, deviates 6-10 in, OR uses A.D.   (1) Moderate impairment: moderate change in speed, deviates 10-15 in   (0) Severe impairment: severe disruption of gait, deviates >15in  4. Gait with vertical head turns = 2   (3) Normal: no change in gait, deviates <6 in   (2) Mild impairment: slight change in speed, deviates 6-10 in OR uses A.D.   (1) Moderate impairment: moderate change in speed, deviates 10-15 in   (0) Severe impairment: severe disruption of gait, deviates >15 in  5. Gait with pivot turns = 3   (3) Normal: performs safely in 3 sec, no LOB   (2) Mild impairment: performs in >3 sec & no LOB, OR turns safely & requires several steps to regain LOB   (1) Moderate impairment: turns slow, OR requires several small steps for balance following turn & stop   (0) Severe impairment: cannot turn safely, needs assist  6. Step over obstacle = 1   (3) Normal: steps over 2  stacked boxes w/o change in speed or LOB   (2) Mild impairment: able to step over 1 box w/o change in speed or LOB   (1) Moderate impairment: steps over 1 box but must slow down, may require VC   (0) Severe impairment: cannot perform w/o assist  7. Gait with Narrow LANIE = 1   (3) Normal: 10 steps no staggering   (2) Mild impairment: 7-9 steps   (1) Moderate impairment: 4-7 steps   (0) Severe impairment: < 4 steps or cannot perform w/o assist  8. Gait with eyes closed = 1   (3) Normal: < 7 sec, no A.D., no LOB, normal gait pattern, deviates <6 in   (2) Mild impairment: 7.1-9 sec, mild gait deviations, deviates 6-10 in   (1) Moderate impairment: > 9 sec, abnormal pattern, LOB, deviates 10-15 in   (0) Severe impairment: cannot perform w/o assist, LOB, deviates >15in  9. Ambulating Backwards = 2   (3) Normal: no A.D., no LOB, normal gait pattern, deviates <6in   (2) Mild impairment: uses A.D., slower speed, mild gait deviations, deviates 6-10 in   (1) Moderate impairment: slow speed, abnormal gait pattern, LOB, deviates 10-15 in   (0) Severe impairment: severe gait deviations or LOB, deviates >15in  10. Steps = 3   (3) Normal: alternating feet, no rail   (2) Mild Impairment: alternating feet, uses rail   (1) Moderate impairment: step-to, uses rail   (0) Severe impairment: cannot perform safely    Score 19/30     Score:   <22/30 fall risk   <20/30 fall risk in older adults   <18/30 fall risk in Parkinsons        home exercise program:   1. Bridge 10x3   2. SLR 10x3   3. Clamshell 10x3 bilateral   4. Hip ABD 10x3 bilateral   5. Feet together eyes closed  6. Feet semi-tandem eyes closed       neuromuscular re-education activities to improve: Balance, Coordination and Proprioception for 30 minutes. The following activities were included:    In parallel bars:  Fwd lunge onto soft side of bosu x 10 alternating lower extremity, 1 upper extremity assist  Squats on soft side of bosu x 10   On firm side of bosu, standing  "stabilization x 30 sec    X 15 lower extremity marching, bilateral, with pink theraband resistance  X 10 trunk rotations, each side, using pink theraband    Lateral stepping with trunk rotation to same side- small robert x1   - x15 reps - no upper extremity  - SBA    FORWARD/BWD stepping - small robert x1   - x10 reps - UUE    - x10 reps - no upper extremity   - Angelica / CGA        Step taps to 8" step from foam 10x2 bilateral    CGA/Angelica   No UE    Patient Education and Home Exercises     Home Exercises Provided and Patient Education Provided     Education provided:   - benefits of Physical Therapy , progress in rehab    Written Home Exercises Provided: Patient instructed to cont prior HEP. Exercises were reviewed and Rashad was able to demonstrate them prior to the end of the session.  Rashad demonstrated good  understanding of the education provided. See EMR under Patient Instructions for exercises provided during therapy sessions    ASSESSMENT   Rashad has made good progress in physical therapy.  He has met all of his short term goals and is making good progress toward his long term goals.  He scored 20 sec on the 5 times sit to stand, which is similar time to his eval, however today he completed without upper extremity assist indicating improved lower extremity strength.  He score 19/30 on the FGA indicating improved dynamic balance and decreased fall risk.  He demo's improved gait quality with increased left lower extremity weight bearing in stance.  He will continue to benefit from skilled Physical Therapy to address impairments and maximize functional mobility.       Rashad Is progressing well towards his goals.   Pt prognosis is Good.     Pt will continue to benefit from skilled outpatient physical therapy to address the deficits listed in the problem list box on initial evaluation, provide pt/family education and to maximize pt's level of independence in the home and community environment.     Pt's " spiritual, cultural and educational needs considered and pt agreeable to plan of care and goals.     Anticipated barriers to physical therapy: transportation (patient is brought in by his brother)    Goals: as of 8/15/22  Short Term Goals: 5 weeks   1. Patient will improve static standing balance to 30s on conditions 1 and 2 of MCTSIB to reduce risk of falling. -MET 8/15/22   2. Demonstrate ability to ambulate with head movements in pitch and yaw without change in speed or staggering outside 10 path to enable safe scanning of environment for obstacles or pivot turn R/L without LOB. -MET 8/15/22   3. Patient will improve FGA score by 4 points to promote dynamic balance. MET 9/28/22  4. Patient will improve 5xSTS score by 3 seconds to promote functional strength. Progressing -performed without upper extremity assist 9/28/22     Long Term Goals: 10 weeks   1. Patient will be independent with home exercise program (HEP) to promote continued rehabilitation following discharge. -Progressing 9/28/22  2. Pt will ambulate 250 feet without assistive device and at independence with good balance to reduce risk of falls. -Progressing 9/28/22  3. Patient will improve FGA score by 8 points to promote dynamic balance. -Progressing 9/28/22  4. Patient will improve 5xSTS score by 6 seconds to promote functional strength. -Progressing 9/28/22  5. Patient will improve static standing balance to 30s on conditions 1-4 of MCTSIB to reduce risk of falling. -Progressing 9/28/22    PLAN   Plan of care Certification: 9/26/22 to 11/26/22    Continue Physical Therapy 2 times per week for 8 weeks for therex, neuro re-ed, patient education, gait training, targeting dynamic balance, static balance and stability at the left lower extremity (knee).     Lila Underwood, PT, DPT, NCS  09/28/2022

## 2022-09-28 NOTE — PLAN OF CARE
[facility]   Physical Therapy Re-Evaluation and Treatment Note    Name: Rashad Rodríguez  [facility] Number: [id number]    Therapy Diagnosis:   Encounter Diagnoses   Name Primary?    Impaired functional mobility and activity tolerance Yes    Impaired balance as late effect of cerebrovascular accident        Physician: Pascual Castillo MD    Visit Date: 9/28/2022    Physician: Pascual Castillo MD     Physician Orders: PT Eval and Treat   Medical Diagnosis from Referral: Cerebrovascular accident (CVA), unspecified mechanism [I63.9]  Evaluation Date: 7/14/2022  Authorization Period Expiration: 12/31/22  Plan of Care Expiration: 9/26/22  Visit # / Visits authorized: 8/ 20 + eval    PN: 9/15/22    PTA Visit #: 0/5     Time In: 8:00  Time Out: 8:45  Total Billable Time: 45 minutes    Precautions: Standard and Fall, trouble with thin liquids     SUBJECTIVE     Pt reports: He went to the dentist and they gave him an antibiotic and pain medication to decrease tooth pain.     He was compliant with home exercise program.  Response to previous treatment: a little tired  Functional change: ongoing    Pain: 0/10  Location: NA    OBJECTIVE          Evaluation 9/28/22   Single Limb Stance R LE NT  (<10 sec = HIGH FALL RISK) 3-4 sec   Single Limb Stance L LE NT  (<10 sec = HIGH FALL RISK) 1-2   5 times sit-stand 19 seconds - BUE     >12 sec= fall risk for general elderly  >16 sec= fall risk for Parkinson's disease  >10 sec= balance/vestibular dysfunction (<61 y/o)  >14.2 sec= balance/vestibular dysfunction (>61 y/o)  >12 sec= fall risk for CVA 20 sec no upper extremity assist   FGA 15/30 19/30      Objective Measures updated at progress report unless specified.      Treatment   Italics= not performed    Rashad received the treatments listed below:      therapeutic exercises to develop strength, posture and endurance for 15 minutes including:    Recumbent BIKE level 6 x 5 min - BLE    5 times sit to stand: 21  sec    Functional Gait Assessment:   1. Gait on level surface =  2   (3) Normal: less than 5.5 sec, no A.D., no imbalance, normal gait pattern, deviates< 6in   (2) Mild impairment: 7-5.6 sec, uses A.D., mild gait deviations, or deviates 6-10 in   (1) Moderate impairment: > 7 sec, slow speed, imbalance, deviates 10-15 in.   (0) Severe impairment: needs assist, deviates >15 in, reach/touch wall  2. Change in Gait Speed = 2   (3) Normal: smooth change w/o loss of balance or gait deviation, deviates < 6 in, significant difference between speeds   (2) Mild impairment: changes speed, but demonstrates mild gait deviations, deviates 6-10 in, OR no deviations but unable to significantly speed, OR uses A.D.   (1) Moderate impairment: minor changes to speed, OR changes speed w/ significant deviations, deviates 10-15 in, OR  Changes speed , but loses balance & recovers   (0) Severe impairment: cannot change speed, deviates >15 in, or loses balance & needs assist  3. Gait with horizontal head turns  = 2   (3) Normal: no change in gait, deviates <6 in   (2) Mild impairment: slight change in speed, deviates 6-10 in, OR uses A.D.   (1) Moderate impairment: moderate change in speed, deviates 10-15 in   (0) Severe impairment: severe disruption of gait, deviates >15in  4. Gait with vertical head turns = 2   (3) Normal: no change in gait, deviates <6 in   (2) Mild impairment: slight change in speed, deviates 6-10 in OR uses A.D.   (1) Moderate impairment: moderate change in speed, deviates 10-15 in   (0) Severe impairment: severe disruption of gait, deviates >15 in  5. Gait with pivot turns = 3   (3) Normal: performs safely in 3 sec, no LOB   (2) Mild impairment: performs in >3 sec & no LOB, OR turns safely & requires several steps to regain LOB   (1) Moderate impairment: turns slow, OR requires several small steps for balance following turn & stop   (0) Severe impairment: cannot turn safely, needs assist  6. Step over obstacle =  1   (3) Normal: steps over 2 stacked boxes w/o change in speed or LOB   (2) Mild impairment: able to step over 1 box w/o change in speed or LOB   (1) Moderate impairment: steps over 1 box but must slow down, may require VC   (0) Severe impairment: cannot perform w/o assist  7. Gait with Narrow LANIE = 1   (3) Normal: 10 steps no staggering   (2) Mild impairment: 7-9 steps   (1) Moderate impairment: 4-7 steps   (0) Severe impairment: < 4 steps or cannot perform w/o assist  8. Gait with eyes closed = 1   (3) Normal: < 7 sec, no A.D., no LOB, normal gait pattern, deviates <6 in   (2) Mild impairment: 7.1-9 sec, mild gait deviations, deviates 6-10 in   (1) Moderate impairment: > 9 sec, abnormal pattern, LOB, deviates 10-15 in   (0) Severe impairment: cannot perform w/o assist, LOB, deviates >15in  9. Ambulating Backwards = 2   (3) Normal: no A.D., no LOB, normal gait pattern, deviates <6in   (2) Mild impairment: uses A.D., slower speed, mild gait deviations, deviates 6-10 in   (1) Moderate impairment: slow speed, abnormal gait pattern, LOB, deviates 10-15 in   (0) Severe impairment: severe gait deviations or LOB, deviates >15in  10. Steps = 3   (3) Normal: alternating feet, no rail   (2) Mild Impairment: alternating feet, uses rail   (1) Moderate impairment: step-to, uses rail   (0) Severe impairment: cannot perform safely    Score 19/30     Score:   <22/30 fall risk   <20/30 fall risk in older adults   <18/30 fall risk in Parkinsons        home exercise program:   1. Bridge 10x3   2. SLR 10x3   3. Clamshell 10x3 bilateral   4. Hip ABD 10x3 bilateral   5. Feet together eyes closed  6. Feet semi-tandem eyes closed       neuromuscular re-education activities to improve: Balance, Coordination and Proprioception for 30 minutes. The following activities were included:    In parallel bars:  Fwd lunge onto soft side of bosu x 10 alternating lower extremity, 1 upper extremity assist  Squats on soft side of bosu x 10   On firm  "side of bosu, standing stabilization x 30 sec    X 15 lower extremity marching, bilateral, with pink theraband resistance  X 10 trunk rotations, each side, using pink theraband    Lateral stepping with trunk rotation to same side- small robert x1   - x15 reps - no upper extremity  - SBA    FORWARD/BWD stepping - small robert x1   - x10 reps - UUE    - x10 reps - no upper extremity   - Angelica / CGA        Step taps to 8" step from foam 10x2 bilateral    CGA/Angelica   No UE    Patient Education and Home Exercises     Home Exercises Provided and Patient Education Provided     Education provided:   - benefits of Physical Therapy , progress in rehab    Written Home Exercises Provided: Patient instructed to cont prior HEP. Exercises were reviewed and Rashad was able to demonstrate them prior to the end of the session.  Rashad demonstrated good  understanding of the education provided. See EMR under Patient Instructions for exercises provided during therapy sessions    ASSESSMENT   Rashad has made good progress in physical therapy.  He has met all of his short term goals and is making good progress toward his long term goals.  He scored 20 sec on the 5 times sit to stand, which is similar time to his eval, however today he completed without upper extremity assist indicating improved lower extremity strength.  He score 19/30 on the FGA indicating improved dynamic balance and decreased fall risk.  He demo's improved gait quality with increased left lower extremity weight bearing in stance.  He will continue to benefit from skilled Physical Therapy to address impairments and maximize functional mobility.       Rashad Is progressing well towards his goals.   Pt prognosis is Good.     Pt will continue to benefit from skilled outpatient physical therapy to address the deficits listed in the problem list box on initial evaluation, provide pt/family education and to maximize pt's level of independence in the home and community " environment.     Pt's spiritual, cultural and educational needs considered and pt agreeable to plan of care and goals.     Anticipated barriers to physical therapy: transportation (patient is brought in by his brother)    Goals: as of 8/15/22  Short Term Goals: 5 weeks   1. Patient will improve static standing balance to 30s on conditions 1 and 2 of MCTSIB to reduce risk of falling. -MET 8/15/22   2. Demonstrate ability to ambulate with head movements in pitch and yaw without change in speed or staggering outside 10 path to enable safe scanning of environment for obstacles or pivot turn R/L without LOB. -MET 8/15/22   3. Patient will improve FGA score by 4 points to promote dynamic balance. MET 9/28/22  4. Patient will improve 5xSTS score by 3 seconds to promote functional strength. Progressing -performed without upper extremity assist 9/28/22     Long Term Goals: 10 weeks   1. Patient will be independent with home exercise program (HEP) to promote continued rehabilitation following discharge. -Progressing 9/28/22  2. Pt will ambulate 250 feet without assistive device and at independence with good balance to reduce risk of falls. -Progressing 9/28/22  3. Patient will improve FGA score by 8 points to promote dynamic balance. -Progressing 9/28/22  4. Patient will improve 5xSTS score by 6 seconds to promote functional strength. -Progressing 9/28/22  5. Patient will improve static standing balance to 30s on conditions 1-4 of MCTSIB to reduce risk of falling. -Progressing 9/28/22    PLAN   Plan of care Certification: 9/26/22 to 11/26/22    Continue Physical Therapy 2 times per week for 8 weeks for therex, neuro re-ed, patient education, gait training, targeting dynamic balance, static balance and stability at the left lower extremity (knee).     Lila Underwood, PT, DPT, NCS  09/28/2022

## 2022-09-29 ENCOUNTER — TELEPHONE (OUTPATIENT)
Dept: SMOKING CESSATION | Facility: CLINIC | Age: 77
End: 2022-09-29
Payer: COMMERCIAL

## 2022-09-30 ENCOUNTER — TELEPHONE (OUTPATIENT)
Dept: NEUROLOGY | Facility: CLINIC | Age: 77
End: 2022-09-30
Payer: COMMERCIAL

## 2022-09-30 NOTE — TELEPHONE ENCOUNTER
----- Message from Tova Devries sent at 9/30/2022  9:45 AM CDT -----  Type: Patient Call Back    Who called: self     What is the request in detail: pt asking for a call back, he need some information he states     Can the clinic reply by [facility]?    Would the patient rather a call back or a response via [facility]? Call    Best call back number: [phone] (home)       Additional Information:      He had a stroke in July and is on Plavix and aspirin and needs to have a tooth pulled. Can he get off the medication to have this procedure     Left information on voicemail

## 2022-10-12 ENCOUNTER — TELEPHONE (OUTPATIENT)
Dept: FAMILY MEDICINE | Facility: CLINIC | Age: 77
End: 2022-10-12
Payer: COMMERCIAL

## 2022-10-12 DIAGNOSIS — I63.9 CEREBROVASCULAR ACCIDENT (CVA), UNSPECIFIED MECHANISM: ICD-10-CM

## 2022-10-12 NOTE — TELEPHONE ENCOUNTER
Notified patient that Dr. Dejesus had sent in 90 day supply with 3 refills available on August 2022. Instructed to reach out to pharmacy to ensure that they can fill it and to call back office if told that they are unavailable. Patient verbalized understanding

## 2022-10-12 NOTE — TELEPHONE ENCOUNTER
----- Message from Delmy Larkin sent at 10/12/2022  7:57 AM CDT -----  Regarding: Self / [phone]  Type: RX Refill Request    Who Called:  Patient    Refill or New Rx:  Refill    RX Name and Strength:  clopidogreL (PLAVIX) 75 mg tablet    Preferred Pharmacy with phone number:  [id number] - [place], [place] - [address] [facility] [address]    Local or Mail Order:  Local    Ordering Provider:  Oleg    Would the patient rather a call back or a response via [facility]?  Call back    Best Call Back Number:  [phone] (home)

## 2022-10-14 ENCOUNTER — CLINICAL SUPPORT (OUTPATIENT)
Dept: REHABILITATION | Facility: HOSPITAL | Age: 77
End: 2022-10-14
Payer: COMMERCIAL

## 2022-10-14 DIAGNOSIS — Z74.09 IMPAIRED FUNCTIONAL MOBILITY AND ACTIVITY TOLERANCE: Primary | ICD-10-CM

## 2022-10-14 DIAGNOSIS — R26.89 IMPAIRED BALANCE AS LATE EFFECT OF CEREBROVASCULAR ACCIDENT: ICD-10-CM

## 2022-10-14 DIAGNOSIS — I69.398 IMPAIRED BALANCE AS LATE EFFECT OF CEREBROVASCULAR ACCIDENT: ICD-10-CM

## 2022-10-14 PROCEDURE — 97110 THERAPEUTIC EXERCISES: CPT | Mod: PN

## 2022-10-14 PROCEDURE — 97112 NEUROMUSCULAR REEDUCATION: CPT | Mod: PN

## 2022-10-14 NOTE — PROGRESS NOTES
"[facility]   Physical Therapy Treatment Note    Name: Rashad Rodríguez  [facility] Number: [id number]    Therapy Diagnosis:   Encounter Diagnoses   Name Primary?    Impaired functional mobility and activity tolerance Yes    Impaired balance as late effect of cerebrovascular accident      Physician: Pascual Castillo MD    Visit Date: 10/14/2022    Physician: Pascual Castillo MD     Physician Orders: PT Eval and Treat   Medical Diagnosis from Referral: Cerebrovascular accident (CVA), unspecified mechanism [I63.9]  Evaluation Date: 7/14/2022  Authorization Period Expiration: 12/31/22  Plan of Care Expiration: 11/26/22  Visit # / Visits authorized: 9/ 20 + eval    PN: 10/28/22    PTA Visit #: 0/5     Time In: 8:00  Time Out: 9:00  Total Billable Time: 60 minutes    Precautions: Standard and Fall, trouble with thin liquids     SUBJECTIVE     Pt reports: doing well, states he has 2 more appointments however is unsure if would like to keep at this time financially     He was compliant with home exercise program.  Response to previous treatment: a little tired  Functional change: ongoing    Pain: 0/10  Location: NA    OBJECTIVE     Objective Measures updated at progress report unless specified.      Treatment   Italics= not performed    Rashad received the treatments listed below:      therapeutic exercises to develop strength, posture and endurance for 20 minutes including:    Recumbent BIKE level 6 x 3 min - BLE  Recumbent BIKE level 3 x 3 min - BLE     Sit to stand 10x3 with foam     Step ups to 8" - 10x2 bilateral     CGA/Angelica    UUE PRN       home exercise program: discussed  1. Bridge 10x3   2. SLR 10x3   3. Clamshell 10x3 bilateral   4. Hip ABD 10x3 bilateral   5. Feet together eyes closed  6. Feet semi-tandem eyes closed       neuromuscular re-education activities to improve: Balance, Coordination and Proprioception for 40 minutes. The following activities were included:    In parallel " "bars:    Forward lunge onto soft side of bosu 10x2 bilateral lower extremity, 1 upper extremity assist   Forward lunge onto soft side of bosu x10 alternating lower extremity, 1 upper extremity assist    Squats on soft side of bosu 10x2   On firm side of bosu, standing stabilization 30 sec x2   Squats on firm side of bosu 10x2       Step taps to 8" - 10x2 bilateral     CGA/Angelica    No upper extremity     Single leg stance - modified with 8" box     No upper extremity     30 sec x3     Lateral stepping - small robert x4   8 lengths   CGA  No upper extremity     FORWARD stepping - small robert x4    8 lengths    CGA   No upper extremity      Lateral stepping with trunk flexion, knee flexion - small robert x1  x20  CGA  No upper extremity     FORWARD stepping with trunk flexion, knee flexion - small robert x1   x20   CGA   No upper extremity        Patient Education and Home Exercises     Home Exercises Provided and Patient Education Provided     Education provided:   - benefits of Physical Therapy , progress in rehab    Written Home Exercises Provided: Patient instructed to cont prior HEP. Exercises were reviewed and Rashad was able to demonstrate them prior to the end of the session.  Rashad demonstrated good  understanding of the education provided. See EMR under Patient Instructions for exercises provided during therapy sessions      ASSESSMENT   Rashad demonstrating extension at the trunk that is creating difficulty with standing and upright dynamic balance exercises and is educated on trunk flexion (bend at the waist) and increased knee flexion (bending) to create more stability within current base of support. Pt continues to be appropriate for skilled PT services at this time.       Rashad Is progressing well towards his goals.   Pt prognosis is Good.     Pt will continue to benefit from skilled outpatient physical therapy to address the deficits listed in the problem list box on initial evaluation, provide " pt/family education and to maximize pt's level of independence in the home and community environment.     Pt's spiritual, cultural and educational needs considered and pt agreeable to plan of care and goals.     Anticipated barriers to physical therapy: transportation (patient is brought in by his brother)    Goals: as of 8/15/22  Short Term Goals: 5 weeks   1. Patient will improve static standing balance to 30s on conditions 1 and 2 of MCTSIB to reduce risk of falling. -MET 8/15/22   2. Demonstrate ability to ambulate with head movements in pitch and yaw without change in speed or staggering outside 10 path to enable safe scanning of environment for obstacles or pivot turn R/L without LOB. -MET 8/15/22   3. Patient will improve FGA score by 4 points to promote dynamic balance. MET 9/28/22  4. Patient will improve 5xSTS score by 3 seconds to promote functional strength. Progressing -performed without upper extremity assist 9/28/22     Long Term Goals: 10 weeks   1. Patient will be independent with home exercise program (HEP) to promote continued rehabilitation following discharge. -Progressing 9/28/22  2. Pt will ambulate 250 feet without assistive device and at independence with good balance to reduce risk of falls. -Progressing 9/28/22  3. Patient will improve FGA score by 8 points to promote dynamic balance. -Progressing 9/28/22  4. Patient will improve 5xSTS score by 6 seconds to promote functional strength. -Progressing 9/28/22  5. Patient will improve static standing balance to 30s on conditions 1-4 of MCTSIB to reduce risk of falling. -Progressing 9/28/22    PLAN   Plan of care Certification: 9/26/22 to 11/26/22    Continue Physical Therapy 2 times per week for 8 weeks for therex, neuro re-ed, patient education, gait training, targeting dynamic balance, static balance and stability at the left lower extremity (knee).     Sophia Ybarra, PT, DPT, NCS  10/14/2022

## 2022-10-19 DIAGNOSIS — Z12.11 SPECIAL SCREENING FOR MALIGNANT NEOPLASMS, COLON: Primary | ICD-10-CM

## 2023-02-08 ENCOUNTER — LAB VISIT (OUTPATIENT)
Dept: LAB | Facility: HOSPITAL | Age: 78
End: 2023-02-08
Attending: FAMILY MEDICINE
Payer: COMMERCIAL

## 2023-02-08 DIAGNOSIS — E78.5 DYSLIPIDEMIA: ICD-10-CM

## 2023-02-08 DIAGNOSIS — I10 ESSENTIAL HYPERTENSION: ICD-10-CM

## 2023-02-08 LAB
ALBUMIN SERPL BCP-MCNC: 3.4 G/DL (ref 3.5–5.2)
ALP SERPL-CCNC: 98 U/L (ref 55–135)
ALT SERPL W/O P-5'-P-CCNC: 20 U/L (ref 10–44)
ANION GAP SERPL CALC-SCNC: 8 MMOL/L (ref 8–16)
AST SERPL-CCNC: 23 U/L (ref 10–40)
BASOPHILS # BLD AUTO: 0.07 K/UL (ref 0–0.2)
BASOPHILS NFR BLD: 0.8 % (ref 0–1.9)
BILIRUB SERPL-MCNC: 0.9 MG/DL (ref 0.1–1)
BUN SERPL-MCNC: 14 MG/DL (ref 8–23)
CALCIUM SERPL-MCNC: 9.3 MG/DL (ref 8.7–10.5)
CHLORIDE SERPL-SCNC: 108 MMOL/L (ref 95–110)
CHOLEST SERPL-MCNC: 134 MG/DL (ref 120–199)
CHOLEST/HDLC SERPL: 2.5 {RATIO} (ref 2–5)
CO2 SERPL-SCNC: 26 MMOL/L (ref 23–29)
CREAT SERPL-MCNC: 1 MG/DL (ref 0.5–1.4)
DIFFERENTIAL METHOD: ABNORMAL
EOSINOPHIL # BLD AUTO: 0.3 K/UL (ref 0–0.5)
EOSINOPHIL NFR BLD: 2.8 % (ref 0–8)
ERYTHROCYTE [DISTWIDTH] IN BLOOD BY AUTOMATED COUNT: 14.9 % (ref 11.5–14.5)
EST. GFR  (NO RACE VARIABLE): >60 ML/MIN/1.73 M^2
GLUCOSE SERPL-MCNC: 82 MG/DL (ref 70–110)
HCT VFR BLD AUTO: 38.9 % (ref 40–54)
HDLC SERPL-MCNC: 54 MG/DL (ref 40–75)
HDLC SERPL: 40.3 % (ref 20–50)
HGB BLD-MCNC: 12.6 G/DL (ref 14–18)
IMM GRANULOCYTES # BLD AUTO: 0.01 K/UL (ref 0–0.04)
IMM GRANULOCYTES NFR BLD AUTO: 0.1 % (ref 0–0.5)
LDLC SERPL CALC-MCNC: 70.4 MG/DL (ref 63–159)
LYMPHOCYTES # BLD AUTO: 2.8 K/UL (ref 1–4.8)
LYMPHOCYTES NFR BLD: 30.7 % (ref 18–48)
MCH RBC QN AUTO: 29.2 PG (ref 27–31)
MCHC RBC AUTO-ENTMCNC: 32.4 G/DL (ref 32–36)
MCV RBC AUTO: 90 FL (ref 82–98)
MONOCYTES # BLD AUTO: 1 K/UL (ref 0.3–1)
MONOCYTES NFR BLD: 10.5 % (ref 4–15)
NEUTROPHILS # BLD AUTO: 5 K/UL (ref 1.8–7.7)
NEUTROPHILS NFR BLD: 55.1 % (ref 38–73)
NONHDLC SERPL-MCNC: 80 MG/DL
NRBC BLD-RTO: 0 /100 WBC
PLATELET # BLD AUTO: 285 K/UL (ref 150–450)
PMV BLD AUTO: 10.6 FL (ref 9.2–12.9)
POTASSIUM SERPL-SCNC: 4.3 MMOL/L (ref 3.5–5.1)
PROT SERPL-MCNC: 7.5 G/DL (ref 6–8.4)
RBC # BLD AUTO: 4.32 M/UL (ref 4.6–6.2)
SODIUM SERPL-SCNC: 142 MMOL/L (ref 136–145)
TRIGL SERPL-MCNC: 48 MG/DL (ref 30–150)
WBC # BLD AUTO: 9.14 K/UL (ref 3.9–12.7)

## 2023-02-08 PROCEDURE — 80053 COMPREHEN METABOLIC PANEL: CPT | Performed by: FAMILY MEDICINE

## 2023-02-08 PROCEDURE — 80061 LIPID PANEL: CPT | Performed by: FAMILY MEDICINE

## 2023-02-08 PROCEDURE — 85025 COMPLETE CBC W/AUTO DIFF WBC: CPT | Performed by: FAMILY MEDICINE

## 2023-02-08 PROCEDURE — 36415 COLL VENOUS BLD VENIPUNCTURE: CPT | Mod: PN | Performed by: FAMILY MEDICINE

## 2023-02-15 ENCOUNTER — OFFICE VISIT (OUTPATIENT)
Dept: FAMILY MEDICINE | Facility: CLINIC | Age: 78
End: 2023-02-15
Payer: COMMERCIAL

## 2023-02-15 ENCOUNTER — CLINICAL SUPPORT (OUTPATIENT)
Dept: SMOKING CESSATION | Facility: CLINIC | Age: 78
End: 2023-02-15
Payer: COMMERCIAL

## 2023-02-15 ENCOUNTER — LAB VISIT (OUTPATIENT)
Dept: LAB | Facility: HOSPITAL | Age: 78
End: 2023-02-15
Attending: FAMILY MEDICINE
Payer: COMMERCIAL

## 2023-02-15 VITALS
OXYGEN SATURATION: 96 % | WEIGHT: 119.06 LBS | TEMPERATURE: 98 F | BODY MASS INDEX: 18.69 KG/M2 | DIASTOLIC BLOOD PRESSURE: 80 MMHG | SYSTOLIC BLOOD PRESSURE: 112 MMHG | HEIGHT: 67 IN | HEART RATE: 87 BPM

## 2023-02-15 DIAGNOSIS — R31.0 GROSS HEMATURIA: ICD-10-CM

## 2023-02-15 DIAGNOSIS — D35.02 BILATERAL ADRENAL ADENOMAS: ICD-10-CM

## 2023-02-15 DIAGNOSIS — I10 ESSENTIAL HYPERTENSION: Primary | Chronic | ICD-10-CM

## 2023-02-15 DIAGNOSIS — D64.9 NORMOCYTIC ANEMIA: ICD-10-CM

## 2023-02-15 DIAGNOSIS — I69.30 HISTORY OF STROKE WITH RESIDUAL DEFICIT: Chronic | ICD-10-CM

## 2023-02-15 DIAGNOSIS — D35.01 BILATERAL ADRENAL ADENOMAS: ICD-10-CM

## 2023-02-15 DIAGNOSIS — J98.4 CAVITARY LESION OF LUNG: ICD-10-CM

## 2023-02-15 DIAGNOSIS — F17.200 SMOKER: ICD-10-CM

## 2023-02-15 DIAGNOSIS — E78.5 DYSLIPIDEMIA: Chronic | ICD-10-CM

## 2023-02-15 DIAGNOSIS — R19.5 POSITIVE FIT (FECAL IMMUNOCHEMICAL TEST): ICD-10-CM

## 2023-02-15 DIAGNOSIS — F17.200 NICOTINE DEPENDENCE: Primary | ICD-10-CM

## 2023-02-15 PROBLEM — A31.0 MAI (MYCOBACTERIUM AVIUM-INTRACELLULARE): Status: RESOLVED | Noted: 2019-02-19 | Resolved: 2023-02-15

## 2023-02-15 PROBLEM — I69.398 IMPAIRED BALANCE AS LATE EFFECT OF CEREBROVASCULAR ACCIDENT: Status: RESOLVED | Noted: 2022-07-19 | Resolved: 2023-02-15

## 2023-02-15 PROBLEM — R26.89 IMPAIRED BALANCE AS LATE EFFECT OF CEREBROVASCULAR ACCIDENT: Status: RESOLVED | Noted: 2022-07-19 | Resolved: 2023-02-15

## 2023-02-15 PROBLEM — Z74.09 IMPAIRED FUNCTIONAL MOBILITY AND ACTIVITY TOLERANCE: Status: RESOLVED | Noted: 2022-07-19 | Resolved: 2023-02-15

## 2023-02-15 PROBLEM — I63.9 CVA (CEREBRAL VASCULAR ACCIDENT): Status: RESOLVED | Noted: 2022-07-07 | Resolved: 2023-02-15

## 2023-02-15 LAB
BASOPHILS # BLD AUTO: 0.08 K/UL (ref 0–0.2)
BASOPHILS NFR BLD: 0.9 % (ref 0–1.9)
DIFFERENTIAL METHOD: ABNORMAL
EOSINOPHIL # BLD AUTO: 0.2 K/UL (ref 0–0.5)
EOSINOPHIL NFR BLD: 2.6 % (ref 0–8)
ERYTHROCYTE [DISTWIDTH] IN BLOOD BY AUTOMATED COUNT: 14.8 % (ref 11.5–14.5)
FERRITIN SERPL-MCNC: 55 NG/ML (ref 20–300)
HCT VFR BLD AUTO: 39.6 % (ref 40–54)
HGB BLD-MCNC: 12.9 G/DL (ref 14–18)
IMM GRANULOCYTES # BLD AUTO: 0.02 K/UL (ref 0–0.04)
IMM GRANULOCYTES NFR BLD AUTO: 0.2 % (ref 0–0.5)
IRON SERPL-MCNC: 35 UG/DL (ref 45–160)
LYMPHOCYTES # BLD AUTO: 2 K/UL (ref 1–4.8)
LYMPHOCYTES NFR BLD: 22.6 % (ref 18–48)
MCH RBC QN AUTO: 29.5 PG (ref 27–31)
MCHC RBC AUTO-ENTMCNC: 32.6 G/DL (ref 32–36)
MCV RBC AUTO: 91 FL (ref 82–98)
MONOCYTES # BLD AUTO: 0.9 K/UL (ref 0.3–1)
MONOCYTES NFR BLD: 9.9 % (ref 4–15)
NEUTROPHILS # BLD AUTO: 5.6 K/UL (ref 1.8–7.7)
NEUTROPHILS NFR BLD: 63.8 % (ref 38–73)
NRBC BLD-RTO: 0 /100 WBC
PLATELET # BLD AUTO: 306 K/UL (ref 150–450)
PMV BLD AUTO: 11.4 FL (ref 9.2–12.9)
RBC # BLD AUTO: 4.37 M/UL (ref 4.6–6.2)
SATURATED IRON: 10 % (ref 20–50)
TOTAL IRON BINDING CAPACITY: 357 UG/DL (ref 250–450)
TRANSFERRIN SERPL-MCNC: 241 MG/DL (ref 200–375)
WBC # BLD AUTO: 8.79 K/UL (ref 3.9–12.7)

## 2023-02-15 PROCEDURE — 1101F PR PT FALLS ASSESS DOC 0-1 FALLS W/OUT INJ PAST YR: ICD-10-PCS | Mod: CPTII,S$GLB,, | Performed by: FAMILY MEDICINE

## 2023-02-15 PROCEDURE — 99999 PR PBB SHADOW E&M-EST. PATIENT-LVL IV: ICD-10-PCS | Mod: PBBFAC,,, | Performed by: FAMILY MEDICINE

## 2023-02-15 PROCEDURE — 1126F AMNT PAIN NOTED NONE PRSNT: CPT | Mod: CPTII,S$GLB,, | Performed by: FAMILY MEDICINE

## 2023-02-15 PROCEDURE — 1160F PR REVIEW ALL MEDS BY PRESCRIBER/CLIN PHARMACIST DOCUMENTED: ICD-10-PCS | Mod: CPTII,S$GLB,, | Performed by: FAMILY MEDICINE

## 2023-02-15 PROCEDURE — 85025 COMPLETE CBC W/AUTO DIFF WBC: CPT | Performed by: FAMILY MEDICINE

## 2023-02-15 PROCEDURE — 1159F PR MEDICATION LIST DOCUMENTED IN MEDICAL RECORD: ICD-10-PCS | Mod: CPTII,S$GLB,, | Performed by: FAMILY MEDICINE

## 2023-02-15 PROCEDURE — 3074F PR MOST RECENT SYSTOLIC BLOOD PRESSURE < 130 MM HG: ICD-10-PCS | Mod: CPTII,S$GLB,, | Performed by: FAMILY MEDICINE

## 2023-02-15 PROCEDURE — 84466 ASSAY OF TRANSFERRIN: CPT | Performed by: FAMILY MEDICINE

## 2023-02-15 PROCEDURE — 99214 PR OFFICE/OUTPT VISIT, EST, LEVL IV, 30-39 MIN: ICD-10-PCS | Mod: S$GLB,,, | Performed by: FAMILY MEDICINE

## 2023-02-15 PROCEDURE — 99214 OFFICE O/P EST MOD 30 MIN: CPT | Mod: S$GLB,,, | Performed by: FAMILY MEDICINE

## 2023-02-15 PROCEDURE — 1101F PT FALLS ASSESS-DOCD LE1/YR: CPT | Mod: CPTII,S$GLB,, | Performed by: FAMILY MEDICINE

## 2023-02-15 PROCEDURE — 99999 PR PBB SHADOW E&M-EST. PATIENT-LVL I: CPT | Mod: PBBFAC,,,

## 2023-02-15 PROCEDURE — 1159F MED LIST DOCD IN RCRD: CPT | Mod: CPTII,S$GLB,, | Performed by: FAMILY MEDICINE

## 2023-02-15 PROCEDURE — 3288F FALL RISK ASSESSMENT DOCD: CPT | Mod: CPTII,S$GLB,, | Performed by: FAMILY MEDICINE

## 2023-02-15 PROCEDURE — 36415 COLL VENOUS BLD VENIPUNCTURE: CPT | Mod: PN | Performed by: FAMILY MEDICINE

## 2023-02-15 PROCEDURE — 3079F DIAST BP 80-89 MM HG: CPT | Mod: CPTII,S$GLB,, | Performed by: FAMILY MEDICINE

## 2023-02-15 PROCEDURE — 3079F PR MOST RECENT DIASTOLIC BLOOD PRESSURE 80-89 MM HG: ICD-10-PCS | Mod: CPTII,S$GLB,, | Performed by: FAMILY MEDICINE

## 2023-02-15 PROCEDURE — 99407 PR TOBACCO USE CESSATION INTENSIVE >10 MINUTES: ICD-10-PCS | Mod: S$GLB,,, | Performed by: GENERAL PRACTICE

## 2023-02-15 PROCEDURE — 99999 PR PBB SHADOW E&M-EST. PATIENT-LVL I: ICD-10-PCS | Mod: PBBFAC,,,

## 2023-02-15 PROCEDURE — 99999 PR PBB SHADOW E&M-EST. PATIENT-LVL IV: CPT | Mod: PBBFAC,,, | Performed by: FAMILY MEDICINE

## 2023-02-15 PROCEDURE — 3074F SYST BP LT 130 MM HG: CPT | Mod: CPTII,S$GLB,, | Performed by: FAMILY MEDICINE

## 2023-02-15 PROCEDURE — 82728 ASSAY OF FERRITIN: CPT | Performed by: FAMILY MEDICINE

## 2023-02-15 PROCEDURE — 99407 BEHAV CHNG SMOKING > 10 MIN: CPT | Mod: S$GLB,,, | Performed by: GENERAL PRACTICE

## 2023-02-15 PROCEDURE — 1126F PR PAIN SEVERITY QUANTIFIED, NO PAIN PRESENT: ICD-10-PCS | Mod: CPTII,S$GLB,, | Performed by: FAMILY MEDICINE

## 2023-02-15 PROCEDURE — 1160F RVW MEDS BY RX/DR IN RCRD: CPT | Mod: CPTII,S$GLB,, | Performed by: FAMILY MEDICINE

## 2023-02-15 PROCEDURE — 3288F PR FALLS RISK ASSESSMENT DOCUMENTED: ICD-10-PCS | Mod: CPTII,S$GLB,, | Performed by: FAMILY MEDICINE

## 2023-02-15 RX ORDER — ASPIRIN 81 MG/1
81 TABLET ORAL DAILY
Qty: 90 TABLET | Refills: 3 | Status: SHIPPED | OUTPATIENT
Start: 2023-02-15 | End: 2024-01-23 | Stop reason: SDUPTHER

## 2023-02-15 RX ORDER — ATORVASTATIN CALCIUM 40 MG/1
40 TABLET, FILM COATED ORAL DAILY
Qty: 90 TABLET | Refills: 3 | Status: SHIPPED | OUTPATIENT
Start: 2023-02-15 | End: 2023-09-19 | Stop reason: SDUPTHER

## 2023-02-15 RX ORDER — VALSARTAN 160 MG/1
160 TABLET ORAL DAILY
Qty: 90 TABLET | Refills: 3 | Status: SHIPPED | OUTPATIENT
Start: 2023-02-15 | End: 2023-09-19 | Stop reason: SDUPTHER

## 2023-02-15 RX ORDER — CLOPIDOGREL BISULFATE 75 MG/1
75 TABLET ORAL DAILY
Qty: 90 TABLET | Refills: 3 | Status: SHIPPED | OUTPATIENT
Start: 2023-02-15 | End: 2023-09-19

## 2023-02-15 RX ORDER — AMLODIPINE BESYLATE 5 MG/1
5 TABLET ORAL DAILY
Qty: 90 TABLET | Refills: 3 | Status: SHIPPED | OUTPATIENT
Start: 2023-02-15 | End: 2023-09-19 | Stop reason: SDUPTHER

## 2023-02-15 NOTE — ASSESSMENT & PLAN NOTE
Importance of smoking cessation discussed with patient. 3 minutes spent counseling patient on risks of smoking, benefits of stopping and ways of stopping. Patient not ready to quit.

## 2023-02-15 NOTE — ASSESSMENT & PLAN NOTE
Agrees to get colonoscopy done as he did not due last year because of stroke. Importance of ruling out mass discussed.

## 2023-02-15 NOTE — ASSESSMENT & PLAN NOTE
Continue ASA and Plavix  Decline PT for lower extremity weakness as he states not impacting daily life and he knows exercises to do.

## 2023-02-15 NOTE — PROGRESS NOTES
Subjective:       Patient ID: Rashad Rodríguez is a 77 y.o. male.    Chief Complaint: Hypertension and Hyperlipidemia    Hypertension  This is a chronic problem. The current episode started more than 1 year ago. The problem is controlled. Pertinent negatives include no anxiety, blurred vision, chest pain, headaches, malaise/fatigue, neck pain, orthopnea, palpitations or shortness of breath. Risk factors for coronary artery disease include male gender, dyslipidemia and smoking/tobacco exposure. Past treatments include angiotensin blockers and calcium channel blockers. The current treatment provides significant improvement. There are no compliance problems.  Hypertensive end-organ damage includes angina and CVA. There is no history of kidney disease. There is no history of chronic renal disease.   Hyperlipidemia  This is a chronic problem. The problem is controlled. He has no history of chronic renal disease, hypothyroidism or liver disease. Factors aggravating his hyperlipidemia include smoking. Pertinent negatives include no chest pain, focal sensory loss, myalgias or shortness of breath. Current antihyperlipidemic treatment includes statins. The current treatment provides significant improvement of lipids. There are no compliance problems.  Risk factors for coronary artery disease include male sex, dyslipidemia and hypertension.     Review of Systems   Constitutional:  Negative for malaise/fatigue.   Eyes:  Negative for blurred vision.   Respiratory:  Negative for shortness of breath.    Cardiovascular:  Negative for chest pain, palpitations and orthopnea.   Musculoskeletal:  Negative for myalgias and neck pain.   Neurological:  Negative for headaches.       Objective:      Physical Exam  Vitals reviewed.   Constitutional:       Appearance: He is well-developed. He is not diaphoretic.   HENT:      Head: Normocephalic.      Right Ear: External ear normal.      Left Ear: External ear normal.      Nose: Nose normal.       Mouth/Throat:      Pharynx: No oropharyngeal exudate.   Neck:      Trachea: No tracheal deviation.   Cardiovascular:      Rate and Rhythm: Normal rate and regular rhythm.      Heart sounds: Normal heart sounds.   Pulmonary:      Effort: Pulmonary effort is normal.      Breath sounds: Normal breath sounds. No wheezing or rales.   Abdominal:      General: Bowel sounds are normal.      Palpations: Abdomen is soft. Abdomen is not rigid. There is no mass.      Tenderness: There is no abdominal tenderness. There is no guarding or rebound.   Musculoskeletal:      Cervical back: Normal range of motion and neck supple.   Lymphadenopathy:      Cervical: No cervical adenopathy.   Neurological:      Mental Status: He is oriented to person, place, and time.      Gait: Gait normal.       Lab Visit on 02/15/2023   Component Date Value Ref Range Status    WBC 02/15/2023 8.79  3.90 - 12.70 K/uL Final    RBC 02/15/2023 4.37 (L)  4.60 - 6.20 M/uL Final    Hemoglobin 02/15/2023 12.9 (L)  14.0 - 18.0 g/dL Final    Hematocrit 02/15/2023 39.6 (L)  40.0 - 54.0 % Final    MCV 02/15/2023 91  82 - 98 fL Final    MCH 02/15/2023 29.5  27.0 - 31.0 pg Final    MCHC 02/15/2023 32.6  32.0 - 36.0 g/dL Final    RDW 02/15/2023 14.8 (H)  11.5 - 14.5 % Final    Platelets 02/15/2023 306  150 - 450 K/uL Final    MPV 02/15/2023 11.4  9.2 - 12.9 fL Final    Immature Granulocytes 02/15/2023 0.2  0.0 - 0.5 % Final    Gran # (ANC) 02/15/2023 5.6  1.8 - 7.7 K/uL Final    Immature Grans (Abs) 02/15/2023 0.02  0.00 - 0.04 K/uL Final    Comment: Mild elevation in immature granulocytes is non specific and   can be seen in a variety of conditions including stress response,   acute inflammation, trauma and pregnancy. Correlation with other   laboratory and clinical findings is essential.      Lymph # 02/15/2023 2.0  1.0 - 4.8 K/uL Final    Mono # 02/15/2023 0.9  0.3 - 1.0 K/uL Final    Eos # 02/15/2023 0.2  0.0 - 0.5 K/uL Final    Baso # 02/15/2023 0.08  0.00 - 0.20  K/uL Final    nRBC 02/15/2023 0  0 /100 WBC Final    Gran % 02/15/2023 63.8  38.0 - 73.0 % Final    Lymph % 02/15/2023 22.6  18.0 - 48.0 % Final    Mono % 02/15/2023 9.9  4.0 - 15.0 % Final    Eosinophil % 02/15/2023 2.6  0.0 - 8.0 % Final    Basophil % 02/15/2023 0.9  0.0 - 1.9 % Final    Differential Method 02/15/2023 Automated   Final    Iron 02/15/2023 35 (L)  45 - 160 ug/dL Final    Transferrin 02/15/2023 241  200 - 375 mg/dL Final    TIBC 02/15/2023 357  250 - 450 ug/dL Final    Saturated Iron 02/15/2023 10 (L)  20 - 50 % Final    Ferritin 02/15/2023 55  20.0 - 300.0 ng/mL Final   Lab Visit on 02/08/2023   Component Date Value Ref Range Status    Sodium 02/08/2023 142  136 - 145 mmol/L Final    Potassium 02/08/2023 4.3  3.5 - 5.1 mmol/L Final    Chloride 02/08/2023 108  95 - 110 mmol/L Final    CO2 02/08/2023 26  23 - 29 mmol/L Final    Glucose 02/08/2023 82  70 - 110 mg/dL Final    BUN 02/08/2023 14  8 - 23 mg/dL Final    Creatinine 02/08/2023 1.0  0.5 - 1.4 mg/dL Final    Calcium 02/08/2023 9.3  8.7 - 10.5 mg/dL Final    Total Protein 02/08/2023 7.5  6.0 - 8.4 g/dL Final    Albumin 02/08/2023 3.4 (L)  3.5 - 5.2 g/dL Final    Total Bilirubin 02/08/2023 0.9  0.1 - 1.0 mg/dL Final    Comment: For infants and newborns, interpretation of results should be based  on gestational age, weight and in agreement with clinical  observations.    Premature Infant recommended reference ranges:  Up to 24 hours.............<8.0 mg/dL  Up to 48 hours............<12.0 mg/dL  3-5 days..................<15.0 mg/dL  6-29 days.................<15.0 mg/dL      Alkaline Phosphatase 02/08/2023 98  55 - 135 U/L Final    AST 02/08/2023 23  10 - 40 U/L Final    ALT 02/08/2023 20  10 - 44 U/L Final    Anion Gap 02/08/2023 8  8 - 16 mmol/L Final    eGFR 02/08/2023 >60  >60 mL/min/1.73 m^2 Final    WBC 02/08/2023 9.14  3.90 - 12.70 K/uL Final    RBC 02/08/2023 4.32 (L)  4.60 - 6.20 M/uL Final    Hemoglobin 02/08/2023 12.6 (L)  14.0 - 18.0  g/dL Final    Hematocrit 02/08/2023 38.9 (L)  40.0 - 54.0 % Final    MCV 02/08/2023 90  82 - 98 fL Final    MCH 02/08/2023 29.2  27.0 - 31.0 pg Final    MCHC 02/08/2023 32.4  32.0 - 36.0 g/dL Final    RDW 02/08/2023 14.9 (H)  11.5 - 14.5 % Final    Platelets 02/08/2023 285  150 - 450 K/uL Final    MPV 02/08/2023 10.6  9.2 - 12.9 fL Final    Immature Granulocytes 02/08/2023 0.1  0.0 - 0.5 % Final    Gran # (ANC) 02/08/2023 5.0  1.8 - 7.7 K/uL Final    Immature Grans (Abs) 02/08/2023 0.01  0.00 - 0.04 K/uL Final    Comment: Mild elevation in immature granulocytes is non specific and   can be seen in a variety of conditions including stress response,   acute inflammation, trauma and pregnancy. Correlation with other   laboratory and clinical findings is essential.      Lymph # 02/08/2023 2.8  1.0 - 4.8 K/uL Final    Mono # 02/08/2023 1.0  0.3 - 1.0 K/uL Final    Eos # 02/08/2023 0.3  0.0 - 0.5 K/uL Final    Baso # 02/08/2023 0.07  0.00 - 0.20 K/uL Final    nRBC 02/08/2023 0  0 /100 WBC Final    Gran % 02/08/2023 55.1  38.0 - 73.0 % Final    Lymph % 02/08/2023 30.7  18.0 - 48.0 % Final    Mono % 02/08/2023 10.5  4.0 - 15.0 % Final    Eosinophil % 02/08/2023 2.8  0.0 - 8.0 % Final    Basophil % 02/08/2023 0.8  0.0 - 1.9 % Final    Differential Method 02/08/2023 Automated   Final    Cholesterol 02/08/2023 134  120 - 199 mg/dL Final    Comment: The National Cholesterol Education Program (NCEP) has set the  following guidelines (reference ranges) for Cholesterol:  Optimal.....................<200 mg/dL  Borderline High.............200-239 mg/dL  High........................> or = 240 mg/dL      Triglycerides 02/08/2023 48  30 - 150 mg/dL Final    Comment: The National Cholesterol Education Program (NCEP) has set the  following guidelines (reference values) for triglycerides:  Normal......................<150 mg/dL  Borderline High.............150-199 mg/dL  High........................200-499 mg/dL      HDL 02/08/2023 54   40 - 75 mg/dL Final    Comment: The National Cholesterol Education Program (NCEP) has set the  following guidelines (reference values) for HDL Cholesterol:  Low...............<40 mg/dL  Optimal...........>60 mg/dL      LDL Cholesterol 02/08/2023 70.4  63.0 - 159.0 mg/dL Final    Comment: The National Cholesterol Education Program (NCEP) has set the  following guidelines (reference values) for LDL Cholesterol:  Optimal.......................<130 mg/dL  Borderline High...............130-159 mg/dL  High..........................160-189 mg/dL  Very High.....................>190 mg/dL      HDL/Cholesterol Ratio 02/08/2023 40.3  20.0 - 50.0 % Final    Total Cholesterol/HDL Ratio 02/08/2023 2.5  2.0 - 5.0 Final    Non-HDL Cholesterol 02/08/2023 80  mg/dL Final    Comment: Risk category and Non-HDL cholesterol goals:  Coronary heart disease (CHD)or equivalent (10-year risk of CHD >20%):  Non-HDL cholesterol goal     <130 mg/dL  Two or more CHD risk factors and 10-year risk of CHD <= 20%:  Non-HDL cholesterol goal     <160 mg/dL  0 to 1 CHD risk factor:  Non-HDL cholesterol goal     <190 mg/dL         Assessment:       1. Essential hypertension    2. Dyslipidemia    3. History of stroke with residual deficit    4. Gross hematuria    5. Normocytic anemia    6. Positive FIT (fecal immunochemical test)    7. Smoker    8. Cavitary lesion of lung    9. Bilateral adrenal adenomas           Plan:       1. Essential hypertension  Overview:  Blood pressure goal less than 140/90    Assessment & Plan:  Current therapy effective, will continue    Orders:  -     amLODIPine (NORVASC) 5 MG tablet; Take 1 tablet (5 mg total) by mouth once daily.  Dispense: 90 tablet; Refill: 3  -     valsartan (DIOVAN) 160 MG tablet; Take 1 tablet (160 mg total) by mouth once daily.  Dispense: 90 tablet; Refill: 3  -     Comprehensive Metabolic Panel; Future; Expected date: 08/15/2023  -     CBC Auto Differential; Future; Expected date: 08/15/2023    2.  Dyslipidemia  Assessment & Plan:  Continue atorvastatin 40 mg daily    Orders:  -     atorvastatin (LIPITOR) 40 MG tablet; Take 1 tablet (40 mg total) by mouth once daily.  Dispense: 90 tablet; Refill: 3  -     Comprehensive Metabolic Panel; Future; Expected date: 08/15/2023    3. History of stroke with residual deficit  Overview:  10/2020- Left lacunar stroke  07/2022- Right Upper Basal Ganglia    Reports mild bilateral lower extremity weakness    Assessment & Plan:  Continue ASA and Plavix  Decline PT for lower extremity weakness as he states not impacting daily life and he knows exercises to do.    Orders:  -     clopidogreL (PLAVIX) 75 mg tablet; Take 1 tablet (75 mg total) by mouth once daily.  Dispense: 90 tablet; Refill: 3  -     aspirin (ECOTRIN) 81 MG EC tablet; Take 1 tablet (81 mg total) by mouth once daily.  Dispense: 90 tablet; Refill: 3  -     Comprehensive Metabolic Panel; Future; Expected date: 08/15/2023    4. Gross hematuria  Overview:  Reports one isolated episode of gross hematuria in early January.  Saw Dr. Fraser at [facility]  He is scheduled for CT Urogram- 2/22/23  Cystoscopy for 2/28/23    Assessment & Plan:  Has appts scheduled with urology for complete evaluation      5. Normocytic anemia  Assessment & Plan:  Patient on ASA and Plavix.   Not symptomatic.  Benefits of continuing ASA and Plavix outweigh risk of stopping treatment so will continue as long as anemia not worsening    Orders:  -     CBC Auto Differential; Future; Expected date: 02/15/2023  -     Iron and TIBC; Future; Expected date: 02/15/2023  -     Ferritin; Future; Expected date: 02/15/2023  -     CBC Auto Differential; Future; Expected date: 08/15/2023    6. Positive FIT (fecal immunochemical test)  Assessment & Plan:  Agrees to get colonoscopy done as he did not due last year because of stroke. Importance of ruling out mass discussed.    Orders:  -     Ambulatory referral/consult to Endo Procedure ; Future; Expected  date: 02/16/2023    7. Smoker  Assessment & Plan:  Importance of smoking cessation discussed with patient. 3 minutes spent counseling patient on risks of smoking, benefits of stopping and ways of stopping. Patient not ready to quit.        8. Cavitary lesion of lung  Overview:  Ct 5/18 with rul cavitary lesion and julius nodule. Stable 11/18 ct.  \    CT 2/23/2022: FINDINGS:  Lungs: The largest opacity in the right lung appears to be a partially cavitated solid mass and measures 5.0 AP x 3.3 TV-cm on series 4, image 90 (decrease in size since 2018 and previously 5.4 x 3.4-cm on most recent chest CT).  The largest opacity in the left lung appears subsolid and measures 0.7-cm on series 4, image 297, not significantly changed since 2018.  The lungs show moderate biapical findings consistent with emphysema, unchanged.    Assessment & Plan:  No acute concerns reported      9. Bilateral adrenal adenomas  Overview:  CT 11/29/2018:  Adrenal glands are evaluated with thin sections and adrenal mass protocol.  The right adrenal gland has a nodular lesion 1.9 cm with density measurements as follows: 9 Hounsfield units precontrast, venous phase 78, delayed phase 43, relative washout 45%.  Left adrenal gland has nodular lesion 1.6 cm with density measurements as follows: 5 Hounsfield units precontrast, 57 venous phase, 26 delayed phase, relative washout 54 %.  These lesions are consistent with adenomas based on noncontrast attenuation less than 10 Hounsfield units and relative washout greater than 40%..

## 2023-02-15 NOTE — PROGRESS NOTES
Spoke with patient today in regard to smoking cessation progress 3/6/12 month telephone follow up, he states that he is tobacco free.  Commended patient on the accomplishments thus far.  Informed patient of benefit period, future follow up, and contact information if any further help or support is needed.  Will complete smart form for 3/6/12 month follow up on Quit attempt #1 and resolve episode.     *Created addendum to review notes and confirm that the episode was resolved

## 2023-03-07 DIAGNOSIS — D50.9 IRON DEFICIENCY ANEMIA, UNSPECIFIED IRON DEFICIENCY ANEMIA TYPE: Primary | ICD-10-CM

## 2023-03-08 ENCOUNTER — TELEPHONE (OUTPATIENT)
Dept: ENDOSCOPY | Facility: HOSPITAL | Age: 78
End: 2023-03-08
Payer: COMMERCIAL

## 2023-03-09 ENCOUNTER — TELEPHONE (OUTPATIENT)
Dept: FAMILY MEDICINE | Facility: CLINIC | Age: 78
End: 2023-03-09
Payer: COMMERCIAL

## 2023-03-09 NOTE — TELEPHONE ENCOUNTER
Notified patient of his lab results per Dr. Dejesus, patient verbalized understanding. Also noticed that endo team was attempting to reach out to patient due to positive FIT kit test, provided endo team scheduling. He verbalized understanding.

## 2023-03-09 NOTE — TELEPHONE ENCOUNTER
----- Message from Juanito Dejesus Jr., MD sent at 3/7/2023  9:57 PM CST -----  Please let patient know that his labs showing iron deficiency anemia.  I am placing a referral to see Gastroenterology, as we need to be sure that he is not losing iron/blood from his GI system, as this can be due to multiple things such as ulcers, hemorrhoids internally, and more seriously mass.  He should start taking an over-the-counter iron once daily.

## 2023-03-23 ENCOUNTER — TELEPHONE (OUTPATIENT)
Dept: FAMILY MEDICINE | Facility: CLINIC | Age: 78
End: 2023-03-23
Payer: COMMERCIAL

## 2023-03-23 NOTE — TELEPHONE ENCOUNTER
----- Message from Shon Go sent at 3/23/2023  1:00 PM CDT -----  Type: Patient Call Back    Who called:Self     What is the request in detail: Asking for a call regarding questions     Can the clinic reply by [facility]? NO     Would the patient rather a call back or a response via [facility]? CALL     Best call back number:## [phone] (home)

## 2023-03-23 NOTE — TELEPHONE ENCOUNTER
Called pt back , reports his stool is black . Has GI ov next month on the 22th . Declines having stomach pain , bleeding or feeling weak . Next ov for our clinic is 4/12 , please advise

## 2023-03-30 NOTE — TELEPHONE ENCOUNTER
Spoke with pt he displayed understanding that if black stools continue OV needed pt does not have my chart activated to do a virtual visit.

## 2023-04-05 ENCOUNTER — TELEPHONE (OUTPATIENT)
Dept: FAMILY MEDICINE | Facility: CLINIC | Age: 78
End: 2023-04-05

## 2023-04-05 NOTE — TELEPHONE ENCOUNTER
----- Message from Nupur Cleary sent at 4/5/2023  9:13 AM CDT -----  Type: Patient Call Back    Who called:pt    What is the request in detail:returning a call     Can the clinic reply by [facility]?no     Would the patient rather a call back or a response via [facility]?call    Best call back number: [phone]      Additional Information:

## 2023-04-05 NOTE — TELEPHONE ENCOUNTER
Rt pt call , explain no documentation of our office calling .Asked what the vm detailed , says someone called asking if he was still having black stool . Pt said yes he is .

## 2023-05-06 NOTE — TELEPHONE ENCOUNTER
No care due was identified.  [facility] Embedded Care Due Messages. Reference number: [id number].   5/06/2023 9:07:49 AM CDT

## 2023-05-07 NOTE — TELEPHONE ENCOUNTER
Refill Routing Note   Medication(s) are not appropriate for processing by [facility] for the following reason(s):      No active prescription written by PCP    [facility] action(s):  Defer              Appointments  past 12m or future 3m with PCP    Date Provider   Last Visit   2/15/2023 Juanito Dejesus Jr., MD   Next Visit   8/15/2023 Juanito Dejesus Jr., MD   ED visits in past 90 days: 0        Note composed:10:43 AM 05/07/2023

## 2023-05-09 RX ORDER — SILDENAFIL 100 MG/1
TABLET, FILM COATED ORAL
Qty: 10 TABLET | Refills: 11 | Status: SHIPPED | OUTPATIENT
Start: 2023-05-09 | End: 2023-09-19 | Stop reason: SDUPTHER

## 2023-05-23 ENCOUNTER — TELEPHONE (OUTPATIENT)
Dept: FAMILY MEDICINE | Facility: CLINIC | Age: 78
End: 2023-05-23
Payer: COMMERCIAL

## 2023-05-23 DIAGNOSIS — Z12.11 SCREENING FOR COLORECTAL CANCER: Primary | ICD-10-CM

## 2023-05-23 DIAGNOSIS — Z12.12 SCREENING FOR COLORECTAL CANCER: Primary | ICD-10-CM

## 2023-05-23 NOTE — TELEPHONE ENCOUNTER
----- Message from Rubin Brandon MA sent at 5/22/2023  7:17 AM CDT -----    ----- Message -----  From: Margaux Spring  Sent: 5/19/2023   6:18 PM CDT  To: [facility] Staff    Patient is in need of a new colonoscopy referral due to expiration date needing to be pushed further out. Thanks

## 2023-05-26 NOTE — PROGRESS NOTES
"[facility]   Physical Therapy Treatment Note    Name: Rashad Rodríguez  [facility] Number: [id number]    Therapy Diagnosis:   Encounter Diagnoses   Name Primary?    Impaired functional mobility and activity tolerance Yes    Impaired balance as late effect of cerebrovascular accident      Physician: Pascual Castillo MD    Visit Date: 9/21/2022    Physician: Pascual Castillo MD     Physician Orders: PT Eval and Treat   Medical Diagnosis from Referral: Cerebrovascular accident (CVA), unspecified mechanism [I63.9]  Evaluation Date: 7/14/2022  Authorization Period Expiration: 12/31/22  Plan of Care Expiration: 9/26/22  Visit # / Visits authorized: 7/ 20 + eval    PN: 9/15/22    PTA Visit #: 0/5     Time In: 8:00  Time Out: 8:50  Total Billable Time: 50 minutes    Precautions: Standard and Fall, trouble with thin liquids     SUBJECTIVE     Pt reports: He has only been walking around his house, he lives on the [facility].     He was compliant with home exercise program.  Response to previous treatment: a little tired  Functional change: ongoing    Pain: 0/10  Location: NA    OBJECTIVE     Objective Measures updated at progress report unless specified.      Treatment   Italics= not performed    Rashad received the treatments listed below:      therapeutic exercises to develop strength, posture and endurance for 10 minutes including:    Recumbent BIKE level 5 x 6 min - BLE    Step tap onto 4in step x 15 B lower extremity  Step up with opposite high knee x 10 both sides, 1 upper extremity assist    Step ups to 8" box from foam 10x2 bilateral    CGA / Angelica    No UE    Lateral stepping - red theraband at knees, x8 lengths //bars    - no upper extremity    - SBA       home exercise program:   1. Bridge 10x3   2. SLR 10x3   3. Clamshell 10x3 bilateral   4. Hip ABD 10x3 bilateral   5. Feet together eyes closed  6. Feet semi-tandem eyes closed       neuromuscular re-education activities to improve: Balance, " jeremiah "Coordination and Proprioception for 40 minutes. The following activities were included:    In parallel bars:    Standing on foam:   Lower extremity taps onto 2 big orange cones x 10 B lower extremity, 1 upper extremity assist   Feet together eyes closed 30 sec x 2   Tandem stance x 30 sec B lower extremity in front   Fwd lunge onto foam x 15 B lower extremity, 1 upper extremity assist    Lateral stepping with trunk rotation to same side- small robert x1   - x15 reps - no upper extremity  - SBA    FORWARD/BWD stepping - small robert x1   - x10 reps - UUE    - x10 reps - no upper extremity   - Angelica / CGA     Walking with head turns right and left 100ft x 2, SBA, patient has 1 loss of balance requires min A to reccover  Walking with head nods up and down 100ft x 2, SBA  Walking with focus on bilateral hip ext in stance 100ft x 2     Step taps to 8" step from foam 10x2 bilateral    CGA/Angelica   No UE    Patient Education and Home Exercises     Home Exercises Provided and Patient Education Provided     Education provided:   - benefits of Physical Therapy , progress in rehab    Written Home Exercises Provided: Patient instructed to cont prior HEP. Exercises were reviewed and Rashad was able to demonstrate them prior to the end of the session.  Rashad demonstrated good  understanding of the education provided. See EMR under Patient Instructions for exercises provided during therapy sessions    ASSESSMENT   Rashad tolerated session well, he required 2 seated rest breaks due to fatigue.  He demo's improved left lower extremity weight bearing with cues for glut activation in stance.  He was able to progress to dynamic gait activities walking on the turf.  He has 1 loss of balance walking with head rotations, requires min A to recover.  He continues to make good progress in Physical Therapy.       Rashad Is progressing well towards his goals.   Pt prognosis is Good.     Pt will continue to benefit from skilled outpatient " physical therapy to address the deficits listed in the problem list box on initial evaluation, provide pt/family education and to maximize pt's level of independence in the home and community environment.     Pt's spiritual, cultural and educational needs considered and pt agreeable to plan of care and goals.     Anticipated barriers to physical therapy: transportation (patient is brought in by his brother)    Goals: as of 8/15/22  Short Term Goals: 5 weeks   1. Patient will improve static standing balance to 30s on conditions 1 and 2 of MCTSIB to reduce risk of falling. -MET 8/15/22   2. Demonstrate ability to ambulate with head movements in pitch and yaw without change in speed or staggering outside 10 path to enable safe scanning of environment for obstacles or pivot turn R/L without LOB. -MET 8/15/22   3. Patient will improve FGA score by 4 points to promote dynamic balance. -ongoing  4. Patient will improve 5xSTS score by 3 seconds to promote functional strength. -ongoing     Long Term Goals: 10 weeks   1. Patient will be independent with home exercise program (HEP) to promote continued rehabilitation following discharge. -ongoing  2. Pt will ambulate 250 feet without assistive device and at independence with good balance to reduce risk of falls. -ongoing  3. Patient will improve FGA score by 8 points to promote dynamic balance. -ongoing  4. Patient will improve 5xSTS score by 6 seconds to promote functional strength. -ongoing  5. Patient will improve static standing balance to 30s on conditions 1-4 of MCTSIB to reduce risk of falling. -ongoing     PLAN   Plan of care Certification: 7/14/2022 to 9/26/22.    Continue targeting dynamic balance, static balance and stability at the left lower extremity (knee).     Lila Underwood, PT, DPT, NCS  09/21/2022

## 2023-06-26 NOTE — TELEPHONE ENCOUNTER
For thick Mucus he can use over the counter plain Robitussin syrup (with no letters after such as DM CF) and drink a full glass of water right after. This should help thin mucus.    Olumiant Counseling: I discussed with the patient the risks of Olumiant therapy including but not limited to upper respiratory tract infections, shingles, cold sores, and nausea. Live vaccines should be avoided.  This medication has been linked to serious infections; higher rate of mortality; malignancy and lymphoproliferative disorders; major adverse cardiovascular events; thrombosis; gastrointestinal perforations; neutropenia; lymphopenia; anemia; liver enzyme elevations; and lipid elevations.

## 2023-07-14 LAB — CRC RECOMMENDATION EXT: NORMAL

## 2023-08-18 ENCOUNTER — TELEPHONE (OUTPATIENT)
Dept: FAMILY MEDICINE | Facility: CLINIC | Age: 78
End: 2023-08-18
Payer: COMMERCIAL

## 2023-08-18 NOTE — TELEPHONE ENCOUNTER
----- Message from Dana Rodríguez sent at 8/18/2023 11:37 AM CDT -----  Type: Patient Call Back    Who called:Self    What is the request in detail:In regards to an missed appt    Can the clinic reply by [facility]?No    Would the patient rather a call back or a response via [facility]? Call    Best call back number:.[phone] (home)     Additional Information:

## 2023-09-14 ENCOUNTER — LAB VISIT (OUTPATIENT)
Dept: LAB | Facility: HOSPITAL | Age: 78
End: 2023-09-14
Attending: FAMILY MEDICINE
Payer: COMMERCIAL

## 2023-09-14 DIAGNOSIS — D64.9 NORMOCYTIC ANEMIA: ICD-10-CM

## 2023-09-14 DIAGNOSIS — E78.5 DYSLIPIDEMIA: Chronic | ICD-10-CM

## 2023-09-14 DIAGNOSIS — I69.30 HISTORY OF STROKE WITH RESIDUAL DEFICIT: Chronic | ICD-10-CM

## 2023-09-14 DIAGNOSIS — I10 ESSENTIAL HYPERTENSION: Chronic | ICD-10-CM

## 2023-09-14 LAB
ALBUMIN SERPL BCP-MCNC: 3.6 G/DL (ref 3.5–5.2)
ALP SERPL-CCNC: 114 U/L (ref 55–135)
ALT SERPL W/O P-5'-P-CCNC: 39 U/L (ref 10–44)
ANION GAP SERPL CALC-SCNC: 12 MMOL/L (ref 8–16)
AST SERPL-CCNC: 30 U/L (ref 10–40)
BASOPHILS # BLD AUTO: 0.07 K/UL (ref 0–0.2)
BASOPHILS NFR BLD: 0.8 % (ref 0–1.9)
BILIRUB SERPL-MCNC: 0.6 MG/DL (ref 0.1–1)
BUN SERPL-MCNC: 13 MG/DL (ref 8–23)
CALCIUM SERPL-MCNC: 9.2 MG/DL (ref 8.7–10.5)
CHLORIDE SERPL-SCNC: 108 MMOL/L (ref 95–110)
CO2 SERPL-SCNC: 23 MMOL/L (ref 23–29)
CREAT SERPL-MCNC: 1 MG/DL (ref 0.5–1.4)
DIFFERENTIAL METHOD: ABNORMAL
EOSINOPHIL # BLD AUTO: 0.2 K/UL (ref 0–0.5)
EOSINOPHIL NFR BLD: 2.1 % (ref 0–8)
ERYTHROCYTE [DISTWIDTH] IN BLOOD BY AUTOMATED COUNT: 14.4 % (ref 11.5–14.5)
EST. GFR  (NO RACE VARIABLE): >60 ML/MIN/1.73 M^2
GLUCOSE SERPL-MCNC: 91 MG/DL (ref 70–110)
HCT VFR BLD AUTO: 38.3 % (ref 40–54)
HGB BLD-MCNC: 12.5 G/DL (ref 14–18)
IMM GRANULOCYTES # BLD AUTO: 0.02 K/UL (ref 0–0.04)
IMM GRANULOCYTES NFR BLD AUTO: 0.2 % (ref 0–0.5)
LYMPHOCYTES # BLD AUTO: 2.4 K/UL (ref 1–4.8)
LYMPHOCYTES NFR BLD: 29.3 % (ref 18–48)
MCH RBC QN AUTO: 29.6 PG (ref 27–31)
MCHC RBC AUTO-ENTMCNC: 32.6 G/DL (ref 32–36)
MCV RBC AUTO: 91 FL (ref 82–98)
MONOCYTES # BLD AUTO: 0.8 K/UL (ref 0.3–1)
MONOCYTES NFR BLD: 9.4 % (ref 4–15)
NEUTROPHILS # BLD AUTO: 4.8 K/UL (ref 1.8–7.7)
NEUTROPHILS NFR BLD: 58.2 % (ref 38–73)
NRBC BLD-RTO: 0 /100 WBC
PLATELET # BLD AUTO: 278 K/UL (ref 150–450)
PMV BLD AUTO: 10.9 FL (ref 9.2–12.9)
POTASSIUM SERPL-SCNC: 4 MMOL/L (ref 3.5–5.1)
PROT SERPL-MCNC: 7.7 G/DL (ref 6–8.4)
RBC # BLD AUTO: 4.22 M/UL (ref 4.6–6.2)
SODIUM SERPL-SCNC: 143 MMOL/L (ref 136–145)
WBC # BLD AUTO: 8.29 K/UL (ref 3.9–12.7)

## 2023-09-14 PROCEDURE — 85025 COMPLETE CBC W/AUTO DIFF WBC: CPT | Performed by: FAMILY MEDICINE

## 2023-09-14 PROCEDURE — 80053 COMPREHEN METABOLIC PANEL: CPT | Performed by: FAMILY MEDICINE

## 2023-09-14 PROCEDURE — 36415 COLL VENOUS BLD VENIPUNCTURE: CPT | Mod: PN | Performed by: FAMILY MEDICINE

## 2023-09-19 ENCOUNTER — OFFICE VISIT (OUTPATIENT)
Dept: FAMILY MEDICINE | Facility: CLINIC | Age: 78
End: 2023-09-19
Payer: COMMERCIAL

## 2023-09-19 VITALS
BODY MASS INDEX: 18.89 KG/M2 | HEART RATE: 74 BPM | HEIGHT: 67 IN | SYSTOLIC BLOOD PRESSURE: 112 MMHG | TEMPERATURE: 98 F | OXYGEN SATURATION: 98 % | WEIGHT: 120.38 LBS | DIASTOLIC BLOOD PRESSURE: 62 MMHG

## 2023-09-19 DIAGNOSIS — R19.5 POSITIVE FIT (FECAL IMMUNOCHEMICAL TEST): ICD-10-CM

## 2023-09-19 DIAGNOSIS — N52.9 ERECTILE DYSFUNCTION, UNSPECIFIED ERECTILE DYSFUNCTION TYPE: ICD-10-CM

## 2023-09-19 DIAGNOSIS — D64.9 NORMOCYTIC ANEMIA: ICD-10-CM

## 2023-09-19 DIAGNOSIS — I10 ESSENTIAL HYPERTENSION: Primary | Chronic | ICD-10-CM

## 2023-09-19 DIAGNOSIS — I69.30 HISTORY OF STROKE WITH RESIDUAL DEFICIT: Chronic | ICD-10-CM

## 2023-09-19 DIAGNOSIS — R31.0 GROSS HEMATURIA: ICD-10-CM

## 2023-09-19 DIAGNOSIS — E78.5 DYSLIPIDEMIA: Chronic | ICD-10-CM

## 2023-09-19 PROCEDURE — 1160F PR REVIEW ALL MEDS BY PRESCRIBER/CLIN PHARMACIST DOCUMENTED: ICD-10-PCS | Mod: CPTII,S$GLB,, | Performed by: FAMILY MEDICINE

## 2023-09-19 PROCEDURE — 99999 PR PBB SHADOW E&M-EST. PATIENT-LVL IV: CPT | Mod: PBBFAC,,, | Performed by: FAMILY MEDICINE

## 2023-09-19 PROCEDURE — 3288F PR FALLS RISK ASSESSMENT DOCUMENTED: ICD-10-PCS | Mod: CPTII,S$GLB,, | Performed by: FAMILY MEDICINE

## 2023-09-19 PROCEDURE — 1101F PT FALLS ASSESS-DOCD LE1/YR: CPT | Mod: CPTII,S$GLB,, | Performed by: FAMILY MEDICINE

## 2023-09-19 PROCEDURE — 3078F DIAST BP <80 MM HG: CPT | Mod: CPTII,S$GLB,, | Performed by: FAMILY MEDICINE

## 2023-09-19 PROCEDURE — 3074F SYST BP LT 130 MM HG: CPT | Mod: CPTII,S$GLB,, | Performed by: FAMILY MEDICINE

## 2023-09-19 PROCEDURE — 3074F PR MOST RECENT SYSTOLIC BLOOD PRESSURE < 130 MM HG: ICD-10-PCS | Mod: CPTII,S$GLB,, | Performed by: FAMILY MEDICINE

## 2023-09-19 PROCEDURE — 1159F PR MEDICATION LIST DOCUMENTED IN MEDICAL RECORD: ICD-10-PCS | Mod: CPTII,S$GLB,, | Performed by: FAMILY MEDICINE

## 2023-09-19 PROCEDURE — 1159F MED LIST DOCD IN RCRD: CPT | Mod: CPTII,S$GLB,, | Performed by: FAMILY MEDICINE

## 2023-09-19 PROCEDURE — 1126F AMNT PAIN NOTED NONE PRSNT: CPT | Mod: CPTII,S$GLB,, | Performed by: FAMILY MEDICINE

## 2023-09-19 PROCEDURE — 1126F PR PAIN SEVERITY QUANTIFIED, NO PAIN PRESENT: ICD-10-PCS | Mod: CPTII,S$GLB,, | Performed by: FAMILY MEDICINE

## 2023-09-19 PROCEDURE — 1101F PR PT FALLS ASSESS DOC 0-1 FALLS W/OUT INJ PAST YR: ICD-10-PCS | Mod: CPTII,S$GLB,, | Performed by: FAMILY MEDICINE

## 2023-09-19 PROCEDURE — 99999 PR PBB SHADOW E&M-EST. PATIENT-LVL IV: ICD-10-PCS | Mod: PBBFAC,,, | Performed by: FAMILY MEDICINE

## 2023-09-19 PROCEDURE — 99214 OFFICE O/P EST MOD 30 MIN: CPT | Mod: S$GLB,,, | Performed by: FAMILY MEDICINE

## 2023-09-19 PROCEDURE — 3078F PR MOST RECENT DIASTOLIC BLOOD PRESSURE < 80 MM HG: ICD-10-PCS | Mod: CPTII,S$GLB,, | Performed by: FAMILY MEDICINE

## 2023-09-19 PROCEDURE — 99214 PR OFFICE/OUTPT VISIT, EST, LEVL IV, 30-39 MIN: ICD-10-PCS | Mod: S$GLB,,, | Performed by: FAMILY MEDICINE

## 2023-09-19 PROCEDURE — 3288F FALL RISK ASSESSMENT DOCD: CPT | Mod: CPTII,S$GLB,, | Performed by: FAMILY MEDICINE

## 2023-09-19 PROCEDURE — 1160F RVW MEDS BY RX/DR IN RCRD: CPT | Mod: CPTII,S$GLB,, | Performed by: FAMILY MEDICINE

## 2023-09-19 RX ORDER — ATORVASTATIN CALCIUM 40 MG/1
40 TABLET, FILM COATED ORAL DAILY
Qty: 90 TABLET | Refills: 3 | Status: SHIPPED | OUTPATIENT
Start: 2023-09-19 | End: 2024-01-23 | Stop reason: SDUPTHER

## 2023-09-19 RX ORDER — AMLODIPINE BESYLATE 5 MG/1
5 TABLET ORAL DAILY
Qty: 90 TABLET | Refills: 3 | Status: SHIPPED | OUTPATIENT
Start: 2023-09-19 | End: 2024-01-23 | Stop reason: SDUPTHER

## 2023-09-19 RX ORDER — SILDENAFIL 100 MG/1
TABLET, FILM COATED ORAL
Qty: 10 TABLET | Refills: 11 | Status: SHIPPED | OUTPATIENT
Start: 2023-09-19 | End: 2024-01-23 | Stop reason: SDUPTHER

## 2023-09-19 RX ORDER — ESOMEPRAZOLE MAGNESIUM 40 MG/1
1 CAPSULE, DELAYED RELEASE ORAL DAILY
COMMUNITY
Start: 2023-09-13

## 2023-09-19 RX ORDER — VALSARTAN 160 MG/1
160 TABLET ORAL DAILY
Qty: 90 TABLET | Refills: 3 | Status: SHIPPED | OUTPATIENT
Start: 2023-09-19 | End: 2024-01-23 | Stop reason: SDUPTHER

## 2023-09-19 RX ORDER — TICAGRELOR 90 MG/1
90 TABLET ORAL 2 TIMES DAILY
COMMUNITY
Start: 2023-04-03 | End: 2024-01-23 | Stop reason: SDUPTHER

## 2023-09-19 NOTE — PROGRESS NOTES
Patient Name: Rashad Rodríguez    : 1945  MRN: [id number]      Subjective:     Patient ID: Rashad is a 78 y.o. male    Chief Complaint:  Hypertension and Hyperlipidemia    78-year-old male comes in for follow-up on hypertension and hyperlipidemia.  He has a history of a stroke with residual weakness in his bilateral legs, but does ambulate.     Hypertension  This is a chronic problem. The current episode started more than 1 year ago. The problem is controlled. Pertinent negatives include no anxiety, blurred vision, chest pain, headaches, malaise/fatigue, neck pain, orthopnea, palpitations or shortness of breath. Risk factors for coronary artery disease include male gender and dyslipidemia (Previous smoker, quit in ). Past treatments include angiotensin blockers and calcium channel blockers. The current treatment provides significant improvement. There are no compliance problems.  Hypertensive end-organ damage includes CVA. There is no history of angina or kidney disease. There is no history of chronic renal disease.   Hyperlipidemia  This is a chronic problem. The problem is controlled. He has no history of chronic renal disease, hypothyroidism or liver disease. Factors aggravating his hyperlipidemia include smoking. Pertinent negatives include no chest pain, focal sensory loss, myalgias or shortness of breath. Current antihyperlipidemic treatment includes statins. The current treatment provides significant improvement of lipids. There are no compliance problems.  Risk factors for coronary artery disease include male sex, dyslipidemia and hypertension.        Review of Systems   Constitutional:  Negative for malaise/fatigue.   Eyes:  Negative for blurred vision.   Respiratory:  Negative for shortness of breath.    Cardiovascular:  Negative for chest pain, palpitations and orthopnea.   Musculoskeletal:  Negative for myalgias and neck pain.   Neurological:  Negative for headaches.        Objective:   BP  "112/62 (BP Location: Right arm, Patient Position: Sitting, BP Method: Medium (Manual))   Pulse 74   Temp 98.2 °F (36.8 °C) (Oral)   Ht 5' 7" (1.702 m)   Wt 54.6 kg (120 lb 5.9 oz)   SpO2 98%   BMI 18.85 kg/m²     Physical Exam  Vitals reviewed.   Constitutional:       General: He is not in acute distress.     Appearance: He is well-developed. He is not diaphoretic.   HENT:      Head: Normocephalic.      Right Ear: External ear normal.      Left Ear: External ear normal.      Nose: Nose normal.      Mouth/Throat:      Pharynx: No oropharyngeal exudate.   Eyes:      Extraocular Movements: Extraocular movements intact.      Pupils: Pupils are equal, round, and reactive to light.   Neck:      Trachea: No tracheal deviation.   Cardiovascular:      Rate and Rhythm: Normal rate and regular rhythm.      Heart sounds: Normal heart sounds.   Pulmonary:      Effort: Pulmonary effort is normal.      Breath sounds: Normal breath sounds. No wheezing or rales.   Abdominal:      General: Bowel sounds are normal.      Palpations: Abdomen is soft. Abdomen is not rigid. There is no mass.      Tenderness: There is no abdominal tenderness. There is no guarding or rebound.   Musculoskeletal:      Cervical back: Normal range of motion and neck supple.   Lymphadenopathy:      Cervical: No cervical adenopathy.   Neurological:      Mental Status: He is alert and oriented to person, place, and time.      Gait: Gait normal.        Lab Visit on 09/14/2023   Component Date Value Ref Range Status    Sodium 09/14/2023 143  136 - 145 mmol/L Final    Potassium 09/14/2023 4.0  3.5 - 5.1 mmol/L Final    Chloride 09/14/2023 108  95 - 110 mmol/L Final    CO2 09/14/2023 23  23 - 29 mmol/L Final    Glucose 09/14/2023 91  70 - 110 mg/dL Final    BUN 09/14/2023 13  8 - 23 mg/dL Final    Creatinine 09/14/2023 1.0  0.5 - 1.4 mg/dL Final    Calcium 09/14/2023 9.2  8.7 - 10.5 mg/dL Final    Total Protein 09/14/2023 7.7  6.0 - 8.4 g/dL Final    Albumin " 09/14/2023 3.6  3.5 - 5.2 g/dL Final    Total Bilirubin 09/14/2023 0.6  0.1 - 1.0 mg/dL Final    Comment: For infants and newborns, interpretation of results should be based  on gestational age, weight and in agreement with clinical  observations.    Premature Infant recommended reference ranges:  Up to 24 hours.............<8.0 mg/dL  Up to 48 hours............<12.0 mg/dL  3-5 days..................<15.0 mg/dL  6-29 days.................<15.0 mg/dL      Alkaline Phosphatase 09/14/2023 114  55 - 135 U/L Final    AST 09/14/2023 30  10 - 40 U/L Final    ALT 09/14/2023 39  10 - 44 U/L Final    eGFR 09/14/2023 >60  >60 mL/min/1.73 m^2 Final    Anion Gap 09/14/2023 12  8 - 16 mmol/L Final    WBC 09/14/2023 8.29  3.90 - 12.70 K/uL Final    RBC 09/14/2023 4.22 (L)  4.60 - 6.20 M/uL Final    Hemoglobin 09/14/2023 12.5 (L)  14.0 - 18.0 g/dL Final    Hematocrit 09/14/2023 38.3 (L)  40.0 - 54.0 % Final    MCV 09/14/2023 91  82 - 98 fL Final    MCH 09/14/2023 29.6  27.0 - 31.0 pg Final    MCHC 09/14/2023 32.6  32.0 - 36.0 g/dL Final    RDW 09/14/2023 14.4  11.5 - 14.5 % Final    Platelets 09/14/2023 278  150 - 450 K/uL Final    MPV 09/14/2023 10.9  9.2 - 12.9 fL Final    Immature Granulocytes 09/14/2023 0.2  0.0 - 0.5 % Final    Gran # (ANC) 09/14/2023 4.8  1.8 - 7.7 K/uL Final    Immature Grans (Abs) 09/14/2023 0.02  0.00 - 0.04 K/uL Final    Comment: Mild elevation in immature granulocytes is non specific and   can be seen in a variety of conditions including stress response,   acute inflammation, trauma and pregnancy. Correlation with other   laboratory and clinical findings is essential.      Lymph # 09/14/2023 2.4  1.0 - 4.8 K/uL Final    Mono # 09/14/2023 0.8  0.3 - 1.0 K/uL Final    Eos # 09/14/2023 0.2  0.0 - 0.5 K/uL Final    Baso # 09/14/2023 0.07  0.00 - 0.20 K/uL Final    nRBC 09/14/2023 0  0 /100 WBC Final    Gran % 09/14/2023 58.2  38.0 - 73.0 % Final    Lymph % 09/14/2023 29.3  18.0 - 48.0 % Final    Mono %  09/14/2023 9.4  4.0 - 15.0 % Final    Eosinophil % 09/14/2023 2.1  0.0 - 8.0 % Final    Basophil % 09/14/2023 0.8  0.0 - 1.9 % Final    Differential Method 09/14/2023 Automated   Final       Assessment        ICD-10-CM ICD-9-CM   1. Essential hypertension  I10 401.9   2. Dyslipidemia  E78.5 272.4   3. Erectile dysfunction, unspecified erectile dysfunction type  N52.9 607.84   4. Normocytic anemia  D64.9 285.9   5. History of stroke with residual deficit  I69.30 438.9   6. Gross hematuria  R31.0 599.71   7. Positive FIT (fecal immunochemical test)  R19.5 792.1         Plan:     1. Essential hypertension  Overview:  BP Readings from Last 3 Encounters:   09/19/23 112/62   02/15/23 112/80   08/08/22 112/74      ACC/AHA guidelines on blood pressure goals reviewed.  Reinforced correct way of measuring blood pressure.     Assessment & Plan:  Blood pressure is at goal.  Continue current regimen.    Orders:  -     amLODIPine (NORVASC) 5 MG tablet; Take 1 tablet (5 mg total) by mouth once daily.  Dispense: 90 tablet; Refill: 3  -     valsartan (DIOVAN) 160 MG tablet; Take 1 tablet (160 mg total) by mouth once daily.  Dispense: 90 tablet; Refill: 3  -     Comprehensive Metabolic Panel; Future; Expected date: 01/19/2024  -     Lipid Panel; Future; Expected date: 01/19/2024    2. Dyslipidemia  Overview:  Lab Results   Component Value Date    CHOL 134 02/08/2023    CHOL 139 08/05/2022    CHOL 176 07/07/2022     Lab Results   Component Value Date    HDL 54 02/08/2023    HDL 50 08/05/2022    HDL 61 07/07/2022     Lab Results   Component Value Date    LDLCALC 70.4 02/08/2023    LDLCALC 78.4 08/05/2022    LDLCALC 95.6 07/07/2022     Lab Results   Component Value Date    TRIG 48 02/08/2023    TRIG 53 08/05/2022    TRIG 97 07/07/2022     Lab Results   Component Value Date    CHOLHDL 40.3 02/08/2023    CHOLHDL 36.0 08/05/2022    CHOLHDL 34.7 07/07/2022        The ASCVD Risk score (Adriano GILBERT, et al., 2019) failed to calculate for the  following reasons:    The patient has a prior MI or stroke diagnosis      Assessment & Plan:  Continue high dose statin.    Orders:  -     atorvastatin (LIPITOR) 40 MG tablet; Take 1 tablet (40 mg total) by mouth once daily.  Dispense: 90 tablet; Refill: 3  -     Comprehensive Metabolic Panel; Future; Expected date: 01/19/2024  -     Lipid Panel; Future; Expected date: 01/19/2024    3. Erectile dysfunction, unspecified erectile dysfunction type  Assessment & Plan:  Reports good response with sildenafil.  Will continue.    Orders:  -     sildenafiL (VIAGRA) 100 MG tablet; TAKE 1 TABLET BY MOUTH 1 HOUR PRIOR TO SEX. MAX OF 1 TABLET IN 24 HOURS  Dispense: 10 tablet; Refill: 11    4. Normocytic anemia  Overview:  Lab Results   Component Value Date    HGB 12.5 (L) 09/14/2023    HGB 12.9 (L) 02/15/2023    HGB 12.6 (L) 02/08/2023     Lab Results   Component Value Date    IRON 35 (L) 02/15/2023    IRON 105 02/03/2022      Lab Results   Component Value Date    FERRITIN 55 02/15/2023    FERRITIN 83 02/03/2022         Assessment & Plan:  Hemoglobin stable.  Monitor iron levels.  Discussed iron supplementation.    Orders:  -     CBC Auto Differential; Future; Expected date: 01/19/2024  -     Iron and TIBC; Future; Expected date: 01/19/2024  -     Ferritin; Future; Expected date: 01/19/2024    5. History of stroke with residual deficit  Overview:  10/2020- Left lacunar stroke  07/2022- Right Upper Basal Ganglia    Reports mild bilateral lower extremity weakness    Assessment & Plan:  No acute concerns.  Continue Brilinta and high dose atorvastatin      6. Gross hematuria  Overview:  Reports one isolated episode of gross hematuria in early January.  Saw Dr. Fraser at [facility]  CT urogram done by Urology on February 22, 2023- no acute concerns.  He was advised to have a cystoscopy done however he had postponed it.  Encouraged patient to make an appointment for full evaluation given his smoking history      7. Positive FIT (fecal  immunochemical test)  Assessment & Plan:  States he had a colonoscopy done by GI at [facility] in July.  Will attempt to get records.             -Juanito Dejesus Jr., MD, AAHIVS      This office note has been dictated.  This dictation has been generated using M-Modal Fluency Direct dictation; some phonetic errors may occur.         Patient Instructions   When you get a chance, please make sure to call Dr. Fraser's office to schedule the cystoscopy (the camera test to look at your bladder)      Future Appointments   Date Time Provider Department Center   1/19/2024  7:15 AM LAB, [facility] DRAW STATION [facility]   1/23/2024  9:00 AM Juanito Dejesus Jr., MD [facility]

## 2023-09-19 NOTE — PATIENT INSTRUCTIONS
When you get a chance, please make sure to call Dr. Fraser's office to schedule the cystoscopy (the camera test to look at your bladder)

## 2023-09-24 PROBLEM — E78.5 DYSLIPIDEMIA: Chronic | Status: ACTIVE | Noted: 2023-02-15

## 2023-09-24 PROBLEM — N52.9 ERECTILE DYSFUNCTION: Chronic | Status: ACTIVE | Noted: 2023-09-24

## 2023-09-24 PROBLEM — N52.9 ERECTILE DYSFUNCTION: Status: ACTIVE | Noted: 2023-09-24

## 2023-09-24 PROBLEM — I69.30 HISTORY OF STROKE WITH RESIDUAL DEFICIT: Chronic | Status: ACTIVE | Noted: 2020-10-01

## 2023-09-24 NOTE — ASSESSMENT & PLAN NOTE
States he had a colonoscopy done by GI at [facility] in July.  Will attempt to get records.

## 2023-09-25 ENCOUNTER — PATIENT OUTREACH (OUTPATIENT)
Dept: ADMINISTRATIVE | Facility: HOSPITAL | Age: 78
End: 2023-09-25
Payer: COMMERCIAL

## 2023-09-25 NOTE — LETTER
AUTHORIZATION FOR RELEASE OF   CONFIDENTIAL INFORMATION        We are seeing Rashad Rodríguez, date of birth 1945, in the clinic at [facility]. Juanito Dejesus Jr., MD is the patient's PCP. Rashad Rodríguez has an outstanding lab/procedure at the time we reviewed his chart. In order to help keep his health information updated, he has authorized us to request the following medical record(s):        (  )  MAMMOGRAM                                      ( X )  COLONOSCOPY       (  )  PAP SMEAR                                          (  )  OUTSIDE LAB RESULTS     (  )  DEXA SCAN                                          (  )  EYE EXAM            (  )  FOOT EXAM                                          (  )  ENTIRE RECORD     (  )  OUTSIDE IMMUNIZATIONS                 (  )  _______________         Please fax records to [facility], Roque, Cesar R. Jr., MD, FAX [phone]   [address] [facility] [address]       If you have any questions, please contact Erick Shi MA,[facility] at [phone].              Patient Name: Rashad Rodríguez  : 1945  Patient Phone #: [phone]

## 2023-09-26 ENCOUNTER — PATIENT OUTREACH (OUTPATIENT)
Dept: ADMINISTRATIVE | Facility: HOSPITAL | Age: 78
End: 2023-09-26
Payer: COMMERCIAL

## 2023-09-28 ENCOUNTER — TELEPHONE (OUTPATIENT)
Dept: FAMILY MEDICINE | Facility: CLINIC | Age: 78
End: 2023-09-28
Payer: COMMERCIAL

## 2023-10-03 ENCOUNTER — PATIENT OUTREACH (OUTPATIENT)
Dept: ADMINISTRATIVE | Facility: HOSPITAL | Age: 78
End: 2023-10-03
Payer: COMMERCIAL

## 2024-01-19 ENCOUNTER — LAB VISIT (OUTPATIENT)
Dept: LAB | Facility: HOSPITAL | Age: 79
End: 2024-01-19
Attending: FAMILY MEDICINE
Payer: COMMERCIAL

## 2024-01-19 DIAGNOSIS — I10 ESSENTIAL HYPERTENSION: Chronic | ICD-10-CM

## 2024-01-19 DIAGNOSIS — E78.5 DYSLIPIDEMIA: Chronic | ICD-10-CM

## 2024-01-19 DIAGNOSIS — D64.9 NORMOCYTIC ANEMIA: ICD-10-CM

## 2024-01-19 LAB
ALBUMIN SERPL BCP-MCNC: 3.6 G/DL (ref 3.5–5.2)
ALP SERPL-CCNC: 101 U/L (ref 55–135)
ALT SERPL W/O P-5'-P-CCNC: 29 U/L (ref 10–44)
ANION GAP SERPL CALC-SCNC: 9 MMOL/L (ref 8–16)
AST SERPL-CCNC: 27 U/L (ref 10–40)
BASOPHILS # BLD AUTO: 0.07 K/UL (ref 0–0.2)
BASOPHILS NFR BLD: 0.7 % (ref 0–1.9)
BILIRUB SERPL-MCNC: 0.8 MG/DL (ref 0.1–1)
BUN SERPL-MCNC: 20 MG/DL (ref 8–23)
CALCIUM SERPL-MCNC: 9.4 MG/DL (ref 8.7–10.5)
CHLORIDE SERPL-SCNC: 107 MMOL/L (ref 95–110)
CHOLEST SERPL-MCNC: 133 MG/DL (ref 120–199)
CHOLEST/HDLC SERPL: 2.6 {RATIO} (ref 2–5)
CO2 SERPL-SCNC: 24 MMOL/L (ref 23–29)
CREAT SERPL-MCNC: 1.1 MG/DL (ref 0.5–1.4)
DIFFERENTIAL METHOD BLD: ABNORMAL
EOSINOPHIL # BLD AUTO: 0.2 K/UL (ref 0–0.5)
EOSINOPHIL NFR BLD: 2.4 % (ref 0–8)
ERYTHROCYTE [DISTWIDTH] IN BLOOD BY AUTOMATED COUNT: 14.7 % (ref 11.5–14.5)
EST. GFR  (NO RACE VARIABLE): >60 ML/MIN/1.73 M^2
FERRITIN SERPL-MCNC: 63 NG/ML (ref 20–300)
GLUCOSE SERPL-MCNC: 95 MG/DL (ref 70–110)
HCT VFR BLD AUTO: 40.1 % (ref 40–54)
HDLC SERPL-MCNC: 52 MG/DL (ref 40–75)
HDLC SERPL: 39.1 % (ref 20–50)
HGB BLD-MCNC: 13.3 G/DL (ref 14–18)
IMM GRANULOCYTES # BLD AUTO: 0.02 K/UL (ref 0–0.04)
IMM GRANULOCYTES NFR BLD AUTO: 0.2 % (ref 0–0.5)
IRON SERPL-MCNC: 51 UG/DL (ref 45–160)
LDLC SERPL CALC-MCNC: 69.6 MG/DL (ref 63–159)
LYMPHOCYTES # BLD AUTO: 2.8 K/UL (ref 1–4.8)
LYMPHOCYTES NFR BLD: 28.9 % (ref 18–48)
MCH RBC QN AUTO: 29.9 PG (ref 27–31)
MCHC RBC AUTO-ENTMCNC: 33.2 G/DL (ref 32–36)
MCV RBC AUTO: 90 FL (ref 82–98)
MONOCYTES # BLD AUTO: 0.9 K/UL (ref 0.3–1)
MONOCYTES NFR BLD: 9.1 % (ref 4–15)
NEUTROPHILS # BLD AUTO: 5.7 K/UL (ref 1.8–7.7)
NEUTROPHILS NFR BLD: 58.7 % (ref 38–73)
NONHDLC SERPL-MCNC: 81 MG/DL
NRBC BLD-RTO: 0 /100 WBC
PLATELET # BLD AUTO: 301 K/UL (ref 150–450)
PMV BLD AUTO: 11 FL (ref 9.2–12.9)
POTASSIUM SERPL-SCNC: 4 MMOL/L (ref 3.5–5.1)
PROT SERPL-MCNC: 7.8 G/DL (ref 6–8.4)
RBC # BLD AUTO: 4.45 M/UL (ref 4.6–6.2)
SATURATED IRON: 13 % (ref 20–50)
SODIUM SERPL-SCNC: 140 MMOL/L (ref 136–145)
TOTAL IRON BINDING CAPACITY: 382 UG/DL (ref 250–450)
TRANSFERRIN SERPL-MCNC: 258 MG/DL (ref 200–375)
TRIGL SERPL-MCNC: 57 MG/DL (ref 30–150)
WBC # BLD AUTO: 9.74 K/UL (ref 3.9–12.7)

## 2024-01-19 PROCEDURE — 80061 LIPID PANEL: CPT | Performed by: FAMILY MEDICINE

## 2024-01-19 PROCEDURE — 83540 ASSAY OF IRON: CPT | Performed by: FAMILY MEDICINE

## 2024-01-19 PROCEDURE — 36415 COLL VENOUS BLD VENIPUNCTURE: CPT | Mod: PN | Performed by: FAMILY MEDICINE

## 2024-01-19 PROCEDURE — 85025 COMPLETE CBC W/AUTO DIFF WBC: CPT | Performed by: FAMILY MEDICINE

## 2024-01-19 PROCEDURE — 80053 COMPREHEN METABOLIC PANEL: CPT | Performed by: FAMILY MEDICINE

## 2024-01-19 PROCEDURE — 82728 ASSAY OF FERRITIN: CPT | Performed by: FAMILY MEDICINE

## 2024-01-23 ENCOUNTER — OFFICE VISIT (OUTPATIENT)
Dept: FAMILY MEDICINE | Facility: CLINIC | Age: 79
End: 2024-01-23
Payer: COMMERCIAL

## 2024-01-23 VITALS
WEIGHT: 117.94 LBS | DIASTOLIC BLOOD PRESSURE: 56 MMHG | SYSTOLIC BLOOD PRESSURE: 110 MMHG | HEIGHT: 67 IN | OXYGEN SATURATION: 99 % | TEMPERATURE: 98 F | BODY MASS INDEX: 18.51 KG/M2 | HEART RATE: 88 BPM

## 2024-01-23 DIAGNOSIS — N52.9 ERECTILE DYSFUNCTION, UNSPECIFIED ERECTILE DYSFUNCTION TYPE: ICD-10-CM

## 2024-01-23 DIAGNOSIS — F17.200 NICOTINE DEPENDENCE WITH CURRENT USE: Chronic | ICD-10-CM

## 2024-01-23 DIAGNOSIS — I69.30 HISTORY OF STROKE WITH RESIDUAL DEFICIT: Chronic | ICD-10-CM

## 2024-01-23 DIAGNOSIS — E78.5 DYSLIPIDEMIA: Chronic | ICD-10-CM

## 2024-01-23 DIAGNOSIS — I10 ESSENTIAL HYPERTENSION: Primary | Chronic | ICD-10-CM

## 2024-01-23 DIAGNOSIS — D50.9 IRON DEFICIENCY ANEMIA, UNSPECIFIED IRON DEFICIENCY ANEMIA TYPE: Chronic | ICD-10-CM

## 2024-01-23 DIAGNOSIS — J98.4 CAVITARY LESION OF LUNG: ICD-10-CM

## 2024-01-23 PROCEDURE — 99999 PR PBB SHADOW E&M-EST. PATIENT-LVL IV: CPT | Mod: PBBFAC,,, | Performed by: FAMILY MEDICINE

## 2024-01-23 PROCEDURE — 1126F AMNT PAIN NOTED NONE PRSNT: CPT | Mod: CPTII,S$GLB,, | Performed by: FAMILY MEDICINE

## 2024-01-23 PROCEDURE — 3078F DIAST BP <80 MM HG: CPT | Mod: CPTII,S$GLB,, | Performed by: FAMILY MEDICINE

## 2024-01-23 PROCEDURE — 3074F SYST BP LT 130 MM HG: CPT | Mod: CPTII,S$GLB,, | Performed by: FAMILY MEDICINE

## 2024-01-23 PROCEDURE — 1159F MED LIST DOCD IN RCRD: CPT | Mod: CPTII,S$GLB,, | Performed by: FAMILY MEDICINE

## 2024-01-23 PROCEDURE — 1101F PT FALLS ASSESS-DOCD LE1/YR: CPT | Mod: CPTII,S$GLB,, | Performed by: FAMILY MEDICINE

## 2024-01-23 PROCEDURE — 1160F RVW MEDS BY RX/DR IN RCRD: CPT | Mod: CPTII,S$GLB,, | Performed by: FAMILY MEDICINE

## 2024-01-23 PROCEDURE — 3288F FALL RISK ASSESSMENT DOCD: CPT | Mod: CPTII,S$GLB,, | Performed by: FAMILY MEDICINE

## 2024-01-23 PROCEDURE — 99214 OFFICE O/P EST MOD 30 MIN: CPT | Mod: S$GLB,,, | Performed by: FAMILY MEDICINE

## 2024-01-23 RX ORDER — VALSARTAN 160 MG/1
160 TABLET ORAL DAILY
Qty: 90 TABLET | Refills: 3 | Status: SHIPPED | OUTPATIENT
Start: 2024-01-23

## 2024-01-23 RX ORDER — AMLODIPINE BESYLATE 5 MG/1
5 TABLET ORAL DAILY
Qty: 90 TABLET | Refills: 3 | Status: SHIPPED | OUTPATIENT
Start: 2024-01-23

## 2024-01-23 RX ORDER — FERROUS SULFATE 325(65) MG
325 TABLET ORAL
Refills: 0 | COMMUNITY
Start: 2024-01-23

## 2024-01-23 RX ORDER — ATORVASTATIN CALCIUM 40 MG/1
40 TABLET, FILM COATED ORAL DAILY
Qty: 90 TABLET | Refills: 3 | Status: SHIPPED | OUTPATIENT
Start: 2024-01-23

## 2024-01-23 RX ORDER — ASPIRIN 81 MG/1
81 TABLET ORAL DAILY
Qty: 90 TABLET | Refills: 3 | COMMUNITY
Start: 2024-01-23 | End: 2025-01-22

## 2024-01-23 RX ORDER — SILDENAFIL 100 MG/1
TABLET, FILM COATED ORAL
Qty: 10 TABLET | Refills: 11 | Status: SHIPPED | OUTPATIENT
Start: 2024-01-23

## 2024-01-23 RX ORDER — TICAGRELOR 90 MG/1
90 TABLET ORAL 2 TIMES DAILY
Qty: 180 TABLET | Refills: 3 | Status: SHIPPED | OUTPATIENT
Start: 2024-01-23

## 2024-01-23 NOTE — ASSESSMENT & PLAN NOTE
I reviewed with the patient the [place]. Preventative Services Task Force Recommendation on Lung Cancer Screening With Low-Dose Computed Tomography.  Patient meets eligibility criteria as per current CMS guidelines for subsequent LDCT lung cancer screening (age 50-80; asymptomatic; tobacco smoking hx of at least 30 pack years; current smoker or one who has quit smoking within the last 15 years).  Benefits and harms of screening discussed with patient, including follow-up diagnostic testing, over-diagnosis, false positive rate, and total radiation exposure.  Patient counseled on the importance of adherence to annual lung cancer LDCT screening, impact of comorbidities and ability or willingness to undergo diagnosis and treatment.  Patient counseled on the importance of maintaining cigarette smoking abstinence if former smoker; or the importance of smoking cessation if current smoker and, if appropriate, furnishing of information about tobacco cessation interventions  All questions answered.   Patient wishes to proceed with continued annual surveillance testing.

## 2024-02-15 NOTE — PROGRESS NOTES
[place] - Psychiatry [address] [address]  [address] [facility]  [place] [address]  Phone: [phone]  Fax: [phone]                  Gwendolyn Fournier   3/16/2017 9:30 AM   Office Visit    Description:  Female : 1952   Provider:  Wei Jiménez MD   Department:  [facility]           Reason for Visit     Follow-up                To Do List           Goals (5 Years of Data)     None      Follow-Up and Disposition     Return in about 3 months (around 2017), or if symptoms worsen or fail to improve.       These Medications        Disp Refills Start End    buPROPion (WELLBUTRIN SR) 150 MG TBSR 12 hr tablet 180 tablet 1 3/16/2017     Take 1 tablet (150 mg total) by mouth 2 (two) times daily. - Oral    Pharmacy: [facility] [address] [place] [address] [place] AT [facility] Ph #: [phone]       duloxetine (CYMBALTA) 60 MG capsule 90 capsule 1 3/16/2017     Take 1 capsule (60 mg total) by mouth once daily. - Oral    Pharmacy: [facility] [address] [place] [address] [place] AT [facility] Ph #: [phone]         [facility]     [facility] Care Line -  Assistance  Registered nurses in the [facility] Center provide clinical advisement, health education, appointment booking, and other advisory services.  Call for this free service at [phone].             Medications           Message regarding Medications     Verify the changes and/or additions to your medication regime listed below are the same as discussed with your clinician today.  If any of these changes or additions are incorrect, please notify your healthcare provider.        CHANGE how you are taking these medications     Start Taking Instead of    buPROPion (WELLBUTRIN SR) 150 MG TBSR 12 hr tablet buPROPion (WELLBUTRIN SR) 150 MG TBSR 12 hr tablet    Dosage:  Take 1 tablet (150 mg total) by mouth 2 (two) times daily. Dosage:  TAKE 1  "  Patient Name: Rashad Rodríguez    : 1945  MRN: [id number]      Subjective:     Patient ID: Rashad is a 78 y.o. male    Chief Complaint:  Hypertension    Hypertension  This is a chronic problem. The current episode started more than 1 year ago. The problem is controlled. Pertinent negatives include no anxiety, blurred vision, chest pain, headaches, malaise/fatigue, neck pain, orthopnea, palpitations or shortness of breath. Risk factors for coronary artery disease include male gender and dyslipidemia (Previous smoker, quit in ). Past treatments include angiotensin blockers and calcium channel blockers. The current treatment provides significant improvement. There are no compliance problems.  Hypertensive end-organ damage includes CVA. There is no history of angina or kidney disease. There is no history of chronic renal disease.   Hyperlipidemia  This is a chronic problem. The problem is controlled. He has no history of chronic renal disease, hypothyroidism or liver disease. Factors aggravating his hyperlipidemia include smoking. Pertinent negatives include no chest pain, focal sensory loss, myalgias or shortness of breath. Current antihyperlipidemic treatment includes statins. The current treatment provides significant improvement of lipids. There are no compliance problems.  Risk factors for coronary artery disease include male sex, dyslipidemia and hypertension.        Review of Systems   Constitutional:  Negative for malaise/fatigue.   Eyes:  Negative for blurred vision.   Respiratory:  Negative for shortness of breath.    Cardiovascular:  Negative for chest pain, palpitations and orthopnea.   Musculoskeletal:  Negative for myalgias and neck pain.   Neurological:  Negative for headaches.        Objective:   BP (!) 110/56 (BP Location: Left arm, Patient Position: Sitting, BP Method: Medium (Manual))   Pulse 88   Temp 98.3 °F (36.8 °C) (Oral)   Ht 5' 7" (1.702 m)   Wt 53.5 kg (117 lb 15.1 oz)   SpO2 99% "   BMI 18.47 kg/m²     Physical Exam  Vitals reviewed.   Constitutional:       General: He is not in acute distress.     Appearance: He is well-developed. He is not diaphoretic.   HENT:      Head: Normocephalic.      Right Ear: External ear normal.      Left Ear: External ear normal.      Nose: Nose normal.      Mouth/Throat:      Pharynx: No oropharyngeal exudate.   Eyes:      Extraocular Movements: Extraocular movements intact.      Pupils: Pupils are equal, round, and reactive to light.   Neck:      Trachea: No tracheal deviation.   Cardiovascular:      Rate and Rhythm: Normal rate and regular rhythm.      Heart sounds: Normal heart sounds.   Pulmonary:      Effort: Pulmonary effort is normal.      Breath sounds: Normal breath sounds. No wheezing or rales.   Abdominal:      General: Bowel sounds are normal.      Palpations: Abdomen is soft. Abdomen is not rigid. There is no mass.      Tenderness: There is no abdominal tenderness. There is no guarding or rebound.   Musculoskeletal:      Cervical back: Normal range of motion and neck supple.   Lymphadenopathy:      Cervical: No cervical adenopathy.   Neurological:      Mental Status: He is alert and oriented to person, place, and time.      Gait: Gait normal.        Assessment        ICD-10-CM ICD-9-CM   1. Essential hypertension  I10 401.9   2. History of stroke with residual deficit  I69.30 438.9   3. Dyslipidemia  E78.5 272.4   4. Nicotine dependence with current use  F17.200 305.1   5. Iron deficiency anemia, unspecified iron deficiency anemia type  D50.9 280.9   6. Erectile dysfunction, unspecified erectile dysfunction type  N52.9 607.84   7. Cavitary lesion of lung  J98.4 518.89         Plan:     1. Essential hypertension  Assessment & Plan:  Blood pressure at goal.  Continue current regimen.    Orders:  -     amLODIPine (NORVASC) 5 MG tablet; Take 1 tablet (5 mg total) by mouth once daily.  Dispense: 90 tablet; Refill: 3  -     atorvastatin (LIPITOR) 40 MG  TABLET(150 MG) BY MOUTH EVERY DAY    Reason for Change:  Reorder       STOP taking these medications     hydrochlorothiazide (HYDRODIURIL) 25 MG tablet Take 25 mg by mouth once daily.    loratadine (CLARITIN) 10 mg tablet Take 1 tablet (10 mg total) by mouth once daily.    diazePAM (VALIUM) 5 MG tablet Take 1 tablet (5 mg total) by mouth as needed.           Verify that the below list of medications is an accurate representation of the medications you are currently taking.  If none reported, the list may be blank. If incorrect, please contact your healthcare provider. Carry this list with you in case of emergency.           Current Medications     allopurinol (ZYLOPRIM) 100 MG tablet TAKE 1 TABLET(100 MG) BY MOUTH TWICE DAILY    amlodipine (NORVASC) 5 MG tablet TAKE 1 TABLET(5 MG) BY MOUTH EVERY DAY    buPROPion (WELLBUTRIN SR) 150 MG TBSR 12 hr tablet Take 1 tablet (150 mg total) by mouth 2 (two) times daily.    colchicine 0.6 mg tablet Take 2 tabs PO at onset of gout pain, then 1 tab PO one hour later if not resolved.  Repeat in 24 hours until resolved.    duloxetine (CYMBALTA) 60 MG capsule Take 1 capsule (60 mg total) by mouth once daily.    meloxicam (MOBIC) 15 MG tablet Take 1 tablet (15 mg total) by mouth as needed.    metoprolol succinate (TOPROL-XL) 50 MG 24 hr tablet Take 1 tablet (50 mg total) by mouth once daily.    tizanidine (ZANAFLEX) 4 MG tablet Take 4 mg by mouth as needed.     ammonium lactate (LAC-HYDRIN) 12 % lotion 2 (two) times daily as needed.     fluticasone (FLONASE) 50 mcg/actuation nasal spray 1 spray by Each Nare route once daily.    oxycodone-acetaminophen (PERCOCET) 5-325 mg per tablet TK 1 T PO Q 6 TO 8 H PRN P    polyethylene glycol (COLYTE) 240-22.72-6.72 -5.84 gram SolR Take 4,000 mLs (4 L total) by mouth as directed.    trazodone (DESYREL) 100 MG tablet TAKE 1/2 TO 1 TABLET(50  MG) BY MOUTH EVERY NIGHT AS NEEDED FOR INSOMNIA           Clinical Reference Information          "  Your Vitals Were     BP Pulse Height Weight BMI    126/72 (BP Location: Right arm, Patient Position: Sitting, BP Method: Manual) 78 5' 7" (1.702 m) 110.9 kg (244 lb 7.8 oz) 38.29 kg/m2      Blood Pressure          Most Recent Value    BP  126/72      Allergies as of 3/16/2017     No Known Allergies      Immunizations Administered on Date of Encounter - 3/16/2017     None      Instructions            You have been provided with a certain amount of medication with a specified number of refills.  Please follow up within an adequate time before you run out of medications.  If you run out of medication before your next appointment you may be charged a refill fee.  REFILLS FOR CONTROLLED SUBSTANCES WILL NOT BE GIVEN WITHOUT AN APPOINTMENT.  I will not honor or fill automated refill requests from pharmacies.    Please book your next appointment today by phone: [phone].  Call 8am and 10:30am to speak with my assistant.    PLEASE BE AT LEAST 15 MINUTES EARLY FOR YOUR NEXT APPOINTMENT.  PLEASE, DO NOT BE LATE OR YOU WILL BE TURNED AWAY AND ASKED TO RESCHEDULE.  YOU MUST ALLOW TIME FOR CHECK-IN AS WELL AS GET YOUR VITAL SIGNS AND GO OVER YOUR MEDICATIONS.  Tardiness can affect and is not fair to the patients who present after you and are on time for their appointments.  It causes a delay in the appointments for patients and staff.  IF YOU ARE LATE FOR YOUR APPOINTMENT TIME, YOU WILL BE SEEN FOR A SHORTENED AMOUNT OF TIME OR ASKED TO RESCHEDULE.  THERE IS A POSSIBILITY THAT YOU WILL BE CHARGED FOR THE APPOINTMENT TIME PERSONALLY AND IT WILL NOT GO TO YOUR INSURANCE.  YOU MAY ALSO BE DISCHARGED FROM CLINIC with multiple "No Show" appointments.     -----------------------------------------------------------------------------------------------------------------  IF YOU FEEL SUICIDAL OR HAVING THOUGHTS OR PLANS TO HURT YOURSELF OR OTHERS, CALL 911 OR REPORT TO THE NEAREST EMERGENCY ROOM.  YOU CAN ALSO ACCESS ONE OF THE " tablet; Take 1 tablet (40 mg total) by mouth once daily.  Dispense: 90 tablet; Refill: 3  -     valsartan (DIOVAN) 160 MG tablet; Take 1 tablet (160 mg total) by mouth once daily.  Dispense: 90 tablet; Refill: 3  -     Comprehensive Metabolic Panel; Future; Expected date: 07/23/2024  -     CBC Auto Differential; Future; Expected date: 07/23/2024  -     Lipid Panel; Future; Expected date: 07/23/2024    2. History of stroke with residual deficit  Overview:  10/2020- Left lacunar stroke  07/2022- Right Upper Basal Ganglia    Reports mild bilateral lower extremity weakness    Assessment & Plan:  No acute concerns.  Continue Brilinta and atorvastatin.    Orders:  -     aspirin (ECOTRIN) 81 MG EC tablet; Take 1 tablet (81 mg total) by mouth once daily.  Dispense: 90 tablet; Refill: 3  -     atorvastatin (LIPITOR) 40 MG tablet; Take 1 tablet (40 mg total) by mouth once daily.  Dispense: 90 tablet; Refill: 3  -     BRILINTA 90 mg tablet; Take 1 tablet (90 mg total) by mouth 2 (two) times daily.  Dispense: 180 tablet; Refill: 3  -     Comprehensive Metabolic Panel; Future; Expected date: 07/23/2024  -     CBC Auto Differential; Future; Expected date: 07/23/2024  -     Lipid Panel; Future; Expected date: 07/23/2024    3. Dyslipidemia  Overview:  Lab Results   Component Value Date    CHOL 133 01/19/2024    CHOL 134 02/08/2023    CHOL 139 08/05/2022     Lab Results   Component Value Date    HDL 52 01/19/2024    HDL 54 02/08/2023    HDL 50 08/05/2022     Lab Results   Component Value Date    LDLCALC 69.6 01/19/2024    LDLCALC 70.4 02/08/2023    LDLCALC 78.4 08/05/2022     Lab Results   Component Value Date    TRIG 57 01/19/2024    TRIG 48 02/08/2023    TRIG 53 08/05/2022     Lab Results   Component Value Date    CHOLHDL 39.1 01/19/2024    CHOLHDL 40.3 02/08/2023    CHOLHDL 36.0 08/05/2022        The 10-year ASCVD risk score (Adriano GILBERT, et al., 2019) is: 25.3%    Values used to calculate the score:      Age: 78 years      Sex:  FOLLOWING HOTLINES:    [facility]  [phone]    [facility]   [facility] Crisis Line   [phone]     [facility]  24 hours / 7 days   [phone])   [phone])            Language Assistance Services     ATTENTION: Language assistance services are available, free of charge. Please call [phone].      ATENCIÓN: Si habla español, tiene a paz disposición servicios gratuitos de asistencia lingüística. Llame al [phone].     CHÚ Ý: N?u b?n nói Ti?ng Vi?t, có các d?ch v? h? tr? ngôn ng? mi?n phí dành cho b?n. G?i s? [phone].         [facility] complies with applicable Federal civil rights laws and does not discriminate on the basis of race, color, national origin, age, disability, or sex.         Male      Is Non- : Yes      Diabetic: No      Tobacco smoker: Yes      Systolic Blood Pressure: 110 mmHg      Is BP treated: Yes      HDL Cholesterol: 52 mg/dL      Total Cholesterol: 133 mg/dL      Assessment & Plan:  Continue high dose atorvastatin.    Orders:  -     atorvastatin (LIPITOR) 40 MG tablet; Take 1 tablet (40 mg total) by mouth once daily.  Dispense: 90 tablet; Refill: 3  -     Comprehensive Metabolic Panel; Future; Expected date: 07/23/2024  -     Lipid Panel; Future; Expected date: 07/23/2024    4. Nicotine dependence with current use  Assessment & Plan:  I reviewed with the patient the U.S. Preventative Services Task Force Recommendation on Lung Cancer Screening With Low-Dose Computed Tomography.  Patient meets eligibility criteria as per current CMS guidelines for subsequent LDCT lung cancer screening (age 50-80; asymptomatic; tobacco smoking hx of at least 30 pack years; current smoker or one who has quit smoking within the last 15 years).  Benefits and harms of screening discussed with patient, including follow-up diagnostic testing, over-diagnosis, false positive rate, and total radiation exposure.  Patient counseled on the importance of adherence to annual lung cancer LDCT screening, impact of comorbidities and ability or willingness to undergo diagnosis and treatment.  Patient counseled on the importance of maintaining cigarette smoking abstinence if former smoker; or the importance of smoking cessation if current smoker and, if appropriate, furnishing of information about tobacco cessation interventions  All questions answered.   Patient wishes to proceed with continued annual surveillance testing.     Orders:  -     CT Chest Lung Screening Low Dose; Future; Expected date: 01/23/2024    5. Iron deficiency anemia, unspecified iron deficiency anemia type  Overview:  Lab Results   Component Value Date    HGB 13.3 (L) 01/19/2024    HGB 12.5 (L) 09/14/2023    HGB 12.9 (L)  02/15/2023     Lab Results   Component Value Date    IRON 51 01/19/2024    IRON 35 (L) 02/15/2023    IRON 105 02/03/2022      Lab Results   Component Value Date    FERRITIN 63 01/19/2024    FERRITIN 55 02/15/2023    FERRITIN 83 02/03/2022         Assessment & Plan:  Hemoglobin and iron level improved. Continue daily iron    Orders:  -     ferrous sulfate (IRON) 325 mg (65 mg iron) Tab tablet; Take 1 tablet (325 mg total) by mouth daily with breakfast.; Refill: 0  -     CBC Auto Differential; Future; Expected date: 07/23/2024  -     Iron and TIBC; Future; Expected date: 07/23/2024  -     Ferritin; Future; Expected date: 07/23/2024    6. Erectile dysfunction, unspecified erectile dysfunction type  -     sildenafiL (VIAGRA) 100 MG tablet; TAKE 1 TABLET BY MOUTH 1 HOUR PRIOR TO SEX. MAX OF 1 TABLET IN 24 HOURS  Dispense: 10 tablet; Refill: 11    7. Cavitary lesion of lung  Overview:  Ct 5/18 with rul cavitary lesion and julius nodule. Stable 11/18 ct.  \    CT 2/23/2022: FINDINGS:  Lungs: The largest opacity in the right lung appears to be a partially cavitated solid mass and measures 5.0 AP x 3.3 TV-cm on series 4, image 90 (decrease in size since 2018 and previously 5.4 x 3.4-cm on most recent chest CT).  The largest opacity in the left lung appears subsolid and measures 0.7-cm on series 4, image 297, not significantly changed since 2018.  The lungs show moderate biapical findings consistent with emphysema, unchanged.    Assessment & Plan:  No acute respiratory concerns reported.  Continue monitoring.                   -Juanito Dejesus Jr., MD, AAHIVS      This office note has been dictated.  This dictation has been generated using M-Modal Fluency Direct dictation; some phonetic errors may occur.         Patient Instructions   If you are running out of medications and have no refills message us at least 5 days prior    Follow Up  Follow up in about 6 months (around 7/23/2024) for Hypertension, Lipids, CVA History  (Fasting labs a week prior).    Future Appointments   Date Time Provider Department Center   7/23/2024  8:30 AM LAB, [facility] DRAW STATION [facility] LAB [facility]   7/30/2024  9:00 AM Juanito Dejesus Jr., MD [facility]

## 2024-05-13 ENCOUNTER — TELEPHONE (OUTPATIENT)
Dept: FAMILY MEDICINE | Facility: CLINIC | Age: 79
End: 2024-05-13
Payer: COMMERCIAL

## 2024-05-13 NOTE — TELEPHONE ENCOUNTER
No answer. Left message for Patient to return call to clinic.Patient has a year supply and should reach out to the pharmacy  for refills

## 2024-05-13 NOTE — TELEPHONE ENCOUNTER
----- Message from Judith Alfaro sent at 5/13/2024  2:20 PM CDT -----  Type: RX Refill Request    Who Called:  self     Have you contacted your pharmacy: no     Refill or New Rx: refill    RX Name and Strength: valsartan (DIOVAN) 160 MG tablet  amLODIPine (NORVASC) 5 MG tablet      [facility] Pharm[facility] with phone number[id number] [place] - [address] [facility] [address]   Phone: [phone]  Fax: [phone]    Local or Mail Order: local    Would the patient rather a call back or a response via [facility]?  call    Best Call Back Number: [phone] (home)      Additional Information:

## 2024-07-23 ENCOUNTER — LAB VISIT (OUTPATIENT)
Dept: LAB | Facility: HOSPITAL | Age: 79
End: 2024-07-23
Attending: FAMILY MEDICINE
Payer: COMMERCIAL

## 2024-07-23 DIAGNOSIS — I10 ESSENTIAL HYPERTENSION: Chronic | ICD-10-CM

## 2024-07-23 DIAGNOSIS — D50.9 IRON DEFICIENCY ANEMIA, UNSPECIFIED IRON DEFICIENCY ANEMIA TYPE: Chronic | ICD-10-CM

## 2024-07-23 DIAGNOSIS — I69.30 HISTORY OF STROKE WITH RESIDUAL DEFICIT: Chronic | ICD-10-CM

## 2024-07-23 DIAGNOSIS — E78.5 DYSLIPIDEMIA: Chronic | ICD-10-CM

## 2024-07-23 LAB
ALBUMIN SERPL BCP-MCNC: 3.5 G/DL (ref 3.5–5.2)
ALP SERPL-CCNC: 104 U/L (ref 55–135)
ALT SERPL W/O P-5'-P-CCNC: 33 U/L (ref 10–44)
ANION GAP SERPL CALC-SCNC: 12 MMOL/L (ref 8–16)
AST SERPL-CCNC: 27 U/L (ref 10–40)
BASOPHILS # BLD AUTO: 0.05 K/UL (ref 0–0.2)
BASOPHILS NFR BLD: 0.6 % (ref 0–1.9)
BILIRUB SERPL-MCNC: 0.7 MG/DL (ref 0.1–1)
BUN SERPL-MCNC: 17 MG/DL (ref 8–23)
CALCIUM SERPL-MCNC: 9.8 MG/DL (ref 8.7–10.5)
CHLORIDE SERPL-SCNC: 108 MMOL/L (ref 95–110)
CHOLEST SERPL-MCNC: 138 MG/DL (ref 120–199)
CHOLEST/HDLC SERPL: 2.7 {RATIO} (ref 2–5)
CO2 SERPL-SCNC: 22 MMOL/L (ref 23–29)
CREAT SERPL-MCNC: 1 MG/DL (ref 0.5–1.4)
DIFFERENTIAL METHOD BLD: ABNORMAL
EOSINOPHIL # BLD AUTO: 0.2 K/UL (ref 0–0.5)
EOSINOPHIL NFR BLD: 2.1 % (ref 0–8)
ERYTHROCYTE [DISTWIDTH] IN BLOOD BY AUTOMATED COUNT: 14.6 % (ref 11.5–14.5)
EST. GFR  (NO RACE VARIABLE): >60 ML/MIN/1.73 M^2
FERRITIN SERPL-MCNC: 72 NG/ML (ref 20–300)
GLUCOSE SERPL-MCNC: 89 MG/DL (ref 70–110)
HCT VFR BLD AUTO: 40.6 % (ref 40–54)
HDLC SERPL-MCNC: 52 MG/DL (ref 40–75)
HDLC SERPL: 37.7 % (ref 20–50)
HGB BLD-MCNC: 13.2 G/DL (ref 14–18)
IMM GRANULOCYTES # BLD AUTO: 0.02 K/UL (ref 0–0.04)
IMM GRANULOCYTES NFR BLD AUTO: 0.2 % (ref 0–0.5)
IRON SERPL-MCNC: 65 UG/DL (ref 45–160)
LDLC SERPL CALC-MCNC: 74.6 MG/DL (ref 63–159)
LYMPHOCYTES # BLD AUTO: 2.1 K/UL (ref 1–4.8)
LYMPHOCYTES NFR BLD: 24.8 % (ref 18–48)
MCH RBC QN AUTO: 29.6 PG (ref 27–31)
MCHC RBC AUTO-ENTMCNC: 32.5 G/DL (ref 32–36)
MCV RBC AUTO: 91 FL (ref 82–98)
MONOCYTES # BLD AUTO: 0.8 K/UL (ref 0.3–1)
MONOCYTES NFR BLD: 9.1 % (ref 4–15)
NEUTROPHILS # BLD AUTO: 5.3 K/UL (ref 1.8–7.7)
NEUTROPHILS NFR BLD: 63.2 % (ref 38–73)
NONHDLC SERPL-MCNC: 86 MG/DL
NRBC BLD-RTO: 0 /100 WBC
PLATELET # BLD AUTO: 261 K/UL (ref 150–450)
PMV BLD AUTO: 10.9 FL (ref 9.2–12.9)
POTASSIUM SERPL-SCNC: 4.3 MMOL/L (ref 3.5–5.1)
PROT SERPL-MCNC: 7.5 G/DL (ref 6–8.4)
RBC # BLD AUTO: 4.46 M/UL (ref 4.6–6.2)
SATURATED IRON: 19 % (ref 20–50)
SODIUM SERPL-SCNC: 142 MMOL/L (ref 136–145)
TOTAL IRON BINDING CAPACITY: 349 UG/DL (ref 250–450)
TRANSFERRIN SERPL-MCNC: 236 MG/DL (ref 200–375)
TRIGL SERPL-MCNC: 57 MG/DL (ref 30–150)
WBC # BLD AUTO: 8.46 K/UL (ref 3.9–12.7)

## 2024-07-23 PROCEDURE — 80053 COMPREHEN METABOLIC PANEL: CPT | Performed by: FAMILY MEDICINE

## 2024-07-23 PROCEDURE — 85025 COMPLETE CBC W/AUTO DIFF WBC: CPT | Performed by: FAMILY MEDICINE

## 2024-07-23 PROCEDURE — 83540 ASSAY OF IRON: CPT | Performed by: FAMILY MEDICINE

## 2024-07-23 PROCEDURE — 80061 LIPID PANEL: CPT | Performed by: FAMILY MEDICINE

## 2024-07-23 PROCEDURE — 84466 ASSAY OF TRANSFERRIN: CPT | Performed by: FAMILY MEDICINE

## 2024-07-23 PROCEDURE — 36415 COLL VENOUS BLD VENIPUNCTURE: CPT | Mod: PN | Performed by: FAMILY MEDICINE

## 2024-07-23 PROCEDURE — 82728 ASSAY OF FERRITIN: CPT | Performed by: FAMILY MEDICINE

## 2024-09-19 ENCOUNTER — LAB VISIT (OUTPATIENT)
Dept: LAB | Facility: HOSPITAL | Age: 79
End: 2024-09-19
Attending: FAMILY MEDICINE
Payer: COMMERCIAL

## 2024-09-19 ENCOUNTER — OFFICE VISIT (OUTPATIENT)
Dept: FAMILY MEDICINE | Facility: CLINIC | Age: 79
End: 2024-09-19
Payer: COMMERCIAL

## 2024-09-19 ENCOUNTER — TELEPHONE (OUTPATIENT)
Dept: FAMILY MEDICINE | Facility: CLINIC | Age: 79
End: 2024-09-19
Payer: COMMERCIAL

## 2024-09-19 VITALS
HEIGHT: 67 IN | HEART RATE: 70 BPM | BODY MASS INDEX: 17.27 KG/M2 | TEMPERATURE: 98 F | SYSTOLIC BLOOD PRESSURE: 116 MMHG | OXYGEN SATURATION: 96 % | WEIGHT: 110 LBS | DIASTOLIC BLOOD PRESSURE: 66 MMHG

## 2024-09-19 DIAGNOSIS — E78.5 DYSLIPIDEMIA: Chronic | ICD-10-CM

## 2024-09-19 DIAGNOSIS — R63.4 ABNORMAL WEIGHT LOSS: ICD-10-CM

## 2024-09-19 DIAGNOSIS — R13.13 PHARYNGEAL DYSPHAGIA: ICD-10-CM

## 2024-09-19 DIAGNOSIS — I10 ESSENTIAL HYPERTENSION: Primary | Chronic | ICD-10-CM

## 2024-09-19 DIAGNOSIS — I69.30 HISTORY OF STROKE WITH RESIDUAL DEFICIT: Chronic | ICD-10-CM

## 2024-09-19 LAB
LIPASE SERPL-CCNC: 28 U/L (ref 4–60)
TSH SERPL DL<=0.005 MIU/L-ACNC: 0.51 UIU/ML (ref 0.4–4)

## 2024-09-19 PROCEDURE — 1101F PT FALLS ASSESS-DOCD LE1/YR: CPT | Mod: CPTII,S$GLB,, | Performed by: FAMILY MEDICINE

## 2024-09-19 PROCEDURE — 99999 PR PBB SHADOW E&M-EST. PATIENT-LVL IV: CPT | Mod: PBBFAC,,, | Performed by: FAMILY MEDICINE

## 2024-09-19 PROCEDURE — 99214 OFFICE O/P EST MOD 30 MIN: CPT | Mod: S$GLB,,, | Performed by: FAMILY MEDICINE

## 2024-09-19 PROCEDURE — 3074F SYST BP LT 130 MM HG: CPT | Mod: CPTII,S$GLB,, | Performed by: FAMILY MEDICINE

## 2024-09-19 PROCEDURE — 1159F MED LIST DOCD IN RCRD: CPT | Mod: CPTII,S$GLB,, | Performed by: FAMILY MEDICINE

## 2024-09-19 PROCEDURE — 1160F RVW MEDS BY RX/DR IN RCRD: CPT | Mod: CPTII,S$GLB,, | Performed by: FAMILY MEDICINE

## 2024-09-19 PROCEDURE — 3288F FALL RISK ASSESSMENT DOCD: CPT | Mod: CPTII,S$GLB,, | Performed by: FAMILY MEDICINE

## 2024-09-19 PROCEDURE — 36415 COLL VENOUS BLD VENIPUNCTURE: CPT | Mod: PN | Performed by: FAMILY MEDICINE

## 2024-09-19 PROCEDURE — 3078F DIAST BP <80 MM HG: CPT | Mod: CPTII,S$GLB,, | Performed by: FAMILY MEDICINE

## 2024-09-19 PROCEDURE — 1126F AMNT PAIN NOTED NONE PRSNT: CPT | Mod: CPTII,S$GLB,, | Performed by: FAMILY MEDICINE

## 2024-09-19 PROCEDURE — G2211 COMPLEX E/M VISIT ADD ON: HCPCS | Mod: S$GLB,,, | Performed by: FAMILY MEDICINE

## 2024-09-19 PROCEDURE — 83690 ASSAY OF LIPASE: CPT | Performed by: FAMILY MEDICINE

## 2024-09-19 PROCEDURE — 84443 ASSAY THYROID STIM HORMONE: CPT | Performed by: FAMILY MEDICINE

## 2024-09-19 RX ORDER — AMLODIPINE BESYLATE 5 MG/1
5 TABLET ORAL DAILY
Qty: 90 TABLET | Refills: 3 | Status: SHIPPED | OUTPATIENT
Start: 2024-09-19

## 2024-09-19 RX ORDER — VALSARTAN 160 MG/1
160 TABLET ORAL DAILY
Qty: 90 TABLET | Refills: 3 | Status: SHIPPED | OUTPATIENT
Start: 2024-09-19

## 2024-09-19 RX ORDER — TICAGRELOR 90 MG/1
90 TABLET ORAL 2 TIMES DAILY
Qty: 180 TABLET | Refills: 3 | Status: SHIPPED | OUTPATIENT
Start: 2024-09-19

## 2024-09-19 RX ORDER — MIRTAZAPINE 7.5 MG/1
7.5 TABLET, FILM COATED ORAL NIGHTLY
Qty: 30 TABLET | Refills: 11 | Status: SHIPPED | OUTPATIENT
Start: 2024-09-19 | End: 2025-09-19

## 2024-09-19 RX ORDER — ATORVASTATIN CALCIUM 40 MG/1
40 TABLET, FILM COATED ORAL DAILY
Qty: 90 TABLET | Refills: 3 | Status: SHIPPED | OUTPATIENT
Start: 2024-09-19

## 2024-09-19 NOTE — TELEPHONE ENCOUNTER
----- Message from Gabbie Ray sent at 9/19/2024  3:32 PM CDT -----  Regarding: swallow test  Type:  Patient Returning Call      Name of who is calling:pt        What is request in detail:pt is requesting a call back in regards to swallow test scheduled for 10/01, patient just want to make sure that there are not 2 test that have to be scheduled.        Can clinic reply by [facility]:no        What number to call back if not in [facility]:[phone]

## 2024-09-30 NOTE — PROGRESS NOTES
"  Patient Name: Rashad Rodríguez    : 1945  MRN: [id number]      Subjective:     Patient ID: Rashad is a 79 y.o. male    Chief Complaint:  Follow-up (6mm f/u review labwork.)    History of Present Illness  The patient is a 79-year-old male who presents for a follow-up of blood pressure, cholesterol, and lab work.    He has discontinued his amlodipine medication but continues to take valsartan, atorvastatin, Brilinta, and aspirin. His last consultation with a neurologist was several years ago.    He reports a decrease in appetite since his second stroke, leading to weight loss even with efforts to regain it. He does not experience nausea, vomiting, or the presence of blood in his stool or urine. He occasionally has difficulty swallowing, with food feeling lodged in his throat, which sometimes triggers a runny nose. He also reports occasional insomnia. His bowel movements are regular, and he does not experience any abdominal pain.      Review of Systems   Constitutional:  Positive for appetite change and unexpected weight change. Negative for chills and fever.   Respiratory:  Negative for shortness of breath.    Cardiovascular:  Negative for chest pain and palpitations.   Gastrointestinal:  Negative for abdominal pain and change in bowel habit.   Genitourinary:  Negative for dysuria and hematuria.   Musculoskeletal:  Negative for myalgias and neck pain.   Neurological:  Negative for headaches.        Objective:   /66 (BP Location: Left arm, Patient Position: Sitting, BP Method: Medium (Manual))   Pulse 70   Temp 97.8 °F (36.6 °C) (Oral)   Ht 5' 7" (1.702 m)   Wt 49.9 kg (110 lb 0.2 oz)   SpO2 96%   BMI 17.23 kg/m²     Physical Exam  Vital Signs  BMI is 17.2.  Physical Exam  Vitals reviewed.   Constitutional:       Appearance: He is well-developed. He is not diaphoretic.   HENT:      Head: Normocephalic.      Right Ear: External ear normal.      Left Ear: External ear normal.      Nose: Nose normal.      " Mouth/Throat:      Pharynx: No oropharyngeal exudate.   Neck:      Trachea: No tracheal deviation.   Cardiovascular:      Rate and Rhythm: Normal rate and regular rhythm.      Heart sounds: Normal heart sounds.   Pulmonary:      Effort: Pulmonary effort is normal.      Breath sounds: Normal breath sounds. No wheezing or rales.   Abdominal:      General: Bowel sounds are normal.      Palpations: Abdomen is soft. Abdomen is not rigid. There is no mass.      Tenderness: There is no abdominal tenderness. There is no guarding or rebound.   Musculoskeletal:      Cervical back: Normal range of motion and neck supple.   Lymphadenopathy:      Cervical: No cervical adenopathy.   Neurological:      Mental Status: He is alert and oriented to person, place, and time.      Gait: Gait normal.            Assessment        ICD-10-CM ICD-9-CM   1. Essential hypertension  I10 401.9   2. Dyslipidemia  E78.5 272.4   3. Abnormal weight loss  R63.4 783.21   4. Pharyngeal dysphagia  R13.13 787.23   5. History of stroke with residual deficit  I69.30 438.9         Plan:     Assessment & Plan  1. Hypertension.  He is not currently taking amlodipine. He should continue taking valsartan for blood pressure management.    2. Hyperlipidemia.  He is currently taking atorvastatin for cholesterol management. His cholesterol levels are slightly elevated but not significantly high.    3. Weight loss  He has a history of stroke and reports a loss of appetite and weight loss since his second stroke. A low-dose medication (7.5 mg) will be initiated at bedtime to stimulate appetite and improve sleep.  Will re-evaluate in the next three months    4. Dysphagia.  He reports difficulty swallowing, with food sometimes getting caught in his throat. A swallow study will be performed to identify the problem.    5. History of stroke  Continue on Brilinta      Follow-up  Return in 3 months for follow-up.     ORDERS  1. Essential hypertension  -     valsartan  (DIOVAN) 160 MG tablet; Take 1 tablet (160 mg total) by mouth once daily.  Dispense: 90 tablet; Refill: 3  -     atorvastatin (LIPITOR) 40 MG tablet; Take 1 tablet (40 mg total) by mouth once daily.  Dispense: 90 tablet; Refill: 3  -     amLODIPine (NORVASC) 5 MG tablet; Take 1 tablet (5 mg total) by mouth once daily.  Dispense: 90 tablet; Refill: 3    2. Dyslipidemia  Overview:  Lab Results   Component Value Date    CHOL 138 07/23/2024    CHOL 133 01/19/2024    CHOL 134 02/08/2023     Lab Results   Component Value Date    HDL 52 07/23/2024    HDL 52 01/19/2024    HDL 54 02/08/2023     Lab Results   Component Value Date    LDLCALC 74.6 07/23/2024    LDLCALC 69.6 01/19/2024    LDLCALC 70.4 02/08/2023     Lab Results   Component Value Date    TRIG 57 07/23/2024    TRIG 57 01/19/2024    TRIG 48 02/08/2023     Lab Results   Component Value Date    CHOLHDL 37.7 07/23/2024    CHOLHDL 39.1 01/19/2024    CHOLHDL 40.3 02/08/2023        The ASCVD Risk score (Adriano GILBERT, et al., 2019) failed to calculate for the following reasons:    Risk score cannot be calculated because patient has a medical history suggesting prior/existing ASCVD      Orders:  -     atorvastatin (LIPITOR) 40 MG tablet; Take 1 tablet (40 mg total) by mouth once daily.  Dispense: 90 tablet; Refill: 3    3. Abnormal weight loss  -     mirtazapine (REMERON) 7.5 MG Tab; Take 1 tablet (7.5 mg total) by mouth every evening.  Dispense: 30 tablet; Refill: 11  -     TSH; Future; Expected date: 09/19/2024  -     LIPASE; Future; Expected date: 09/19/2024    4. Pharyngeal dysphagia  -     Fl Modified Barium Swallow Speech; Future; Expected date: 09/19/2024  -     SLP video swallow; Future; Expected date: 09/19/2024    5. History of stroke with residual deficit  Overview:  10/2020- Left lacunar stroke  07/2022- Right Upper Basal Ganglia    Reports mild bilateral lower extremity weakness    Orders:  -     atorvastatin (LIPITOR) 40 MG tablet; Take 1 tablet (40 mg total) by  mouth once daily.  Dispense: 90 tablet; Refill: 3  -     BRILINTA 90 mg tablet; Take 1 tablet (90 mg total) by mouth 2 (two) times daily.  Dispense: 180 tablet; Refill: 3             -Juanito Dejesus Jr., MD, AAHIVS      This note was generated with the assistance of ambient listening technology. Verbal consent was obtained by the patient and accompanying visitor(s) for the recording of patient appointment to facilitate this note. I attest to having reviewed and edited the generated note for accuracy, though some syntax or spelling errors may persist. Please contact the author of this note for any clarification.          There are no Patient Instructions on file for this visit.    Follow Up  Follow up in about 3 months (around 12/19/2024) for weight.    Future Appointments   Date Time Provider Department Center   10/1/2024 10:30 AM [facility] [place] [facility]   12/19/2024  9:40 AM Juanito Dejesus Jr., MD [facility]

## 2024-10-01 ENCOUNTER — HOSPITAL ENCOUNTER (OUTPATIENT)
Dept: RADIOLOGY | Facility: HOSPITAL | Age: 79
Discharge: HOME | End: 2024-10-01
Attending: FAMILY MEDICINE
Payer: COMMERCIAL

## 2024-10-01 DIAGNOSIS — R13.13 PHARYNGEAL DYSPHAGIA: ICD-10-CM

## 2024-10-01 PROCEDURE — A9698 NON-RAD CONTRAST MATERIALNOC: HCPCS | Performed by: FAMILY MEDICINE

## 2024-10-01 PROCEDURE — 92611 MOTION FLUOROSCOPY/SWALLOW: CPT

## 2024-10-01 PROCEDURE — 97535 SELF CARE MNGMENT TRAINING: CPT

## 2024-10-01 PROCEDURE — 74230 X-RAY XM SWLNG FUNCJ C+: CPT | Mod: TC

## 2024-10-01 PROCEDURE — 25500020 PHARM REV CODE 255: Performed by: FAMILY MEDICINE

## 2024-10-01 RX ADMIN — BARIUM SULFATE 30 ML: 0.81 POWDER, FOR SUSPENSION ORAL at 10:10

## 2024-10-01 NOTE — PROCEDURES
"Modified Barium Swallow    Patient Name:  Rashad Rodríguez   MRN:  [id number]      Recommendations:     Recommendations:                General Recommendations:  GI (assessment of esophageal motility) ST for dysphagia therapy nutrition consult  Diet recommendations: IDDSI % (minced and moist) with thin liquids (Pt at high risk of aspiration across consistencies)   **please note PEG placement for prevention of aspiration is not recommended by ST as PEG feedings have higher correlation with aspiration related illness than aspiration of po secondary to oropharyngeal dysphagia  Aspiration Precautions: GOOD ORAL CARE, maintain ambulation, monitor lung health  General Precautions: Standard,    Communication strategies:  provide increased time to answer    Referral     Reason for Referral  Patient was referred for a Modified Barium Swallow Study to assess the efficiency of his/her swallow function, rule out aspiration and make recommendations regarding safe dietary consistencies, effective compensatory strategies, and safe eating environment.     Pt with c/o of frequent sensation of solids "caught" in throat indicating approximate level of UES. He was not an accurate medical  stating his previous MBS showed "no problems" 7/19/22 MBS revealed aspiration of thin liquids and recommended use of thickener and ongoing ST. He reports upper and lower endoscopy earlier this year [facility] [place], [place] was also unrevealing.     Pt has denies issues swallowing thin liquids. He does not have PMHX of aspiration related lung disease; CT of chest was ordered 1/23/24 but was uncompleted. He has PMx of apical caps beginning in 2020.     He has history of CVA x2, the latest in 2022 and reports unintended weight loss at that time.     Diagnosis: pharyngeal dysphagia      History:     Past Medical History:   Diagnosis Date    Cigarette smoker     Hyperlipidemia     Hypertension     Stroke 10/01/2020       Objective:     Current Respiratory Status:  room " air    Alert: yes    Cooperative: yes    Follows Directions: yes    Visualization  Patient was seen in the lateral view    Oral Peripheral Examination   WFL    Consistencies Assessed  Thin ~15 ml; initial via spoon, multiple via straw  Nectar thick ~20ml; intial via spoon, multiple via straw  Puree x3  Solids x1    Oral Preparation/Oral Phase  WFL- Pt with adequate bolus acceptance, containment, control and timely A-P transfer across consistencies     Pharyngeal Phase   Decreased BOT retraction with lack of epiglottal inversion, decreased laryngeal extrusion/elevation, decreased vestibular closure and decreased pharyngeal stripping wave. Asymptomatic aspiration of thin liquids during initial swallow, symptomatic aspiration of nectar and of vallecular stasis of solid after initial swallow- patient was able to clear aspirate with cough. Significant pharyngeal residue across trials and consistencies, patient required multiple cued throat clear to improve stasis volume, however stasis was not completely cleared. It should be noted chin tuck did not improve safety of swallow.     Specific Results as follows:   THIN LIQUIDS  Rosenbek 8-Point Penetration-Aspiration Scale Score: 8: Material enters the airway, passes below the vocal folds, and no effort is made to eject. (During the swallow)    Pharyngeal Residue  Valleculae: trace- mild  Aryepiglottic folds: trace- mild  Posterior Pharyngeal Wall: trace- mild  Pyriform: trace-mild  -----    NECTAR THICK LIQUIDS  BEST  Rosenbek 8-Point Penetration-Aspiration Scale Score: 3: Material enters the airway, remains above the vocal folds, and is not ejected from the airway. (During the swallow)  WORST  Rosenbek 8-Point Penetration-Aspiration Scale Score: 6: Material enters the ariway, passes below the vocal folds, and is ejected into the larynx or out of the airway. (Of pharyngeal stasis after the swallow)    Pharyngeal Residue  Valleculae: mild-mod  Aryepiglottic folds:  "mild-mod  Posterior Pharyngeal Wall: mild-mod  Pyriform: mild-mod  ----    PUREE  Rosenbek 8-Point Penetration-Aspiration Scale Score: 2: Material enters the airway, remains above the vocal folds, and is ejected from the airway.  (Of pharyngeal residue after the swallow)    Pharyngeal Residue  Valleculae: mod  Aryepiglottic folds: mild-mod  Posterior Pharyngeal Wall: mild  Pyriform: mild-mod  *pharyngeal residue was improved but not completely cleared after 3+ cued dry swallows  ---    SOLIDS  Rosenbek 8-Point Penetration-Aspiration Scale Score: 6: Material enters the ariway, passes below the vocal folds, and is ejected into the larynx or out of the airway. (Of pharyngeal residue after the swallow)    Pharyngeal Residue  Valleculae: mod  Aryepiglottic folds: mild-mod  Posterior Pharyngeal Wall: mild  Pyriform: mild-mod  *pharyngeal residue was improved but not completely cleared after 3+ cued dry swallows      Cervical Esophageal Phase  Decreased UES opening  Decreased esophageal motility with partial regurgitation of solid    Assessment:     Impressions   Pt presents with moderate-severe oropharyngeal dysphagia c/b silent aspiration of thin liquids and symptomatic aspiration of thick consistencies. Esophageal component.     Prognosis: Guarded    Barriers:  Time since initial neuro insult  Previous non-compliance with ST recommendations     Plan  Dysphagia therapy  GI consult for assessment of esophageal motility     Education  Results discussed at length with patient with monitor in place, specifically silent and chronic aspiration of thin liquids were dicussed. Possibility of need for PEG placement for nutrition was discussed he stated "I do not want surgery." He was also educated regarding maintaining his dental health and ambulatory status to minimize risk of development of aspiration related illness. He was instructed to f/u with PCP and potential outpatient ST.       Time Tracking:   SLP Treatment Date:    " 10/1/24  Speech Start Time:  1033  Speech Stop Time:  1110     Speech Total Time (min):  37 min    10/01/2024

## 2024-10-06 DIAGNOSIS — R13.13 PHARYNGEAL DYSPHAGIA: Primary | ICD-10-CM

## 2024-10-06 DIAGNOSIS — K22.4 ESOPHAGEAL DYSMOTILITY: ICD-10-CM

## 2024-10-08 ENCOUNTER — TELEPHONE (OUTPATIENT)
Dept: FAMILY MEDICINE | Facility: CLINIC | Age: 79
End: 2024-10-08
Payer: COMMERCIAL

## 2024-10-08 NOTE — TELEPHONE ENCOUNTER
Spoke with Rashad notified per barium swallow study abnormal results and referrals. Patient explained that GI called to schedule an appointment but he refused because he saw a GI doctor at [facility] (Dr. Lau) the day after the barium swallow study was complete. Also, notified that speech was referral was placed and someone from that department will call to set up an appointment. Patient verbalized understanding and stated he will make the appointment when they call.

## 2024-10-08 NOTE — TELEPHONE ENCOUNTER
Left voice message for patient to return call to discuss lab results.  ----- Message from Juanito Dejesus MD sent at 10/6/2024  1:54 PM CDT -----  Please call patient and let him know that his barium swallow study was not normal and was showing that food was getting stuck in the back of his throat, and there was also something called esophageal dysmotility which is when food does not go down the esophagus properly.  As such, I placed a referral to see GI as these needs further evaluation as he may continue losing weight because of this. Also the speech pathologist who evaluated his study recommended he see a speech therapist which can help with these issues.  I placed a referral for this and he should get a call to have it scheduled

## 2024-10-08 NOTE — TELEPHONE ENCOUNTER
----- Message from Judith sent at 10/8/2024  9:42 AM CDT -----  Type:  Patient Returning Call    Who Called:  self     Who Left Message for Patient:  Brenda    Does the patient know what this is regarding?: no     Would the patient rather a call back or a response via [facility]?  call    Best Call Back Number: .[phone]

## 2024-12-19 ENCOUNTER — LAB VISIT (OUTPATIENT)
Dept: LAB | Facility: HOSPITAL | Age: 79
End: 2024-12-19
Attending: FAMILY MEDICINE
Payer: COMMERCIAL

## 2024-12-19 ENCOUNTER — OFFICE VISIT (OUTPATIENT)
Dept: FAMILY MEDICINE | Facility: CLINIC | Age: 79
End: 2024-12-19
Payer: COMMERCIAL

## 2024-12-19 VITALS
TEMPERATURE: 98 F | HEIGHT: 67 IN | BODY MASS INDEX: 17.13 KG/M2 | RESPIRATION RATE: 18 BRPM | WEIGHT: 109.13 LBS | OXYGEN SATURATION: 99 % | DIASTOLIC BLOOD PRESSURE: 70 MMHG | SYSTOLIC BLOOD PRESSURE: 126 MMHG | HEART RATE: 61 BPM

## 2024-12-19 DIAGNOSIS — E78.5 DYSLIPIDEMIA: Chronic | ICD-10-CM

## 2024-12-19 DIAGNOSIS — R63.6 LOW WEIGHT: Chronic | ICD-10-CM

## 2024-12-19 DIAGNOSIS — D50.9 IRON DEFICIENCY ANEMIA, UNSPECIFIED IRON DEFICIENCY ANEMIA TYPE: Chronic | ICD-10-CM

## 2024-12-19 DIAGNOSIS — J98.4 CAVITARY LESION OF LUNG: ICD-10-CM

## 2024-12-19 DIAGNOSIS — Z23 NEED FOR VACCINATION: ICD-10-CM

## 2024-12-19 DIAGNOSIS — I10 ESSENTIAL HYPERTENSION: Primary | Chronic | ICD-10-CM

## 2024-12-19 LAB
BASOPHILS # BLD AUTO: 0.06 K/UL (ref 0–0.2)
BASOPHILS NFR BLD: 0.7 % (ref 0–1.9)
DIFFERENTIAL METHOD BLD: ABNORMAL
EOSINOPHIL # BLD AUTO: 0.1 K/UL (ref 0–0.5)
EOSINOPHIL NFR BLD: 0.9 % (ref 0–8)
ERYTHROCYTE [DISTWIDTH] IN BLOOD BY AUTOMATED COUNT: 14.2 % (ref 11.5–14.5)
FERRITIN SERPL-MCNC: 114 NG/ML (ref 20–300)
HCT VFR BLD AUTO: 45.8 % (ref 40–54)
HGB BLD-MCNC: 14.5 G/DL (ref 14–18)
IMM GRANULOCYTES # BLD AUTO: 0.02 K/UL (ref 0–0.04)
IMM GRANULOCYTES NFR BLD AUTO: 0.2 % (ref 0–0.5)
IRON SERPL-MCNC: 77 UG/DL (ref 45–160)
LYMPHOCYTES # BLD AUTO: 1.9 K/UL (ref 1–4.8)
LYMPHOCYTES NFR BLD: 22.4 % (ref 18–48)
MCH RBC QN AUTO: 29.9 PG (ref 27–31)
MCHC RBC AUTO-ENTMCNC: 31.7 G/DL (ref 32–36)
MCV RBC AUTO: 94 FL (ref 82–98)
MONOCYTES # BLD AUTO: 0.7 K/UL (ref 0.3–1)
MONOCYTES NFR BLD: 8.4 % (ref 4–15)
NEUTROPHILS # BLD AUTO: 5.8 K/UL (ref 1.8–7.7)
NEUTROPHILS NFR BLD: 67.4 % (ref 38–73)
NRBC BLD-RTO: 0 /100 WBC
PLATELET # BLD AUTO: 322 K/UL (ref 150–450)
PMV BLD AUTO: 10.9 FL (ref 9.2–12.9)
RBC # BLD AUTO: 4.85 M/UL (ref 4.6–6.2)
SATURATED IRON: 21 % (ref 20–50)
TOTAL IRON BINDING CAPACITY: 366 UG/DL (ref 250–450)
TRANSFERRIN SERPL-MCNC: 247 MG/DL (ref 200–375)
WBC # BLD AUTO: 8.54 K/UL (ref 3.9–12.7)

## 2024-12-19 PROCEDURE — 3288F FALL RISK ASSESSMENT DOCD: CPT | Mod: CPTII,S$GLB,, | Performed by: FAMILY MEDICINE

## 2024-12-19 PROCEDURE — 99999 PR PBB SHADOW E&M-EST. PATIENT-LVL V: CPT | Mod: PBBFAC,,, | Performed by: FAMILY MEDICINE

## 2024-12-19 PROCEDURE — 1126F AMNT PAIN NOTED NONE PRSNT: CPT | Mod: CPTII,S$GLB,, | Performed by: FAMILY MEDICINE

## 2024-12-19 PROCEDURE — 1160F RVW MEDS BY RX/DR IN RCRD: CPT | Mod: CPTII,S$GLB,, | Performed by: FAMILY MEDICINE

## 2024-12-19 PROCEDURE — 90653 IIV ADJUVANT VACCINE IM: CPT | Mod: S$GLB,,, | Performed by: FAMILY MEDICINE

## 2024-12-19 PROCEDURE — 3078F DIAST BP <80 MM HG: CPT | Mod: CPTII,S$GLB,, | Performed by: FAMILY MEDICINE

## 2024-12-19 PROCEDURE — 3074F SYST BP LT 130 MM HG: CPT | Mod: CPTII,S$GLB,, | Performed by: FAMILY MEDICINE

## 2024-12-19 PROCEDURE — 82728 ASSAY OF FERRITIN: CPT | Performed by: FAMILY MEDICINE

## 2024-12-19 PROCEDURE — 99214 OFFICE O/P EST MOD 30 MIN: CPT | Mod: 25,S$GLB,, | Performed by: FAMILY MEDICINE

## 2024-12-19 PROCEDURE — 90471 IMMUNIZATION ADMIN: CPT | Mod: S$GLB,,, | Performed by: FAMILY MEDICINE

## 2024-12-19 PROCEDURE — 1101F PT FALLS ASSESS-DOCD LE1/YR: CPT | Mod: CPTII,S$GLB,, | Performed by: FAMILY MEDICINE

## 2024-12-19 PROCEDURE — 36415 COLL VENOUS BLD VENIPUNCTURE: CPT | Mod: PN | Performed by: FAMILY MEDICINE

## 2024-12-19 PROCEDURE — 84466 ASSAY OF TRANSFERRIN: CPT | Performed by: FAMILY MEDICINE

## 2024-12-19 PROCEDURE — 85025 COMPLETE CBC W/AUTO DIFF WBC: CPT | Performed by: FAMILY MEDICINE

## 2024-12-19 PROCEDURE — 1159F MED LIST DOCD IN RCRD: CPT | Mod: CPTII,S$GLB,, | Performed by: FAMILY MEDICINE

## 2024-12-19 RX ORDER — MIRTAZAPINE 15 MG/1
15 TABLET, FILM COATED ORAL NIGHTLY
Qty: 30 TABLET | Refills: 11 | Status: SHIPPED | OUTPATIENT
Start: 2024-12-19 | End: 2025-12-19

## 2024-12-19 NOTE — PROGRESS NOTES
Verified patient's name, , and allergies. Patient given flu vaccine to left deltoid, no complaints or reactions noted. Vis given 21 to patient. Instructed to wait in clinic for 15 minutes to note for reactions, verbalized understanding.

## 2024-12-23 ENCOUNTER — TELEPHONE (OUTPATIENT)
Dept: FAMILY MEDICINE | Facility: CLINIC | Age: 79
End: 2024-12-23
Payer: COMMERCIAL

## 2024-12-23 NOTE — TELEPHONE ENCOUNTER
Patient notified of lab results, verbalized understanding. Instructed to contact office with any questions or concerns.   ----- Message from Juanito Dejesus MD sent at 12/22/2024  8:36 PM CST -----  Iron levels better.

## 2024-12-31 ENCOUNTER — TELEPHONE (OUTPATIENT)
Dept: FAMILY MEDICINE | Facility: CLINIC | Age: 79
End: 2024-12-31
Payer: COMMERCIAL

## 2024-12-31 NOTE — TELEPHONE ENCOUNTER
Patient has yet to complete previously ordered CT scan of lungs.  This is ordered in January.  Please call patient and assist in scheduling.

## 2024-12-31 NOTE — PROGRESS NOTES
"  Patient Name: Rashad Rodríguez    : 1945  MRN: [id number]      Subjective:     Patient ID: Rashad is a 79 y.o. male    Chief Complaint:  Follow-up    History of Present Illness    Rashad presents today for follow-up on hypertension, hyperlipidemia, and weight management.    He takes mirtazapine (two tablets) for appetite, amlodipine and valsartan for blood pressure management, atorvastatin for cholesterol, and brilinta for history of stroke.    He denies abdominal pain, blood in urine, and blood in stools.    Weight has been stable since September.    He is currently a smoker and is considering alternative methods for smoking cessation.        ROS:  General: -fever, -chills, -fatigue, -weight gain, -weight loss  Eyes: -vision changes, -redness, -discharge  ENT: -ear pain, -nasal congestion, -sore throat  Cardiovascular: -chest pain, -palpitations, -lower extremity edema  Respiratory: -cough, -shortness of breath  Gastrointestinal: -abdominal pain, -nausea, -vomiting, -diarrhea, -constipation, -blood in stool  Genitourinary: -dysuria, -hematuria, -frequency  Musculoskeletal: -joint pain, -muscle pain  Skin: -rash, -lesion  Neurological: -headache, -dizziness, -numbness, -tingling  Psychiatric: -anxiety, -depression, -sleep difficulty       Objective:   /70 (BP Location: Right arm, Patient Position: Sitting)   Pulse 61   Temp 98.3 °F (36.8 °C) (Oral)   Resp 18   Ht 5' 7" (1.702 m)   Wt 49.5 kg (109 lb 2 oz)   SpO2 99%   BMI 17.09 kg/m²     Physical Exam  Vitals reviewed.   Constitutional:       Appearance: He is well-developed. He is not diaphoretic.   HENT:      Head: Normocephalic.      Right Ear: External ear normal.      Left Ear: External ear normal.      Nose: Nose normal.      Mouth/Throat:      Pharynx: No oropharyngeal exudate.   Neck:      Trachea: No tracheal deviation.   Cardiovascular:      Rate and Rhythm: Normal rate and regular rhythm.      Heart sounds: Normal heart sounds. "   Pulmonary:      Effort: Pulmonary effort is normal.      Breath sounds: Normal breath sounds. No wheezing or rales.   Abdominal:      General: Bowel sounds are normal.      Palpations: Abdomen is soft. Abdomen is not rigid. There is no mass.      Tenderness: There is no abdominal tenderness. There is no guarding or rebound.   Musculoskeletal:      Cervical back: Normal range of motion and neck supple.   Lymphadenopathy:      Cervical: No cervical adenopathy.   Neurological:      Mental Status: He is alert and oriented to person, place, and time.      Gait: Gait normal.        Assessment        ICD-10-CM ICD-9-CM   1. Essential hypertension  I10 401.9   2. Dyslipidemia  E78.5 272.4   3. Iron deficiency anemia, unspecified iron deficiency anemia type  D50.9 280.9   4. Low weight  R63.6 783.22   5. Cavitary lesion of lung  J98.4 518.89   6. Need for vaccination  Z23 V05.9         Plan:   Assessment & Plan    IMPRESSION:  Evaluated patient's weight, which has remained stable since September  Assessed blood pressure, which was elevated compared to previous visit  Reviewed iron levels, considering previous improvement but wanting to ensure return to normal  Considered potential causes for persistent iron deficiency if levels are not normal  Evaluated current medication regimen, including blood pressure and cholesterol medications    CEREBRAL INFARCTION / STROKE HISTORY:  Continued Brilinta for history of stroke.    HYPERTENSION:  Continued amlodipine for blood pressure.  Continued valsartan for blood pressure.    HYPERLIPIDEMIA:  Continued atorvastatin for cholesterol.    ANEMIA:  Monitor blood count and iron studies.    LOW WEIGHT:  Weight stable for the last three months  Changed mirtazapine from two 7.5mg tablets to one 15mg tablet daily, to be taken in the evening for appetite and sleep.    CAVITARY LESION OF LUNG:  Encouraged patient to schedule previously order CT scan of lungs    FOLLOW-UP:  Follow up in one  year.           1. Essential hypertension    2. Dyslipidemia  Overview:  Lab Results   Component Value Date    CHOL 138 07/23/2024    CHOL 133 01/19/2024    CHOL 134 02/08/2023     Lab Results   Component Value Date    HDL 52 07/23/2024    HDL 52 01/19/2024    HDL 54 02/08/2023     Lab Results   Component Value Date    LDLCALC 74.6 07/23/2024    LDLCALC 69.6 01/19/2024    LDLCALC 70.4 02/08/2023     Lab Results   Component Value Date    TRIG 57 07/23/2024    TRIG 57 01/19/2024    TRIG 48 02/08/2023     Lab Results   Component Value Date    CHOLHDL 37.7 07/23/2024    CHOLHDL 39.1 01/19/2024    CHOLHDL 40.3 02/08/2023        The ASCVD Risk score (dAriano GILBERT, et al., 2019) failed to calculate for the following reasons:    Risk score cannot be calculated because patient has a medical history suggesting prior/existing ASCVD        3. Iron deficiency anemia, unspecified iron deficiency anemia type  Overview:  Lab Results   Component Value Date    HGB 13.3 (L) 01/19/2024    HGB 12.5 (L) 09/14/2023    HGB 12.9 (L) 02/15/2023     Lab Results   Component Value Date    IRON 51 01/19/2024    IRON 35 (L) 02/15/2023    IRON 105 02/03/2022      Lab Results   Component Value Date    FERRITIN 63 01/19/2024    FERRITIN 55 02/15/2023    FERRITIN 83 02/03/2022         Orders:  -     Iron and TIBC; Future; Expected date: 12/19/2024  -     Ferritin; Future; Expected date: 12/19/2024  -     CBC Auto Differential; Future; Expected date: 12/19/2024    4. Low weight  -     mirtazapine (REMERON) 15 MG tablet; Take 1 tablet (15 mg total) by mouth every evening.  Dispense: 30 tablet; Refill: 11    5. Cavitary lesion of lung  Overview:  Ct 5/18 with rul cavitary lesion and julius nodule. Stable 11/18 ct.  \    CT 2/23/2022: FINDINGS:  Lungs: The largest opacity in the right lung appears to be a partially cavitated solid mass and measures 5.0 AP x 3.3 TV-cm on series 4, image 90 (decrease in size since 2018 and previously 5.4 x 3.4-cm on most recent  chest CT).  The largest opacity in the left lung appears subsolid and measures 0.7-cm on series 4, image 297, not significantly changed since 2018.  The lungs show moderate biapical findings consistent with emphysema, unchanged.      6. Need for vaccination  -     influenza (adjuvanted) (Fluad) 45 mcg/0.5 mL IM vaccine (> or = 66 yo) 0.5 mL             -Juanito Dejesus Jr., MD, AAHIVS      This note was generated with the assistance of ambient listening technology. Verbal consent was obtained by the patient and accompanying visitor(s) for the recording of patient appointment to facilitate this note. I attest to having reviewed and edited the generated note for accuracy, though some syntax or spelling errors may persist. Please contact the author of this note for any clarification.      There are no Patient Instructions on file for this visit.      Follow up in about 6 months (around 6/19/2025) for Annual, HTN, HLD, Low Weight.   Future Appointments   Date Time Provider Department Center   6/19/2025  9:20 AM Juanito Dejesus Jr., MD [facility] Kristina HOYOS

## 2025-03-25 ENCOUNTER — TELEPHONE (OUTPATIENT)
Dept: FAMILY MEDICINE | Facility: CLINIC | Age: 80
End: 2025-03-25
Payer: COMMERCIAL

## 2025-03-25 NOTE — TELEPHONE ENCOUNTER
----- Message from Med Assistant Robb sent at 3/25/2025 10:16 AM CDT -----  Type: Patient Call BackWho [facility] the [facility]:is asking if the provider is  Keri approved in [place].. Can the clinic reply by [facility]?NOWould the patient rather a call back or a response via [facility]? Yes, call Best call back number: [phone]

## 2025-03-25 NOTE — TELEPHONE ENCOUNTER
----- Message from Med Assistant Robb sent at 3/25/2025 10:16 AM CDT -----  Type: Patient Call BackWho [facility] the [facility] in [facility]:is asking if the provider is  Keri approved in [place].. Can the clinic reply by [facility]?NOWould the patient rather a call back or a response via [facility]? Yes, call Best call back number: [phone]

## 2025-04-08 ENCOUNTER — OFFICE VISIT (OUTPATIENT)
Dept: FAMILY MEDICINE | Facility: CLINIC | Age: 80
End: 2025-04-08
Payer: COMMERCIAL

## 2025-04-08 VITALS
HEIGHT: 67 IN | TEMPERATURE: 98 F | DIASTOLIC BLOOD PRESSURE: 72 MMHG | OXYGEN SATURATION: 99 % | BODY MASS INDEX: 16.29 KG/M2 | HEART RATE: 74 BPM | SYSTOLIC BLOOD PRESSURE: 110 MMHG | WEIGHT: 103.81 LBS | RESPIRATION RATE: 18 BRPM

## 2025-04-08 DIAGNOSIS — E78.5 DYSLIPIDEMIA: Chronic | ICD-10-CM

## 2025-04-08 DIAGNOSIS — I10 ESSENTIAL HYPERTENSION: Chronic | ICD-10-CM

## 2025-04-08 DIAGNOSIS — I44.0 AV BLOCK, 1ST DEGREE: ICD-10-CM

## 2025-04-08 DIAGNOSIS — I69.30 HISTORY OF STROKE WITH RESIDUAL DEFICIT: Primary | Chronic | ICD-10-CM

## 2025-04-08 DIAGNOSIS — J98.4 CAVITARY LESION OF LUNG: ICD-10-CM

## 2025-04-08 PROCEDURE — 99999 PR PBB SHADOW E&M-EST. PATIENT-LVL V: CPT | Mod: PBBFAC,,, | Performed by: FAMILY MEDICINE

## 2025-04-08 NOTE — PATIENT INSTRUCTIONS
From the hospital they wanted you to follow up with infectious disease because of a spot on your lungs. It looks like they had called you from Dr. Christophe Velasco's office to schedule this. The information for that clinic is:  [facility]   [address] [place] [address]   [address]   [place] [id number]   [phone]

## 2025-04-09 NOTE — PROGRESS NOTES
"  Patient Name: Rashad Rodríguez    : 1945  MRN: [id number]    Transitional Care Note    Family and/or Caretaker present at visit?  Yes.  Diagnostic tests reviewed/disposition: I have reviewed all completed as well as pending diagnostic tests at the time of discharge.  Disease/illness education: YES  Home health/community services discussion/referrals: Patient has home health established at - will bring in name of facility .   Establishment or re-establishment of referral orders for community resources: No other necessary community resources.   Discussion with other health care providers: No discussion with other health care providers necessary.         Subjective:     Patient ID: Rashad is a 80 y.o. male    Chief Complaint:  hopsital follwo up    History of Present Illness    Rashad presents today for hospital follow up after a stroke.  He was admitted from 2025 through 2025 at [facility].    The patient is present with his brother.    He was hospitalized for the entire month of February due to a stroke, followed by a 3 week stay at a rehabilitation facility. He was discharged home 2 weeks ago. He reports persistent right-sided weakness and significant functional loss post-stroke.    He currently receives home physical therapy services and weekly nursing visits. Physical therapy and occupational therapy sessions are conducted separately.    He continues Brilinta and aspirin. He has discontinued Mirtazapine.  He states that his appetite as improved and no longer needs the mirtazapine    He has quit smoking completely, acknowledging that smoking was a contributing factor to his recent hospitalization.    His discharge summary reads as follows:  "Hospital Course:   78 yo presented with worsening R sided weakness.   Dr win performed a cerebral angiogram without intervention.   DAPT was switched to asa and brilinta as patient already taking plavix prior to admission. " "  Hospital course complicated by first degree AV block. Cardiology consulted. Patient should avoid av xavier blocking agents and should have an OP ischemic eval. He had a peg tube placed prior to discharge. He has a cavitary lung lesion and needs outpatient pulm and ID follow up.     Discharge Diagnoses:   L cerebellar stroke, acute   Acute metabolic encephalopathy   Chronic R vertebral occlusion   Aspiration   Htn   R upper lobe cavitary lung lesion   History of COPD  Severe protein calorie malnutrition "    Overall he reports that he is feeling better.  He continues to have right-sided weakness since this more recent stroke.          Review of Systems   Constitutional:  Negative for chills, diaphoresis, fever and night sweats.   Respiratory:  Negative for shortness of breath.    Cardiovascular:  Negative for chest pain and palpitations.   Gastrointestinal:  Negative for abdominal pain, blood in stool and change in bowel habit.   Genitourinary:  Negative for dysuria, frequency and hematuria.   Musculoskeletal:  Negative for myalgias and neck pain.   Neurological:  Positive for weakness. Negative for tremors, syncope and headaches.        Objective:   /72 (BP Location: Left arm, Patient Position: Sitting)   Pulse 74   Temp 97.8 °F (36.6 °C) (Oral)   Resp 18   Ht 5' 7" (1.702 m)   Wt 47.1 kg (103 lb 13.4 oz)   SpO2 99%   BMI 16.26 kg/m²     Physical Exam  Vitals reviewed.   Constitutional:       Appearance: He is well-developed. He is not diaphoretic.      Comments: Sitting in a wheelchair   HENT:      Head: Normocephalic.      Right Ear: External ear normal.      Left Ear: External ear normal.      Nose: Nose normal.      Mouth/Throat:      Pharynx: No oropharyngeal exudate.   Neck:      Trachea: No tracheal deviation.   Cardiovascular:      Rate and Rhythm: Normal rate and regular rhythm.      Heart sounds: Normal heart sounds.   Pulmonary:      Effort: Pulmonary effort is normal.      Breath sounds: " Normal breath sounds. No wheezing or rales.   Abdominal:      General: Bowel sounds are normal.      Palpations: Abdomen is soft. Abdomen is not rigid. There is no mass.      Tenderness: There is no abdominal tenderness. There is no guarding or rebound.   Musculoskeletal:      Cervical back: Normal range of motion and neck supple.   Lymphadenopathy:      Cervical: No cervical adenopathy.   Neurological:      Mental Status: He is alert and oriented to person, place, and time.      Motor: Weakness (Right upper and lower extremities) present.        Assessment        ICD-10-CM ICD-9-CM   1. History of stroke with residual deficit  I69.30 438.9   2. AV block, 1st degree  I44.0 426.11   3. Cavitary lesion of lung  J98.4 518.89   4. Essential hypertension  I10 401.9   5. Dyslipidemia  E78.5 272.4         Plan:   Assessment & Plan      1. History of stroke with residual deficit  Overview:  10/2020- Left lacunar stroke  07/2022- Right Upper Basal Ganglia  02/07/2025- Left cerebellar stroke  Patient to continue with Brilinta and aspirin differently.    Strongly recommend visit to continue with smoking cessation.    Patient to follow up with Neurology as scheduled.    2. AV block, 1st degree  -     Ambulatory referral/consult to Cardiology; Future; Expected date: 04/15/2025  Referral to cardiology placed as appointment had not been scheduled for patient.      3. Cavitary lesion of lung  Overview:  Ct 5/18 with rul cavitary lesion and julius nodule. Stable 11/18 ct.  \    CT 2/23/2022: FINDINGS:  Lungs: The largest opacity in the right lung appears to be a partially cavitated solid mass and measures 5.0 AP x 3.3 TV-cm on series 4, image 90 (decrease in size since 2018 and previously 5.4 x 3.4-cm on most recent chest CT).  The largest opacity in the left lung appears subsolid and measures 0.7-cm on series 4, image 297, not significantly changed since 2018.  The lungs show moderate biapical findings consistent with emphysema,  unchanged.  Patient will make appointment with recommended infectious disease doctor.    However reviewed with patient that he has had this cavitary lesion since at least 2018 and appears unchanged.      4. Essential hypertension  Blood pressure is at goal.    Continue current blood pressure regimen.      5. Dyslipidemia  Patient to continue atorvastatin 40 milligrams daily.               -Juanito Dejesus Jr., MD, AAHIVS      This note was generated with the assistance of ambient listening technology. Verbal consent was obtained by the patient and accompanying visitor(s) for the recording of patient appointment to facilitate this note. I attest to having reviewed and edited the generated note for accuracy, though some syntax or spelling errors may persist. Please contact the author of this note for any clarification.      Patient Instructions   From the hospital they wanted you to follow up with infectious disease because of a spot on your lungs. It looks like they had called you from Dr. Christophe Velasco's office to schedule this. The information for that clinic is:  [place] [place] [facility]   [address] [place] [address]   [address]   [place] [address]   [phone]       No follow-ups on file.   Future Appointments   Date Time Provider Department Center   4/16/2025 10:20 AM Jeremy Benson MD [facility] CARDIO [place]   6/19/2025  9:20 AM Juanito Dejesus Jr., MD [facility]

## 2025-04-11 ENCOUNTER — TELEPHONE (OUTPATIENT)
Dept: CARDIOLOGY | Facility: CLINIC | Age: 80
End: 2025-04-11
Payer: COMMERCIAL

## 2025-04-16 ENCOUNTER — OFFICE VISIT (OUTPATIENT)
Dept: CARDIOLOGY | Facility: CLINIC | Age: 80
End: 2025-04-16
Payer: COMMERCIAL

## 2025-04-16 VITALS
BODY MASS INDEX: 15.61 KG/M2 | HEIGHT: 68 IN | SYSTOLIC BLOOD PRESSURE: 100 MMHG | HEART RATE: 59 BPM | RESPIRATION RATE: 17 BRPM | WEIGHT: 103 LBS | DIASTOLIC BLOOD PRESSURE: 61 MMHG | OXYGEN SATURATION: 100 %

## 2025-04-16 DIAGNOSIS — I10 ESSENTIAL HYPERTENSION: Chronic | ICD-10-CM

## 2025-04-16 DIAGNOSIS — I44.0 AV BLOCK, 1ST DEGREE: Primary | ICD-10-CM

## 2025-04-16 DIAGNOSIS — E78.5 DYSLIPIDEMIA: Chronic | ICD-10-CM

## 2025-04-16 LAB
OHS QRS DURATION: 68 MS
OHS QTC CALCULATION: 434 MS

## 2025-04-16 PROCEDURE — 3074F SYST BP LT 130 MM HG: CPT | Mod: CPTII,S$GLB,, | Performed by: INTERNAL MEDICINE

## 2025-04-16 PROCEDURE — 99999 PR PBB SHADOW E&M-EST. PATIENT-LVL V: CPT | Mod: PBBFAC,,, | Performed by: INTERNAL MEDICINE

## 2025-04-16 PROCEDURE — 1101F PT FALLS ASSESS-DOCD LE1/YR: CPT | Mod: CPTII,S$GLB,, | Performed by: INTERNAL MEDICINE

## 2025-04-16 PROCEDURE — 3288F FALL RISK ASSESSMENT DOCD: CPT | Mod: CPTII,S$GLB,, | Performed by: INTERNAL MEDICINE

## 2025-04-16 PROCEDURE — 99203 OFFICE O/P NEW LOW 30 MIN: CPT | Mod: S$GLB,,, | Performed by: INTERNAL MEDICINE

## 2025-04-16 PROCEDURE — 93000 ELECTROCARDIOGRAM COMPLETE: CPT | Mod: S$GLB,,, | Performed by: INTERNAL MEDICINE

## 2025-04-16 PROCEDURE — 3078F DIAST BP <80 MM HG: CPT | Mod: CPTII,S$GLB,, | Performed by: INTERNAL MEDICINE

## 2025-04-16 PROCEDURE — 1126F AMNT PAIN NOTED NONE PRSNT: CPT | Mod: CPTII,S$GLB,, | Performed by: INTERNAL MEDICINE

## 2025-04-16 PROCEDURE — 1159F MED LIST DOCD IN RCRD: CPT | Mod: CPTII,S$GLB,, | Performed by: INTERNAL MEDICINE

## 2025-04-16 NOTE — PROGRESS NOTES
CARDIOLOGY CLINIC VISIT        HISTORY OF PRESENT ILLNESS:     04/16/2025: Rashad Rodríguez presents for cardiovascular evaluation. History of stroke in 2/2025. Seen at [place]. Residual right sided weakness. States he was in the hospital for about a month and then rehab. He had an EKG that showed second degree AVB, Wenckebach. Possible old septal infarction. EKG today shows sinus rhythm with first degree AVB. Delayed r wave progression. He denies chest pain or shortness of breath. No palpitations.  Echocardiogram during that hospitalization showed ejection fraction of 60-65%.  Negative bubble study.  MRI brain showed few small foci of acute ischemia in the left cerebellum in the PICA territory.  Remote ischemia in the right PICA territory, right basal ganglia, right coronal radiata and left thalamus.    CARDIOVASCULAR HISTORY:     Stroke    PAST MEDICAL HISTORY:     Past Medical History:   Diagnosis Date    Cigarette smoker     Hyperlipidemia     Hypertension     Stroke 10/01/2020       PAST SURGICAL HISTORY:     Past Surgical History:   Procedure Laterality Date    NO PAST SURGERIES         ALLERGIES AND MEDICATION:     Review of patient's allergies indicates:   Allergen Reactions    Cephalexin     Ciprofloxacin     Doxycycline Other (See Comments)     Stomach cramps    Bactrim [sulfamethoxazole-trimethoprim] Rash        Medication List            Accurate as of April 16, 2025 12:28 PM. If you have any questions, ask your nurse or doctor.                CONTINUE taking these medications      amLODIPine 5 MG tablet  Commonly known as: NORVASC  Take 1 tablet (5 mg total) by mouth once daily.     aspirin 81 MG EC tablet  Commonly known as: ECOTRIN  Take 1 tablet (81 mg total) by mouth once daily.     atorvastatin 40 MG tablet  Commonly known as: LIPITOR  Take 1 tablet (40 mg total) by mouth once daily.     BRILINTA 90 mg tablet  Generic drug: ticagrelor  Take 1 tablet (90 mg total) by mouth 2 (two) times  daily.     esomeprazole 40 MG capsule  Commonly known as: NEXIUM     ferrous sulfate 325 mg (65 mg iron) Tab tablet  Commonly known as: IRON  Take 1 tablet (325 mg total) by mouth daily with breakfast.     sildenafiL 100 MG tablet  Commonly known as: VIAGRA  TAKE 1 TABLET BY MOUTH 1 HOUR PRIOR TO SEX. MAX OF 1 TABLET IN 24 HOURS     valsartan 160 MG tablet  Commonly known as: DIOVAN  Take 1 tablet (160 mg total) by mouth once daily.              SOCIAL HISTORY:   Social History[1]    FAMILY HISTORY:     Family History   Problem Relation Name Age of Onset    Cancer Mother      Stroke Father      No Known Problems Sister      No Known Problems Brother      No Known Problems Maternal Aunt      No Known Problems Maternal Uncle      No Known Problems Paternal Aunt      No Known Problems Paternal Uncle      No Known Problems Maternal Grandmother      No Known Problems Maternal Grandfather      No Known Problems Paternal Grandmother      No Known Problems Paternal Grandfather      Amblyopia Neg Hx      Blindness Neg Hx      Cataracts Neg Hx      Diabetes Neg Hx      Glaucoma Neg Hx      Hypertension Neg Hx      Macular degeneration Neg Hx      Retinal detachment Neg Hx      Strabismus Neg Hx      Thyroid disease Neg Hx         REVIEW OF SYSTEMS:   Review of Systems   Constitutional:  Positive for weight loss. Negative for chills, diaphoresis, fever and malaise/fatigue.   HENT:  Negative for congestion, hearing loss, sinus pain, sore throat and tinnitus.    Eyes:  Negative for blurred vision, double vision, photophobia and pain.   Respiratory:  Negative for cough, hemoptysis, sputum production, shortness of breath, wheezing and stridor.    Cardiovascular:  Negative for chest pain, palpitations, orthopnea, claudication, leg swelling and PND.   Gastrointestinal:  Negative for abdominal pain, blood in stool, heartburn, melena, nausea and vomiting.   Musculoskeletal:  Negative for back pain, falls, joint pain, myalgias and neck  "pain.   Neurological:  Positive for weakness. Negative for dizziness, tingling, tremors, sensory change, speech change, focal weakness, seizures, loss of consciousness and headaches.   Endo/Heme/Allergies:  Does not bruise/bleed easily.   Psychiatric/Behavioral:  Negative for depression, memory loss and substance abuse. The patient is not nervous/anxious.        PHYSICAL EXAM:     Vitals:    04/16/25 1024   BP: 100/61   Pulse: (!) 59   Resp: 17    Body mass index is 15.66 kg/m².  Weight: 46.7 kg (103 lb)   Height: 5' 8" (172.7 cm)     Physical Exam  Vitals reviewed.   Constitutional:       General: He is not in acute distress.     Appearance: He is cachectic. He is not diaphoretic.   HENT:      Head: Normocephalic.   Eyes:      Extraocular Movements: Extraocular movements intact.   Neck:      Vascular: No carotid bruit or JVD.   Cardiovascular:      Rate and Rhythm: Normal rate and regular rhythm.      Pulses: Normal pulses.      Heart sounds: Normal heart sounds.   Pulmonary:      Effort: Pulmonary effort is normal.      Breath sounds: Normal breath sounds. No wheezing, rhonchi or rales.   Chest:      Chest wall: No tenderness.   Abdominal:      General: Bowel sounds are normal. There is no distension.      Palpations: Abdomen is soft.      Tenderness: There is no abdominal tenderness.      Comments: Peg tube   Musculoskeletal:      Right lower leg: No edema.      Left lower leg: No edema.   Skin:     General: Skin is warm and dry.      Coloration: Skin is not jaundiced or pale.      Findings: No erythema.   Neurological:      General: No focal deficit present.      Mental Status: He is alert and oriented to person, place, and time.      Motor: Weakness present.   Psychiatric:         Speech: Speech is slurred.         Behavior: Behavior normal.         Thought Content: Thought content normal.         DATA:   EKG: (personally reviewed tracing)  04/16/2025- sinus rhythm with first-degree AV block, delayed R-wave " progression  Results for orders placed or performed in visit on 04/16/25   IN OFFICE EKG 12-LEAD (to [place])    Collection Time: 04/16/25  3:27 AM   Result Value Ref Range    QRS Duration 68 ms    OHS QTC Calculation 434 ms    Narrative    Test Reason : I44.0,    Vent. Rate :  76 BPM     Atrial Rate :  76 BPM     P-R Int : 350 ms          QRS Dur :  68 ms      QT Int : 386 ms       P-R-T Axes :  77  77  72 degrees    QTcB Int : 434 ms    Sinus rhythm with 1st degree A-V block  Poor R-wave progression ; consider anteroseptal infarct, lead placement, or  normal variant  Abnormal ECG  When compared with ECG of 07-Jul-2022 12:07,  T wave inversion now evident in Anterior leads  Confirmed by Amadou Pedro ([id number]) on 4/16/2025 12:16:28 PM    Referred By: ARI HARTMAN           Confirmed By: Amadou Pedro        Laboratory:  CBC:  Recent Labs   Lab 01/19/24  0914 07/23/24  0833 12/19/24  1356 02/08/25  1336   WBC 9.74 8.46 8.54 0-5   Hemoglobin 13.3 L 13.2 L 14.5  --    Hematocrit 40.1 40.6 45.8  --    Platelets 301 261 322  --        CHEMISTRIES:  Recent Labs   Lab 07/07/22  1200 07/08/22  0442 07/09/22  0342 08/05/22  0816 09/14/23  0705 01/19/24  0914 07/23/24  0833 02/06/25  1734 02/24/25  0546 02/27/25  0514 03/03/25  1543   Glucose 87 78 90   < > 91 95 89  --   --   --   --    Sodium 141 140 141   < > 143 140 142   < > 138 138 139   Potassium 4.3 4.0 3.8   < > 4.0 4.0 4.3   < > 4.9 4.4 4.1   BUN 11 14 19   < > 13 20 17   < > 28.8 H 22.7 21.6   Creatinine 1.1 0.9 1.0   < > 1.0 1.1 1.0   < > 0.90 0.79 0.66 L   eGFR if  >60 >60 >60  --   --   --   --   --   --   --   --    eGFR if non African American >60 >60 >60  --   --   --   --   --   --   --   --    Calcium 10.2 9.4 9.4   < > 9.2 9.4 9.8   < > 9.0 8.6 8.3 L   Magnesium  --  2.0  --   --   --   --   --   --   --   --   --     < > = values in this interval not displayed.       CARDIAC BIOMARKERS:  Recent Labs   Lab 07/08/22  0442 02/06/25  1734    H   --    CK  --  96   CPK MB 4.3  --        COAGS:  Recent Labs   Lab 07/07/22  1200 07/08/22  0442 02/06/25  1734   INR 1.0 1.0 1.0       LIPIDS/LFTS:  Recent Labs   Lab 01/19/24  0914 07/23/24  0833 02/06/25  1734 02/06/25  1734 02/14/25  0614 02/16/25  0846 02/17/25  0900   Cholesterol 133 138 149  --   --   --   --    Triglycerides 57 57 102  --   --   --   --    HDL 52 52 67 H  --   --   --   --    LDL Cholesterol 69.6 74.6  --   --   --   --   --    LDL Calculated  --   --  62  --   --   --   --    Non-HDL Cholesterol 81 86 82  --   --   --   --    AST 27 27 21   < > 41 24 42   ALT 29 33 21   < > 24 13 26    < > = values in this interval not displayed.       Hemoglobin A1C   Date Value Ref Range Status   02/06/2025 5.8 (H) 4.7 - 5.6 % Final   07/07/2022 5.6 4.0 - 5.6 % Final     Comment:     ADA Screening Guidelines:  5.7-6.4%  Consistent with prediabetes  >or=6.5%  Consistent with diabetes    High levels of fetal hemoglobin interfere with the HbA1C  assay. Heterozygous hemoglobin variants (HbS, HgC, etc)do  not significantly interfere with this assay.   However, presence of multiple variants may affect accuracy.     10/02/2020 5.7 (H) 4.0 - 5.6 % Final     Comment:     ADA Screening Guidelines:  5.7-6.4%  Consistent with prediabetes  >or=6.5%  Consistent with diabetes  High levels of fetal hemoglobin interfere with the HbA1C  assay. Heterozygous hemoglobin variants (HbS, HgC, etc)do  not significantly interfere with this assay.   However, presence of multiple variants may affect accuracy.     07/19/2011 6.3 (H) 4.0 - 6.2 % Final        The ASCVD Risk score (Adriano GILBERT, et al., 2019) failed to calculate for the following reasons:    Risk score cannot be calculated because patient has a medical history suggesting prior/existing ASCVD      Cardiovascular Testing:    Echocardiogram 02/07/2025:      CONCLUSIONS     Normal left ventricular systolic function.     Left ventricular ejection fraction is estimated at  60-65%.     Rhythm precludes evaluation of diastolic function.     Normal right ventricular systolic function.     RVSP could not be calculated due to incomplete tricuspid regurgitation velocity profile.     Aortic valve sclerosis.     Trace to mild tricuspid valve regurgitation.     A bubble study was performed and appeared negative at rest and with valsalva.     Echocardiogram 07/08/2022:    There is no evidence of intracardiac shunting.  The left ventricle is normal in size with concentric remodeling and normal systolic function.  The estimated ejection fraction is 60%.  Normal left ventricular diastolic function.  Normal right ventricular size with normal right ventricular systolic function.  Mild tricuspid regurgitation.  Normal central venous pressure (3 mmHg).  The estimated PA systolic pressure is 27 mmHg.  There is no pulmonary hypertension.    ASSESSMENT:     Abnormal EKG  Hypertension  Dyslipidemia: LDL 74 atorvastatin 40  Stroke    PLAN:     Abnormal EKG/stroke:  Event monitor.  Hypertension: Continue current management.  Dyslipidemia: Continue current management.  Return to clinic 2 months.           Jeremy Benson MD, MPH, FACC, [facility]         [1]   Social History  Socioeconomic History    Marital status:    Tobacco Use    Smoking status: Every Day     Current packs/day: 0.30     Average packs/day: 0.3 packs/day for 52.7 years (15.8 ttl pk-yrs)     Types: Cigarettes     Start date: 8/1/1972    Smokeless tobacco: Never   Substance and Sexual Activity    Alcohol use: Yes     Alcohol/week: 2.0 standard drinks of alcohol     Types: 2 Cans of beer per week     Comment: socially    Drug use: No    Sexual activity: Yes     Partners: Female     Social Drivers of Health     Food Insecurity: No Food Insecurity (2/18/2025)    Received from [facility]    Hunger Vital Sign     Worried About Running Out of Food in the Last Year: Never true     Ran Out of Food in the Last Year: Never true   Transportation  Needs: No Transportation Needs (2/18/2025)    Received from [facility]    PRAPARE - Transportation     Lack of Transportation (Medical): No     Lack of Transportation (Non-Medical): No   Housing Stability: High Risk (2/18/2025)    Received from [facility]    Housing Stability Vital Sign     Unable to Pay for Housing in the Last Year: No     Number of Times Moved in the Last Year: 2     Homeless in the Last Year: No

## 2025-06-02 ENCOUNTER — TELEPHONE (OUTPATIENT)
Dept: CARDIOLOGY | Facility: HOSPITAL | Age: 80
End: 2025-06-02
Payer: COMMERCIAL

## 2025-06-03 ENCOUNTER — CLINICAL SUPPORT (OUTPATIENT)
Dept: CARDIOLOGY | Facility: HOSPITAL | Age: 80
End: 2025-06-03
Attending: INTERNAL MEDICINE
Payer: COMMERCIAL

## 2025-06-03 DIAGNOSIS — I44.0 AV BLOCK, 1ST DEGREE: ICD-10-CM

## 2025-07-10 ENCOUNTER — DOCUMENTATION ONLY (OUTPATIENT)
Dept: CARDIOLOGY | Facility: HOSPITAL | Age: 80
End: 2025-07-10
Payer: COMMERCIAL

## 2025-07-10 NOTE — PROGRESS NOTES
According to [employer], the patient was sent a 30-day event monitor by mail; however, the testing was not conducted.

## 2025-07-24 ENCOUNTER — TELEPHONE (OUTPATIENT)
Dept: FAMILY MEDICINE | Facility: CLINIC | Age: 80
End: 2025-07-24
Payer: COMMERCIAL

## 2025-07-24 DIAGNOSIS — I10 ESSENTIAL HYPERTENSION: Chronic | ICD-10-CM

## 2025-07-24 NOTE — TELEPHONE ENCOUNTER
Copied from CRM #[id number]. Topic: Medications - Medication Refill  >> Jul 24, 2025 11:39 AM Chester Merida wrote:  Type:  RX Refill Request    Who Called: self    Refill or New Rx:refill    RX Name and Strength:amLODIPine (NORVASC) 5 MG tablet, valsartan (DIOVAN) 160 MG tablet and BRILINTA 90 mg tablet    Preferred Pharmacy with phone number:.  [id number] [place]  [facility] [address] AT [facility] [address]  [facility]  [place] [address]  Phone: [phone] Fax: [phone]    Local or Mail Order:local     Ordering Provider:MICHELLE Dejesus    Would the patient rather a call back or a response via [facility]? No    Best Call Back Number:.[phone]    Additional Information:

## 2025-07-24 NOTE — TELEPHONE ENCOUNTER
Care Due:                  Date            Visit Type   Department     Provider  --------------------------------------------------------------------------------                                             [facility]     MEDICINE /  Last Visit: 04-      FOLLOW UP    INTERNAL MED   Juanito Dejesus  Next Visit: None Scheduled  None         None Found                                                            Last  Test          Frequency    Reason                     Performed    Due Date  --------------------------------------------------------------------------------    CMP.........  12 months..  atorvastatin, valsartan..  07- 07-    Lipid Panel.  12 months..  atorvastatin.............  07- 07-    [employer] Embedded Care Due Messages. Reference number: [id number].   7/24/2025 11:52:01 AM CDT

## 2025-07-25 RX ORDER — VALSARTAN 160 MG/1
160 TABLET ORAL DAILY
Qty: 90 TABLET | Refills: 0 | Status: SHIPPED | OUTPATIENT
Start: 2025-07-25

## 2025-07-25 RX ORDER — AMLODIPINE BESYLATE 5 MG/1
5 TABLET ORAL DAILY
Qty: 90 TABLET | Refills: 0 | Status: SHIPPED | OUTPATIENT
Start: 2025-07-25